# Patient Record
Sex: MALE | Race: WHITE | NOT HISPANIC OR LATINO | Employment: FULL TIME | URBAN - METROPOLITAN AREA
[De-identification: names, ages, dates, MRNs, and addresses within clinical notes are randomized per-mention and may not be internally consistent; named-entity substitution may affect disease eponyms.]

---

## 2017-04-04 RX ORDER — WARFARIN SODIUM 2 MG/1
TABLET ORAL
Qty: 120 TABLET | Refills: 0 | Status: SHIPPED | OUTPATIENT
Start: 2017-04-04 | End: 2017-04-30 | Stop reason: SDUPTHER

## 2017-05-01 RX ORDER — WARFARIN SODIUM 2 MG/1
TABLET ORAL
Qty: 120 TABLET | Refills: 0 | Status: SHIPPED | OUTPATIENT
Start: 2017-05-01 | End: 2017-06-05 | Stop reason: SDUPTHER

## 2017-06-06 RX ORDER — WARFARIN SODIUM 2 MG/1
TABLET ORAL
Qty: 120 TABLET | Refills: 0 | Status: SHIPPED | OUTPATIENT
Start: 2017-06-06 | End: 2017-07-04 | Stop reason: SDUPTHER

## 2017-07-05 RX ORDER — WARFARIN SODIUM 2 MG/1
TABLET ORAL
Qty: 120 TABLET | Refills: 0 | Status: SHIPPED | OUTPATIENT
Start: 2017-07-05 | End: 2017-08-02 | Stop reason: SDUPTHER

## 2017-07-17 RX ORDER — LISINOPRIL 20 MG/1
TABLET ORAL
Qty: 90 TABLET | Refills: 0 | Status: SHIPPED | OUTPATIENT
Start: 2017-07-17 | End: 2017-10-12 | Stop reason: SDUPTHER

## 2017-07-27 ENCOUNTER — OFFICE VISIT (OUTPATIENT)
Dept: CARDIOLOGY | Facility: CLINIC | Age: 37
End: 2017-07-27

## 2017-07-27 VITALS
WEIGHT: 275.2 LBS | BODY MASS INDEX: 38.53 KG/M2 | HEIGHT: 71 IN | HEART RATE: 58 BPM | SYSTOLIC BLOOD PRESSURE: 104 MMHG | DIASTOLIC BLOOD PRESSURE: 60 MMHG

## 2017-07-27 DIAGNOSIS — I42.8 NONISCHEMIC CARDIOMYOPATHY (HCC): ICD-10-CM

## 2017-07-27 DIAGNOSIS — Q23.1 AORTIC INSUFFICIENCY DUE TO BICUSPID AORTIC VALVE: Primary | ICD-10-CM

## 2017-07-27 DIAGNOSIS — I10 ESSENTIAL HYPERTENSION: ICD-10-CM

## 2017-07-27 DIAGNOSIS — Z95.2 HISTORY OF MECHANICAL AORTIC VALVE REPLACEMENT: ICD-10-CM

## 2017-07-27 PROBLEM — Q23.81 AORTIC INSUFFICIENCY DUE TO BICUSPID AORTIC VALVE: Status: ACTIVE | Noted: 2017-07-27

## 2017-07-27 PROCEDURE — 99213 OFFICE O/P EST LOW 20 MIN: CPT | Performed by: INTERNAL MEDICINE

## 2017-07-27 PROCEDURE — 93000 ELECTROCARDIOGRAM COMPLETE: CPT | Performed by: INTERNAL MEDICINE

## 2017-07-27 NOTE — PROGRESS NOTES
Date of Office Visit: 2017  Encounter Provider: Lorne Kam MD  Place of Service: [facility]  Patient Name: Man Ramsey  :1980    Chief Complaint   Patient presents with   • Cardiomyopathy     1 YR FOLLOW UP    :     HPI: Man Ramsey is a 37 y.o. male who presents today to followup. He has a history of a bicuspid aortic valve with severe aortic insufficiency which caused  nonischemic dilated cardiomyopathy. He underwent mechanical aortic valve replacement and had complete recovery of left ventricular systolic function. He has been maintained on  wararin and has had no cardiac issues. He denies dyspnea, chest pain, palpitations, syncope, or edema.        Past Medical History:   Diagnosis Date   • Aortic insufficiency due to bicuspid aortic valve     with severe AI, s/p 29mm ATS mechanical AVR 2012.  Echo has shown normal function but mild AI.   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  He recovered with oral steroids.  GIANNA was negative for endocarditis.   • Hypertension    • Nonischemic cardiomyopathy     due to AI, LVEF 40% with LVE 2012; improved to EF 57% in 2012 with mild LV dilation.  Echo 3/2014 with normal LV size and systolic function   • Obesity    • Ocular migraine        Past Surgical History:   Procedure Laterality Date   • AORTIC VALVE REPAIR/REPLACEMENT      replacement       Social History     Social History   • Marital status: Single     Spouse name: N/A   • Number of children: N/A   • Years of education: N/A     Occupational History   • Not on file.     Social History Main Topics   • Smoking status: Never Smoker   • Smokeless tobacco: Never Used      Comment: caffeine use/ 2-3 CUPS OF COFFEE DAILY    • Alcohol use 1.2 oz/week     1 Cans of beer, 1 Shots of liquor per week      Comment: EITHER OR DAILY, NOT BOTH.    • Drug use: No   • Sexual  "activity: Not on file     Other Topics Concern   • Not on file     Social History Narrative       History reviewed. No pertinent family history.    Review of Systems   HENT: Positive for nosebleeds.    All other systems reviewed and are negative.      No Known Allergies      Current Outpatient Prescriptions:   •  Ascorbic Acid (VITAMIN C PO), Take  by mouth Daily., Disp: , Rfl:   •  carvedilol (COREG) 12.5 MG tablet, TAKE ONE TABLET BY MOUTH TWICE DAILY, Disp: 180 tablet, Rfl: 3  •  Cholecalciferol (VITAMIN D) 1000 UNITS tablet, Take 2 tablets by mouth., Disp: , Rfl:   •  Cyanocobalamin (VITAMIN B 12 PO), Take  by mouth Daily., Disp: , Rfl:   •  lisinopril (PRINIVIL,ZESTRIL) 20 MG tablet, TAKE ONE TABLET BY MOUTH ONCE DAILY, Disp: 90 tablet, Rfl: 0  •  Melatonin 3 MG capsule, Take  by mouth., Disp: , Rfl:   •  Multiple Vitamin tablet, Take  by mouth daily., Disp: , Rfl:   •  warfarin (COUMADIN) 2 MG tablet, TAKE FOUR TABLETS BY MOUTH ONCE DAILY, Disp: 120 tablet, Rfl: 0     Objective:     Vitals:    07/27/17 1148   BP: 104/60   Pulse: 58   Weight: 275 lb 3.2 oz (125 kg)   Height: 71\" (180.3 cm)     Body mass index is 38.38 kg/(m^2).    Physical Exam   Constitutional: He is oriented to person, place, and time. He appears well-developed and well-nourished.   HENT:   Head: Normocephalic.   Nose: Nose normal.   Mouth/Throat: Oropharynx is clear and moist.   Eyes: Conjunctivae and EOM are normal. Pupils are equal, round, and reactive to light.   Neck: Normal range of motion. No JVD present.   Cardiovascular: Normal rate, regular rhythm, normal heart sounds and intact distal pulses.    No murmur heard.  Mechanical S2   Pulmonary/Chest: Effort normal and breath sounds normal.   Abdominal: Soft. He exhibits no mass. There is no tenderness.   Musculoskeletal: Normal range of motion. He exhibits no edema.   Lymphadenopathy:     He has no cervical adenopathy.   Neurological: He is alert and oriented to person, place, and time. " No cranial nerve deficit.   Skin: Skin is warm and dry. No rash noted.   Psychiatric: He has a normal mood and affect. His behavior is normal. Judgment and thought content normal.   Vitals reviewed.        ECG 12 Lead  Date/Time: 7/27/2017 4:45 PM  Performed by: LORNE KAM  Authorized by: LORNE KAM   Comparison: compared with previous ECG   Similar to previous ECG  Rhythm: sinus rhythm  Conduction: conduction normal  ST Segments: ST segments normal  T Waves: T waves normal  QRS axis: normal  Other: no other findings  Clinical impression: normal ECG              Assessment:       Diagnosis Plan   1. Aortic insufficiency due to bicuspid aortic valve     2. History of mechanical aortic valve replacement     3. Essential hypertension     4. Nonischemic cardiomyopathy            Plan:       He is status post mechanical aortic valve replacement for severe bicuspid aortic insufficiency.  He had nonischemic dilated cardiopathy which resolved.  I will repeat an echocardiogram in 2019, which will be 5 years since his last study.  His blood pressure is well-controlled.  We follow his INR in clinic.    As always, it has been a pleasure to participate in your patient's care.      Sincerely,       Lorne Kam MD

## 2017-08-02 RX ORDER — WARFARIN SODIUM 2 MG/1
TABLET ORAL
Qty: 360 TABLET | Refills: 0 | Status: SHIPPED | OUTPATIENT
Start: 2017-08-02 | End: 2017-11-01 | Stop reason: SDUPTHER

## 2017-08-14 RX ORDER — CARVEDILOL 12.5 MG/1
TABLET ORAL
Qty: 180 TABLET | Refills: 3 | Status: SHIPPED | OUTPATIENT
Start: 2017-08-14 | End: 2018-07-27 | Stop reason: SDUPTHER

## 2017-09-15 ENCOUNTER — TELEPHONE (OUTPATIENT)
Dept: CARDIOLOGY | Facility: HOSPITAL | Age: 37
End: 2017-09-15

## 2017-10-12 RX ORDER — LISINOPRIL 20 MG/1
TABLET ORAL
Qty: 90 TABLET | Refills: 1 | Status: SHIPPED | OUTPATIENT
Start: 2017-10-12 | End: 2018-04-21 | Stop reason: SDUPTHER

## 2017-11-01 RX ORDER — WARFARIN SODIUM 2 MG/1
TABLET ORAL
Qty: 360 TABLET | Refills: 0 | Status: SHIPPED | OUTPATIENT
Start: 2017-11-01 | End: 2018-02-06 | Stop reason: SDUPTHER

## 2017-11-09 ENCOUNTER — TELEPHONE (OUTPATIENT)
Dept: CARDIOLOGY | Facility: HOSPITAL | Age: 37
End: 2017-11-09

## 2018-02-06 RX ORDER — WARFARIN SODIUM 2 MG/1
TABLET ORAL
Qty: 360 TABLET | Refills: 0 | Status: SHIPPED | OUTPATIENT
Start: 2018-02-06 | End: 2018-05-02 | Stop reason: SDUPTHER

## 2018-02-08 ENCOUNTER — TELEPHONE (OUTPATIENT)
Dept: CARDIOLOGY | Facility: HOSPITAL | Age: 38
End: 2018-02-08

## 2018-04-23 RX ORDER — LISINOPRIL 20 MG/1
TABLET ORAL
Qty: 90 TABLET | Refills: 1 | Status: SHIPPED | OUTPATIENT
Start: 2018-04-23 | End: 2018-10-25 | Stop reason: SDUPTHER

## 2018-05-03 ENCOUNTER — TELEPHONE (OUTPATIENT)
Dept: CARDIOLOGY | Facility: CLINIC | Age: 38
End: 2018-05-03

## 2018-05-03 RX ORDER — WARFARIN SODIUM 2 MG/1
TABLET ORAL
Qty: 360 TABLET | Refills: 0 | Status: SHIPPED | OUTPATIENT
Start: 2018-05-03 | End: 2018-07-30 | Stop reason: SDUPTHER

## 2018-05-03 NOTE — TELEPHONE ENCOUNTER
Received a refill request for pt's Warfarin.  Pt's last check was 04/05/18 to recheck in 2 wks.      I called pt as a reminder to have his INR checked.  Pt reports that he checked it last night but for forgot to call it in.  Pt states he will call it in today when he gets home.

## 2018-07-23 NOTE — PROGRESS NOTES
Date of Office Visit: 2018  Encounter Provider: Lorne Kam MD  Place of Service: [facility]  Patient Name: Man Ramsey  :1980    Chief Complaint   Patient presents with   • Cardiac Valve Problem     1 yr follow up    :     HPI: Man Ramsey is a 38 y.o. male who presents today to followup. He has a history of a bicuspid aortic valve with severe aortic insufficiency which caused  nonischemic dilated cardiomyopathy. He underwent mechanical aortic valve replacement and had complete recovery of left ventricular systolic function. He has been maintained on warfarin and has had no cardiac issues. He denies dyspnea, chest pain, syncope, or edema.  He has isolated palpitations which are mild and infrequent.    Past Medical History:   Diagnosis Date   • Aortic insufficiency due to bicuspid aortic valve     with severe AI, s/p 29mm ATS mechanical AVR 2012.  Echo has shown normal function but mild AI.   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  He recovered with oral steroids.  GIANNA was negative for endocarditis.   • Hypertension    • Nonischemic cardiomyopathy (CMS/HCC)     due to AI, LVEF 40% with LVE 2012; improved to EF 57% in 2012 with mild LV dilation.  Echo 3/2014 with normal LV size and systolic function   • Obesity    • Ocular migraine        Past Surgical History:   Procedure Laterality Date   • AORTIC VALVE REPAIR/REPLACEMENT      replacement       Social History     Social History   • Marital status: Single     Spouse name: N/A   • Number of children: N/A   • Years of education: N/A     Occupational History   • Not on file.     Social History Main Topics   • Smoking status: Never Smoker   • Smokeless tobacco: Never Used      Comment: caffeine use/ 2-3 CUPS OF TEADAILY    • Alcohol use 1.2 oz/week     1 Cans of beer, 1 Shots of liquor per week      Comment: EITHER  "OR DAILY, NOT BOTH.    • Drug use: No   • Sexual activity: Not on file     Other Topics Concern   • Not on file     Social History Narrative   • No narrative on file       History reviewed. No pertinent family history.    Review of Systems   Cardiovascular: Positive for palpitations. Negative for leg swelling.   Respiratory: Negative for shortness of breath.    Neurological: Positive for headaches. Negative for dizziness and light-headedness.   All other systems reviewed and are negative.      No Known Allergies      Current Outpatient Prescriptions:   •  Ascorbic Acid (VITAMIN C PO), Take  by mouth Daily., Disp: , Rfl:   •  carvedilol (COREG) 12.5 MG tablet, TAKE ONE TABLET BY MOUTH TWICE DAILY, Disp: 180 tablet, Rfl: 3  •  Cholecalciferol (VITAMIN D) 1000 UNITS tablet, Take 2 tablets by mouth., Disp: , Rfl:   •  Cyanocobalamin (VITAMIN B 12 PO), Take  by mouth Daily., Disp: , Rfl:   •  lisinopril (PRINIVIL,ZESTRIL) 20 MG tablet, TAKE ONE TABLET BY MOUTH ONCE DAILY, Disp: 90 tablet, Rfl: 1  •  Melatonin 3 MG capsule, Take  by mouth., Disp: , Rfl:   •  Multiple Vitamin tablet, Take  by mouth daily., Disp: , Rfl:   •  warfarin (COUMADIN) 2 MG tablet, TAKE 4 TABLETS BY MOUTH ONCE DAILY AS DIRECTED, Disp: 360 tablet, Rfl: 0     Objective:     Vitals:    07/30/18 0930   BP: 112/66   BP Location: Left arm   Pulse: 67   Weight: 128 kg (281 lb 3.2 oz)   Height: 180.3 cm (71\")     Body mass index is 39.22 kg/m².    Physical Exam   Constitutional: He is oriented to person, place, and time. He appears well-developed and well-nourished.   HENT:   Head: Normocephalic.   Nose: Nose normal.   Mouth/Throat: Oropharynx is clear and moist.   Eyes: Pupils are equal, round, and reactive to light. Conjunctivae and EOM are normal.   Neck: Normal range of motion. No JVD present.   Cardiovascular: Normal rate, regular rhythm, normal heart sounds and intact distal pulses.    No murmur heard.  Mechanical S2   Pulmonary/Chest: Effort normal " and breath sounds normal.   Abdominal: Soft. He exhibits no mass. There is no tenderness.   Musculoskeletal: Normal range of motion. He exhibits no edema.   Lymphadenopathy:     He has no cervical adenopathy.   Neurological: He is alert and oriented to person, place, and time. No cranial nerve deficit.   Skin: Skin is warm and dry. No rash noted.   Psychiatric: He has a normal mood and affect. His behavior is normal. Judgment and thought content normal.   Vitals reviewed.        ECG 12 Lead  Date/Time: 7/30/2018 10:51 AM  Performed by: LORNE KAM  Authorized by: LORNE KAM   Comparison: compared with previous ECG   Similar to previous ECG  Rhythm: sinus rhythm  Conduction: conduction normal  ST Segments: ST segments normal  T Waves: T waves normal  QRS axis: normal  Other: no other findings  Clinical impression: normal ECG              Assessment:       Diagnosis Plan   1. Aortic insufficiency due to bicuspid aortic valve  Adult Transthoracic Echo Complete W/ Cont if Necessary Per Protocol   2. History of mechanical aortic valve replacement  Adult Transthoracic Echo Complete W/ Cont if Necessary Per Protocol   3. Nonischemic cardiomyopathy (CMS/HCC)  Adult Transthoracic Echo Complete W/ Cont if Necessary Per Protocol   4. Essential hypertension            Plan:       He is status post mechanical aortic valve replacement for severe bicuspid aortic insufficiency.  He had nonischemic dilated cardiopathy which resolved.  I will repeat an echocardiogram in 2019, which will be 5 years since his last study.  His blood pressure is well-controlled. His INR in well managed.     As always, it has been a pleasure to participate in your patient's care.      Sincerely,       Lorne Kam MD

## 2018-07-27 RX ORDER — CARVEDILOL 12.5 MG/1
TABLET ORAL
Qty: 180 TABLET | Refills: 3 | Status: SHIPPED | OUTPATIENT
Start: 2018-07-27 | End: 2019-08-19 | Stop reason: SDUPTHER

## 2018-07-30 ENCOUNTER — OFFICE VISIT (OUTPATIENT)
Dept: CARDIOLOGY | Facility: CLINIC | Age: 38
End: 2018-07-30

## 2018-07-30 VITALS
DIASTOLIC BLOOD PRESSURE: 66 MMHG | WEIGHT: 281.2 LBS | SYSTOLIC BLOOD PRESSURE: 112 MMHG | HEIGHT: 71 IN | HEART RATE: 67 BPM | BODY MASS INDEX: 39.37 KG/M2

## 2018-07-30 DIAGNOSIS — Z95.2 HISTORY OF MECHANICAL AORTIC VALVE REPLACEMENT: ICD-10-CM

## 2018-07-30 DIAGNOSIS — Q23.1 AORTIC INSUFFICIENCY DUE TO BICUSPID AORTIC VALVE: Primary | ICD-10-CM

## 2018-07-30 DIAGNOSIS — I42.8 NONISCHEMIC CARDIOMYOPATHY (HCC): ICD-10-CM

## 2018-07-30 DIAGNOSIS — I10 ESSENTIAL HYPERTENSION: ICD-10-CM

## 2018-07-30 PROCEDURE — 93000 ELECTROCARDIOGRAM COMPLETE: CPT | Performed by: INTERNAL MEDICINE

## 2018-07-30 PROCEDURE — 99213 OFFICE O/P EST LOW 20 MIN: CPT | Performed by: INTERNAL MEDICINE

## 2018-07-30 RX ORDER — WARFARIN SODIUM 2 MG/1
TABLET ORAL
Qty: 360 TABLET | Refills: 0 | Status: SHIPPED | OUTPATIENT
Start: 2018-07-30 | End: 2018-10-31 | Stop reason: SDUPTHER

## 2018-10-25 RX ORDER — LISINOPRIL 20 MG/1
20 TABLET ORAL DAILY
Qty: 90 TABLET | Refills: 2 | Status: SHIPPED | OUTPATIENT
Start: 2018-10-25 | End: 2019-07-14 | Stop reason: SDUPTHER

## 2018-10-31 RX ORDER — WARFARIN SODIUM 2 MG/1
TABLET ORAL
Qty: 360 TABLET | Refills: 0 | Status: SHIPPED | OUTPATIENT
Start: 2018-10-31 | End: 2019-01-29 | Stop reason: SDUPTHER

## 2018-11-27 ENCOUNTER — TELEPHONE (OUTPATIENT)
Dept: PHARMACY | Facility: HOSPITAL | Age: 38
End: 2018-11-27

## 2018-12-05 LAB — INR PPP: 3.7

## 2018-12-06 ENCOUNTER — ANTICOAGULATION VISIT (OUTPATIENT)
Dept: PHARMACY | Facility: HOSPITAL | Age: 38
End: 2018-12-06

## 2018-12-06 ENCOUNTER — TELEPHONE (OUTPATIENT)
Dept: PHARMACY | Facility: HOSPITAL | Age: 38
End: 2018-12-06

## 2018-12-06 DIAGNOSIS — Z79.01 LONG TERM (CURRENT) USE OF ANTICOAGULANTS: ICD-10-CM

## 2018-12-06 DIAGNOSIS — Z95.2 HISTORY OF MECHANICAL AORTIC VALVE REPLACEMENT: ICD-10-CM

## 2018-12-06 NOTE — PROGRESS NOTES
Anticoagulation Clinic Progress Note    Anticoagulation Summary  As of 12/6/2018    INR goal:   2.5-3.5   TTR:   --   INR used for dosing:   3.70! (12/5/2018)   Warfarin maintenance plan:   8 mg every day   Weekly warfarin total:   56 mg   Plan last modified:   Forest Fisher RPH (12/6/2018)   Next INR check:   12/12/2018   Priority:   Maintenance   Target end date:   Indefinite    Indications    History of mechanical aortic valve replacement [Z95.2]  Long term (current) use of anticoagulants [Z79.01]             Anticoagulation Episode Summary     INR check location:   Home Draw    Preferred lab:       Send INR reminders to:   LISA LECHUGA  POOL    Comments:   **[facility]**      Anticoagulation Care Providers     Provider Role Specialty Phone number    Lorne Kam MD Referring Cardiology [phone]            Education:  Man Ramsey is a new start in the [facility].     Man Ramsey was mailed information regarding the transition. he expressed verbal consent and agreement to receive care through the [facility] under the current collaborative care agreement with [facility].     Pt has agreed to only be called if INR out of range. They have been instructed to call if any changes in medications, doses, concerns, etc. Patient expresses understanding and has no further questions at this time.    INR History:  Previous INR data from [facility] is recorded in [facility] and scanned into the patient's chart in [facility]. These results have been analyzed and reviewed.    Plan:  1. INR is Supratherapeutic today- see above in Anticoagulation Summary.   Will instruct Man Ramsey to Continue their warfarin regimen- see above in Anticoagulation Summary.  2. Follow-up in 1 week  3. Patient declines warfarin refills.  4. Verbal and any requested printed information provided. Patient expresses understanding and has no further  questions at this time.    Forest Fisher RPH

## 2018-12-12 LAB — INR PPP: 3

## 2018-12-13 ENCOUNTER — ANTICOAGULATION VISIT (OUTPATIENT)
Dept: PHARMACY | Facility: HOSPITAL | Age: 38
End: 2018-12-13

## 2018-12-13 DIAGNOSIS — Z79.01 LONG TERM (CURRENT) USE OF ANTICOAGULANTS: ICD-10-CM

## 2018-12-13 DIAGNOSIS — Z95.2 HISTORY OF MECHANICAL AORTIC VALVE REPLACEMENT: ICD-10-CM

## 2018-12-13 NOTE — PROGRESS NOTES
Anticoagulation Clinic Progress Note    Anticoagulation Summary  As of 12/13/2018    INR goal:   2.5-3.5   TTR:   --   INR used for dosing:   3.00 (12/12/2018)   Warfarin maintenance plan:   8 mg every day   Weekly warfarin total:   56 mg   No change documented:   Forest Fisher RPH   Plan last modified:   Forest Fisher RPH (12/6/2018)   Next INR check:   12/26/2018   Priority:   Maintenance   Target end date:   Indefinite    Indications    History of mechanical aortic valve replacement [Z95.2]  Long term (current) use of anticoagulants [Z79.01]             Anticoagulation Episode Summary     INR check location:   Home Draw    Preferred lab:       Send INR reminders to:    JOSE LECHUGA  POOL    Comments:   **[facility]**      Anticoagulation Care Providers     Provider Role Specialty Phone number    Lorne Kam MD Referring Cardiology [phone]          Drug interactions: has remained unchanged.  Diet: has remained unchanged.    Clinic Interview:  No pertinent clinical findings have been reported.    INR History:  Anticoagulation Monitoring 12/6/2018 12/13/2018   INR 3.70 3.00   INR Date 12/5/2018 12/12/2018   INR Goal 2.5-3.5 2.5-3.5   Trend - Same   Last Week Total 32 mg 56 mg   Next Week Total 56 mg 56 mg   Sun 8 mg 8 mg   Mon 8 mg 8 mg   Tue 8 mg 8 mg   Wed - 8 mg   Thu 8 mg 8 mg   Fri 8 mg 8 mg   Sat 8 mg 8 mg   Visit Report - -       Plan:  1. INR is Therapeutic today- see above in Anticoagulation Summary.   Will instruct Man Ramsey to Continue their warfarin regimen- see above in Anticoagulation Summary.  2. Follow up in 2 weeks  3. Pt has agreed to only be called if INR out of range. They have been instructed to call if any changes in medications, doses, concerns, etc.     Forest Fisher RPH

## 2018-12-27 ENCOUNTER — ANTICOAGULATION VISIT (OUTPATIENT)
Dept: PHARMACY | Facility: HOSPITAL | Age: 38
End: 2018-12-27

## 2018-12-27 DIAGNOSIS — Z79.01 LONG TERM (CURRENT) USE OF ANTICOAGULANTS: ICD-10-CM

## 2018-12-27 DIAGNOSIS — Z95.2 HISTORY OF MECHANICAL AORTIC VALVE REPLACEMENT: ICD-10-CM

## 2018-12-27 LAB — INR PPP: 3.3

## 2018-12-27 NOTE — PROGRESS NOTES
Anticoagulation Clinic Progress Note    Anticoagulation Summary  As of 12/27/2018    INR goal:   2.5-3.5   TTR:   100.0 % (1.6 wk)   INR used for dosing:   3.30 (12/27/2018)   Warfarin maintenance plan:   8 mg every day   Weekly warfarin total:   56 mg   No change documented:   Joan Spears [facility]   Plan last modified:   Forest Fisher [facility] (12/6/2018)   Next INR check:   1/10/2019   Priority:   Maintenance   Target end date:   Indefinite    Indications    History of mechanical aortic valve replacement [Z95.2]  Long term (current) use of anticoagulants [Z79.01]             Anticoagulation Episode Summary     INR check location:   Home Draw    Preferred lab:       Send INR reminders to:    JOSE LECHUGA  POOL    Comments:   **[facility]**      Anticoagulation Care Providers     Provider Role Specialty Phone number    Lorne Kam MD Referring Cardiology [phone]          Clinic Interview:  No pertinent clinical findings have been reported.    INR History:  Anticoagulation Monitoring 12/6/2018 12/13/2018 12/27/2018   INR 3.70 3.00 3.30   INR Date 12/5/2018 12/12/2018 12/27/2018   INR Goal 2.5-3.5 2.5-3.5 2.5-3.5   Trend - Same Same   Last Week Total 32 mg 56 mg 56 mg   Next Week Total 56 mg 56 mg 56 mg   Sun 8 mg 8 mg 8 mg   Mon 8 mg 8 mg 8 mg   Tue 8 mg 8 mg 8 mg   Wed - 8 mg 8 mg   Thu 8 mg 8 mg 8 mg   Fri 8 mg 8 mg 8 mg   Sat 8 mg 8 mg 8 mg   Visit Report - - -   Some recent data might be hidden       Plan:  1. INR is therapeutic today- see above in Anticoagulation Summary.    Man Ramsey to continue their warfarin regimen- see above in Anticoagulation Summary.  2. Follow up in 2 weeks  3. Pt has agreed to only be called if INR out of range. They have been instructed to call if any changes in medications, doses, concerns, etc. .    Joan Spears [facility]

## 2019-01-09 LAB — INR PPP: 3.5

## 2019-01-10 ENCOUNTER — ANTICOAGULATION VISIT (OUTPATIENT)
Dept: PHARMACY | Facility: HOSPITAL | Age: 39
End: 2019-01-10

## 2019-01-10 DIAGNOSIS — Z79.01 LONG TERM (CURRENT) USE OF ANTICOAGULANTS: ICD-10-CM

## 2019-01-10 DIAGNOSIS — Z95.2 HISTORY OF MECHANICAL AORTIC VALVE REPLACEMENT: ICD-10-CM

## 2019-01-23 LAB — INR PPP: 3

## 2019-01-24 ENCOUNTER — ANTICOAGULATION VISIT (OUTPATIENT)
Dept: PHARMACY | Facility: HOSPITAL | Age: 39
End: 2019-01-24

## 2019-01-24 DIAGNOSIS — Z95.2 HISTORY OF MECHANICAL AORTIC VALVE REPLACEMENT: ICD-10-CM

## 2019-01-24 DIAGNOSIS — Z79.01 LONG TERM (CURRENT) USE OF ANTICOAGULANTS: ICD-10-CM

## 2019-01-24 NOTE — PROGRESS NOTES
Anticoagulation Clinic Progress Note    Anticoagulation Summary  As of 1/24/2019    INR goal:   2.5-3.5   TTR:   100.0 % (1.3 mo)   INR used for dosing:   3.00 (1/23/2019)   Warfarin maintenance plan:   8 mg every day   Weekly warfarin total:   56 mg   Plan last modified:   Forest Fisher RPH (12/6/2018)   Next INR check:   2/6/2019   Priority:   Maintenance   Target end date:   Indefinite    Indications    History of mechanical aortic valve replacement [Z95.2]  Long term (current) use of anticoagulants [Z79.01]             Anticoagulation Episode Summary     INR check location:   Home Draw    Preferred lab:       [facility]    Comments:   **[facility]**      Anticoagulation Care Providers     Provider Role Specialty Phone number    Lorne Kam MD Referring Cardiology [phone]          Clinic Interview:  No pertinent clinical findings have been reported.    INR History:  Anticoagulation Monitoring 12/27/2018 1/10/2019 1/24/2019   INR 3.30 3.50 3.00   INR Date 12/27/2018 1/9/2019 1/23/2019   INR Goal 2.5-3.5 2.5-3.5 2.5-3.5   Trend Same Same Same   Last Week Total 56 mg 56 mg 56 mg   Next Week Total 56 mg 56 mg 56 mg   Sun 8 mg 8 mg 8 mg   Mon 8 mg 8 mg 8 mg   Tue 8 mg 8 mg 8 mg   Wed 8 mg 8 mg 8 mg   Thu 8 mg 8 mg 8 mg   Fri 8 mg 8 mg 8 mg   Sat 8 mg 8 mg 8 mg   Visit Report - - -   Some recent data might be hidden       Plan:  1. INR is therapeutic today- see above in Anticoagulation Summary.    Man Ramsey to continue their warfarin regimen- see above in Anticoagulation Summary.  2. Follow up in 2 weeks  3. Pt has agreed to only be called if INR out of range. They have been instructed to call if any changes in medications, doses, concerns, etc. Patient expresses understanding and has no further questions at this time.    Maria Del Rosario Cruz [facility]

## 2019-01-29 RX ORDER — WARFARIN SODIUM 2 MG/1
TABLET ORAL
Qty: 360 TABLET | Refills: 0 | Status: SHIPPED | OUTPATIENT
Start: 2019-01-29 | End: 2019-04-29 | Stop reason: SDUPTHER

## 2019-02-06 LAB — INR PPP: 2.5

## 2019-02-07 ENCOUNTER — ANTICOAGULATION VISIT (OUTPATIENT)
Dept: PHARMACY | Facility: HOSPITAL | Age: 39
End: 2019-02-07

## 2019-02-07 DIAGNOSIS — Z79.01 LONG TERM (CURRENT) USE OF ANTICOAGULANTS: ICD-10-CM

## 2019-02-07 DIAGNOSIS — Z95.2 HISTORY OF MECHANICAL AORTIC VALVE REPLACEMENT: ICD-10-CM

## 2019-02-07 NOTE — PROGRESS NOTES
Anticoagulation Clinic Progress Note    Anticoagulation Summary  As of 2019    INR goal:   2.5-3.5   TTR:   100.0 % (1.7 mo)   INR used for dosin.50 (2019)   Warfarin maintenance plan:   8 mg every day   Weekly warfarin total:   56 mg   No change documented:   Chiquita Escobar RPH   Plan last modified:   Forest Fisher RPH (2018)   Next INR check:   2019   Priority:   Maintenance   Target end date:   Indefinite    Indications    History of mechanical aortic valve replacement [Z95.2]  Long term (current) use of anticoagulants [Z79.01]             Anticoagulation Episode Summary     INR check location:   Home Draw    Preferred lab:       Send INR reminders to:    JOSE LECHUGA  POOL    Comments:   **[facility]**      Anticoagulation Care Providers     Provider Role Specialty Phone number    Lorne Kam MD Referring Cardiology [phone]          Clinic Interview:      INR History:  Anticoagulation Monitoring 1/10/2019 2019 2019   INR 3.50 3.00 2.50   INR Date 2019   INR Goal 2.5-3.5 2.5-3.5 2.5-3.5   Trend Same Same Same   Last Week Total 56 mg 56 mg 56 mg   Next Week Total 56 mg 56 mg 56 mg   Sun 8 mg 8 mg 8 mg   Mon 8 mg 8 mg 8 mg   Tue 8 mg 8 mg 8 mg   Wed 8 mg 8 mg 8 mg   Thu 8 mg 8 mg 8 mg   Fri 8 mg 8 mg 8 mg   Sat 8 mg 8 mg 8 mg   Visit Report - - -   Some recent data might be hidden       Plan:  1. INR is Therapeutic today- see above in Anticoagulation Summary.   Will instruct Man Ramsey to Continue their warfarin regimen- see above in Anticoagulation Summary.  2. Follow up in 2 weeks  3.  Pt has agreed to only be called if INR out of range. They have been instructed to call if any changes in medications, doses, concerns, etc. Patient expresses understanding and has no further questions at this time.    Chiquita Escobar RPH

## 2019-02-21 ENCOUNTER — ANTICOAGULATION VISIT (OUTPATIENT)
Dept: PHARMACY | Facility: HOSPITAL | Age: 39
End: 2019-02-21

## 2019-02-21 DIAGNOSIS — Z79.01 LONG TERM (CURRENT) USE OF ANTICOAGULANTS: ICD-10-CM

## 2019-02-21 DIAGNOSIS — Z95.2 HISTORY OF MECHANICAL AORTIC VALVE REPLACEMENT: ICD-10-CM

## 2019-02-21 LAB — INR PPP: 3.7

## 2019-02-21 NOTE — PROGRESS NOTES
Anticoagulation Clinic Progress Note    Anticoagulation Summary  As of 2/21/2019    INR goal:   2.5-3.5   TTR:   96.3 % (2.2 mo)   INR used for dosing:   3.70! (2/21/2019)   Warfarin maintenance plan:   8 mg every day   Weekly warfarin total:   56 mg   Plan last modified:   Forest Fisher RP (12/6/2018)   Next INR check:   3/7/2019   Priority:   Maintenance   Target end date:   Indefinite    Indications    History of mechanical aortic valve replacement [Z95.2]  Long term (current) use of anticoagulants [Z79.01]             Anticoagulation Episode Summary     INR check location:   Home Draw    Preferred lab:       Send INR reminders to:    [facility]  POOL    Comments:   **[facility]**      Anticoagulation Care Providers     Provider Role Specialty Phone number    Lorne Kam MD Referring Cardiology [phone]          Clinic Interview:  drinking alcohol more, 2x in the evening and instead of 1x week.     INR History:  Anticoagulation Monitoring 1/24/2019 2/7/2019 2/21/2019   INR 3.00 2.50 3.70   INR Date 1/23/2019 2/6/2019 2/21/2019   INR Goal 2.5-3.5 2.5-3.5 2.5-3.5   Trend Same Same Same   Last Week Total 56 mg 56 mg 56 mg   Next Week Total 56 mg 56 mg 56 mg   Sun 8 mg 8 mg 8 mg   Mon 8 mg 8 mg 8 mg   Tue 8 mg 8 mg 8 mg   Wed 8 mg 8 mg 8 mg   Thu 8 mg 8 mg 8 mg   Fri 8 mg 8 mg 8 mg   Sat 8 mg 8 mg 8 mg   Visit Report - - -   Some recent data might be hidden       Plan:  1. INR is Supratherapeutic today- see above in Anticoagulation Summary.   Will instruct Man Ramsey to Continue their warfarin regimen- see above in Anticoagulation Summary.  2. Follow up in 2 weeks  3.  Pt has agreed to only be called if INR out of range. They have been instructed to call if any changes in medications, doses, concerns, etc. Patient expresses understanding and has no further questions at this time.    Chiquita Escobar [facility]

## 2019-03-06 LAB — INR PPP: 3.5

## 2019-03-07 ENCOUNTER — ANTICOAGULATION VISIT (OUTPATIENT)
Dept: PHARMACY | Facility: HOSPITAL | Age: 39
End: 2019-03-07

## 2019-03-07 DIAGNOSIS — Z95.2 HISTORY OF MECHANICAL AORTIC VALVE REPLACEMENT: ICD-10-CM

## 2019-03-07 DIAGNOSIS — Z79.01 LONG TERM (CURRENT) USE OF ANTICOAGULANTS: ICD-10-CM

## 2019-03-20 LAB — INR PPP: 3.3

## 2019-03-21 ENCOUNTER — ANTICOAGULATION VISIT (OUTPATIENT)
Dept: PHARMACY | Facility: HOSPITAL | Age: 39
End: 2019-03-21

## 2019-03-21 DIAGNOSIS — Z79.01 LONG TERM (CURRENT) USE OF ANTICOAGULANTS: ICD-10-CM

## 2019-03-21 DIAGNOSIS — Z95.2 HISTORY OF MECHANICAL AORTIC VALVE REPLACEMENT: ICD-10-CM

## 2019-03-21 NOTE — PROGRESS NOTES
Anticoagulation Clinic Progress Note    Anticoagulation Summary  As of 3/21/2019    INR goal:   2.5-3.5   TTR:   83.6 % (3.1 mo)   INR used for dosing:   3.30 (3/20/2019)   Warfarin maintenance plan:   8 mg every day   Weekly warfarin total:   56 mg   No change documented:   Yecenia Burns   Plan last modified:   Forest Fisher [facility] (12/6/2018)   Next INR check:   4/4/2019   Priority:   Maintenance   Target end date:   Indefinite    Indications    History of mechanical aortic valve replacement [Z95.2]  Long term (current) use of anticoagulants [Z79.01]             Anticoagulation Episode Summary     INR check location:   Home Draw    Preferred lab:       Send INR reminders to:    JOSE [facility]  POOL    Comments:   **[facility]**      Anticoagulation Care Providers     Provider Role Specialty Phone number    Lorne Kam MD Referring Cardiology [phone]          Clinic Interview:  No pertinent clinical findings have been reported.    INR History:  Anticoagulation Monitoring 2/21/2019 3/7/2019 3/21/2019   INR 3.70 3.50 3.30   INR Date 2/21/2019 3/6/2019 3/20/2019   INR Goal 2.5-3.5 2.5-3.5 2.5-3.5   Trend Same Same Same   Last Week Total 56 mg 56 mg 56 mg   Next Week Total 56 mg 56 mg 56 mg   Sun 8 mg 8 mg 8 mg   Mon 8 mg 8 mg 8 mg   Tue 8 mg 8 mg 8 mg   Wed 8 mg 8 mg 8 mg   Thu 8 mg 8 mg 8 mg   Fri 8 mg 8 mg 8 mg   Sat 8 mg 8 mg 8 mg   Visit Report - - -   Some recent data might be hidden       Plan:  1. INR is therapeutic today- see above in Anticoagulation Summary.    Man Ramsey to continue their warfarin regimen- see above in Anticoagulation Summary.  2. Follow up in 2 weeks  3. Pt has agreed to only be called if INR out of range. They have been instructed to call if any changes in medications, doses, concerns, etc. Patient expresses understanding and has no further questions at this time.    Yecenia Burns

## 2019-04-03 LAB — INR PPP: 3.7

## 2019-04-04 ENCOUNTER — ANTICOAGULATION VISIT (OUTPATIENT)
Dept: PHARMACY | Facility: HOSPITAL | Age: 39
End: 2019-04-04

## 2019-04-04 DIAGNOSIS — Z79.01 LONG TERM (CURRENT) USE OF ANTICOAGULANTS: ICD-10-CM

## 2019-04-04 DIAGNOSIS — Z95.2 HISTORY OF MECHANICAL AORTIC VALVE REPLACEMENT: ICD-10-CM

## 2019-04-04 NOTE — PROGRESS NOTES
Anticoagulation Clinic Progress Note    Anticoagulation Summary  As of 4/4/2019    INR goal:   2.5-3.5   TTR:   79.5 % (3.6 mo)   INR used for dosing:   3.70! (4/3/2019)   Warfarin maintenance plan:   8 mg every day   Weekly warfarin total:   56 mg   No change documented:   Shani Vincent [facility]   Plan last modified:   Forest Fisher [facility] (12/6/2018)   Next INR check:   4/17/2019   Priority:   Maintenance   Target end date:   Indefinite    Indications    History of mechanical aortic valve replacement [Z95.2]  Long term (current) use of anticoagulants [Z79.01]             Anticoagulation Episode Summary     INR check location:   Home Draw    Preferred lab:       Send INR reminders to:    [facility]SE [facility]  POOL    Comments:   **[facility]**      Anticoagulation Care Providers     Provider Role Specialty Phone number    Lorne Kam MD Referring Cardiology [phone]          Drug interactions: has remained unchanged.  Diet: has remained unchanged.    Clinic Interview:      INR History:  Anticoagulation Monitoring 3/7/2019 3/21/2019 4/4/2019   INR 3.50 3.30 3.70   INR Date 3/6/2019 3/20/2019 4/3/2019   INR Goal 2.5-3.5 2.5-3.5 2.5-3.5   Trend Same Same Same   Last Week Total 56 mg 56 mg 56 mg   Next Week Total 56 mg 56 mg 56 mg   Sun 8 mg 8 mg 8 mg   Mon 8 mg 8 mg 8 mg   Tue 8 mg 8 mg 8 mg   Wed 8 mg 8 mg 8 mg   Thu 8 mg 8 mg 8 mg   Fri 8 mg 8 mg 8 mg   Sat 8 mg 8 mg 8 mg   Visit Report - - -   Some recent data might be hidden       Plan:  1. INR is Supratherapeutic today- see above in Anticoagulation Summary.   Will instruct Man Ramsey to Continue their warfarin regimen- see above in Anticoagulation Summary.  2. Follow up in 2 weeks  3. Pt has agreed to only be called if INR out of range. They have been instructed to call if any changes in medications, doses, concerns, etc. Patient expresses understanding and has no further questions at this time.    Shani Vincent [facility]

## 2019-04-17 LAB — INR PPP: 3.8

## 2019-04-18 ENCOUNTER — ANTICOAGULATION VISIT (OUTPATIENT)
Dept: PHARMACY | Facility: HOSPITAL | Age: 39
End: 2019-04-18

## 2019-04-18 DIAGNOSIS — Z95.2 HISTORY OF MECHANICAL AORTIC VALVE REPLACEMENT: ICD-10-CM

## 2019-04-18 DIAGNOSIS — Z79.01 LONG TERM (CURRENT) USE OF ANTICOAGULANTS: ICD-10-CM

## 2019-04-18 NOTE — PROGRESS NOTES
Anticoagulation Clinic Progress Note    Anticoagulation Summary  As of 4/18/2019    INR goal:   2.5-3.5   TTR:   70.4 % (4.1 mo)   INR used for dosing:   3.80! (4/17/2019)   Warfarin maintenance plan:   6 mg every Thu; 8 mg all other days   Weekly warfarin total:   54 mg   Plan last modified:   Shani Vincent [facility] (4/18/2019)   Next INR check:   5/1/2019   Priority:   Maintenance   Target end date:   Indefinite    Indications    History of mechanical aortic valve replacement [Z95.2]  Long term (current) use of anticoagulants [Z79.01]             Anticoagulation Episode Summary     INR check location:   Home Draw    Preferred lab:       Send INR reminders to:    [facility]  POOL    Comments:   **[facility]**      Anticoagulation Care Providers     Provider Role Specialty Phone number    Lorne Kam MD Referring Cardiology [phone]          Drug interactions: has remained unchanged.  Diet: has remained unchanged.    Clinic Interview:      INR History:  Anticoagulation Monitoring 3/21/2019 4/4/2019 4/18/2019   INR 3.30 3.70 3.80   INR Date 3/20/2019 4/3/2019 4/17/2019   INR Goal 2.5-3.5 2.5-3.5 2.5-3.5   Trend Same Same Down   Last Week Total 56 mg 56 mg 56 mg   Next Week Total 56 mg 56 mg 54 mg   Sun 8 mg 8 mg 8 mg   Mon 8 mg 8 mg 8 mg   Tue 8 mg 8 mg 8 mg   Wed 8 mg 8 mg 8 mg   Thu 8 mg 8 mg 6 mg   Fri 8 mg 8 mg 8 mg   Sat 8 mg 8 mg 8 mg   Visit Report - - -   Some recent data might be hidden       Plan:  1. INR is Supratherapeutic today- see above in Anticoagulation Summary.   Will instruct Man Ramsey to Change their warfarin regimen- see above in Anticoagulation Summary.  2. Follow up in 2 weeks  3. Pt has agreed to only be called if INR out of range. They have been instructed to call if any changes in medications, doses, concerns, etc. Patient expresses understanding and has no further questions at this time.    Shani Vincent [facility]

## 2019-04-29 RX ORDER — WARFARIN SODIUM 2 MG/1
TABLET ORAL
Qty: 360 TABLET | Refills: 0 | Status: SHIPPED | OUTPATIENT
Start: 2019-04-29 | End: 2019-07-28 | Stop reason: SDUPTHER

## 2019-05-01 LAB — INR PPP: 3.5

## 2019-05-02 ENCOUNTER — ANTICOAGULATION VISIT (OUTPATIENT)
Dept: PHARMACY | Facility: HOSPITAL | Age: 39
End: 2019-05-02

## 2019-05-02 DIAGNOSIS — Z79.01 LONG TERM (CURRENT) USE OF ANTICOAGULANTS: ICD-10-CM

## 2019-05-02 DIAGNOSIS — Z95.2 HISTORY OF MECHANICAL AORTIC VALVE REPLACEMENT: ICD-10-CM

## 2019-05-02 NOTE — PROGRESS NOTES
Anticoagulation Clinic Progress Note    Anticoagulation Summary  As of 5/2/2019    INR goal:   2.5-3.5   TTR:   63.1 % (4.5 mo)   INR used for dosing:   3.50 (5/1/2019)   Warfarin maintenance plan:   6 mg every Thu; 8 mg all other days   Weekly warfarin total:   54 mg   Plan last modified:   Shani Vincent [facility] (4/18/2019)   Next INR check:   5/15/2019   Priority:   Maintenance   Target end date:   Indefinite    Indications    History of mechanical aortic valve replacement [Z95.2]  Long term (current) use of anticoagulants [Z79.01]             Anticoagulation Episode Summary     INR check location:   Home Draw    Preferred lab:       Send INR reminders to:   [facility]  POOL    Comments:   **[facility]**      Anticoagulation Care Providers     Provider Role Specialty Phone number    Lorne Kam MD Referring Cardiology [phone]          Clinic Interview:  No pertinent clinical findings have been reported.    INR History:  Anticoagulation Monitoring 4/4/2019 4/18/2019 5/2/2019   INR 3.70 3.80 3.50   INR Date 4/3/2019 4/17/2019 5/1/2019   INR Goal 2.5-3.5 2.5-3.5 2.5-3.5   Trend Same Down Same   Last Week Total 56 mg 56 mg 54 mg   Next Week Total 56 mg 54 mg 54 mg   Sun 8 mg 8 mg 8 mg   Mon 8 mg 8 mg 8 mg   Tue 8 mg 8 mg 8 mg   Wed 8 mg 8 mg 8 mg   Thu 8 mg 6 mg 6 mg   Fri 8 mg 8 mg 8 mg   Sat 8 mg 8 mg 8 mg   Visit Report - - -   Some recent data might be hidden       Plan:  1. INR is therapeutic today- see above in Anticoagulation Summary.    Man Ramsey to continue their warfarin regimen- see above in Anticoagulation Summary.  2. Follow up in 2 weeks  3. Pt has agreed to only be called if INR out of range. They have been instructed to call if any changes in medications, doses, concerns, etc. Patient expresses understanding and has no further questions at this time.    Maria Del Rosario Cruz [facility]

## 2019-05-15 LAB — INR PPP: 3.7

## 2019-05-16 ENCOUNTER — ANTICOAGULATION VISIT (OUTPATIENT)
Dept: PHARMACY | Facility: HOSPITAL | Age: 39
End: 2019-05-16

## 2019-05-16 DIAGNOSIS — Z79.01 LONG TERM (CURRENT) USE OF ANTICOAGULANTS: ICD-10-CM

## 2019-05-16 DIAGNOSIS — Z95.2 HISTORY OF MECHANICAL AORTIC VALVE REPLACEMENT: ICD-10-CM

## 2019-05-16 NOTE — PROGRESS NOTES
Anticoagulation Clinic Progress Note    Anticoagulation Summary  As of 5/16/2019    INR goal:   2.5-3.5   TTR:   57.2 % (5 mo)   INR used for dosing:   3.70! (5/15/2019)   Warfarin maintenance plan:   6 mg every Thu; 8 mg all other days   Weekly warfarin total:   54 mg   No change documented:   Neo Cornejo [facility]   Plan last modified:   Shani Vincent [facility] (4/18/2019)   Next INR check:   5/29/2019   Priority:   Maintenance   Target end date:   Indefinite    Indications    History of mechanical aortic valve replacement [Z95.2]  Long term (current) use of anticoagulants [Z79.01]             Anticoagulation Episode Summary     INR check location:   Home Draw    Preferred lab:       Send INR reminders to:    JOSE [facility]  POOL    Comments:   **[facility]**      Anticoagulation Care Providers     Provider Role Specialty Phone number    Lorne Kam MD Referring Cardiology [phone]            Clinic Interview:  Patient Findings     Positives:   Change in diet/appetite    Negatives:   Signs/symptoms of thrombosis, Signs/symptoms of bleeding,   Laboratory test error suspected, Change in health, Change in alcohol use,   Change in activity, Upcoming invasive procedure, Emergency department   visit, Upcoming dental procedure, Missed doses, Extra doses, Change in   medications, Hospital admission, Bruising, Other complaints    Comments:   Week off for (stay-)vacation/out of routine. No more alcohol   than usual. Diet a little different than usual.       Clinical Outcomes     Negatives:   Major bleeding event, Thromboembolic event,   Anticoagulation-related hospital admission, Anticoagulation-related ED   visit, Anticoagulation-related fatality    Comments:   Week off for (stay-)vacation/out of routine. No more alcohol   than usual. Diet a little different than usual.         INR History:  Anticoagulation Monitoring 4/18/2019 5/2/2019 5/16/2019   INR 3.80 3.50 3.70   INR Date 4/17/2019 5/1/2019  5/15/2019   INR Goal 2.5-3.5 2.5-3.5 2.5-3.5   Trend Down Same Same   Last Week Total 56 mg 54 mg 54 mg   Next Week Total 54 mg 54 mg 54 mg   Sun 8 mg 8 mg 8 mg   Mon 8 mg 8 mg 8 mg   Tue 8 mg 8 mg 8 mg   Wed 8 mg 8 mg 8 mg   Thu 6 mg 6 mg 6 mg   Fri 8 mg 8 mg 8 mg   Sat 8 mg 8 mg 8 mg   Visit Report - - -   Some recent data might be hidden       Plan:  1. INR is Supratherapeutic today- see above in Anticoagulation Summary.   Will instruct Man Ramsey to Continue their warfarin regimen (due for lower 6 mg dose today)- see above in Anticoagulation Summary.  2. Follow up in 2 weeks  3. Pt has agreed to only be called if INR out of range. They have been instructed to call if any changes in medications, doses, concerns, etc. Patient expresses understanding and has no further questions at this time.    Neo Cornejo [facility]

## 2019-05-29 LAB — INR PPP: 3.3

## 2019-05-30 ENCOUNTER — ANTICOAGULATION VISIT (OUTPATIENT)
Dept: PHARMACY | Facility: HOSPITAL | Age: 39
End: 2019-05-30

## 2019-05-30 DIAGNOSIS — Z95.2 HISTORY OF MECHANICAL AORTIC VALVE REPLACEMENT: ICD-10-CM

## 2019-05-30 DIAGNOSIS — Z79.01 LONG TERM (CURRENT) USE OF ANTICOAGULANTS: ICD-10-CM

## 2019-05-30 NOTE — PROGRESS NOTES
Anticoagulation Clinic Progress Note    Anticoagulation Summary  As of 5/30/2019    INR goal:   2.5-3.5   TTR:   56.6 % (5.5 mo)   INR used for dosing:   3.30 (5/29/2019)   Warfarin maintenance plan:   6 mg every Thu; 8 mg all other days   Weekly warfarin total:   54 mg   No change documented:   Neo Cornejo RPH   Plan last modified:   Shani Vincent [facility] (4/18/2019)   Next INR check:   6/12/2019   Priority:   Maintenance   Target end date:   Indefinite    Indications    History of mechanical aortic valve replacement [Z95.2]  Long term (current) use of anticoagulants [Z79.01]             Anticoagulation Episode Summary     INR check location:   Home Draw    Preferred lab:       Send INR reminders to:    JOSE [facility]CHUGA  POOL    Comments:   **[facility]**      Anticoagulation Care Providers     Provider Role Specialty Phone number    Lorne Kam MD Referring Cardiology [phone]          Clinic Interview:  No pertinent clinical findings have been reported.    INR History:  Anticoagulation Monitoring 5/2/2019 5/16/2019 5/30/2019   INR 3.50 3.70 3.30   INR Date 5/1/2019 5/15/2019 5/29/2019   INR Goal 2.5-3.5 2.5-3.5 2.5-3.5   Trend Same Same Same   Last Week Total 54 mg 54 mg 54 mg   Next Week Total 54 mg 54 mg 54 mg   Sun 8 mg 8 mg 8 mg   Mon 8 mg 8 mg 8 mg   Tue 8 mg 8 mg 8 mg   Wed 8 mg 8 mg 8 mg   Thu 6 mg 6 mg 6 mg   Fri 8 mg 8 mg 8 mg   Sat 8 mg 8 mg 8 mg   Visit Report - - -   Some recent data might be hidden       Plan:  1. INR is therapeutic today- see above in Anticoagulation Summary.    Man Ramsey to continue their warfarin regimen- see above in Anticoagulation Summary.  2. Follow up in 2 weeks  3. Pt has agreed to only be called if INR out of range. They have been instructed to call if any changes in medications, doses, concerns, etc. Patient expresses understanding and has no further questions at this time.    Neo Cornejo RPH

## 2019-06-12 LAB — INR PPP: 3.3

## 2019-06-13 ENCOUNTER — ANTICOAGULATION VISIT (OUTPATIENT)
Dept: PHARMACY | Facility: HOSPITAL | Age: 39
End: 2019-06-13

## 2019-06-13 DIAGNOSIS — Z95.2 HISTORY OF MECHANICAL AORTIC VALVE REPLACEMENT: ICD-10-CM

## 2019-06-13 DIAGNOSIS — Z79.01 LONG TERM (CURRENT) USE OF ANTICOAGULANTS: ICD-10-CM

## 2019-06-13 NOTE — PROGRESS NOTES
Anticoagulation Clinic Progress Note    Anticoagulation Summary  As of 6/13/2019    INR goal:   2.5-3.5   TTR:   60.0 % (5.9 mo)   INR used for dosing:   3.30 (6/12/2019)   Warfarin maintenance plan:   6 mg every Thu; 8 mg all other days   Weekly warfarin total:   54 mg   No change documented:   Sun Curtis   Plan last modified:   Shani Vincent RP (4/18/2019)   Next INR check:   6/26/2019   Priority:   Maintenance   Target end date:   Indefinite    Indications    History of mechanical aortic valve replacement [Z95.2]  Long term (current) use of anticoagulants [Z79.01]             Anticoagulation Episode Summary     INR check location:   Home Draw    Preferred lab:       Send INR reminders to:    [facility]SE [facility]  POOL    Comments:   **[facility]**      Anticoagulation Care Providers     Provider Role Specialty Phone number    Lorne Kam MD Referring Cardiology [phone]          Clinic Interview:  No pertinent clinical findings have been reported.    INR History:  Anticoagulation Monitoring 5/16/2019 5/30/2019 6/13/2019   INR 3.70 3.30 3.30   INR Date 5/15/2019 5/29/2019 6/12/2019   INR Goal 2.5-3.5 2.5-3.5 2.5-3.5   Trend Same Same Same   Last Week Total 54 mg 54 mg 54 mg   Next Week Total 54 mg 54 mg 54 mg   Sun 8 mg 8 mg 8 mg   Mon 8 mg 8 mg 8 mg   Tue 8 mg 8 mg 8 mg   Wed 8 mg 8 mg 8 mg   Thu 6 mg 6 mg 6 mg   Fri 8 mg 8 mg 8 mg   Sat 8 mg 8 mg 8 mg   Visit Report - - -   Some recent data might be hidden       Plan:  1. INR is therapeutic today- see above in Anticoagulation Summary.    Man Ramsey to continue their warfarin regimen- see above in Anticoagulation Summary.  2. Follow up in 2 weeks  3. Pt has agreed to only be called if INR out of range. They have been instructed to call if any changes in medications, doses, concerns, etc. Patient expresses understanding and has no further questions at this time.    Sun Curtis

## 2019-06-26 LAB — INR PPP: 3.3

## 2019-06-27 ENCOUNTER — ANTICOAGULATION VISIT (OUTPATIENT)
Dept: PHARMACY | Facility: HOSPITAL | Age: 39
End: 2019-06-27

## 2019-06-27 DIAGNOSIS — Z95.2 HISTORY OF MECHANICAL AORTIC VALVE REPLACEMENT: ICD-10-CM

## 2019-06-27 DIAGNOSIS — Z79.01 LONG TERM (CURRENT) USE OF ANTICOAGULANTS: ICD-10-CM

## 2019-06-27 NOTE — PROGRESS NOTES
Anticoagulation Clinic Progress Note    Anticoagulation Summary  As of 6/27/2019    INR goal:   2.5-3.5   TTR:   63.0 % (6.4 mo)   INR used for dosing:   3.30 (6/26/2019)   Warfarin maintenance plan:   6 mg every Thu; 8 mg all other days   Weekly warfarin total:   54 mg   No change documented:   Almaz Faria   Plan last modified:   Shani Vincent [facility] (4/18/2019)   Next INR check:   7/11/2019   Priority:   Maintenance   Target end date:   Indefinite    Indications    History of mechanical aortic valve replacement [Z95.2]  Long term (current) use of anticoagulants [Z79.01]             Anticoagulation Episode Summary     INR check location:   Home Draw    Preferred lab:       Send INR reminders to:    [facility]SE [facility]  POOL    Comments:   **[facility]**      Anticoagulation Care Providers     Provider Role Specialty Phone number    Lorne Kam MD Referring Cardiology [phone]          Clinic Interview:  No pertinent clinical findings have been reported.    INR History:  Anticoagulation Monitoring 5/30/2019 6/13/2019 6/27/2019   INR 3.30 3.30 3.30   INR Date 5/29/2019 6/12/2019 6/26/2019   INR Goal 2.5-3.5 2.5-3.5 2.5-3.5   Trend Same Same Same   Last Week Total 54 mg 54 mg 54 mg   Next Week Total 54 mg 54 mg 54 mg   Sun 8 mg 8 mg 8 mg   Mon 8 mg 8 mg 8 mg   Tue 8 mg 8 mg 8 mg   Wed 8 mg 8 mg 8 mg   Thu 6 mg 6 mg 6 mg   Fri 8 mg 8 mg 8 mg   Sat 8 mg 8 mg 8 mg   Visit Report - - -   Some recent data might be hidden       Plan:  1. INR is therapeutic today- see above in Anticoagulation Summary.    Man Ramsey to continue their warfarin regimen- see above in Anticoagulation Summary.  2. Follow up in 2 weeks  3. Pt has agreed to only be called if INR out of range. They have been instructed to call if any changes in medications, doses, concerns, etc. Patient expresses understanding and has no further questions at this time.    Almaz Faria

## 2019-07-10 LAB — INR PPP: 3

## 2019-07-11 ENCOUNTER — ANTICOAGULATION VISIT (OUTPATIENT)
Dept: PHARMACY | Facility: HOSPITAL | Age: 39
End: 2019-07-11

## 2019-07-11 DIAGNOSIS — Z95.2 HISTORY OF MECHANICAL AORTIC VALVE REPLACEMENT: ICD-10-CM

## 2019-07-11 DIAGNOSIS — Z79.01 LONG TERM (CURRENT) USE OF ANTICOAGULANTS: ICD-10-CM

## 2019-07-11 NOTE — PROGRESS NOTES
Anticoagulation Clinic Progress Note    Anticoagulation Summary  As of 7/11/2019    INR goal:   2.5-3.5   TTR:   65.5 % (6.9 mo)   INR used for dosing:   3.00 (7/10/2019)   Warfarin maintenance plan:   6 mg every Thu; 8 mg all other days   Weekly warfarin total:   54 mg   No change documented:   Yecenia Burns   Plan last modified:   Shani Vincent [facility] (4/18/2019)   Next INR check:   7/24/2019   Priority:   Maintenance   Target end date:   Indefinite    Indications    History of mechanical aortic valve replacement [Z95.2]  Long term (current) use of anticoagulants [Z79.01]             Anticoagulation Episode Summary     INR check location:   Home Draw    Preferred lab:       Send INR reminders to:    [facility]SE [facility]  POOL    Comments:   **[facility]**      Anticoagulation Care Providers     Provider Role Specialty Phone number    Lorne Kam MD Referring Cardiology [phone]          Clinic Interview:  No pertinent clinical findings have been reported.    INR History:  Anticoagulation Monitoring 6/13/2019 6/27/2019 7/11/2019   INR 3.30 3.30 3.00   INR Date 6/12/2019 6/26/2019 7/10/2019   INR Goal 2.5-3.5 2.5-3.5 2.5-3.5   Trend Same Same Same   Last Week Total 54 mg 54 mg 54 mg   Next Week Total 54 mg 54 mg 54 mg   Sun 8 mg 8 mg 8 mg   Mon 8 mg 8 mg 8 mg   Tue 8 mg 8 mg 8 mg   Wed 8 mg 8 mg 8 mg   Thu 6 mg 6 mg 6 mg   Fri 8 mg 8 mg 8 mg   Sat 8 mg 8 mg 8 mg   Visit Report - - -   Some recent data might be hidden       Plan:  1. INR is therapeutic today- see above in Anticoagulation Summary.    Man Ramsey to continue their warfarin regimen- see above in Anticoagulation Summary.  2. Follow up in 2 weeks  3. Pt has agreed to only be called if INR out of range. They have been instructed to call if any changes in medications, doses, concerns, etc. Patient expresses understanding and has no further questions at this time.    Yecenia Burns

## 2019-07-15 RX ORDER — LISINOPRIL 20 MG/1
TABLET ORAL
Qty: 90 TABLET | Refills: 2 | Status: SHIPPED | OUTPATIENT
Start: 2019-07-15 | End: 2020-04-13

## 2019-07-24 LAB — INR PPP: 3.2

## 2019-07-25 ENCOUNTER — ANTICOAGULATION VISIT (OUTPATIENT)
Dept: PHARMACY | Facility: HOSPITAL | Age: 39
End: 2019-07-25

## 2019-07-25 DIAGNOSIS — Z79.01 LONG TERM (CURRENT) USE OF ANTICOAGULANTS: ICD-10-CM

## 2019-07-25 DIAGNOSIS — Z95.2 HISTORY OF MECHANICAL AORTIC VALVE REPLACEMENT: ICD-10-CM

## 2019-07-25 NOTE — PROGRESS NOTES
Anticoagulation Clinic Progress Note    Anticoagulation Summary  As of 7/25/2019    INR goal:   2.5-3.5   TTR:   67.7 % (7.3 mo)   INR used for dosing:   3.20 (7/24/2019)   Warfarin maintenance plan:   6 mg every Thu; 8 mg all other days   Weekly warfarin total:   54 mg   No change documented:   Sun Curtis   Plan last modified:   Shani Vincent [facility] (4/18/2019)   Next INR check:   8/7/2019   Priority:   Maintenance   Target end date:   Indefinite    Indications    History of mechanical aortic valve replacement [Z95.2]  Long term (current) use of anticoagulants [Z79.01]             Anticoagulation Episode Summary     INR check location:   Home Draw    Preferred lab:       Send INR reminders to:    JOSE [facility]  POOL    Comments:   **[facility]**      Anticoagulation Care Providers     Provider Role Specialty Phone number    Lorne Kam MD Referring Cardiology [phone]          Clinic Interview:  No pertinent clinical findings have been reported.    INR History:  Anticoagulation Monitoring 6/27/2019 7/11/2019 7/25/2019   INR 3.30 3.00 3.20   INR Date 6/26/2019 7/10/2019 7/24/2019   INR Goal 2.5-3.5 2.5-3.5 2.5-3.5   Trend Same Same Same   Last Week Total 54 mg 54 mg 54 mg   Next Week Total 54 mg 54 mg 54 mg   Sun 8 mg 8 mg 8 mg   Mon 8 mg 8 mg 8 mg   Tue 8 mg 8 mg 8 mg   Wed 8 mg 8 mg 8 mg   Thu 6 mg 6 mg 6 mg   Fri 8 mg 8 mg 8 mg   Sat 8 mg 8 mg 8 mg   Visit Report - - -   Some recent data might be hidden       Plan:  1. INR is therapeutic today- see above in Anticoagulation Summary.    Man Ramsey to continue their warfarin regimen- see above in Anticoagulation Summary.  2. Follow up in 2 weeks  3. Pt has agreed to only be called if INR out of range. They have been instructed to call if any changes in medications, doses, concerns, etc. Patient expresses understanding and has no further questions at this time.    Sun Curtis

## 2019-07-29 RX ORDER — WARFARIN SODIUM 2 MG/1
TABLET ORAL
Qty: 324 TABLET | Refills: 0 | Status: SHIPPED | OUTPATIENT
Start: 2019-07-29 | End: 2019-10-19 | Stop reason: SDUPTHER

## 2019-07-31 ENCOUNTER — HOSPITAL ENCOUNTER (OUTPATIENT)
Dept: CARDIOLOGY | Facility: HOSPITAL | Age: 39
Discharge: HOME | End: 2019-07-31
Attending: INTERNAL MEDICINE | Admitting: INTERNAL MEDICINE

## 2019-07-31 ENCOUNTER — OFFICE VISIT (OUTPATIENT)
Dept: CARDIOLOGY | Facility: CLINIC | Age: 39
End: 2019-07-31

## 2019-07-31 VITALS
BODY MASS INDEX: 39.34 KG/M2 | SYSTOLIC BLOOD PRESSURE: 128 MMHG | HEIGHT: 71 IN | DIASTOLIC BLOOD PRESSURE: 82 MMHG | HEART RATE: 70 BPM | WEIGHT: 281 LBS

## 2019-07-31 VITALS
DIASTOLIC BLOOD PRESSURE: 82 MMHG | WEIGHT: 286.6 LBS | HEART RATE: 65 BPM | HEIGHT: 71 IN | SYSTOLIC BLOOD PRESSURE: 116 MMHG | BODY MASS INDEX: 40.12 KG/M2

## 2019-07-31 DIAGNOSIS — Z95.2 HISTORY OF MECHANICAL AORTIC VALVE REPLACEMENT: ICD-10-CM

## 2019-07-31 DIAGNOSIS — Q23.1 AORTIC INSUFFICIENCY DUE TO BICUSPID AORTIC VALVE: ICD-10-CM

## 2019-07-31 DIAGNOSIS — Z79.01 LONG TERM (CURRENT) USE OF ANTICOAGULANTS: ICD-10-CM

## 2019-07-31 DIAGNOSIS — Q23.1 AORTIC INSUFFICIENCY DUE TO BICUSPID AORTIC VALVE: Primary | ICD-10-CM

## 2019-07-31 DIAGNOSIS — I42.8 NONISCHEMIC CARDIOMYOPATHY (HCC): ICD-10-CM

## 2019-07-31 DIAGNOSIS — Z86.79 HISTORY OF CARDIOMYOPATHY: ICD-10-CM

## 2019-07-31 LAB
AORTIC DIMENSIONLESS INDEX: 0.5 (DI)
AORTIC ROOT ANNULUS: 2.4 CM
BH CV ECHO MEAS - ACS: 1.9 CM
BH CV ECHO MEAS - AO MAX PG (FULL): 9.1 MMHG
BH CV ECHO MEAS - AO MAX PG: 12 MMHG
BH CV ECHO MEAS - AO MEAN PG (FULL): 4.9 MMHG
BH CV ECHO MEAS - AO MEAN PG: 6.2 MMHG
BH CV ECHO MEAS - AO V2 MAX: 173.6 CM/SEC
BH CV ECHO MEAS - AO V2 MEAN: 112.5 CM/SEC
BH CV ECHO MEAS - AO V2 VTI: 39.2 CM
BH CV ECHO MEAS - AVA(I,A): 1.8 CM^2
BH CV ECHO MEAS - AVA(I,D): 1.8 CM^2
BH CV ECHO MEAS - AVA(V,A): 2 CM^2
BH CV ECHO MEAS - AVA(V,D): 2 CM^2
BH CV ECHO MEAS - BSA(HAYCOCK): 2.5 M^2
BH CV ECHO MEAS - BSA: 2.4 M^2
BH CV ECHO MEAS - BZI_BMI: 42.7 KILOGRAMS/M^2
BH CV ECHO MEAS - BZI_METRIC_HEIGHT: 172.7 CM
BH CV ECHO MEAS - BZI_METRIC_WEIGHT: 127.5 KG
BH CV ECHO MEAS - EDV(MOD-SP2): 109 ML
BH CV ECHO MEAS - EDV(MOD-SP4): 126 ML
BH CV ECHO MEAS - EDV(TEICH): 165.3 ML
BH CV ECHO MEAS - EF(CUBED): 77.9 %
BH CV ECHO MEAS - EF(MOD-BP): 53 %
BH CV ECHO MEAS - EF(MOD-SP2): 51.4 %
BH CV ECHO MEAS - EF(MOD-SP4): 54 %
BH CV ECHO MEAS - EF(TEICH): 69.3 %
BH CV ECHO MEAS - ESV(MOD-SP2): 53 ML
BH CV ECHO MEAS - ESV(MOD-SP4): 58 ML
BH CV ECHO MEAS - ESV(TEICH): 50.8 ML
BH CV ECHO MEAS - FS: 39.5 %
BH CV ECHO MEAS - IVS/LVPW: 1
BH CV ECHO MEAS - IVSD: 1.2 CM
BH CV ECHO MEAS - LAT PEAK E' VEL: 8 CM/SEC
BH CV ECHO MEAS - LV DIASTOLIC VOL/BSA (35-75): 53.4 ML/M^2
BH CV ECHO MEAS - LV MASS(C)D: 292.2 GRAMS
BH CV ECHO MEAS - LV MASS(C)DI: 123.7 GRAMS/M^2
BH CV ECHO MEAS - LV MAX PG: 3 MMHG
BH CV ECHO MEAS - LV MEAN PG: 1.3 MMHG
BH CV ECHO MEAS - LV SYSTOLIC VOL/BSA (12-30): 24.6 ML/M^2
BH CV ECHO MEAS - LV V1 MAX: 86.3 CM/SEC
BH CV ECHO MEAS - LV V1 MEAN: 49.9 CM/SEC
BH CV ECHO MEAS - LV V1 VTI: 18.3 CM
BH CV ECHO MEAS - LVIDD: 5.8 CM
BH CV ECHO MEAS - LVIDS: 3.5 CM
BH CV ECHO MEAS - LVLD AP2: 9.7 CM
BH CV ECHO MEAS - LVLD AP4: 9.5 CM
BH CV ECHO MEAS - LVLS AP2: 7.8 CM
BH CV ECHO MEAS - LVLS AP4: 7.7 CM
BH CV ECHO MEAS - LVOT AREA (M): 3.8 CM^2
BH CV ECHO MEAS - LVOT AREA: 3.9 CM^2
BH CV ECHO MEAS - LVOT DIAM: 2.2 CM
BH CV ECHO MEAS - LVPWD: 1.2 CM
BH CV ECHO MEAS - MED PEAK E' VEL: 9 CM/SEC
BH CV ECHO MEAS - MV A DUR: 0.12 SEC
BH CV ECHO MEAS - MV A MAX VEL: 48.7 CM/SEC
BH CV ECHO MEAS - MV DEC SLOPE: 253 CM/SEC^2
BH CV ECHO MEAS - MV DEC TIME: 0.26 SEC
BH CV ECHO MEAS - MV E MAX VEL: 60.3 CM/SEC
BH CV ECHO MEAS - MV E/A: 1.2
BH CV ECHO MEAS - MV MAX PG: 2.8 MMHG
BH CV ECHO MEAS - MV MEAN PG: 0.98 MMHG
BH CV ECHO MEAS - MV P1/2T MAX VEL: 62.1 CM/SEC
BH CV ECHO MEAS - MV P1/2T: 71.9 MSEC
BH CV ECHO MEAS - MV V2 MAX: 83.4 CM/SEC
BH CV ECHO MEAS - MV V2 MEAN: 44.3 CM/SEC
BH CV ECHO MEAS - MV V2 VTI: 21.8 CM
BH CV ECHO MEAS - MVA P1/2T LCG: 3.5 CM^2
BH CV ECHO MEAS - MVA(P1/2T): 3.1 CM^2
BH CV ECHO MEAS - MVA(VTI): 3.3 CM^2
BH CV ECHO MEAS - PA ACC TIME: 0.13 SEC
BH CV ECHO MEAS - PA MAX PG (FULL): 2 MMHG
BH CV ECHO MEAS - PA MAX PG: 3.6 MMHG
BH CV ECHO MEAS - PA PR(ACCEL): 18.8 MMHG
BH CV ECHO MEAS - PA V2 MAX: 95 CM/SEC
BH CV ECHO MEAS - PULM A REVS DUR: 0.11 SEC
BH CV ECHO MEAS - PULM A REVS VEL: 26.5 CM/SEC
BH CV ECHO MEAS - PULM DIAS VEL: 31.4 CM/SEC
BH CV ECHO MEAS - PULM S/D: 1.4
BH CV ECHO MEAS - PULM SYS VEL: 44.5 CM/SEC
BH CV ECHO MEAS - PVA(V,A): 3.1 CM^2
BH CV ECHO MEAS - PVA(V,D): 3.1 CM^2
BH CV ECHO MEAS - QP/QS: 0.94
BH CV ECHO MEAS - RV MAX PG: 1.6 MMHG
BH CV ECHO MEAS - RV MEAN PG: 0.9 MMHG
BH CV ECHO MEAS - RV V1 MAX: 62.6 CM/SEC
BH CV ECHO MEAS - RV V1 MEAN: 44.1 CM/SEC
BH CV ECHO MEAS - RV V1 VTI: 14.5 CM
BH CV ECHO MEAS - RVOT AREA: 4.7 CM^2
BH CV ECHO MEAS - RVOT DIAM: 2.4 CM
BH CV ECHO MEAS - SI(CUBED): 63.7 ML/M^2
BH CV ECHO MEAS - SI(LVOT): 30.5 ML/M^2
BH CV ECHO MEAS - SI(MOD-SP2): 23.7 ML/M^2
BH CV ECHO MEAS - SI(MOD-SP4): 28.8 ML/M^2
BH CV ECHO MEAS - SI(TEICH): 48.5 ML/M^2
BH CV ECHO MEAS - SV(CUBED): 150.4 ML
BH CV ECHO MEAS - SV(LVOT): 72 ML
BH CV ECHO MEAS - SV(MOD-SP2): 56 ML
BH CV ECHO MEAS - SV(MOD-SP4): 68 ML
BH CV ECHO MEAS - SV(RVOT): 68 ML
BH CV ECHO MEAS - SV(TEICH): 114.5 ML
BH CV ECHO MEAS - TAPSE (>1.6): 2.8 CM2
BH CV ECHO MEASUREMENTS AVERAGE E/E' RATIO: 7.09
BH CV XLRA - RV BASE: 3.3 CM
BH CV XLRA - TDI S': 10 CM/SEC
LEFT ATRIUM VOLUME INDEX: 22 ML/M2
SINUS: 4 CM
STJ: 3.5 CM

## 2019-07-31 PROCEDURE — 93306 TTE W/DOPPLER COMPLETE: CPT | Performed by: INTERNAL MEDICINE

## 2019-07-31 PROCEDURE — 99213 OFFICE O/P EST LOW 20 MIN: CPT | Performed by: INTERNAL MEDICINE

## 2019-07-31 PROCEDURE — 93000 ELECTROCARDIOGRAM COMPLETE: CPT | Performed by: INTERNAL MEDICINE

## 2019-07-31 PROCEDURE — 93306 TTE W/DOPPLER COMPLETE: CPT

## 2019-07-31 NOTE — PROGRESS NOTES
Date of Office Visit: 2019  Encounter Provider: Lorne Kam MD  Place of Service: [facility] CARDIOLOGY  Patient Name: Man Ramsey  :1980    Chief Complaint   Patient presents with   • Cardiac Valve Problem   :     HPI: Man Ramsey is a 39 y.o. male who presents today to followup.     He has a history of a bicuspid aortic valve with severe aortic insufficiency which caused nonischemic dilated cardiomyopathy. In , he underwent mechanical aortic valve replacement and had complete recovery of left ventricular systolic function. He has been maintained on warfarin and has had no cardiac issues. He denies dyspnea, chest pain, syncope, or edema.  He denies bleeding.    Past Medical History:   Diagnosis Date   • Aortic insufficiency due to bicuspid aortic valve     with severe AI, s/p 29mm ATS mechanical AVR 2012.  Echo has shown normal function but mild AI.   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  He recovered with oral steroids.  GIANNA was negative for endocarditis.   • Hypertension    • Nonischemic cardiomyopathy (CMS/HCC)     due to AI, LVEF 40% with LVE 2012; improved to EF 57% in 2012 with mild LV dilation.  Echo 3/2014 with normal LV size and systolic function   • Obesity    • Ocular migraine        Past Surgical History:   Procedure Laterality Date   • AORTIC VALVE REPAIR/REPLACEMENT      replacement       Social History     Socioeconomic History   • Marital status: Single     Spouse name: Not on file   • Number of children: Not on file   • Years of education: Not on file   • Highest education level: Not on file   Tobacco Use   • Smoking status: Never Smoker   • Smokeless tobacco: Never Used   • Tobacco comment: caffeine use/ 2-3 CUPS OF TEADAILY    Substance and Sexual Activity   • Alcohol use: Yes     Alcohol/week: 1.2 oz     Types: 1 Cans of beer, 1 Shots of liquor  "per week     Comment: EITHER OR DAILY, NOT BOTH.    • Drug use: No       No family history on file.    Review of Systems   Constitution: Positive for malaise/fatigue.   Cardiovascular: Negative for leg swelling.   Respiratory: Negative for shortness of breath.    Neurological: Positive for headaches. Negative for dizziness and light-headedness.   All other systems reviewed and are negative.      No Known Allergies      Current Outpatient Medications:   •  Ascorbic Acid (VITAMIN C PO), Take  by mouth Daily., Disp: , Rfl:   •  carvedilol (COREG) 12.5 MG tablet, TAKE ONE TABLET BY MOUTH TWICE DAILY, Disp: 180 tablet, Rfl: 3  •  Cholecalciferol (VITAMIN D) 1000 UNITS tablet, Take 2 tablets by mouth., Disp: , Rfl:   •  Cyanocobalamin (VITAMIN B 12 PO), Take  by mouth Daily., Disp: , Rfl:   •  lisinopril (PRINIVIL,ZESTRIL) 20 MG tablet, TAKE 1 TABLET BY MOUTH ONCE DAILY, Disp: 90 tablet, Rfl: 2  •  Melatonin 3 MG capsule, Take  by mouth., Disp: , Rfl:   •  Multiple Vitamin tablet, Take  by mouth daily., Disp: , Rfl:   •  warfarin (COUMADIN) 2 MG tablet, TAKE 3 tabs (6mg) on Thursday, TAKE 4 tabs (8mg) all other days OR as directed by CLINIC, Disp: 324 tablet, Rfl: 0     Objective:     Vitals:    07/31/19 1046   BP: 116/82   BP Location: Left arm   Pulse: 65   Weight: 130 kg (286 lb 9.6 oz)   Height: 180.3 cm (71\")     Body mass index is 39.97 kg/m².    Physical Exam   Constitutional: He is oriented to person, place, and time. He appears well-developed and well-nourished.   HENT:   Head: Normocephalic.   Nose: Nose normal.   Mouth/Throat: Oropharynx is clear and moist.   Eyes: Conjunctivae and EOM are normal. Pupils are equal, round, and reactive to light.   Neck: Normal range of motion. No JVD present.   Cardiovascular: Normal rate, regular rhythm, normal heart sounds and intact distal pulses.   No murmur heard.  Mechanical S2   Pulmonary/Chest: Effort normal and breath sounds normal.   Abdominal: Soft. There is no " tenderness.   Musculoskeletal: Normal range of motion. He exhibits no edema.   Neurological: He is alert and oriented to person, place, and time. No cranial nerve deficit.   Skin: Skin is warm and dry. No rash noted.   Psychiatric: He has a normal mood and affect. His behavior is normal. Judgment and thought content normal.   Vitals reviewed.        ECG 12 Lead  Date/Time: 7/31/2019 11:08 AM  Performed by: Lorne Kam MD  Authorized by: Lorne Kam MD   Comparison: compared with previous ECG   Similar to previous ECG  Rhythm: sinus rhythm  Conduction: conduction normal  ST Segments: ST segments normal  T Waves: T waves normal  QRS axis: normal  Other: no other findings    Clinical impression: normal ECG              Assessment:       Diagnosis Plan   1. Aortic insufficiency due to bicuspid aortic valve     2. History of mechanical aortic valve replacement     3. Long term (current) use of anticoagulants     4. History of cardiomyopathy            Plan:       He is status post mechanical aortic valve replacement for severe bicuspid aortic insufficiency.  He had nonischemic dilated cardiopathy which resolved.  An echo today shows normal LV size/systolic function and normal valvular function.  I will repeat this in 2024 assuming he has no problems until then. His INR in well managed.     Sincerely,       Lorne Kam MD

## 2019-08-07 LAB — INR PPP: 2.9

## 2019-08-08 ENCOUNTER — ANTICOAGULATION VISIT (OUTPATIENT)
Dept: PHARMACY | Facility: HOSPITAL | Age: 39
End: 2019-08-08

## 2019-08-08 DIAGNOSIS — Z79.01 LONG TERM (CURRENT) USE OF ANTICOAGULANTS: ICD-10-CM

## 2019-08-08 DIAGNOSIS — Z95.2 HISTORY OF MECHANICAL AORTIC VALVE REPLACEMENT: ICD-10-CM

## 2019-08-08 NOTE — PROGRESS NOTES
Anticoagulation Clinic Progress Note    Anticoagulation Summary  As of 2019    INR goal:   2.5-3.5   TTR:   69.6 % (7.8 mo)   INR used for dosin.90 (2019)   Warfarin maintenance plan:   6 mg every Thu; 8 mg all other days   Weekly warfarin total:   54 mg   No change documented:   Sun Curtis   Plan last modified:   Shani Vincent RP (2019)   Next INR check:   2019   Priority:   Maintenance   Target end date:   Indefinite    Indications    History of mechanical aortic valve replacement [Z95.2]  Long term (current) use of anticoagulants [Z79.01]             Anticoagulation Episode Summary     INR check location:   Home Draw    Preferred lab:       Send INR reminders to:    JOSE LECHUGA  POOL    Comments:   **[facility]**      Anticoagulation Care Providers     Provider Role Specialty Phone number    Lorne Kam MD Referring Cardiology [phone]          Clinic Interview:  No pertinent clinical findings have been reported.    INR History:  Anticoagulation Monitoring 2019   INR 3.00 3.20 2.90   INR Date 7/10/2019 2019 2019   INR Goal 2.5-3.5 2.5-3.5 2.5-3.5   Trend Same Same Same   Last Week Total 54 mg 54 mg 54 mg   Next Week Total 54 mg 54 mg 54 mg   Sun 8 mg 8 mg 8 mg   Mon 8 mg 8 mg 8 mg   Tue 8 mg 8 mg 8 mg   Wed 8 mg 8 mg 8 mg   Thu 6 mg 6 mg 6 mg   Fri 8 mg 8 mg 8 mg   Sat 8 mg 8 mg 8 mg   Visit Report - - -   Some recent data might be hidden       Plan:  1. INR is therapeutic today- see above in Anticoagulation Summary.    Man Ramsey to continue their warfarin regimen- see above in Anticoagulation Summary.  2. Follow up in 2 weeks  3. Pt has agreed to only be called if INR out of range. They have been instructed to call if any changes in medications, doses, concerns, etc. Patient expresses understanding and has no further questions at this time.    Sun Curtis

## 2019-08-19 RX ORDER — CARVEDILOL 12.5 MG/1
TABLET ORAL
Qty: 180 TABLET | Refills: 2 | Status: SHIPPED | OUTPATIENT
Start: 2019-08-19 | End: 2020-05-18

## 2019-08-22 ENCOUNTER — ANTICOAGULATION VISIT (OUTPATIENT)
Dept: PHARMACY | Facility: HOSPITAL | Age: 39
End: 2019-08-22

## 2019-08-22 DIAGNOSIS — Z95.2 HISTORY OF MECHANICAL AORTIC VALVE REPLACEMENT: ICD-10-CM

## 2019-08-22 DIAGNOSIS — Z79.01 LONG TERM (CURRENT) USE OF ANTICOAGULANTS: ICD-10-CM

## 2019-08-22 LAB — INR PPP: 2.9

## 2019-08-22 NOTE — PROGRESS NOTES
Anticoagulation Clinic Progress Note    Anticoagulation Summary  As of 2019    INR goal:   2.5-3.5   TTR:   71.4 % (8.3 mo)   INR used for dosin.90 (2019)   Warfarin maintenance plan:   6 mg every Thu; 8 mg all other days   Weekly warfarin total:   54 mg   No change documented:   Yecenia Burns   Plan last modified:   Shani Vincent [facility] (2019)   Next INR check:   2019   Priority:   Maintenance   Target end date:   Indefinite    Indications    History of mechanical aortic valve replacement [Z95.2]  Long term (current) use of anticoagulants [Z79.01]             Anticoagulation Episode Summary     INR check location:   Home Draw    Preferred lab:       Send INR reminders to:    [facility]SE [facility]  POOL    Comments:   **[facility]**      Anticoagulation Care Providers     Provider Role Specialty Phone number    Lorne Kam MD Referring Cardiology [phone]          Clinic Interview:  No pertinent clinical findings have been reported.    INR History:  Anticoagulation Monitoring 2019   INR 3.20 2.90 2.90   INR Date 2019   INR Goal 2.5-3.5 2.5-3.5 2.5-3.5   Trend Same Same Same   Last Week Total 54 mg 54 mg 54 mg   Next Week Total 54 mg 54 mg 54 mg   Sun 8 mg 8 mg 8 mg   Mon 8 mg 8 mg 8 mg   Tue 8 mg 8 mg 8 mg   Wed 8 mg 8 mg 8 mg   Thu 6 mg 6 mg 6 mg   Fri 8 mg 8 mg 8 mg   Sat 8 mg 8 mg 8 mg   Visit Report - - -   Some recent data might be hidden       Plan:  1. INR is therapeutic today- see above in Anticoagulation Summary.    Man Ramsey to continue their warfarin regimen- see above in Anticoagulation Summary.  2. Follow up in 2 weeks  3. Pt has agreed to only be called if INR out of range. They have been instructed to call if any changes in medications, doses, concerns, etc. Patient expresses understanding and has no further questions at this time.    Yecenia Burns

## 2019-09-05 ENCOUNTER — ANTICOAGULATION VISIT (OUTPATIENT)
Dept: PHARMACY | Facility: HOSPITAL | Age: 39
End: 2019-09-05

## 2019-09-05 DIAGNOSIS — Z79.01 LONG TERM (CURRENT) USE OF ANTICOAGULANTS: ICD-10-CM

## 2019-09-05 DIAGNOSIS — Z95.2 HISTORY OF MECHANICAL AORTIC VALVE REPLACEMENT: ICD-10-CM

## 2019-09-05 LAB — INR PPP: 3.3

## 2019-09-05 NOTE — PROGRESS NOTES
Anticoagulation Clinic Progress Note    Anticoagulation Summary  As of 9/5/2019    INR goal:   2.5-3.5   TTR:   72.9 % (8.7 mo)   INR used for dosing:   3.30 (9/4/2019)   Warfarin maintenance plan:   6 mg every Thu; 8 mg all other days   Weekly warfarin total:   54 mg   No change documented:   Almaz aFria   Plan last modified:   Shani Vincent [facility] (4/18/2019)   Next INR check:   9/19/2019   Priority:   Maintenance   Target end date:   Indefinite    Indications    History of mechanical aortic valve replacement [Z95.2]  Long term (current) use of anticoagulants [Z79.01]             Anticoagulation Episode Summary     INR check location:   Home Draw    Preferred lab:       Send INR reminders to:    [facility]SE [facility]  POOL    Comments:   **[facility]**      Anticoagulation Care Providers     Provider Role Specialty Phone number    Lorne Kam MD Referring Cardiology [phone]          Clinic Interview:  No pertinent clinical findings have been reported.    INR History:  Anticoagulation Monitoring 8/8/2019 8/22/2019 9/5/2019   INR 2.90 2.90 3.30   INR Date 8/7/2019 8/22/2019 9/4/2019   INR Goal 2.5-3.5 2.5-3.5 2.5-3.5   Trend Same Same Same   Last Week Total 54 mg 54 mg 54 mg   Next Week Total 54 mg 54 mg 54 mg   Sun 8 mg 8 mg 8 mg   Mon 8 mg 8 mg 8 mg   Tue 8 mg 8 mg 8 mg   Wed 8 mg 8 mg 8 mg   Thu 6 mg 6 mg 6 mg   Fri 8 mg 8 mg 8 mg   Sat 8 mg 8 mg 8 mg   Visit Report - - -   Some recent data might be hidden       Plan:  1. INR is therapeutic today- see above in Anticoagulation Summary.    Man Ramsey to continue their warfarin regimen- see above in Anticoagulation Summary.  2. Follow up in 2 weeks  3. Pt has agreed to only be called if INR out of range. They have been instructed to call if any changes in medications, doses, concerns, etc. Patient expresses understanding and has no further questions at this time.    Almaz Faria

## 2019-09-18 LAB — INR PPP: 3.1

## 2019-09-19 ENCOUNTER — ANTICOAGULATION VISIT (OUTPATIENT)
Dept: PHARMACY | Facility: HOSPITAL | Age: 39
End: 2019-09-19

## 2019-09-19 DIAGNOSIS — Z79.01 LONG TERM (CURRENT) USE OF ANTICOAGULANTS: ICD-10-CM

## 2019-09-19 DIAGNOSIS — Z95.2 HISTORY OF MECHANICAL AORTIC VALVE REPLACEMENT: ICD-10-CM

## 2019-09-19 NOTE — PROGRESS NOTES
Anticoagulation Clinic Progress Note    Anticoagulation Summary  As of 9/19/2019    INR goal:   2.5-3.5   TTR:   74.2 % (9.2 mo)   INR used for dosing:   3.10 (9/18/2019)   Warfarin maintenance plan:   6 mg every Thu; 8 mg all other days   Weekly warfarin total:   54 mg   No change documented:   Yecenia Burns   Plan last modified:   Shani Vincent [facility] (4/18/2019)   Next INR check:   10/2/2019   Priority:   Maintenance   Target end date:   Indefinite    Indications    History of mechanical aortic valve replacement [Z95.2]  Long term (current) use of anticoagulants [Z79.01]             Anticoagulation Episode Summary     INR check location:   Home Draw    Preferred lab:       Send INR reminders to:    JOSE LECHUGA  POOL    Comments:   **[facility]**      Anticoagulation Care Providers     Provider Role Specialty Phone number    Lorne Kam MD Referring Cardiology [phone]          Clinic Interview:  No pertinent clinical findings have been reported.    INR History:  Anticoagulation Monitoring 8/22/2019 9/5/2019 9/19/2019   INR 2.90 3.30 3.10   INR Date 8/22/2019 9/4/2019 9/18/2019   INR Goal 2.5-3.5 2.5-3.5 2.5-3.5   Trend Same Same Same   Last Week Total 54 mg 54 mg 54 mg   Next Week Total 54 mg 54 mg 54 mg   Sun 8 mg 8 mg 8 mg   Mon 8 mg 8 mg 8 mg   Tue 8 mg 8 mg 8 mg   Wed 8 mg 8 mg 8 mg   Thu 6 mg 6 mg 6 mg   Fri 8 mg 8 mg 8 mg   Sat 8 mg 8 mg 8 mg   Visit Report - - -   Some recent data might be hidden       Plan:  1. INR is therapeutic today- see above in Anticoagulation Summary.    Man Ramsey to continue their warfarin regimen- see above in Anticoagulation Summary.  2. Follow up in 2 weeks  3. Pt has agreed to only be called if INR out of range. They have been instructed to call if any changes in medications, doses, concerns, etc. Patient expresses understanding and has no further questions at this time.    Yecenia Burns

## 2019-10-02 LAB — INR PPP: 2.4

## 2019-10-03 LAB — INR PPP: 2.4

## 2019-10-04 ENCOUNTER — ANTICOAGULATION VISIT (OUTPATIENT)
Dept: PHARMACY | Facility: HOSPITAL | Age: 39
End: 2019-10-04

## 2019-10-04 DIAGNOSIS — Z95.2 HISTORY OF MECHANICAL AORTIC VALVE REPLACEMENT: ICD-10-CM

## 2019-10-04 DIAGNOSIS — Z79.01 LONG TERM (CURRENT) USE OF ANTICOAGULANTS: ICD-10-CM

## 2019-10-04 NOTE — PROGRESS NOTES
Anticoagulation Clinic Progress Note    Anticoagulation Summary  As of 10/4/2019    INR goal:   2.5-3.5   TTR:   74.5 % (9.7 mo)   INR used for dosin.40! (10/3/2019)   Warfarin maintenance plan:   6 mg every Thu; 8 mg all other days   Weekly warfarin total:   54 mg   No change documented:   Neo Cornejo RP   Plan last modified:   Shani Vincent RP (2019)   Next INR check:   10/16/2019   Priority:   Maintenance   Target end date:   Indefinite    Indications    History of mechanical aortic valve replacement [Z95.2]  Long term (current) use of anticoagulants [Z79.01]             Anticoagulation Episode Summary     INR check location:   Home Draw    Preferred lab:       Send INR reminders to:    JOSE LECHUGA  POOL    Comments:   **[facility]**      Anticoagulation Care Providers     Provider Role Specialty Phone number    Lorne Kam MD Referring Cardiology [phone]            Clinic Interview:  Patient Findings     Positives:   Change in alcohol use, Change in medications    Negatives:   Signs/symptoms of thrombosis, Signs/symptoms of bleeding,   Laboratory test error suspected, Change in health, Change in activity,   Upcoming invasive procedure, Emergency department visit, Upcoming dental   procedure, Missed doses, Extra doses, Change in diet/appetite, Hospital   admission, Bruising, Other complaints    Comments:   Less EtOH last week while taking muscle relaxers briefly;   returned to normal.      Clinical Outcomes     Negatives:   Major bleeding event, Thromboembolic event,   Anticoagulation-related hospital admission, Anticoagulation-related ED   visit, Anticoagulation-related fatality    Comments:   Less EtOH last week while taking muscle relaxers briefly;   returned to normal.        INR History:  Anticoagulation Monitoring 2019 2019 10/4/2019   INR 3.30 3.10 2.40   INR Date 2019 2019 10/3/2019   INR Goal 2.5-3.5 2.5-3.5 2.5-3.5   Trend Same Same Same    Last Week Total 54 mg 54 mg 54 mg   Next Week Total 54 mg 54 mg 54 mg   Sun 8 mg 8 mg 8 mg   Mon 8 mg 8 mg 8 mg   Tue 8 mg 8 mg 8 mg   Wed 8 mg 8 mg 8 mg   Thu 6 mg 6 mg 6 mg   Fri 8 mg 8 mg 8 mg   Sat 8 mg 8 mg 8 mg   Visit Report - - -   Some recent data might be hidden       Plan:  1. INR is Subtherapeutic today- see above in Anticoagulation Summary.   Will instruct Man Ramsey to Continue their warfarin regimen- see above in Anticoagulation Summary.  2. Follow up in 2 weeks  3. Pt has agreed to only be called if INR out of range. They have been instructed to call if any changes in medications, doses, concerns, etc. Patient expresses understanding and has no further questions at this time.    Neo Cornejo [facility]

## 2019-10-16 LAB — INR PPP: 3.2

## 2019-10-17 ENCOUNTER — ANTICOAGULATION VISIT (OUTPATIENT)
Dept: PHARMACY | Facility: HOSPITAL | Age: 39
End: 2019-10-17

## 2019-10-17 DIAGNOSIS — Z95.2 HISTORY OF MECHANICAL AORTIC VALVE REPLACEMENT: ICD-10-CM

## 2019-10-17 DIAGNOSIS — Z79.01 LONG TERM (CURRENT) USE OF ANTICOAGULANTS: ICD-10-CM

## 2019-10-17 NOTE — PROGRESS NOTES
Anticoagulation Clinic Progress Note    Anticoagulation Summary  As of 10/17/2019    INR goal:   2.5-3.5   TTR:   75.1 % (10.1 mo)   INR used for dosing:   3.20 (10/16/2019)   Warfarin maintenance plan:   6 mg every Thu; 8 mg all other days   Weekly warfarin total:   54 mg   No change documented:   Ryan Vann RPH   Plan last modified:   Shani Vincent RPH (4/18/2019)   Next INR check:   10/30/2019   Priority:   Maintenance   Target end date:   Indefinite    Indications    History of mechanical aortic valve replacement [Z95.2]  Long term (current) use of anticoagulants [Z79.01]             Anticoagulation Episode Summary     INR check location:   Home Draw    Preferred lab:       Send INR reminders to:    JOSE [facility]CHUGA  POOL    Comments:   **[facility]**      Anticoagulation Care Providers     Provider Role Specialty Phone number    Lorne Kam MD Referring Cardiology [phone]          Clinic Interview:  No pertinent clinical findings have been reported.    INR History:  Anticoagulation Monitoring 9/19/2019 10/4/2019 10/17/2019   INR 3.10 2.40 3.20   INR Date 9/18/2019 10/3/2019 10/16/2019   INR Goal 2.5-3.5 2.5-3.5 2.5-3.5   Trend Same Same Same   Last Week Total 54 mg 54 mg 54 mg   Next Week Total 54 mg 54 mg 54 mg   Sun 8 mg 8 mg 8 mg   Mon 8 mg 8 mg 8 mg   Tue 8 mg 8 mg 8 mg   Wed 8 mg 8 mg 8 mg   Thu 6 mg 6 mg 6 mg   Fri 8 mg 8 mg 8 mg   Sat 8 mg 8 mg 8 mg   Visit Report - - -   Some recent data might be hidden       Plan:  1. INR is therapeutic today- see above in Anticoagulation Summary.    Man Ramsey to continue their warfarin regimen- see above in Anticoagulation Summary.  2. Follow up in 2 weeks  3. Pt has agreed to only be called if INR out of range. They have been instructed to call if any changes in medications, doses, concerns, etc. Patient expresses understanding and has no further questions at this time.    Ryan Vann RPH

## 2019-10-21 RX ORDER — WARFARIN SODIUM 2 MG/1
TABLET ORAL
Qty: 350 TABLET | Refills: 0 | Status: SHIPPED | OUTPATIENT
Start: 2019-10-21 | End: 2020-01-16

## 2019-10-30 LAB — INR PPP: 2.5

## 2019-10-31 ENCOUNTER — ANTICOAGULATION VISIT (OUTPATIENT)
Dept: PHARMACY | Facility: HOSPITAL | Age: 39
End: 2019-10-31

## 2019-10-31 DIAGNOSIS — Z95.2 HISTORY OF MECHANICAL AORTIC VALVE REPLACEMENT: ICD-10-CM

## 2019-10-31 DIAGNOSIS — Z79.01 LONG TERM (CURRENT) USE OF ANTICOAGULANTS: ICD-10-CM

## 2019-10-31 NOTE — PROGRESS NOTES
Anticoagulation Clinic Progress Note    Anticoagulation Summary  As of 10/31/2019    INR goal:   2.5-3.5   TTR:   76.2 % (10.6 mo)   INR used for dosin.50 (10/30/2019)   Warfarin maintenance plan:   6 mg every Thu; 8 mg all other days   Weekly warfarin total:   54 mg   No change documented:   Sun Curtis   Plan last modified:   Shani Vincent RPH (2019)   Next INR check:   2019   Priority:   Maintenance   Target end date:   Indefinite    Indications    History of mechanical aortic valve replacement [Z95.2]  Long term (current) use of anticoagulants [Z79.01]             Anticoagulation Episode Summary     INR check location:   Home Draw    Preferred lab:       Send INR reminders to:    JOSE LECHUGA  POOL    Comments:   **[facility]**      Anticoagulation Care Providers     Provider Role Specialty Phone number    Lorne Kam MD Referring Cardiology [phone]          Clinic Interview:  No pertinent clinical findings have been reported.    INR History:  Anticoagulation Monitoring 10/4/2019 10/17/2019 10/31/2019   INR 2.40 3.20 2.50   INR Date 10/3/2019 10/16/2019 10/30/2019   INR Goal 2.5-3.5 2.5-3.5 2.5-3.5   Trend Same Same Same   Last Week Total 54 mg 54 mg 54 mg   Next Week Total 54 mg 54 mg 54 mg   Sun 8 mg 8 mg 8 mg   Mon 8 mg 8 mg 8 mg   Tue 8 mg 8 mg 8 mg   Wed 8 mg 8 mg 8 mg   Thu 6 mg 6 mg 6 mg   Fri 8 mg 8 mg 8 mg   Sat 8 mg 8 mg 8 mg   Visit Report - - -   Some recent data might be hidden       Plan:  1. INR is therapeutic today- see above in Anticoagulation Summary.    Man Ramsey to continue their warfarin regimen- see above in Anticoagulation Summary.  2. Follow up in 2 weeks  3. Pt has agreed to only be called if INR out of range. They have been instructed to call if any changes in medications, doses, concerns, etc. Patient expresses understanding and has no further questions at this time.    Sun Curtis

## 2019-11-13 ENCOUNTER — ANTICOAGULATION VISIT (OUTPATIENT)
Dept: PHARMACY | Facility: HOSPITAL | Age: 39
End: 2019-11-13

## 2019-11-13 DIAGNOSIS — Z79.01 LONG TERM (CURRENT) USE OF ANTICOAGULANTS: ICD-10-CM

## 2019-11-13 DIAGNOSIS — Z95.2 HISTORY OF MECHANICAL AORTIC VALVE REPLACEMENT: ICD-10-CM

## 2019-11-13 LAB — INR PPP: 3.2

## 2019-11-13 NOTE — PROGRESS NOTES
Anticoagulation Clinic Progress Note    Anticoagulation Summary  As of 11/13/2019    INR goal:   2.5-3.5   TTR:   77.1 % (11 mo)   INR used for dosing:   3.20 (11/12/2019)   Warfarin maintenance plan:   6 mg every Thu; 8 mg all other days   Weekly warfarin total:   54 mg   No change documented:   Almaz Faria   Plan last modified:   Shani Vincent [facility] (4/18/2019)   Next INR check:   11/27/2019   Priority:   Maintenance   Target end date:   Indefinite    Indications    History of mechanical aortic valve replacement [Z95.2]  Long term (current) use of anticoagulants [Z79.01]             Anticoagulation Episode Summary     INR check location:   Home Draw    Preferred lab:       Send INR reminders to:    JOSE LECHUGA  POOL    Comments:   **[facility]**      Anticoagulation Care Providers     Provider Role Specialty Phone number    Lorne Kam MD Referring Cardiology [phone]          Clinic Interview:  No pertinent clinical findings have been reported.    INR History:  Anticoagulation Monitoring 10/17/2019 10/31/2019 11/13/2019   INR 3.20 2.50 3.20   INR Date 10/16/2019 10/30/2019 11/12/2019   INR Goal 2.5-3.5 2.5-3.5 2.5-3.5   Trend Same Same Same   Last Week Total 54 mg 54 mg 54 mg   Next Week Total 54 mg 54 mg 54 mg   Sun 8 mg 8 mg 8 mg   Mon 8 mg 8 mg 8 mg   Tue 8 mg 8 mg 8 mg   Wed 8 mg 8 mg 8 mg   Thu 6 mg 6 mg 6 mg   Fri 8 mg 8 mg 8 mg   Sat 8 mg 8 mg 8 mg   Visit Report - - -   Some recent data might be hidden       Plan:  1. INR is therapeutic today- see above in Anticoagulation Summary.    Man Ramsey to continue their warfarin regimen- see above in Anticoagulation Summary.  2. Follow up in 2 weeks  3. Pt has agreed to only be called if INR out of range. They have been instructed to call if any changes in medications, doses, concerns, etc. Patient expresses understanding and has no further questions at this time.    Almaz Faria

## 2019-11-14 ENCOUNTER — HOSPITAL ENCOUNTER (EMERGENCY)
Facility: HOSPITAL | Age: 39
Discharge: HOME | End: 2019-11-14
Attending: EMERGENCY MEDICINE | Admitting: EMERGENCY MEDICINE

## 2019-11-14 VITALS
HEIGHT: 71 IN | TEMPERATURE: 97.1 F | OXYGEN SATURATION: 98 % | WEIGHT: 286.6 LBS | BODY MASS INDEX: 40.12 KG/M2 | HEART RATE: 84 BPM | DIASTOLIC BLOOD PRESSURE: 112 MMHG | SYSTOLIC BLOOD PRESSURE: 155 MMHG | RESPIRATION RATE: 16 BRPM

## 2019-11-14 DIAGNOSIS — K92.1 HEMATOCHEZIA: Primary | ICD-10-CM

## 2019-11-14 LAB
ALBUMIN SERPL-MCNC: 4.2 G/DL (ref 3.5–5.2)
ALBUMIN/GLOB SERPL: 1.4 G/DL
ALP SERPL-CCNC: 92 U/L (ref 39–117)
ALT SERPL W P-5'-P-CCNC: 41 U/L (ref 1–41)
ANION GAP SERPL CALCULATED.3IONS-SCNC: 7.9 MMOL/L (ref 5–15)
AST SERPL-CCNC: 27 U/L (ref 1–40)
BASOPHILS # BLD AUTO: 0.04 10*3/MM3 (ref 0–0.2)
BASOPHILS NFR BLD AUTO: 0.4 % (ref 0–1.5)
BILIRUB SERPL-MCNC: 0.4 MG/DL (ref 0.2–1.2)
BUN BLD-MCNC: 16 MG/DL (ref 6–20)
BUN/CREAT SERPL: 14.8 (ref 7–25)
CALCIUM SPEC-SCNC: 9.4 MG/DL (ref 8.6–10.5)
CHLORIDE SERPL-SCNC: 101 MMOL/L (ref 98–107)
CO2 SERPL-SCNC: 31.1 MMOL/L (ref 22–29)
CREAT BLD-MCNC: 1.08 MG/DL (ref 0.76–1.27)
DEPRECATED RDW RBC AUTO: 42.7 FL (ref 37–54)
EOSINOPHIL # BLD AUTO: 0.12 10*3/MM3 (ref 0–0.4)
EOSINOPHIL NFR BLD AUTO: 1.3 % (ref 0.3–6.2)
ERYTHROCYTE [DISTWIDTH] IN BLOOD BY AUTOMATED COUNT: 13.3 % (ref 12.3–15.4)
EXPIRATION DATE: ABNORMAL
FECAL OCCULT BLOOD SCREEN, POC: POSITIVE
GFR SERPL CREATININE-BSD FRML MDRD: 76 ML/MIN/1.73
GLOBULIN UR ELPH-MCNC: 3.1 GM/DL
GLUCOSE BLD-MCNC: 145 MG/DL (ref 65–99)
HCT VFR BLD AUTO: 43.3 % (ref 37.5–51)
HGB BLD-MCNC: 14.4 G/DL (ref 13–17.7)
IMM GRANULOCYTES # BLD AUTO: 0.03 10*3/MM3 (ref 0–0.05)
IMM GRANULOCYTES NFR BLD AUTO: 0.3 % (ref 0–0.5)
INR PPP: 2.58 (ref 0.9–1.1)
LIPASE SERPL-CCNC: 46 U/L (ref 13–60)
LYMPHOCYTES # BLD AUTO: 2.31 10*3/MM3 (ref 0.7–3.1)
LYMPHOCYTES NFR BLD AUTO: 24.8 % (ref 19.6–45.3)
Lab: 128
MCH RBC QN AUTO: 29 PG (ref 26.6–33)
MCHC RBC AUTO-ENTMCNC: 33.3 G/DL (ref 31.5–35.7)
MCV RBC AUTO: 87.1 FL (ref 79–97)
MONOCYTES # BLD AUTO: 0.98 10*3/MM3 (ref 0.1–0.9)
MONOCYTES NFR BLD AUTO: 10.5 % (ref 5–12)
NEGATIVE CONTROL: NEGATIVE
NEUTROPHILS # BLD AUTO: 5.82 10*3/MM3 (ref 1.7–7)
NEUTROPHILS NFR BLD AUTO: 62.7 % (ref 42.7–76)
NRBC BLD AUTO-RTO: 0 /100 WBC (ref 0–0.2)
PLATELET # BLD AUTO: 275 10*3/MM3 (ref 140–450)
PMV BLD AUTO: 9.1 FL (ref 6–12)
POSITIVE CONTROL: POSITIVE
POTASSIUM BLD-SCNC: 3.7 MMOL/L (ref 3.5–5.2)
PROT SERPL-MCNC: 7.3 G/DL (ref 6–8.5)
PROTHROMBIN TIME: 27.4 SECONDS (ref 11.7–14.2)
RBC # BLD AUTO: 4.97 10*6/MM3 (ref 4.14–5.8)
SODIUM BLD-SCNC: 140 MMOL/L (ref 136–145)
WBC NRBC COR # BLD: 9.3 10*3/MM3 (ref 3.4–10.8)

## 2019-11-14 PROCEDURE — 85610 PROTHROMBIN TIME: CPT | Performed by: PHYSICIAN ASSISTANT

## 2019-11-14 PROCEDURE — 82270 OCCULT BLOOD FECES: CPT | Performed by: PHYSICIAN ASSISTANT

## 2019-11-14 PROCEDURE — 85025 COMPLETE CBC W/AUTO DIFF WBC: CPT | Performed by: PHYSICIAN ASSISTANT

## 2019-11-14 PROCEDURE — 99283 EMERGENCY DEPT VISIT LOW MDM: CPT

## 2019-11-14 PROCEDURE — 80053 COMPREHEN METABOLIC PANEL: CPT | Performed by: PHYSICIAN ASSISTANT

## 2019-11-14 PROCEDURE — 83690 ASSAY OF LIPASE: CPT | Performed by: PHYSICIAN ASSISTANT

## 2019-11-14 RX ORDER — SODIUM CHLORIDE 0.9 % (FLUSH) 0.9 %
10 SYRINGE (ML) INJECTION AS NEEDED
Status: DISCONTINUED | OUTPATIENT
Start: 2019-11-14 | End: 2019-11-15 | Stop reason: HOSPADM

## 2019-11-15 NOTE — ED PROVIDER NOTES
EMERGENCY DEPARTMENT ENCOUNTER    Room Number:  20/20  Date of encounter:  11/14/2019  PCP: Provider, No Known  Historian: patient  Dr Kam, cardiology      HPI:  Chief Complaint: bloody stool  A complete HPI/ROS/PMH/PSH/SH/FH are unobtainable due to: nothing    Context: Man Ramsey is a 39 y.o. male who presents to the ED c/o bloody stool that have occurred intermittently throughout the last 3 days. Patient states he noticed 'bright red' blood in the commode 2 days ago after several BMs. He had one BM yesterday without any blood. Today, he noticed blood when he wiped and again in the commode after a BM. Pt denies constant rectal bleeding, CP, SOA, abdominal pain, fever, chills and all other complaints at this time. He is anticoagulated on Coumadin. He has never had a colonoscopy.     Medical Record Review:  Mechanical aortic valve replacement. Coumadin anticoagulation.       PAST MEDICAL HISTORY  Active Ambulatory Problems     Diagnosis Date Noted   • Hypertension    • History of mechanical aortic valve replacement 07/27/2017   • Aortic insufficiency due to bicuspid aortic valve 07/27/2017   • Long term (current) use of anticoagulants 12/06/2018   • History of cardiomyopathy 07/31/2019     Resolved Ambulatory Problems     Diagnosis Date Noted   • Nonischemic cardiomyopathy (CMS/HCC)      Past Medical History:   Diagnosis Date   • Aortic insufficiency due to bicuspid aortic valve    • Fever of unknown origin    • Hypertension    • Nonischemic cardiomyopathy (CMS/HCC)    • Obesity    • Ocular migraine          PAST SURGICAL HISTORY  Past Surgical History:   Procedure Laterality Date   • AORTIC VALVE REPAIR/REPLACEMENT      replacement         FAMILY HISTORY  History reviewed. No pertinent family history.      SOCIAL HISTORY  Social History     Socioeconomic History   • Marital status: Single     Spouse name: Not on file   • Number of children: Not on file   • Years of education: Not on file   • Highest education  level: Not on file   Tobacco Use   • Smoking status: Never Smoker   • Smokeless tobacco: Never Used   • Tobacco comment: caffeine use/ 2-3 CUPS OF TEADAILY    Substance and Sexual Activity   • Alcohol use: Yes     Alcohol/week: 1.2 oz     Types: 1 Cans of beer, 1 Shots of liquor per week     Comment: EITHER OR DAILY, NOT BOTH.    • Drug use: No         ALLERGIES  Patient has no known allergies.        REVIEW OF SYSTEMS  Review of Systems   Constitutional: Negative for activity change, appetite change and fever.   HENT: Negative for congestion and sore throat.    Eyes: Negative.    Respiratory: Negative for cough and shortness of breath.    Cardiovascular: Negative for chest pain and leg swelling.   Gastrointestinal: Positive for blood in stool. Negative for abdominal pain, constipation, diarrhea and vomiting.   Genitourinary: Negative for decreased urine volume and dysuria.   Musculoskeletal: Negative for neck pain.   Skin: Negative for rash and wound.   Neurological: Negative for weakness, numbness and headaches.   Psychiatric/Behavioral: Negative.    All other systems reviewed and are negative.       All other ROS negative except as documented in HPI      PHYSICAL EXAM    I have reviewed the triage vital signs and nursing notes.    ED Triage Vitals [11/14/19 2055]   Temp Heart Rate Resp BP SpO2   97.1 °F (36.2 °C) 80 16 -- 98 %     GENERAL: awake, alert, not distressed  HENT: nares patent  EYES: no scleral icterus  CV: regular rhythm, regular rate, mech valve sound  RESPIRATORY: normal effort  ABDOMEN: soft, non-tender  MUSCULOSKELETAL: no deformity  NEURO: alert, moves all extremities, follows commands  SKIN: warm, dry  : Hemoccult positive, no hemorrhoids, no fissures, very minimal bright red blood        LAB RESULTS  Recent Results (from the past 24 hour(s))   Comprehensive Metabolic Panel    Collection Time: 11/14/19  9:33 PM   Result Value Ref Range    Glucose 145 (H) 65 - 99 mg/dL    BUN 16 6 - 20 mg/dL     Creatinine 1.08 0.76 - 1.27 mg/dL    Sodium 140 136 - 145 mmol/L    Potassium 3.7 3.5 - 5.2 mmol/L    Chloride 101 98 - 107 mmol/L    CO2 31.1 (H) 22.0 - 29.0 mmol/L    Calcium 9.4 8.6 - 10.5 mg/dL    Total Protein 7.3 6.0 - 8.5 g/dL    Albumin 4.20 3.50 - 5.20 g/dL    ALT (SGPT) 41 1 - 41 U/L    AST (SGOT) 27 1 - 40 U/L    Alkaline Phosphatase 92 39 - 117 U/L    Total Bilirubin 0.4 0.2 - 1.2 mg/dL    eGFR Non African Amer 76 >60 mL/min/1.73    Globulin 3.1 gm/dL    A/G Ratio 1.4 g/dL    BUN/Creatinine Ratio 14.8 7.0 - 25.0    Anion Gap 7.9 5.0 - 15.0 mmol/L   Lipase    Collection Time: 11/14/19  9:33 PM   Result Value Ref Range    Lipase 46 13 - 60 U/L   Protime-INR    Collection Time: 11/14/19  9:33 PM   Result Value Ref Range    Protime 27.4 (H) 11.7 - 14.2 Seconds    INR 2.58 (H) 0.90 - 1.10   CBC Auto Differential    Collection Time: 11/14/19  9:33 PM   Result Value Ref Range    WBC 9.30 3.40 - 10.80 10*3/mm3    RBC 4.97 4.14 - 5.80 10*6/mm3    Hemoglobin 14.4 13.0 - 17.7 g/dL    Hematocrit 43.3 37.5 - 51.0 %    MCV 87.1 79.0 - 97.0 fL    MCH 29.0 26.6 - 33.0 pg    MCHC 33.3 31.5 - 35.7 g/dL    RDW 13.3 12.3 - 15.4 %    RDW-SD 42.7 37.0 - 54.0 fl    MPV 9.1 6.0 - 12.0 fL    Platelets 275 140 - 450 10*3/mm3    Neutrophil % 62.7 42.7 - 76.0 %    Lymphocyte % 24.8 19.6 - 45.3 %    Monocyte % 10.5 5.0 - 12.0 %    Eosinophil % 1.3 0.3 - 6.2 %    Basophil % 0.4 0.0 - 1.5 %    Immature Grans % 0.3 0.0 - 0.5 %    Neutrophils, Absolute 5.82 1.70 - 7.00 10*3/mm3    Lymphocytes, Absolute 2.31 0.70 - 3.10 10*3/mm3    Monocytes, Absolute 0.98 (H) 0.10 - 0.90 10*3/mm3    Eosinophils, Absolute 0.12 0.00 - 0.40 10*3/mm3    Basophils, Absolute 0.04 0.00 - 0.20 10*3/mm3    Immature Grans, Absolute 0.03 0.00 - 0.05 10*3/mm3    nRBC 0.0 0.0 - 0.2 /100 WBC   POCT Occult Blood Stool    Collection Time: 11/14/19 10:30 PM   Result Value Ref Range    Fecal Occult Blood Positive (A) Negative    Lot Number [id number]     Expiration Date  05/31/2020     Positive Control Positive Positive    Negative Control Negative Negative       Ordered the above labs and independently reviewed the results.        MEDICATIONS GIVEN IN ER    Medications   sodium chloride 0.9 % flush 10 mL (not administered)         PROGRESS, DATA ANALYSIS, CONSULTS, AND MEDICAL DECISION MAKING    All labs have been independently reviewed by me.  All radiology studies have been reviewed by me and discussed with radiologist dictating report.   EKG's independently reviewed by me.  Discussion below represents my analysis of pertinent findings related to patient's condition, differential diagnosis, treatment plan and final disposition.         --  2130. Ordered labs.     2223. Performed rectal examination. Patient is Hemoccult positive. Very minimal bright red blood. No hemorrhoids. No fissures. Ambulated patient. HR 88, /112.  Patient is not anemic.  Shared decision-making with patient. Discussed admission versus discharge. He would like to be discharged. Pt understands and agrees with the plan, all questions answered.    2230. Spoke with Dr Romero about the patient's case. After a bedside evaluation, he agrees with the plan of care.    --  AS OF 11:15 PM VITALS:    BP - (!) 155/112  HR - 84  TEMP - 97.1 °F (36.2 °C) (Tympanic)  02 SATS - 98%        DIAGNOSIS  Final diagnoses:   Hematochezia         DISPOSITION  DISCHARGE    Patient discharged in stable condition.    Reviewed implications of results, diagnosis, meds, responsibility to follow up, warning signs and symptoms of possible worsening, potential complications and reasons to return to ER.    Patient/Family voiced understanding of above instructions.    Discussed plan for discharge, as there is no emergent indication for admission. Patient referred to primary care provider for BP management due to today's BP. Pt/family is agreeable and understands need for follow up and repeat testing.  Pt is aware that discharge does not  mean that nothing is wrong but it indicates no emergency is present that requires admission and they must continue care with follow-up as given below or physician of their choice.     FOLLOW-UP  Cosme Jarvis MD  [address] [id number] [phone]    Schedule an appointment as soon as possible for a visit            Medication List      No changes were made to your prescriptions during this visit.       --  Documentation assistance provided by henrique Patiño for GEORGES Bullard.  Information recorded by the scribe was done at my direction and has been verified and validated by me.       Mckenzie Patiño  11/14/19 2323       Eric Sheth PA  11/14/19 8550

## 2019-11-15 NOTE — ED NOTES
Discussed discharge instructions with patient, patient verbalized understanding, pt left with NAD.     Alie Andres RN  11/14/19 9510

## 2019-11-15 NOTE — ED NOTES
Pt reports that he is passing bright red blood in his stool that happened Tuesday all day, went away and started back up again today when he wipes. Denies abd pain. Hx of this although denies abd problems. Reports taking warfarin.          Christie Quinones, RN  11/14/19 2058

## 2019-11-15 NOTE — ED PROVIDER NOTES
"Pt is a 39 y.o. male who presents to the ED complaining of \"blood in stool\" that started 3 days ago. Pt denies abd pain, generalized weakness, lightheadedness. Pt reports he is currently taking Coumadin for artifical heart valve.       On exam,  Constitutional: Resting comfortably, no distress  HEENT: Moist mucus membrane, PERRLA  Cardiovascular: Artifical valve sound, regular heart  Pulmonary: CTAB, no respiratory distress  Abdomen: Soft, non-distended, non-tender, normal active bowel sounds  Musculoskeletal: No bilateral pedal edema, no bilateral calves tenderness   Neurological: Non-focal neurological exam    Plan: Reviewed labs which shows hemoglobin is normal. Plan to discharge.         MD ATTESTATION NOTE  The ROSELINE and I have discussed this patient's history, physical exam, and treatment plan.  I have reviewed the documentation and personally had a face to face interaction with the patient. I affirm the documentation and agree with the treatment and plan.  The attached note describes my personal findings.    Documentation assistance provided by henrique Silvestre for Dr. Frank Romero MD. Information recorded by the scribe was done at my direction and has been verified and validated by me.       Luciana Silvestre  11/15/19 0139       Boris Romero MD  11/15/19 0234    "

## 2019-11-15 NOTE — DISCHARGE INSTRUCTIONS
Return for any worsening symptoms including dizziness, lightheadedness, weakness, or worsening bleeding.

## 2019-11-27 LAB — INR PPP: 3

## 2019-12-02 ENCOUNTER — ANTICOAGULATION VISIT (OUTPATIENT)
Dept: PHARMACY | Facility: HOSPITAL | Age: 39
End: 2019-12-02

## 2019-12-02 ENCOUNTER — OFFICE VISIT (OUTPATIENT)
Dept: FAMILY MEDICINE CLINIC | Facility: CLINIC | Age: 39
End: 2019-12-02

## 2019-12-02 VITALS
SYSTOLIC BLOOD PRESSURE: 122 MMHG | HEIGHT: 71 IN | BODY MASS INDEX: 39.62 KG/M2 | DIASTOLIC BLOOD PRESSURE: 84 MMHG | OXYGEN SATURATION: 98 % | WEIGHT: 283 LBS | HEART RATE: 67 BPM | TEMPERATURE: 97.8 F

## 2019-12-02 DIAGNOSIS — R73.9 HYPERGLYCEMIA: ICD-10-CM

## 2019-12-02 DIAGNOSIS — Z95.2 HISTORY OF MECHANICAL AORTIC VALVE REPLACEMENT: ICD-10-CM

## 2019-12-02 DIAGNOSIS — Z79.01 LONG TERM (CURRENT) USE OF ANTICOAGULANTS: ICD-10-CM

## 2019-12-02 DIAGNOSIS — I10 ESSENTIAL HYPERTENSION: Primary | ICD-10-CM

## 2019-12-02 DIAGNOSIS — K62.5 RECTAL BLEEDING: ICD-10-CM

## 2019-12-02 DIAGNOSIS — E78.5 HYPERLIPIDEMIA, UNSPECIFIED HYPERLIPIDEMIA TYPE: ICD-10-CM

## 2019-12-02 PROCEDURE — 90715 TDAP VACCINE 7 YRS/> IM: CPT | Performed by: INTERNAL MEDICINE

## 2019-12-02 PROCEDURE — 99214 OFFICE O/P EST MOD 30 MIN: CPT | Performed by: INTERNAL MEDICINE

## 2019-12-02 PROCEDURE — 90471 IMMUNIZATION ADMIN: CPT | Performed by: INTERNAL MEDICINE

## 2019-12-02 RX ORDER — CHLORAL HYDRATE 500 MG
CAPSULE ORAL
COMMUNITY

## 2019-12-02 NOTE — PROGRESS NOTES
Subjective Chief complaint is to establish care  Man Ramsey is a 39 y.o. male.     History of Present Illness   Man is here today to establish care.  He is a former patient of Dr. Garcia.  He has not seen him since 2014.  He is under the care of Dr. Kam as his cardiologist for a mechanical heart valve.  He is on warfarin as his anticoagulant.  He did have a recent episode of rectal bleeding at the emergency room his hemoglobin was normal.  He did have an elevation in his blood sugar but he was not fasting for this.  He does report that he inks that his cholesterol will be high there is a history of this in the family.  Social history he works in computer software.  He is a lifelong non-smoker.  He occasionally drinks alcohol.  Family history is remarkable for hypertension and hyperlipidemia.  He did have a grandparent who had congestive heart failure.  The following portions of the patient's history were reviewed and updated as appropriate: allergies, current medications, past family history, past medical history, past social history, past surgical history and problem list.    Review of Systems   HENT: Positive for nosebleeds. Negative for sore throat and trouble swallowing.    Respiratory: Negative for chest tightness and shortness of breath.    Cardiovascular: Negative for chest pain and leg swelling.   Gastrointestinal: Positive for anal bleeding and blood in stool. Negative for abdominal pain.   Genitourinary: Negative for dysuria and hematuria.   Neurological: Negative for dizziness, light-headedness and headache.       Objective   Physical Exam   Constitutional: He is oriented to person, place, and time. He appears well-developed and well-nourished.   Neck: Carotid bruit is not present. No thyromegaly present.   Cardiovascular: Normal rate, regular rhythm and intact distal pulses. Exam reveals no gallop and no friction rub.   No murmur heard.  There is a mechanical click but no murmur   Pulmonary/Chest:  Effort normal and breath sounds normal. No respiratory distress. He has no wheezes. He has no rales.   Abdominal: Soft. Bowel sounds are normal. He exhibits no distension and no mass. There is no tenderness. There is no guarding.   Musculoskeletal: He exhibits no edema.   Neurological: He is alert and oriented to person, place, and time.   Skin: Skin is warm and dry.   Psychiatric: He has a normal mood and affect.   Nursing note and vitals reviewed.        Assessment/Plan   Man was seen today for establish care.    Diagnoses and all orders for this visit:    Essential hypertension    History of mechanical aortic valve replacement    Rectal bleeding    Hyperglycemia  -     Hemoglobin A1c; Future  -     Basic Metabolic Panel; Future    Hyperlipidemia, unspecified hyperlipidemia type  -     Lipid Panel; Future    Other orders  -     Tdap Vaccine Greater Than or Equal To 6yo IM      Man is here today to establish care.  His medical problems seem to be stable.  He does have an appointment with a gastroenterologist regarding his rectal bleeding.  I am going to set him up for some fasting laboratories and we will call him with these results.  We will see him back in approximately 6 months for physical exam.

## 2019-12-02 NOTE — PROGRESS NOTES
Anticoagulation Clinic Progress Note    Anticoagulation Summary  As of 12/2/2019    INR goal:   2.5-3.5   TTR:   78.1 % (11.5 mo)   INR used for dosing:   3.00 (11/27/2019)   Warfarin maintenance plan:   6 mg every Thu; 8 mg all other days   Weekly warfarin total:   54 mg   No change documented:   Yecenia Burns   Plan last modified:   Shani Vincent [facility] (4/18/2019)   Next INR check:   12/11/2019   Priority:   Maintenance   Target end date:   Indefinite    Indications    History of mechanical aortic valve replacement [Z95.2]  Long term (current) use of anticoagulants [Z79.01]             Anticoagulation Episode Summary     INR check location:   Home Draw    Preferred lab:       Send INR reminders to:    JOSE [facility]CHU[facility]  POOL    Comments:   **[facility]**      Anticoagulation Care Providers     Provider Role Specialty Phone number    Lorne Kam MD Referring Cardiology [phone]          Clinic Interview:  No pertinent clinical findings have been reported.    INR History:  Anticoagulation Monitoring 10/31/2019 11/13/2019 12/2/2019   INR 2.50 3.20 3.00   INR Date 10/30/2019 11/12/2019 11/27/2019   INR Goal 2.5-3.5 2.5-3.5 2.5-3.5   Trend Same Same Same   Last Week Total 54 mg 54 mg 54 mg   Next Week Total 54 mg 54 mg 54 mg   Sun 8 mg 8 mg 8 mg   Mon 8 mg 8 mg 8 mg   Tue 8 mg 8 mg 8 mg   Wed 8 mg 8 mg 8 mg   Thu 6 mg 6 mg 6 mg   Fri 8 mg 8 mg 8 mg   Sat 8 mg 8 mg 8 mg   Visit Report - - -   Some recent data might be hidden       Plan:  1. INR is therapeutic today- see above in Anticoagulation Summary.    Man Ramsey to continue their warfarin regimen- see above in Anticoagulation Summary.  2. Follow up in 2 weeks  3. Pt has agreed to only be called if INR out of range. They have been instructed to call if any changes in medications, doses, concerns, etc. Patient expresses understanding and has no further questions at this time.    Yecenia Burns

## 2019-12-03 ENCOUNTER — OFFICE VISIT (OUTPATIENT)
Dept: GASTROENTEROLOGY | Facility: CLINIC | Age: 39
End: 2019-12-03

## 2019-12-03 VITALS
TEMPERATURE: 97.5 F | DIASTOLIC BLOOD PRESSURE: 82 MMHG | WEIGHT: 287.2 LBS | SYSTOLIC BLOOD PRESSURE: 124 MMHG | BODY MASS INDEX: 40.21 KG/M2 | HEIGHT: 71 IN

## 2019-12-03 DIAGNOSIS — K62.5 RECTAL BLEED: Primary | ICD-10-CM

## 2019-12-03 PROCEDURE — 99204 OFFICE O/P NEW MOD 45 MIN: CPT | Performed by: INTERNAL MEDICINE

## 2019-12-03 RX ORDER — FEXOFENADINE HCL 180 MG/1
180 TABLET ORAL DAILY
COMMUNITY

## 2019-12-03 NOTE — H&P (VIEW-ONLY)
Chief Complaint   Patient presents with   • Rectal Bleeding       Man Ramsey is a 39 y.o. male who presents with rectal bleeding    39-year-old gentleman with a history of aortic valve replacement, cardiomyopathy, on Coumadin with an INR running between 2.5 and 3.3, presents with rectal bleeding off and on now for 1 month.  Hemoglobin was normal.  No previous colonoscopy in his lifetime.  No abdominal cramping.  No diarrhea.  No family history of IBD or colon cancer        Past Medical History:   Diagnosis Date   • Aortic insufficiency due to bicuspid aortic valve     with severe AI, s/p 29mm ATS mechanical AVR 5/2012.  Echo has shown normal function but mild AI.   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  He recovered with oral steroids.  GIANNA was negative for endocarditis.   • GI (gastrointestinal bleed) 11/12/2019   • Hypertension    • Nonischemic cardiomyopathy (CMS/HCC)     due to AI, LVEF 40% with LVE 5/2012; improved to EF 57% in July 2012 with mild LV dilation.  Echo 3/2014 with normal LV size and systolic function   • Obesity    • Ocular migraine        Past Surgical History:   Procedure Laterality Date   • ABDOMINAL SURGERY  May 2012    sternotomy, heart valve replacement   • AORTIC VALVE REPAIR/REPLACEMENT      replacement         Current Outpatient Medications:   •  Ascorbic Acid (VITAMIN C PO), Take  by mouth Daily., Disp: , Rfl:   •  Calcium Carb-Cholecalciferol (CALCIUM 1000 + D PO), Take  by mouth., Disp: , Rfl:   •  carvedilol (COREG) 12.5 MG tablet, TAKE 1 TABLET BY MOUTH TWICE DAILY, Disp: 180 tablet, Rfl: 2  •  Cholecalciferol (VITAMIN D) 1000 UNITS tablet, Take 2 tablets by mouth., Disp: , Rfl:   •  Cyanocobalamin (VITAMIN B 12 PO), Take  by mouth Daily., Disp: , Rfl:   •  fexofenadine (ALLEGRA) 180 MG tablet, Take 180 mg by mouth Daily., Disp: ,  Rfl:   •  lisinopril (PRINIVIL,ZESTRIL) 20 MG tablet, TAKE 1 TABLET BY MOUTH ONCE DAILY, Disp: 90 tablet, Rfl: 2  •  Melatonin 3 MG capsule, Take  by mouth., Disp: , Rfl:   •  Multiple Vitamin tablet, Take  by mouth daily., Disp: , Rfl:   •  Omega-3 Fatty Acids (FISH OIL) 1000 MG capsule capsule, Take  by mouth Daily With Breakfast., Disp: , Rfl:   •  warfarin (COUMADIN) 2 MG tablet, TAKE 3 TABLETS (6MG) BY MOUTH ON THURSDAYS AND 4 TABS (8MG) ON ALL OTHER DAYS OR AS DIRECTED BY CLINIC, Disp: 350 tablet, Rfl: 0    No Known Allergies    Social History     Socioeconomic History   • Marital status: Single     Spouse name: Not on file   • Number of children: Not on file   • Years of education: Not on file   • Highest education level: Not on file   Tobacco Use   • Smoking status: Never Smoker   • Smokeless tobacco: Never Used   • Tobacco comment: caffeine use/ 2-3 CUPS OF TEADAILY    Substance and Sexual Activity   • Alcohol use: Yes     Alcohol/week: 1.2 oz     Types: 1 Cans of beer, 1 Shots of liquor per week     Comment: 1-2 servings of alcohol daily   • Drug use: No   • Sexual activity: No       History reviewed. No pertinent family history.    Review of Systems   Gastrointestinal: Positive for blood in stool.   All other systems reviewed and are negative.      There were no vitals filed for this visit.    Physical Exam   Constitutional: He is oriented to person, place, and time. He appears well-developed and well-nourished.   HENT:   Head: Normocephalic and atraumatic.   Eyes: Conjunctivae and EOM are normal.   Neck: Normal range of motion. No tracheal deviation present.   Cardiovascular: Normal rate and regular rhythm.   Pulmonary/Chest: Effort normal and breath sounds normal. No respiratory distress.   Abdominal: Soft. Bowel sounds are normal. He exhibits no distension and no mass. There is no tenderness. There is no rebound and no guarding.   Musculoskeletal: Normal range of motion.   Neurological: He is alert and  oriented to person, place, and time.   Skin: Skin is warm and dry.   Psychiatric: He has a normal mood and affect. Judgment normal.   Nursing note and vitals reviewed.      Problem list    Rectal bleeding  Heme positive stools      Assessment/Plan    His diagnosis is likely internal hemorrhoid cannot rule out rectal polyp or cancer without full colonoscopy  I will plan for colonoscopy in 2 weeks and we will have him continue his Coumadin up until the time of procedure as I firmly believe that hemorrhoids will be his diagnosis, he will likely need suppository treatment at home for hemorrhoids, I doubt we will begin with banding or IRC

## 2019-12-12 ENCOUNTER — ANTICOAGULATION VISIT (OUTPATIENT)
Dept: PHARMACY | Facility: HOSPITAL | Age: 39
End: 2019-12-12

## 2019-12-12 DIAGNOSIS — Z79.01 LONG TERM (CURRENT) USE OF ANTICOAGULANTS: ICD-10-CM

## 2019-12-12 DIAGNOSIS — Z95.2 HISTORY OF MECHANICAL AORTIC VALVE REPLACEMENT: ICD-10-CM

## 2019-12-12 LAB — INR PPP: 3.4

## 2019-12-12 NOTE — PROGRESS NOTES
Anticoagulation Clinic Progress Note    Anticoagulation Summary  As of 12/12/2019    INR goal:   2.5-3.5   TTR:   79.0 % (1 y)   INR used for dosing:   3.40 (12/11/2019)   Warfarin maintenance plan:   6 mg every Thu; 8 mg all other days   Weekly warfarin total:   54 mg   No change documented:   Almaz Faria   Plan last modified:   Shani Vincent [facility] (4/18/2019)   Next INR check:   12/26/2019   Priority:   Maintenance   Target end date:   Indefinite    Indications    History of mechanical aortic valve replacement [Z95.2]  Long term (current) use of anticoagulants [Z79.01]             Anticoagulation Episode Summary     INR check location:   Home Draw    Preferred lab:       Send INR reminders to:    JOSE LECHUGA  POOL    Comments:   **[facility]**      Anticoagulation Care Providers     Provider Role Specialty Phone number    Lorne Kam MD Referring Cardiology [phone]          Clinic Interview:  No pertinent clinical findings have been reported.    INR History:  Anticoagulation Monitoring 11/13/2019 12/2/2019 12/12/2019   INR 3.20 3.00 3.40   INR Date 11/12/2019 11/27/2019 12/11/2019   INR Goal 2.5-3.5 2.5-3.5 2.5-3.5   Trend Same Same Same   Last Week Total 54 mg 54 mg 54 mg   Next Week Total 54 mg 54 mg 54 mg   Sun 8 mg 8 mg 8 mg   Mon 8 mg 8 mg 8 mg   Tue 8 mg 8 mg 8 mg   Wed 8 mg 8 mg 8 mg   Thu 6 mg 6 mg 6 mg   Fri 8 mg 8 mg 8 mg   Sat 8 mg 8 mg 8 mg   Visit Report - - -   Some recent data might be hidden       Plan:  1. INR is therapeutic today- see above in Anticoagulation Summary.    Man Ramsey to continue their warfarin regimen- see above in Anticoagulation Summary.  2. Follow up in 2 weeks  3. Pt has agreed to only be called if INR out of range. They have been instructed to call if any changes in medications, doses, concerns, etc. Patient expresses understanding and has no further questions at this time.    Almaz Faria

## 2019-12-17 ENCOUNTER — HOSPITAL ENCOUNTER (OUTPATIENT)
Facility: HOSPITAL | Age: 39
Setting detail: HOSPITAL OUTPATIENT SURGERY
Discharge: HOME | End: 2019-12-17
Attending: INTERNAL MEDICINE | Admitting: INTERNAL MEDICINE

## 2019-12-17 ENCOUNTER — ANESTHESIA (OUTPATIENT)
Dept: GASTROENTEROLOGY | Facility: HOSPITAL | Age: 39
End: 2019-12-17

## 2019-12-17 ENCOUNTER — TELEPHONE (OUTPATIENT)
Dept: GASTROENTEROLOGY | Facility: CLINIC | Age: 39
End: 2019-12-17

## 2019-12-17 ENCOUNTER — ANESTHESIA EVENT (OUTPATIENT)
Dept: GASTROENTEROLOGY | Facility: HOSPITAL | Age: 39
End: 2019-12-17

## 2019-12-17 VITALS
HEART RATE: 61 BPM | DIASTOLIC BLOOD PRESSURE: 102 MMHG | SYSTOLIC BLOOD PRESSURE: 144 MMHG | RESPIRATION RATE: 17 BRPM | OXYGEN SATURATION: 100 % | BODY MASS INDEX: 39.19 KG/M2 | TEMPERATURE: 98.1 F | HEIGHT: 71 IN | WEIGHT: 279.9 LBS

## 2019-12-17 DIAGNOSIS — K62.5 RECTAL BLEED: ICD-10-CM

## 2019-12-17 PROCEDURE — 25010000002 PROPOFOL 10 MG/ML EMULSION: Performed by: ANESTHESIOLOGY

## 2019-12-17 PROCEDURE — 45380 COLONOSCOPY AND BIOPSY: CPT | Performed by: INTERNAL MEDICINE

## 2019-12-17 PROCEDURE — 88305 TISSUE EXAM BY PATHOLOGIST: CPT | Performed by: INTERNAL MEDICINE

## 2019-12-17 RX ORDER — SODIUM CHLORIDE 0.9 % (FLUSH) 0.9 %
10 SYRINGE (ML) INJECTION AS NEEDED
Status: DISCONTINUED | OUTPATIENT
Start: 2019-12-17 | End: 2019-12-17 | Stop reason: HOSPADM

## 2019-12-17 RX ORDER — LIDOCAINE HYDROCHLORIDE 10 MG/ML
0.5 INJECTION, SOLUTION INFILTRATION; PERINEURAL ONCE AS NEEDED
Status: DISCONTINUED | OUTPATIENT
Start: 2019-12-17 | End: 2019-12-17 | Stop reason: HOSPADM

## 2019-12-17 RX ORDER — PROPOFOL 10 MG/ML
VIAL (ML) INTRAVENOUS AS NEEDED
Status: DISCONTINUED | OUTPATIENT
Start: 2019-12-17 | End: 2019-12-17 | Stop reason: SURG

## 2019-12-17 RX ORDER — LIDOCAINE HYDROCHLORIDE 20 MG/ML
INJECTION, SOLUTION INFILTRATION; PERINEURAL AS NEEDED
Status: DISCONTINUED | OUTPATIENT
Start: 2019-12-17 | End: 2019-12-17 | Stop reason: SURG

## 2019-12-17 RX ORDER — SODIUM CHLORIDE, SODIUM LACTATE, POTASSIUM CHLORIDE, CALCIUM CHLORIDE 600; 310; 30; 20 MG/100ML; MG/100ML; MG/100ML; MG/100ML
1000 INJECTION, SOLUTION INTRAVENOUS CONTINUOUS
Status: DISCONTINUED | OUTPATIENT
Start: 2019-12-17 | End: 2019-12-17 | Stop reason: HOSPADM

## 2019-12-17 RX ADMIN — PROPOFOL 200 MG: 10 INJECTION, EMULSION INTRAVENOUS at 13:40

## 2019-12-17 RX ADMIN — LIDOCAINE HYDROCHLORIDE 100 MG: 20 INJECTION, SOLUTION INFILTRATION; PERINEURAL at 13:40

## 2019-12-17 RX ADMIN — SODIUM CHLORIDE, POTASSIUM CHLORIDE, SODIUM LACTATE AND CALCIUM CHLORIDE 1000 ML: 600; 310; 30; 20 INJECTION, SOLUTION INTRAVENOUS at 12:28

## 2019-12-17 NOTE — DISCHARGE INSTRUCTIONS
For the next 24 hours patient needs to be with a responsible adult.    For 24 hours DO NOT drive, operate machinery, appliances, drink alcohol, make important decisions or sign legal documents.    Start with a light or bland diet if you are feeling sick to your stomach otherwise advance to regular diet as tolerated.    Follow recommendations on procedure report if provided by your doctor.    Call Dr Hill for problems [phone]    Problems may include but not limited to: large amounts of bleeding, trouble breathing, repeated vomiting, severe unrelieved pain, fever or chills.

## 2019-12-17 NOTE — ANESTHESIA POSTPROCEDURE EVALUATION
"Patient: Man Ramsey    Procedure Summary     Date:  12/17/19 Room / Location:  [facility] ENDOSCOPY 10 /  [facility]    Anesthesia Start:  1337 Anesthesia Stop:  1357    Procedure:  COLONOSCOPY into cecum with polypectomy (N/A ) Diagnosis:       Rectal bleed      (Rectal bleed [K62.5])    Surgeon:  Cosme Hill MD Provider:  Mckenzie Mahmood MD    Anesthesia Type:  MAC ASA Status:  3          Anesthesia Type: No value filed.    Vitals  Vitals Value Taken Time   /67 12/17/2019  1:58 PM   Temp     Pulse 67 12/17/2019  1:58 PM   Resp 15 12/17/2019  1:58 PM   SpO2 98 % 12/17/2019  1:58 PM           Post Anesthesia Care and Evaluation    Patient location during evaluation: PACU  Patient participation: complete - patient participated  Level of consciousness: awake  Pain score: 0  Pain management: adequate  Airway patency: patent  Anesthetic complications: No anesthetic complications    Cardiovascular status: acceptable  Respiratory status: acceptable  Hydration status: acceptable    Comments: Blood pressure 101/67, pulse 67, temperature 36.7 °C (98.1 °F), temperature source Oral, resp. rate 15, height 180.3 cm (71\"), weight 127 kg (279 lb 14.4 oz), SpO2 98 %.    No anesthesia care post op    "

## 2019-12-17 NOTE — TELEPHONE ENCOUNTER
Advised patient the PA for his Analpram will be started once we have received the paperwork from his pharmacy. He verb understanding.

## 2019-12-17 NOTE — ADDENDUM NOTE
Addendum  created 12/17/19 1626 by Man Rainey MD    Review and Sign - Ready for Procedure, Review and Sign - Signed

## 2019-12-17 NOTE — TELEPHONE ENCOUNTER
----- Message from Maite Copeland sent at 12/17/2019  3:34 PM EST -----  Regarding: medication   Contact: [phone]  Pt is calling regarding medications needing PA. Pharmacy told the pt they sent the PA (I don't see it).

## 2019-12-17 NOTE — ANESTHESIA PREPROCEDURE EVALUATION
Anesthesia Evaluation     Patient summary reviewed and Nursing notes reviewed   NPO Solid Status: > 8 hours  NPO Liquid Status: > 6 hours           Airway   Mallampati: I  TM distance: >3 FB  Neck ROM: full  No difficulty expected  Dental - normal exam     Pulmonary - normal exam   Cardiovascular     ECG reviewed  PT is on anticoagulation therapy  Patient on routine beta blocker  Rhythm: regular  Rate: normal    (+) hypertension 2 medications or greater, valvular problems/murmurs (s/p avr), CAD, murmur,     ROS comment: Echo ef 55%    Neuro/Psych  GI/Hepatic/Renal/Endo    (+) obesity,  GI bleeding ,     Musculoskeletal     Abdominal  - normal exam    Bowel sounds: normal.   Substance History      OB/GYN          Other                      Anesthesia Plan    ASA 3     MAC       Anesthetic plan, all risks, benefits, and alternatives have been provided, discussed and informed consent has been obtained with: patient.

## 2019-12-18 ENCOUNTER — TELEPHONE (OUTPATIENT)
Dept: GASTROENTEROLOGY | Facility: CLINIC | Age: 39
End: 2019-12-18

## 2019-12-18 LAB
CYTO UR: NORMAL
LAB AP CASE REPORT: NORMAL
LAB AP CLINICAL INFORMATION: NORMAL
PATH REPORT.FINAL DX SPEC: NORMAL
PATH REPORT.GROSS SPEC: NORMAL

## 2019-12-18 NOTE — TELEPHONE ENCOUNTER
----- Message from Cosme Hill MD sent at 12/18/2019  3:45 PM EST -----  Biopsies benign  Colonoscopy recall 10 years

## 2019-12-18 NOTE — TELEPHONE ENCOUNTER
Patient called, advised as per Dr. Hill's note. He verb understanding and is agreeable to the plan. Repeat c/s in 10 yrs (12/17/290) added to HM.

## 2019-12-25 LAB — INR PPP: 3.1

## 2019-12-26 ENCOUNTER — RESULTS ENCOUNTER (OUTPATIENT)
Dept: FAMILY MEDICINE CLINIC | Facility: CLINIC | Age: 39
End: 2019-12-26

## 2019-12-26 ENCOUNTER — ANTICOAGULATION VISIT (OUTPATIENT)
Dept: PHARMACY | Facility: HOSPITAL | Age: 39
End: 2019-12-26

## 2019-12-26 DIAGNOSIS — Z79.01 LONG TERM (CURRENT) USE OF ANTICOAGULANTS: ICD-10-CM

## 2019-12-26 DIAGNOSIS — Z95.2 HISTORY OF MECHANICAL AORTIC VALVE REPLACEMENT: ICD-10-CM

## 2019-12-26 DIAGNOSIS — R73.9 HYPERGLYCEMIA: ICD-10-CM

## 2019-12-26 DIAGNOSIS — E78.5 HYPERLIPIDEMIA, UNSPECIFIED HYPERLIPIDEMIA TYPE: ICD-10-CM

## 2019-12-26 LAB
BUN SERPL-MCNC: 13 MG/DL (ref 6–20)
BUN/CREAT SERPL: 12.7 (ref 7–25)
CALCIUM SERPL-MCNC: 9.5 MG/DL (ref 8.6–10.5)
CHLORIDE SERPL-SCNC: 102 MMOL/L (ref 98–107)
CHOLEST SERPL-MCNC: 236 MG/DL (ref 0–200)
CO2 SERPL-SCNC: 25.8 MMOL/L (ref 22–29)
CREAT SERPL-MCNC: 1.02 MG/DL (ref 0.76–1.27)
GLUCOSE SERPL-MCNC: 98 MG/DL (ref 65–99)
HBA1C MFR BLD: 5.9 % (ref 4.8–5.6)
HDLC SERPL-MCNC: 35 MG/DL (ref 40–60)
LDLC SERPL CALC-MCNC: 159 MG/DL (ref 0–100)
POTASSIUM SERPL-SCNC: 4.4 MMOL/L (ref 3.5–5.2)
SODIUM SERPL-SCNC: 141 MMOL/L (ref 136–145)
TRIGL SERPL-MCNC: 211 MG/DL (ref 0–150)
VLDLC SERPL CALC-MCNC: 42.2 MG/DL

## 2019-12-26 NOTE — PROGRESS NOTES
Anticoagulation Clinic Progress Note    Anticoagulation Summary  As of 12/26/2019    INR goal:   2.5-3.5   TTR:   79.7 % (1 y)   INR used for dosing:   3.10 (12/25/2019)   Warfarin maintenance plan:   6 mg every Thu; 8 mg all other days   Weekly warfarin total:   54 mg   No change documented:   Yecenia Burns   Plan last modified:   Shani Vincent [facility] (4/18/2019)   Next INR check:   1/8/2020   Priority:   Maintenance   Target end date:   Indefinite    Indications    History of mechanical aortic valve replacement [Z95.2]  Long term (current) use of anticoagulants [Z79.01]             Anticoagulation Episode Summary     INR check location:   Home Draw    Preferred lab:       Send INR reminders to:    [facility]  POOL    Comments:   **[employer] [facility]**      Anticoagulation Care Providers     Provider Role Specialty Phone number    Lorne Kam MD Referring Cardiology [phone]          Clinic Interview:  No pertinent clinical findings have been reported.    INR History:  Anticoagulation Monitoring 12/2/2019 12/12/2019 12/26/2019   INR 3.00 3.40 3.10   INR Date 11/27/2019 12/11/2019 12/25/2019   INR Goal 2.5-3.5 2.5-3.5 2.5-3.5   Trend Same Same Same   Last Week Total 54 mg 54 mg 54 mg   Next Week Total 54 mg 54 mg 54 mg   Sun 8 mg 8 mg 8 mg   Mon 8 mg 8 mg 8 mg   Tue 8 mg 8 mg 8 mg   Wed 8 mg 8 mg 8 mg   Thu 6 mg 6 mg 6 mg   Fri 8 mg 8 mg 8 mg   Sat 8 mg 8 mg 8 mg   Visit Report - - -   Some recent data might be hidden       Plan:  1. INR is therapeutic today- see above in Anticoagulation Summary.    Man Ramsey to continue their warfarin regimen- see above in Anticoagulation Summary.  2. Follow up in 2 weeks  3. Pt has agreed to only be called if INR out of range. They have been instructed to call if any changes in medications, doses, concerns, etc. Patient expresses understanding and has no further questions at this time.    Yecenia Burns

## 2019-12-27 ENCOUNTER — TELEPHONE (OUTPATIENT)
Dept: FAMILY MEDICINE CLINIC | Facility: CLINIC | Age: 39
End: 2019-12-27

## 2019-12-27 RX ORDER — AMOXICILLIN 500 MG/1
2000 TABLET, FILM COATED ORAL ONCE
Qty: 4 TABLET | Refills: 5 | Status: SHIPPED | OUTPATIENT
Start: 2019-12-27 | End: 2019-12-27

## 2020-01-08 ENCOUNTER — TELEPHONE (OUTPATIENT)
Dept: CARDIOLOGY | Facility: CLINIC | Age: 40
End: 2020-01-08

## 2020-01-08 LAB — INR PPP: 3.2

## 2020-01-08 NOTE — TELEPHONE ENCOUNTER
Mr. Ramsey was informed of instructions to hold warfarin x 3 days prior to dental procedure. He is agreeable to contacting [facility] upon procedure being scheduled to review instructions.

## 2020-01-08 NOTE — TELEPHONE ENCOUNTER
Received a form/ request for records and any padma-operative recommendations prior to two teeth extractions under IV sedation.  Dr. Kam has signed the form and I have faxed records (echo, last OV, and last PT/ INR), but pt will still need instructions regarding discontinuing Coumadin 3 days prior to oral surgery.

## 2020-01-09 ENCOUNTER — ANTICOAGULATION VISIT (OUTPATIENT)
Dept: PHARMACY | Facility: HOSPITAL | Age: 40
End: 2020-01-09

## 2020-01-09 DIAGNOSIS — Z95.2 HISTORY OF MECHANICAL AORTIC VALVE REPLACEMENT: ICD-10-CM

## 2020-01-09 DIAGNOSIS — Z79.01 LONG TERM (CURRENT) USE OF ANTICOAGULANTS: ICD-10-CM

## 2020-01-09 NOTE — PROGRESS NOTES
Anticoagulation Clinic Progress Note    Anticoagulation Summary  As of 1/9/2020    INR goal:   2.5-3.5   TTR:   80.5 % (1.1 y)   INR used for dosing:   3.20 (1/8/2020)   Warfarin maintenance plan:   6 mg every Thu; 8 mg all other days   Weekly warfarin total:   54 mg   No change documented:   Yecenia Burns   Plan last modified:   Shani Vincent [facility] (4/18/2019)   Next INR check:   1/22/2020   Priority:   Maintenance   Target end date:   Indefinite    Indications    History of mechanical aortic valve replacement [Z95.2]  Long term (current) use of anticoagulants [Z79.01]             Anticoagulation Episode Summary     INR check location:   Home Draw    Preferred lab:       Send INR reminders to:    [facility]  POOL    Comments:   **[facility]**      Anticoagulation Care Providers     Provider Role Specialty Phone number    Lorne Kam MD Referring Cardiology [phone]          Clinic Interview:  No pertinent clinical findings have been reported.    INR History:  Anticoagulation Monitoring 12/12/2019 12/26/2019 1/9/2020   INR 3.40 3.10 3.20   INR Date 12/11/2019 12/25/2019 1/8/2020   INR Goal 2.5-3.5 2.5-3.5 2.5-3.5   Trend Same Same Same   Last Week Total 54 mg 54 mg 54 mg   Next Week Total 54 mg 54 mg 54 mg   Sun 8 mg 8 mg 8 mg   Mon 8 mg 8 mg 8 mg   Tue 8 mg 8 mg 8 mg   Wed 8 mg 8 mg 8 mg   Thu 6 mg 6 mg 6 mg   Fri 8 mg 8 mg 8 mg   Sat 8 mg 8 mg 8 mg   Visit Report - - -   Some recent data might be hidden       Plan:  1. INR is therapeutic today- see above in Anticoagulation Summary.    Man Ramsey to continue their warfarin regimen- see above in Anticoagulation Summary.  2. Follow up in 2 weeks  3. Pt has agreed to only be called if INR out of range. They have been instructed to call if any changes in medications, doses, concerns, etc. Patient expresses understanding and has no further questions at this time.    Yecenia Burns

## 2020-01-16 RX ORDER — WARFARIN SODIUM 2 MG/1
TABLET ORAL
Qty: 350 TABLET | Refills: 0 | Status: SHIPPED | OUTPATIENT
Start: 2020-01-16 | End: 2020-03-16

## 2020-01-17 ENCOUNTER — ANTICOAGULATION VISIT (OUTPATIENT)
Dept: PHARMACY | Facility: HOSPITAL | Age: 40
End: 2020-01-17

## 2020-01-17 DIAGNOSIS — Z95.2 HISTORY OF MECHANICAL AORTIC VALVE REPLACEMENT: ICD-10-CM

## 2020-01-17 DIAGNOSIS — Z79.01 LONG TERM (CURRENT) USE OF ANTICOAGULANTS: ICD-10-CM

## 2020-01-17 LAB — INR PPP: 2.7

## 2020-01-17 NOTE — TELEPHONE ENCOUNTER
Procedure 1/23 per pt phone call today, instructed on holding 3 days and warfarin boost post procedure.  Will tigre INR at home on 1/29.

## 2020-01-17 NOTE — PROGRESS NOTES
Anticoagulation Clinic Progress Note    Anticoagulation Summary  As of 2020    INR goal:   2.5-3.5   TTR:   80.9 % (1.1 y)   INR used for dosin.70 (2020)   Warfarin maintenance plan:   6 mg every Thu; 8 mg all other days   Weekly warfarin total:   54 mg   Plan last modified:   Shani Vincent [facility] (2019)   Next INR check:   2020   Priority:   Maintenance   Target end date:   Indefinite    Indications    History of mechanical aortic valve replacement [Z95.2]  Long term (current) use of anticoagulants [Z79.01]             Anticoagulation Episode Summary     INR check location:   Home Draw    Preferred lab:       Send INR reminders to:   [facility] [employer]  POOL    Comments:   **[facility]**      Anticoagulation Care Providers     Provider Role Specialty Phone number    Lorne Kam MD Referring Cardiology [phone]            INR History:  Anticoagulation Monitoring 2019   INR 3.10 3.20 2.70   INR Date 2019   INR Goal 2.5-3.5 2.5-3.5 2.5-3.5   Trend Same Same Same   Last Week Total 54 mg 54 mg 54 mg   Next Week Total 54 mg 54 mg 34 mg   Sun 8 mg 8 mg 8 mg   Mon 8 mg 8 mg Hold (); Otherwise 8 mg   Tue 8 mg 8 mg Hold (); Otherwise 8 mg   Wed 8 mg 8 mg Hold ()   Thu 6 mg 6 mg 10 mg ()   Fri 8 mg 8 mg 10 mg (); Otherwise 8 mg   Sat 8 mg 8 mg 8 mg   Visit Report - - -   Some recent data might be hidden       Plan:  1. INR is Therapeutic today- see above in Anticoagulation Summary.   Will instruct Man Ramsey to HOLD warfarin for 3 days before dental procedure (, ) then boost to 10mg x 2 days, then resume their warfarin regimen- see above in Anticoagulation Summary.  2. Follow up in 6 days after procedure  3.Spoke with pt today.. They have been instructed to call if any changes in medications, doses, concerns, etc. Patient expresses understanding and has no further questions at this  time.    Maria Del Rosario Cruz [facility]

## 2020-01-29 LAB — INR PPP: 2.1

## 2020-01-30 ENCOUNTER — ANTICOAGULATION VISIT (OUTPATIENT)
Dept: PHARMACY | Facility: HOSPITAL | Age: 40
End: 2020-01-30

## 2020-01-30 DIAGNOSIS — Z95.2 HISTORY OF MECHANICAL AORTIC VALVE REPLACEMENT: ICD-10-CM

## 2020-01-30 DIAGNOSIS — Z79.01 LONG TERM (CURRENT) USE OF ANTICOAGULANTS: ICD-10-CM

## 2020-01-30 NOTE — PROGRESS NOTES
Anticoagulation Clinic Progress Note    Anticoagulation Summary  As of 2020    INR goal:   2.5-3.5   TTR:   79.5 % (1.1 y)   INR used for dosin.10! (2020)   Warfarin maintenance plan:   6 mg every Thu; 8 mg all other days   Weekly warfarin total:   54 mg   Plan last modified:   Shani Vincent [facility] (2019)   Next INR check:   2020   Priority:   Maintenance   Target end date:   Indefinite    Indications    History of mechanical aortic valve replacement [Z95.2]  Long term (current) use of anticoagulants [Z79.01]             Anticoagulation Episode Summary     INR check location:   Home Draw    Preferred lab:       Send INR reminders to:    [facility]  POOL    Comments:   **[facility]**      Anticoagulation Care Providers     Provider Role Specialty Phone number    Lorne Kam MD Referring Cardiology [phone]          Drug interactions: has remained unchanged.  Diet: has remained unchanged.    Clinic Interview:  No pertinent clinical findings have been reported.    INR History:  Anticoagulation Monitoring 2020   INR 3.20 2.70 2.10   INR Date 2020   INR Goal 2.5-3.5 2.5-3.5 2.5-3.5   Trend Same Same Same   Last Week Total 54 mg 54 mg 60 mg   Next Week Total 54 mg 34 mg 56 mg   Sun 8 mg 8 mg 8 mg   Mon 8 mg Hold (); Otherwise 8 mg 8 mg   Tue 8 mg Hold (); Otherwise 8 mg 8 mg   Wed 8 mg Hold () 8 mg   Thu 6 mg 10 mg () 8 mg ()   Fri 8 mg 10 mg (); Otherwise 8 mg 8 mg   Sat 8 mg 8 mg 8 mg   Visit Report - - -   Some recent data might be hidden       Plan:  1. INR is Subtherapeutic today- see above in Anticoagulation Summary.   Spoke with and instructed  Man Ramsey to take a booster of 8mg today, then continue current dosage regimen- see above in Anticoagulation Summary.  2. Follow up in 1 weeks  3. Pt has agreed to only be called if INR out of range. They have been instructed to call if any  changes in medications, doses, concerns, etc. Patient expresses understanding and has no further questions at this time.    Mckenzie Grayson, [facility]

## 2020-02-05 LAB — INR PPP: 2

## 2020-02-06 ENCOUNTER — ANTICOAGULATION VISIT (OUTPATIENT)
Dept: PHARMACY | Facility: HOSPITAL | Age: 40
End: 2020-02-06

## 2020-02-06 DIAGNOSIS — Z79.01 LONG TERM (CURRENT) USE OF ANTICOAGULANTS: ICD-10-CM

## 2020-02-06 DIAGNOSIS — Z95.2 HISTORY OF MECHANICAL AORTIC VALVE REPLACEMENT: ICD-10-CM

## 2020-02-06 NOTE — PROGRESS NOTES
Anticoagulation Clinic Progress Note    Anticoagulation Summary  As of 2020    INR goal:   2.5-3.5   TTR:   78.2 % (1.1 y)   INR used for dosin.00! (2020)   Warfarin maintenance plan:   10 mg every Thu; 8 mg all other days   Weekly warfarin total:   58 mg   Plan last modified:   Neo Cornejo RPH (2020)   Next INR check:   2020   Priority:   Maintenance   Target end date:   Indefinite    Indications    History of mechanical aortic valve replacement [Z95.2]  Long term (current) use of anticoagulants [Z79.01]             Anticoagulation Episode Summary     INR check location:   Home Draw    Preferred lab:       Send INR reminders to:    [facility]  POOL    Comments:   **[facility] [facility]**      Anticoagulation Care Providers     Provider Role Specialty Phone number    Lorne Kam MD Referring Cardiology [phone]            Clinic Interview:  Patient Findings     Positives:   Change in alcohol use, Change in diet/appetite    Negatives:   Signs/symptoms of thrombosis, Signs/symptoms of bleeding,   Laboratory test error suspected, Change in health, Change in activity,   Upcoming invasive procedure, Emergency department visit, Upcoming dental   procedure, Missed doses, Extra doses, Change in medications, Hospital   admission, Bruising, Other complaints    Comments:   Increased vit k (trying to eat more salads due to high BG)   since ; plans to continue. Temporarily off EtOH since dental   procedure; awaiting clearance from provider before resuming.      Clinical Outcomes     Negatives:   Major bleeding event, Thromboembolic event,   Anticoagulation-related hospital admission, Anticoagulation-related ED   visit, Anticoagulation-related fatality    Comments:   Increased vit k (trying to eat more salads due to high BG)   since ; plans to continue. Temporarily off EtOH since dental   procedure; awaiting clearance from provider before resuming.        INR  History:  Anticoagulation Monitoring 1/17/2020 1/30/2020 2/6/2020   INR 2.70 2.10 2.00   INR Date 1/17/2020 1/29/2020 2/5/2020   INR Goal 2.5-3.5 2.5-3.5 2.5-3.5   Trend Same Same Up   Last Week Total 54 mg 60 mg 56 mg   Next Week Total 34 mg 56 mg 58 mg   Sun 8 mg 8 mg 8 mg   Mon Hold (1/20); Otherwise 8 mg 8 mg 8 mg   Tue Hold (1/21); Otherwise 8 mg 8 mg 8 mg   Wed Hold (1/22) 8 mg -   Thu 10 mg (1/23) 8 mg (1/30) 10 mg   Fri 10 mg (1/24); Otherwise 8 mg 8 mg 8 mg   Sat 8 mg 8 mg 8 mg   Visit Report - - -   Some recent data might be hidden       Plan:  1. INR is Subtherapeutic today- see above in Anticoagulation Summary.   Will instruct Man Ramsey to Increase their warfarin regimen- see above in Anticoagulation Summary.  2. Follow up in 1 week  3. They have been instructed to call if any changes in medications, doses, concerns, etc. Patient expresses understanding and has no further questions at this time.    Neo Cornejo [facility]

## 2020-02-12 LAB — INR PPP: 2.4

## 2020-02-13 ENCOUNTER — ANTICOAGULATION VISIT (OUTPATIENT)
Dept: PHARMACY | Facility: HOSPITAL | Age: 40
End: 2020-02-13

## 2020-02-13 DIAGNOSIS — Z79.01 LONG TERM (CURRENT) USE OF ANTICOAGULANTS: ICD-10-CM

## 2020-02-13 DIAGNOSIS — Z95.2 HISTORY OF MECHANICAL AORTIC VALVE REPLACEMENT: ICD-10-CM

## 2020-02-13 NOTE — PROGRESS NOTES
Anticoagulation Clinic Progress Note    Anticoagulation Summary  As of 2020    INR goal:   2.5-3.5   TTR:   76.9 % (1.2 y)   INR used for dosin.40! (2020)   Warfarin maintenance plan:   8 mg every day   Weekly warfarin total:   56 mg   Plan last modified:   Neo Cornejo RPH (2020)   Next INR check:   2020   Priority:   Maintenance   Target end date:   Indefinite    Indications    History of mechanical aortic valve replacement [Z95.2]  Long term (current) use of anticoagulants [Z79.01]             Anticoagulation Episode Summary     INR check location:   Home Draw    Preferred lab:       Send INR reminders to:    [facility]  POOL    Comments:   **[facility]**      Anticoagulation Care Providers     Provider Role Specialty Phone number    Lorne Kam MD Referring Cardiology [phone]            Clinic Interview:  Patient Findings     Positives:   Change in alcohol use    Negatives:   Signs/symptoms of thrombosis, Signs/symptoms of bleeding,   Laboratory test error suspected, Change in health, Change in activity,   Upcoming invasive procedure, Emergency department visit, Upcoming dental   procedure, Missed doses, Extra doses, Change in medications, Change in   diet/appetite, Hospital admission, Bruising, Other complaints    Comments:   Pt reports he has been cleared to resume EtOH intake and   voices intention of restarting today.       Clinical Outcomes     Negatives:   Major bleeding event, Thromboembolic event,   Anticoagulation-related hospital admission, Anticoagulation-related ED   visit, Anticoagulation-related fatality    Comments:   Pt reports he has been cleared to resume EtOH intake and   voices intention of restarting today.         INR History:  Anticoagulation Monitoring 2020   INR 2.10 2.00 2.40   INR Date 2020   INR Goal 2.5-3.5 2.5-3.5 2.5-3.5   Trend Same Up Down   Last Week Total 60 mg 56 mg 58  mg   Next Week Total 56 mg 58 mg 56 mg   Sun 8 mg 8 mg 8 mg   Mon 8 mg 8 mg 8 mg   Tue 8 mg 8 mg 8 mg   Wed 8 mg - -   Thu 8 mg (1/30) 10 mg 8 mg   Fri 8 mg 8 mg 8 mg   Sat 8 mg 8 mg 8 mg   Visit Report - - -   Some recent data might be hidden       Plan:  1. INR is Subtherapeutic today- see above in Anticoagulation Summary.   Will instruct Man Ramsey to Change their warfarin regimen- see above in Anticoagulation Summary.  2. Follow up in 1 week  3. They have been instructed to call if any changes in medications, doses, concerns, etc. Patient expresses understanding and has no further questions at this time.    Neo Cornejo RP

## 2020-02-19 LAB — INR PPP: 2.4

## 2020-02-20 ENCOUNTER — ANTICOAGULATION VISIT (OUTPATIENT)
Dept: PHARMACY | Facility: HOSPITAL | Age: 40
End: 2020-02-20

## 2020-02-20 DIAGNOSIS — Z95.2 HISTORY OF MECHANICAL AORTIC VALVE REPLACEMENT: ICD-10-CM

## 2020-02-20 DIAGNOSIS — Z79.01 LONG TERM (CURRENT) USE OF ANTICOAGULANTS: ICD-10-CM

## 2020-02-20 NOTE — PROGRESS NOTES
Anticoagulation Clinic Progress Note    Anticoagulation Summary  As of 2020    INR goal:   2.5-3.5   TTR:   75.6 % (1.2 y)   INR used for dosin.40! (2020)   Warfarin maintenance plan:   10 mg every Thu; 8 mg all other days   Weekly warfarin total:   58 mg   Plan last modified:   Neo Cornejo RPH (2020)   Next INR check:   2020   Priority:   Maintenance   Target end date:   Indefinite    Indications    History of mechanical aortic valve replacement [Z95.2]  Long term (current) use of anticoagulants [Z79.01]             Anticoagulation Episode Summary     INR check location:   Home Draw    Preferred lab:       Send INR reminders to:    [facility]  POOL    Comments:   **[facility]**      Anticoagulation Care Providers     Provider Role Specialty Phone number    Lorne Kam MD Referring Cardiology [phone]          Clinic Interview:  Patient Findings     Negatives:   Signs/symptoms of thrombosis, Signs/symptoms of bleeding,   Laboratory test error suspected, Change in health, Change in alcohol use,   Change in activity, Upcoming invasive procedure, Emergency department   visit, Upcoming dental procedure, Missed doses, Extra doses, Change in   medications, Change in diet/appetite, Hospital admission, Bruising, Other   complaints      Clinical Outcomes     Negatives:   Major bleeding event, Thromboembolic event,   Anticoagulation-related hospital admission, Anticoagulation-related ED   visit, Anticoagulation-related fatality        INR History:  Anticoagulation Monitoring 2020   INR 2.00 2.40 2.40   INR Date 2020   INR Goal 2.5-3.5 2.5-3.5 2.5-3.5   Trend Up Down Up   Last Week Total 56 mg 58 mg 56 mg   Next Week Total 58 mg 56 mg 58 mg   Sun 8 mg 8 mg 8 mg   Mon 8 mg 8 mg 8 mg   Tue 8 mg 8 mg 8 mg   Wed - - -   Thu 10 mg 8 mg 10 mg   Fri 8 mg 8 mg 8 mg   Sat 8 mg 8 mg 8 mg   Visit Report - - -   Some recent data  might be hidden       Plan:  1. INR is Subtherapeutic today- see above in Anticoagulation Summary.   Will instruct Man Ramsey to Increase their warfarin regimen- see above in Anticoagulation Summary.  2. Follow up in 1 week  3. They have been instructed to call if any changes in medications, doses, concerns, etc. Patient expresses understanding and has no further questions at this time.    Neo Cornejo [facility]

## 2020-02-26 LAB — INR PPP: 2.1

## 2020-02-27 ENCOUNTER — ANTICOAGULATION VISIT (OUTPATIENT)
Dept: PHARMACY | Facility: HOSPITAL | Age: 40
End: 2020-02-27

## 2020-02-27 DIAGNOSIS — Z79.01 LONG TERM (CURRENT) USE OF ANTICOAGULANTS: ICD-10-CM

## 2020-02-27 DIAGNOSIS — Z95.2 HISTORY OF MECHANICAL AORTIC VALVE REPLACEMENT: ICD-10-CM

## 2020-02-27 NOTE — PROGRESS NOTES
Anticoagulation Clinic Progress Note    Anticoagulation Summary  As of 2020    INR goal:   2.5-3.5   TTR:   74.4 % (1.2 y)   INR used for dosin.10! (2020)   Warfarin maintenance plan:   10 mg every Sun, Thu; 8 mg all other days   Weekly warfarin total:   60 mg   Plan last modified:   Neo Cornejo RPH (2020)   Next INR check:   3/4/2020   Priority:   Maintenance   Target end date:   Indefinite    Indications    History of mechanical aortic valve replacement [Z95.2]  Long term (current) use of anticoagulants [Z79.01]             Anticoagulation Episode Summary     INR check location:   Home Draw    Preferred lab:       Send INR reminders to:    [facility]  POOL    Comments:   **[facility]**      Anticoagulation Care Providers     Provider Role Specialty Phone number    Lorne Kam MD Referring Cardiology [phone]            Clinic Interview:  Patient Findings     Positives:   Change in diet/appetite    Negatives:   Signs/symptoms of thrombosis, Signs/symptoms of bleeding,   Laboratory test error suspected, Change in health, Change in alcohol use,   Change in activity, Upcoming invasive procedure, Emergency department   visit, Upcoming dental procedure, Missed doses, Extra doses, Change in   medications, Hospital admission, Bruising, Other complaints    Comments:   Continues with increased vit k.       Clinical Outcomes     Negatives:   Major bleeding event, Thromboembolic event,   Anticoagulation-related hospital admission, Anticoagulation-related ED   visit, Anticoagulation-related fatality    Comments:   Continues with increased vit k.         INR History:  Anticoagulation Monitoring 2020   INR 2.40 2.40 2.10   INR Date 2020   INR Goal 2.5-3.5 2.5-3.5 2.5-3.5   Trend Down Up Up   Last Week Total 58 mg 56 mg 58 mg   Next Week Total 56 mg 58 mg 62 mg   Sun 8 mg 8 mg 10 mg   Mon 8 mg 8 mg 8 mg   Tue 8 mg 8 mg 8 mg    Wed - - -   Thu 8 mg 10 mg 12 mg (2/27)   Fri 8 mg 8 mg 8 mg   Sat 8 mg 8 mg 8 mg   Visit Report - - -   Some recent data might be hidden       Plan:  1. INR is Subtherapeutic today- see above in Anticoagulation Summary.   Will instruct Man PAT Roxy to Increase their warfarin regimen- see above in Anticoagulation Summary.  2. Follow up in 1 week  3. They have been instructed to call if any changes in medications, doses, concerns, etc. Patient expresses understanding and has no further questions at this time.    Neo Cornejo, [facility]

## 2020-03-04 LAB — INR PPP: 2.3

## 2020-03-05 ENCOUNTER — ANTICOAGULATION VISIT (OUTPATIENT)
Dept: PHARMACY | Facility: HOSPITAL | Age: 40
End: 2020-03-05

## 2020-03-05 DIAGNOSIS — Z95.2 HISTORY OF MECHANICAL AORTIC VALVE REPLACEMENT: ICD-10-CM

## 2020-03-05 DIAGNOSIS — Z79.01 LONG TERM (CURRENT) USE OF ANTICOAGULANTS: ICD-10-CM

## 2020-03-05 NOTE — PROGRESS NOTES
Anticoagulation Clinic Progress Note    Anticoagulation Summary  As of 3/5/2020    INR goal:   2.5-3.5   TTR:   73.3 % (1.2 y)   INR used for dosin.30! (3/4/2020)   Warfarin maintenance plan:   8 mg every Mon, Wed, Fri; 10 mg all other days   Weekly warfarin total:   64 mg   Plan last modified:   Maria Del Rosario Cruz RPH (3/5/2020)   Next INR check:   3/11/2020   Priority:   Maintenance   Target end date:   Indefinite    Indications    History of mechanical aortic valve replacement [Z95.2]  Long term (current) use of anticoagulants [Z79.01]             Anticoagulation Episode Summary     INR check location:   Home Draw    Preferred lab:       Send INR reminders to:    [facility]  [facility]    Comments:   **[facility]**      Anticoagulation Care Providers     Provider Role Specialty Phone number    Lorne Kam MD Referring Cardiology [phone]          Drug interactions: has remained unchanged.  Diet: has remained unchanged. Etoh use consistent but lower than before late Dec.    Clinic Interview:      INR History:  Anticoagulation Monitoring 2020 2020 3/5/2020   INR 2.40 2.10 2.30   INR Date 2020 2020 3/4/2020   INR Goal 2.5-3.5 2.5-3.5 2.5-3.5   Trend Up Up Up   Last Week Total 56 mg 58 mg 62 mg   Next Week Total 58 mg 62 mg 66 mg   Sun 8 mg 10 mg 10 mg   Mon 8 mg 8 mg 8 mg   Tue 8 mg 8 mg 10 mg   Wed - - -   Thu 10 mg 12 mg () 12 mg (3/5)   Fri 8 mg 8 mg 8 mg   Sat 8 mg 8 mg 10 mg   Visit Report - - -   Some recent data might be hidden       Plan:  1. INR is Subtherapeutic today- see above in Anticoagulation Summary.   Will instruct Man Ramsey to Increase their warfarin regimen- see above in Anticoagulation Summary.  2. Follow up in 1 weeks  3. Spoke with pt today. They have been instructed to call if any changes in medications, doses, concerns, etc. Patient expresses understanding and has no further questions at this time.    Maria Del Rosario Cruz RPH

## 2020-03-11 LAB — INR PPP: 3.1

## 2020-03-12 ENCOUNTER — ANTICOAGULATION VISIT (OUTPATIENT)
Dept: PHARMACY | Facility: HOSPITAL | Age: 40
End: 2020-03-12

## 2020-03-12 DIAGNOSIS — Z79.01 LONG TERM (CURRENT) USE OF ANTICOAGULANTS: ICD-10-CM

## 2020-03-12 DIAGNOSIS — Z95.2 HISTORY OF MECHANICAL AORTIC VALVE REPLACEMENT: ICD-10-CM

## 2020-03-12 NOTE — PROGRESS NOTES
Anticoagulation Clinic Progress Note    Anticoagulation Summary  As of 3/12/2020    INR goal:   2.5-3.5   TTR:   73.3 % (1.2 y)   INR used for dosing:   3.10 (3/11/2020)   Warfarin maintenance plan:   8 mg every Mon, Wed, Fri; 10 mg all other days   Weekly warfarin total:   64 mg   No change documented:   Neo Cornejo RPH   Plan last modified:   Maria Del Rosario Cruz RPH (3/5/2020)   Next INR check:   3/18/2020   Priority:   Maintenance   Target end date:   Indefinite    Indications    History of mechanical aortic valve replacement [Z95.2]  Long term (current) use of anticoagulants [Z79.01]             Anticoagulation Episode Summary     INR check location:   Home Draw    Preferred lab:       Send INR reminders to:   [facility]  POOL    Comments:   **[facility]**      Anticoagulation Care Providers     Provider Role Specialty Phone number    Lorne Kam MD Referring Cardiology [phone]            Clinic Interview:  Patient Findings     Negatives:   Signs/symptoms of thrombosis, Signs/symptoms of bleeding,   Laboratory test error suspected, Change in health, Change in alcohol use,   Change in activity, Upcoming invasive procedure, Emergency department   visit, Upcoming dental procedure, Missed doses, Extra doses, Change in   medications, Change in diet/appetite, Hospital admission, Bruising, Other   complaints      Clinical Outcomes     Negatives:   Major bleeding event, Thromboembolic event,   Anticoagulation-related hospital admission, Anticoagulation-related ED   visit, Anticoagulation-related fatality        INR History:  Anticoagulation Monitoring 2/27/2020 3/5/2020 3/12/2020   INR 2.10 2.30 3.10   INR Date 2/26/2020 3/4/2020 3/11/2020   INR Goal 2.5-3.5 2.5-3.5 2.5-3.5   Trend Up Up Same   Last Week Total 58 mg 62 mg 66 mg   Next Week Total 62 mg 66 mg 64 mg   Sun 10 mg 10 mg 10 mg   Mon 8 mg 8 mg 8 mg   Tue 8 mg 10 mg 10 mg   Wed - - -   Thu 12 mg (2/27) 12 mg (3/5) 10 mg   Fri 8 mg 8  mg 8 mg   Sat 8 mg 10 mg 10 mg   Visit Report - - -   Some recent data might be hidden       Plan:  1. INR is Therapeutic today- see above in Anticoagulation Summary.   Will instruct Man Ramsey to Continue their warfarin regimen- see above in Anticoagulation Summary.  2. Follow up in 1 week  3. They have been instructed to call if any changes in medications, doses, concerns, etc. Patient expresses understanding and has no further questions at this time.    Neo Cornejo [facility]

## 2020-03-16 NOTE — TELEPHONE ENCOUNTER
Attempted to contact patient regarding changing tablet strength from 2 mg tabs to 4 mg tabs for the sake of decreasing daily pill burden.

## 2020-03-18 RX ORDER — WARFARIN SODIUM 2 MG/1
TABLET ORAL
Qty: 412 TABLET | Refills: 1 | Status: SHIPPED | OUTPATIENT
Start: 2020-03-18 | End: 2020-10-13

## 2020-03-18 NOTE — TELEPHONE ENCOUNTER
Warfarin refill for 2-mg tablets sent to [facility] in [place], [place], per request. Mr. Ramsey voiced preference for 2-mg tablets.

## 2020-03-19 ENCOUNTER — ANTICOAGULATION VISIT (OUTPATIENT)
Dept: PHARMACY | Facility: HOSPITAL | Age: 40
End: 2020-03-19

## 2020-03-19 DIAGNOSIS — Z79.01 LONG TERM (CURRENT) USE OF ANTICOAGULANTS: ICD-10-CM

## 2020-03-19 DIAGNOSIS — Z95.2 HISTORY OF MECHANICAL AORTIC VALVE REPLACEMENT: ICD-10-CM

## 2020-03-19 LAB — INR PPP: 3.3

## 2020-03-19 NOTE — PROGRESS NOTES
Anticoagulation Clinic Progress Note    Anticoagulation Summary  As of 3/19/2020    INR goal:   2.5-3.5   TTR:   73.7 % (1.3 y)   INR used for dosing:   3.30 (3/19/2020)   Warfarin maintenance plan:   8 mg every Mon, Wed, Fri; 10 mg all other days   Weekly warfarin total:   64 mg   No change documented:   Almaz Faria   Plan last modified:   Maria Del Rosario Cruz RPH (3/5/2020)   Next INR check:   4/2/2020   Priority:   Maintenance   Target end date:   Indefinite    Indications    History of mechanical aortic valve replacement [Z95.2]  Long term (current) use of anticoagulants [Z79.01]             Anticoagulation Episode Summary     INR check location:   Home Draw    Preferred lab:       Send INR reminders to:    [facility]  [facility]OL    Comments:   **[facility]**      Anticoagulation Care Providers     Provider Role Specialty Phone number    Lorne Kam MD Referring Cardiology [phone]          Clinic Interview:  No pertinent clinical findings have been reported.    INR History:  Anticoagulation Monitoring 3/5/2020 3/12/2020 3/19/2020   INR 2.30 3.10 3.30   INR Date 3/4/2020 3/11/2020 3/19/2020   INR Goal 2.5-3.5 2.5-3.5 2.5-3.5   Trend Up Same Same   Last Week Total 62 mg 66 mg 64 mg   Next Week Total 66 mg 64 mg 64 mg   Sun 10 mg 10 mg 10 mg   Mon 8 mg 8 mg 8 mg   Tue 10 mg 10 mg 10 mg   Wed - - 8 mg   Thu 12 mg (3/5) 10 mg 10 mg   Fri 8 mg 8 mg 8 mg   Sat 10 mg 10 mg 10 mg   Visit Report - - -   Some recent data might be hidden       Plan:  1. INR is therapeutic today- see above in Anticoagulation Summary.    Man PAT Ramsey to continue their warfarin regimen- see above in Anticoagulation Summary.  2. Follow up in 2 weeks  3. Pt has agreed to only be called if INR out of range. They have been instructed to call if any changes in medications, doses, concerns, etc. Patient expresses understanding and has no further questions at this time.    Almaz Faria

## 2020-04-01 ENCOUNTER — ANTICOAGULATION VISIT (OUTPATIENT)
Dept: PHARMACY | Facility: HOSPITAL | Age: 40
End: 2020-04-01

## 2020-04-01 DIAGNOSIS — Z95.2 HISTORY OF MECHANICAL AORTIC VALVE REPLACEMENT: ICD-10-CM

## 2020-04-01 DIAGNOSIS — Z79.01 LONG TERM (CURRENT) USE OF ANTICOAGULANTS: ICD-10-CM

## 2020-04-01 LAB — INR PPP: 2.6

## 2020-04-01 NOTE — PROGRESS NOTES
Anticoagulation Clinic Progress Note    Anticoagulation Summary  As of 2020    INR goal:   2.5-3.5   TTR:   74.5 % (1.3 y)   INR used for dosin.60 (2020)   Warfarin maintenance plan:   8 mg every Mon, Wed, Fri; 10 mg all other days   Weekly warfarin total:   64 mg   No change documented:   Almaz Faria   Plan last modified:   Maria Del Rosario Cruz RPH (3/5/2020)   Next INR check:   4/15/2020   Priority:   Maintenance   Target end date:   Indefinite    Indications    History of mechanical aortic valve replacement [Z95.2]  Long term (current) use of anticoagulants [Z79.01]             Anticoagulation Episode Summary     INR check location:   Home Draw    Preferred lab:       Send INR reminders to:    [facility]  POOL    Comments:   **[facility]**      Anticoagulation Care Providers     Provider Role Specialty Phone number    Lorne Kam MD Referring Cardiology [phone]          Clinic Interview:  No pertinent clinical findings have been reported.    INR History:  Anticoagulation Monitoring 3/12/2020 3/19/2020 2020   INR 3.10 3.30 2.60   INR Date 3/11/2020 3/19/2020 2020   INR Goal 2.5-3.5 2.5-3.5 2.5-3.5   Trend Same Same Same   Last Week Total 66 mg 64 mg 64 mg   Next Week Total 64 mg 64 mg 64 mg   Sun 10 mg 10 mg 10 mg   Mon 8 mg 8 mg 8 mg   Tue 10 mg 10 mg 10 mg   Wed - 8 mg 8 mg   Thu 10 mg 10 mg 10 mg   Fri 8 mg 8 mg 8 mg   Sat 10 mg 10 mg 10 mg   Visit Report - - -   Some recent data might be hidden       Plan:  1. INR is therapeutic today- see above in Anticoagulation Summary.    Man Ramsey to continue their warfarin regimen- see above in Anticoagulation Summary.  2. Follow up in 2 weeks  3. Pt has agreed to only be called if INR out of range. They have been instructed to call if any changes in medications, doses, concerns, etc. Patient expresses understanding and has no further questions at this time.    Almaz Faria

## 2020-04-13 RX ORDER — LISINOPRIL 20 MG/1
TABLET ORAL
Qty: 90 TABLET | Refills: 0 | Status: SHIPPED | OUTPATIENT
Start: 2020-04-13 | End: 2020-07-14

## 2020-04-16 ENCOUNTER — ANTICOAGULATION VISIT (OUTPATIENT)
Dept: PHARMACY | Facility: HOSPITAL | Age: 40
End: 2020-04-16

## 2020-04-16 DIAGNOSIS — Z79.01 LONG TERM (CURRENT) USE OF ANTICOAGULANTS: ICD-10-CM

## 2020-04-16 DIAGNOSIS — Z95.2 HISTORY OF MECHANICAL AORTIC VALVE REPLACEMENT: ICD-10-CM

## 2020-04-16 LAB — INR PPP: 2.8

## 2020-04-30 ENCOUNTER — ANTICOAGULATION VISIT (OUTPATIENT)
Dept: PHARMACY | Facility: HOSPITAL | Age: 40
End: 2020-04-30

## 2020-04-30 DIAGNOSIS — Z95.2 HISTORY OF MECHANICAL AORTIC VALVE REPLACEMENT: ICD-10-CM

## 2020-04-30 DIAGNOSIS — Z79.01 LONG TERM (CURRENT) USE OF ANTICOAGULANTS: ICD-10-CM

## 2020-04-30 LAB
INR PPP: 3.1
INR PPP: 3.1

## 2020-04-30 NOTE — PROGRESS NOTES
Anticoagulation Clinic Progress Note    Anticoagulation Summary  As of 4/30/2020    INR goal:   2.5-3.5   TTR:   75.9 % (1.4 y)   INR used for dosing:   3.10 (4/30/2020)   Warfarin maintenance plan:   8 mg every Mon, Wed, Fri; 10 mg all other days   Weekly warfarin total:   64 mg   No change documented:   Almaz Faria   Plan last modified:   Maria Del Rosario Cruz RPH (3/5/2020)   Next INR check:   5/14/2020   Priority:   Maintenance   Target end date:   Indefinite    Indications    History of mechanical aortic valve replacement [Z95.2]  Long term (current) use of anticoagulants [Z79.01]             Anticoagulation Episode Summary     INR check location:   Home Draw    Preferred lab:       Send INR reminders to:    [facility]  POOL    Comments:   **[facility]**      Anticoagulation Care Providers     Provider Role Specialty Phone number    Lorne Kam MD Referring Cardiology [phone]          Clinic Interview:  No pertinent clinical findings have been reported.    INR History:  Anticoagulation Monitoring 4/1/2020 4/16/2020 4/30/2020   INR 2.60 2.80 3.10   INR Date 4/1/2020 4/16/2020 4/30/2020   INR Goal 2.5-3.5 2.5-3.5 2.5-3.5   Trend Same Same Same   Last Week Total 64 mg 64 mg 64 mg   Next Week Total 64 mg 64 mg 64 mg   Sun 10 mg 10 mg 10 mg   Mon 8 mg 8 mg 8 mg   Tue 10 mg 10 mg 10 mg   Wed 8 mg 8 mg 8 mg   Thu 10 mg 10 mg 10 mg   Fri 8 mg 8 mg 8 mg   Sat 10 mg 10 mg 10 mg   Visit Report - - -   Some recent data might be hidden       Plan:  1. INR is therapeutic today- see above in Anticoagulation Summary.    Man Ramsey to continue their warfarin regimen- see above in Anticoagulation Summary.  2. Follow up in 2 weeks  3. Pt has agreed to only be called if INR out of range. They have been instructed to call if any changes in medications, doses, concerns, etc. Patient expresses understanding and has no further questions at this time.    Almaz Faria

## 2020-04-30 NOTE — PROGRESS NOTES
Anticoagulation Clinic Progress Note    Anticoagulation Summary  As of 4/30/2020    INR goal:   2.5-3.5   TTR:   75.9 % (1.4 y)   INR used for dosing:   3.10 (4/30/2020)   Warfarin maintenance plan:   8 mg every Mon, Wed, Fri; 10 mg all other days   Weekly warfarin total:   64 mg   No change documented:   Almaz Faria   Plan last modified:   Maria Del Rosario Cruz RPH (3/5/2020)   Next INR check:   5/14/2020   Priority:   Maintenance   Target end date:   Indefinite    Indications    History of mechanical aortic valve replacement [Z95.2]  Long term (current) use of anticoagulants [Z79.01]             Anticoagulation Episode Summary     INR check location:   Home Draw    Preferred lab:       Send INR reminders to:    JOSE LECHUGA  POOL    Comments:   **[facility]**      Anticoagulation Care Providers     Provider Role Specialty Phone number    Lorne Kam MD Referring Cardiology [phone]          Clinic Interview:  No pertinent clinical findings have been reported.    INR History:  Anticoagulation Monitoring 4/16/2020 4/30/2020 4/30/2020   INR 2.80 3.10 3.10   INR Date 4/16/2020 4/30/2020 4/30/2020   INR Goal 2.5-3.5 2.5-3.5 2.5-3.5   Trend Same Same Same   Last Week Total 64 mg 64 mg 64 mg   Next Week Total 64 mg 64 mg 64 mg   Sun 10 mg 10 mg 10 mg   Mon 8 mg 8 mg 8 mg   Tue 10 mg 10 mg 10 mg   Wed 8 mg 8 mg 8 mg   Thu 10 mg 10 mg 10 mg   Fri 8 mg 8 mg 8 mg   Sat 10 mg 10 mg 10 mg   Visit Report - - -   Some recent data might be hidden       Plan:  1. INR is therapeutic today- see above in Anticoagulation Summary.    Man Ramsey to continue their warfarin regimen- see above in Anticoagulation Summary.  2. Follow up in 2 weeks  3. Pt has agreed to only be called if INR out of range. They have been instructed to call if any changes in medications, doses, concerns, etc. Patient expresses understanding and has no further questions at this time.    Almaz Faria

## 2020-05-14 ENCOUNTER — ANTICOAGULATION VISIT (OUTPATIENT)
Dept: PHARMACY | Facility: HOSPITAL | Age: 40
End: 2020-05-14

## 2020-05-14 DIAGNOSIS — Z79.01 LONG TERM (CURRENT) USE OF ANTICOAGULANTS: ICD-10-CM

## 2020-05-14 DIAGNOSIS — Z95.2 HISTORY OF MECHANICAL AORTIC VALVE REPLACEMENT: ICD-10-CM

## 2020-05-14 LAB — INR PPP: 3.4

## 2020-05-14 NOTE — PROGRESS NOTES
Anticoagulation Clinic Progress Note    Anticoagulation Summary  As of 5/14/2020    INR goal:   2.5-3.5   TTR:   76.6 % (1.4 y)   INR used for dosing:   3.40 (5/13/2020)   Warfarin maintenance plan:   8 mg every Mon, Wed, Fri; 10 mg all other days   Weekly warfarin total:   64 mg   No change documented:   Almaz Faria   Plan last modified:   Maria Del Rosario Cruz RPH (3/5/2020)   Next INR check:   5/28/2020   Priority:   High   Target end date:   Indefinite    Indications    History of mechanical aortic valve replacement [Z95.2]  Long term (current) use of anticoagulants [Z79.01]             Anticoagulation Episode Summary     INR check location:   Home Draw    Preferred lab:       Send INR reminders to:    JOSE LECHUGA  POOL    Comments:   **[facility]**      Anticoagulation Care Providers     Provider Role Specialty Phone number    Lorne Kam MD Referring Cardiology [phone]          Clinic Interview:  No pertinent clinical findings have been reported.    INR History:  Anticoagulation Monitoring 4/30/2020 4/30/2020 5/14/2020   INR 3.10 3.10 3.40   INR Date 4/30/2020 4/30/2020 5/13/2020   INR Goal 2.5-3.5 2.5-3.5 2.5-3.5   Trend Same Same Same   Last Week Total 64 mg 64 mg 64 mg   Next Week Total 64 mg 64 mg 64 mg   Sun 10 mg 10 mg 10 mg   Mon 8 mg 8 mg 8 mg   Tue 10 mg 10 mg 10 mg   Wed 8 mg 8 mg 8 mg   Thu 10 mg 10 mg 10 mg   Fri 8 mg 8 mg 8 mg   Sat 10 mg 10 mg 10 mg   Visit Report - - -   Some recent data might be hidden       Plan:  1. INR is therapeutic today- see above in Anticoagulation Summary.    Man Ramsey to continue their warfarin regimen- see above in Anticoagulation Summary.  2. Follow up in 2 weeks  3. Pt has agreed to only be called if INR out of range. They have been instructed to call if any changes in medications, doses, concerns, etc. Patient expresses understanding and has no further questions at this time.    Almaz Faria

## 2020-05-18 RX ORDER — CARVEDILOL 12.5 MG/1
TABLET ORAL
Qty: 180 TABLET | Refills: 0 | Status: SHIPPED | OUTPATIENT
Start: 2020-05-18 | End: 2020-08-06 | Stop reason: SDUPTHER

## 2020-05-29 ENCOUNTER — ANTICOAGULATION VISIT (OUTPATIENT)
Dept: PHARMACY | Facility: HOSPITAL | Age: 40
End: 2020-05-29

## 2020-05-29 DIAGNOSIS — Z79.01 LONG TERM (CURRENT) USE OF ANTICOAGULANTS: ICD-10-CM

## 2020-05-29 DIAGNOSIS — Z95.2 HISTORY OF MECHANICAL AORTIC VALVE REPLACEMENT: ICD-10-CM

## 2020-05-29 LAB — INR PPP: 2.5

## 2020-05-29 NOTE — PROGRESS NOTES
Anticoagulation Clinic Progress Note    Anticoagulation Summary  As of 2020    INR goal:   2.5-3.5   TTR:   77.3 % (1.5 y)   INR used for dosin.50 (2020)   Warfarin maintenance plan:   8 mg every Mon, Wed, Fri; 10 mg all other days   Weekly warfarin total:   64 mg   No change documented:   Almaz Faria   Plan last modified:   Maria Del Rosario Cruz RPH (3/5/2020)   Next INR check:   2020   Priority:   High   Target end date:   Indefinite    Indications    History of mechanical aortic valve replacement [Z95.2]  Long term (current) use of anticoagulants [Z79.01]             Anticoagulation Episode Summary     INR check location:   Home Draw    Preferred lab:       Send INR reminders to:    JOSE [facility]CHUGA  POOL    Comments:   **[facility]**      Anticoagulation Care Providers     Provider Role Specialty Phone number    Lorne Kam MD Referring Cardiology [phone]          Clinic Interview:  No pertinent clinical findings have been reported.    INR History:  Anticoagulation Monitoring 2020   INR 3.10 3.40 2.50   INR Date 2020   INR Goal 2.5-3.5 2.5-3.5 2.5-3.5   Trend Same Same Same   Last Week Total 64 mg 64 mg 64 mg   Next Week Total 64 mg 64 mg 64 mg   Sun 10 mg 10 mg 10 mg   Mon 8 mg 8 mg 8 mg   Tue 10 mg 10 mg 10 mg   Wed 8 mg 8 mg 8 mg   Thu 10 mg 10 mg 10 mg   Fri 8 mg 8 mg 8 mg   Sat 10 mg 10 mg 10 mg   Visit Report - - -   Some recent data might be hidden       Plan:  1. INR is therapeutic today- see above in Anticoagulation Summary.    Man Ramsey to continue their warfarin regimen- see above in Anticoagulation Summary.  2. Follow up in 2 weeks  3. Pt has agreed to only be called if INR out of range. They have been instructed to call if any changes in medications, doses, concerns, etc. Patient expresses understanding and has no further questions at this time.    Almaz Faria

## 2020-06-04 ENCOUNTER — OFFICE VISIT (OUTPATIENT)
Dept: FAMILY MEDICINE CLINIC | Facility: CLINIC | Age: 40
End: 2020-06-04

## 2020-06-04 VITALS
DIASTOLIC BLOOD PRESSURE: 80 MMHG | SYSTOLIC BLOOD PRESSURE: 130 MMHG | WEIGHT: 266 LBS | BODY MASS INDEX: 37.24 KG/M2 | TEMPERATURE: 97.9 F | HEIGHT: 71 IN | HEART RATE: 70 BPM | OXYGEN SATURATION: 98 %

## 2020-06-04 DIAGNOSIS — Z00.00 ANNUAL PHYSICAL EXAM: Primary | ICD-10-CM

## 2020-06-04 DIAGNOSIS — Z11.59 ENCOUNTER FOR HEPATITIS C SCREENING TEST FOR LOW RISK PATIENT: ICD-10-CM

## 2020-06-04 PROCEDURE — 99396 PREV VISIT EST AGE 40-64: CPT | Performed by: INTERNAL MEDICINE

## 2020-06-04 NOTE — PROGRESS NOTES
Subjective Chief complaint is annual physical exam  Man Ramsey is a 40 y.o. male.     History of Present Illness Man is here today for an annual physical exam.  Since his last visit he basically has worked hard on trying to lose weight.  He has lost approximately 13 pounds.  He did have a mildly elevated hemoglobin A1c of 5.9 and his cholesterol panel showed an LDL of 159.  His rectal bleeding turned out to be from internal hemorrhoids.  He had one benign polyp.  He has had no new problems since his last visit.  We did go over his health maintenance tab.  He did have a Pneumovax after his valvular surgery in 2012.  I am not sure that he needs to continue to have these but I did advise him to ask his cardiologist whether he would be considered to have chronic heart disease because of the valve replacement.  The following portions of the patient's history were reviewed and updated as appropriate:   He  has a past medical history of Aortic insufficiency due to bicuspid aortic valve, Coronary artery disease (Feb 2012), Fever of unknown origin, GI (gastrointestinal bleed) (11/12/2019), Hypertension, Nonischemic cardiomyopathy (CMS/HCC), Obesity, and Ocular migraine.  He does not have any pertinent problems on file.  He  has a past surgical history that includes Aortic valve replacement; Abdominal surgery (May 2012); and Colonoscopy (N/A, 12/17/2019).  His family history includes Colon polyps in his mother.  He  reports that he has never smoked. He has never used smokeless tobacco. He reports that he drinks about 2.0 standard drinks of alcohol per week. He reports that he does not use drugs.  Current Outpatient Medications   Medication Sig Dispense Refill   • Ascorbic Acid (VITAMIN C PO) Take  by mouth Daily.     • Calcium Carb-Cholecalciferol (CALCIUM 1000 + D PO) Take  by mouth.     • carvedilol (COREG) 12.5 MG tablet Take 1 tablet by mouth twice daily 180 tablet 0   • Cholecalciferol (VITAMIN D) 1000 UNITS tablet  Take 2 tablets by mouth.     • Cyanocobalamin (VITAMIN B 12 PO) Take  by mouth Daily.     • fexofenadine (ALLEGRA) 180 MG tablet Take 180 mg by mouth Daily.     • lisinopril (PRINIVIL,ZESTRIL) 20 MG tablet Take 1 tablet by mouth once daily 90 tablet 0   • Melatonin 3 MG capsule Take  by mouth.     • Multiple Vitamin tablet Take  by mouth daily.     • Omega-3 Fatty Acids (FISH OIL) 1000 MG capsule capsule Take  by mouth Daily With Breakfast.     • warfarin (COUMADIN) 2 MG tablet TAKE 4 TABLETS BY MOUTH ON MON, WED, FRI (8MGS) AND 5 TABLETS (10 MGS) BY MOUTH ON ALL OTHER DAYS OR AS DIRECTED 412 tablet 1     No current facility-administered medications for this visit.      Current Outpatient Medications on File Prior to Visit   Medication Sig   • Ascorbic Acid (VITAMIN C PO) Take  by mouth Daily.   • Calcium Carb-Cholecalciferol (CALCIUM 1000 + D PO) Take  by mouth.   • carvedilol (COREG) 12.5 MG tablet Take 1 tablet by mouth twice daily   • Cholecalciferol (VITAMIN D) 1000 UNITS tablet Take 2 tablets by mouth.   • Cyanocobalamin (VITAMIN B 12 PO) Take  by mouth Daily.   • fexofenadine (ALLEGRA) 180 MG tablet Take 180 mg by mouth Daily.   • lisinopril (PRINIVIL,ZESTRIL) 20 MG tablet Take 1 tablet by mouth once daily   • Melatonin 3 MG capsule Take  by mouth.   • Multiple Vitamin tablet Take  by mouth daily.   • Omega-3 Fatty Acids (FISH OIL) 1000 MG capsule capsule Take  by mouth Daily With Breakfast.   • warfarin (COUMADIN) 2 MG tablet TAKE 4 TABLETS BY MOUTH ON MON, WED, FRI (8MGS) AND 5 TABLETS (10 MGS) BY MOUTH ON ALL OTHER DAYS OR AS DIRECTED   • [DISCONTINUED] hydrocortisone (ANUSOL-HC) 2.5 % rectal cream Insert  into the rectum 2 (Two) Times a Day.   • [DISCONTINUED] hydrocortisone-pramoxine (ANALPRAM HC) 2.5-1 % rectal cream Insert  into the rectum 2 (Two) Times a Day.     No current facility-administered medications on file prior to visit.      He has No Known Allergies..    Review of Systems   Constitutional:  Negative for unexpected weight gain and unexpected weight loss.   HENT: Negative for hearing loss, tinnitus and trouble swallowing.         He occasionally has a sore throat from allergies   Eyes: Negative for double vision and visual disturbance.   Respiratory: Negative for chest tightness and shortness of breath.    Cardiovascular: Negative for chest pain and leg swelling.   Gastrointestinal: Negative for abdominal pain.   Genitourinary: Negative for dysuria and hematuria.   Neurological: Negative for dizziness, facial asymmetry, weakness, numbness and headache.       Objective   Physical Exam   Constitutional: He appears well-developed and well-nourished.   HENT:   Head: Normocephalic and atraumatic.   Right Ear: External ear normal.   Left Ear: External ear normal.   Nose: Nose normal.   Mouth/Throat: Oropharynx is clear and moist.   Eyes: Pupils are equal, round, and reactive to light. Conjunctivae and EOM are normal. No scleral icterus.   Neck: Neck supple. No JVD present. No tracheal deviation present. No thyromegaly present.   Cardiovascular: Normal rate, regular rhythm, normal heart sounds and intact distal pulses. Exam reveals no gallop and no friction rub.   No murmur heard.  Pulmonary/Chest: Effort normal and breath sounds normal. No respiratory distress. He has no wheezes. He has no rales.   Abdominal: Soft. Bowel sounds are normal. He exhibits no distension and no mass. There is no tenderness. There is no rebound and no guarding. No hernia.   Musculoskeletal: Normal range of motion.   Lymphadenopathy:     He has no cervical adenopathy.   Neurological: He is alert. He has normal reflexes. No cranial nerve deficit. Coordination normal.   Skin: Skin is warm and dry.   Psychiatric: He has a normal mood and affect. His behavior is normal.   Nursing note and vitals reviewed.        Assessment/Plan   Man was seen today for annual exam.    Diagnoses and all orders for this visit:    Annual physical exam  -      Hemoglobin A1c  -     Comprehensive Metabolic Panel  -     Lipid Panel    Encounter for hepatitis C screening test for low risk patient  -     Hepatitis C Antibody    Man is here today for annual physical exam.  We did discuss him getting his flu shot a little bit earlier than usual this year.  We did discuss whether he is going to need ongoing Pneumovax shots.  I am going to have him check with his cardiologist about whether he is considered to have chronic heart disease.  If so then we will administer this.  He is otherwise up-to-date on screenings.

## 2020-06-05 LAB
ALBUMIN SERPL-MCNC: 5 G/DL (ref 3.5–5.2)
ALBUMIN/GLOB SERPL: 2.5 G/DL
ALP SERPL-CCNC: 78 U/L (ref 39–117)
ALT SERPL-CCNC: 45 U/L (ref 1–41)
AST SERPL-CCNC: 31 U/L (ref 1–40)
BILIRUB SERPL-MCNC: 0.5 MG/DL (ref 0.2–1.2)
BUN SERPL-MCNC: 18 MG/DL (ref 6–20)
BUN/CREAT SERPL: 18.4 (ref 7–25)
CALCIUM SERPL-MCNC: 9.8 MG/DL (ref 8.6–10.5)
CHLORIDE SERPL-SCNC: 101 MMOL/L (ref 98–107)
CHOLEST SERPL-MCNC: 215 MG/DL (ref 0–200)
CO2 SERPL-SCNC: 24.7 MMOL/L (ref 22–29)
CREAT SERPL-MCNC: 0.98 MG/DL (ref 0.76–1.27)
GLOBULIN SER CALC-MCNC: 2 GM/DL
GLUCOSE SERPL-MCNC: 98 MG/DL (ref 65–99)
HBA1C MFR BLD: 5.78 % (ref 4.8–5.6)
HCV AB S/CO SERPL IA: <0.1 S/CO RATIO (ref 0–0.9)
HDLC SERPL-MCNC: 40 MG/DL (ref 40–60)
LDLC SERPL CALC-MCNC: 147 MG/DL (ref 0–100)
POTASSIUM SERPL-SCNC: 4.7 MMOL/L (ref 3.5–5.2)
PROT SERPL-MCNC: 7 G/DL (ref 6–8.5)
SODIUM SERPL-SCNC: 140 MMOL/L (ref 136–145)
TRIGL SERPL-MCNC: 138 MG/DL (ref 0–150)
VLDLC SERPL CALC-MCNC: 27.6 MG/DL (ref 5–40)

## 2020-06-11 LAB — INR PPP: 3

## 2020-06-12 ENCOUNTER — ANTICOAGULATION VISIT (OUTPATIENT)
Dept: PHARMACY | Facility: HOSPITAL | Age: 40
End: 2020-06-12

## 2020-06-12 DIAGNOSIS — Z79.01 LONG TERM (CURRENT) USE OF ANTICOAGULANTS: ICD-10-CM

## 2020-06-12 DIAGNOSIS — Z95.2 HISTORY OF MECHANICAL AORTIC VALVE REPLACEMENT: ICD-10-CM

## 2020-06-12 NOTE — PROGRESS NOTES
Anticoagulation Clinic Progress Note    Anticoagulation Summary  As of 6/12/2020    INR goal:   2.5-3.5   TTR:   77.8 % (1.5 y)   INR used for dosing:   3.00 (6/11/2020)   Warfarin maintenance plan:   8 mg every Mon, Wed, Fri; 10 mg all other days   Weekly warfarin total:   64 mg   No change documented:   Sun Curtis   Plan last modified:   Maria Del Rosario Cruz RPH (3/5/2020)   Next INR check:   6/25/2020   Priority:   High   Target end date:   Indefinite    Indications    History of mechanical aortic valve replacement [Z95.2]  Long term (current) use of anticoagulants [Z79.01]             Anticoagulation Episode Summary     INR check location:   Home Draw    Preferred lab:       Send INR reminders to:   LISA [facility]CHU[facility]  POOL    Comments:   **[employer] [facility]**      Anticoagulation Care Providers     Provider Role Specialty Phone number    Lorne Kam MD Referring Cardiology [phone]          Clinic Interview:  No pertinent clinical findings have been reported.    INR History:  Anticoagulation Monitoring 5/14/2020 5/29/2020 6/12/2020   INR 3.40 2.50 3.00   INR Date 5/13/2020 5/29/2020 6/11/2020   INR Goal 2.5-3.5 2.5-3.5 2.5-3.5   Trend Same Same Same   Last Week Total 64 mg 64 mg 64 mg   Next Week Total 64 mg 64 mg 64 mg   Sun 10 mg 10 mg 10 mg   Mon 8 mg 8 mg 8 mg   Tue 10 mg 10 mg 10 mg   Wed 8 mg 8 mg 8 mg   Thu 10 mg 10 mg 10 mg   Fri 8 mg 8 mg 8 mg   Sat 10 mg 10 mg 10 mg   Visit Report - - -   Some recent data might be hidden       Plan:  1. INR is therapeutic today- see above in Anticoagulation Summary.    Man STEWART Roxy to continue their warfarin regimen- see above in Anticoagulation Summary.  2. Follow up in 2 weeks  3. Pt has agreed to only be called if INR out of range. They have been instructed to call if any changes in medications, doses, concerns, etc. Patient expresses understanding and has no further questions at this time.    Sun Curtis

## 2020-06-17 ENCOUNTER — ANTICOAGULATION VISIT (OUTPATIENT)
Dept: PHARMACY | Facility: HOSPITAL | Age: 40
End: 2020-06-17

## 2020-06-17 DIAGNOSIS — Z95.2 HISTORY OF MECHANICAL AORTIC VALVE REPLACEMENT: ICD-10-CM

## 2020-06-17 DIAGNOSIS — Z79.01 LONG TERM (CURRENT) USE OF ANTICOAGULANTS: ICD-10-CM

## 2020-06-17 NOTE — PROGRESS NOTES
Mr. Pederson called to report dental surgery scheduled for 7/7/20 in preparation for a dental implant. The surgeon has requested holding of warfarin x 3 days prior to the procedure. Dr. Kam has previously approved a 3-day hold prior to previously dental extraction (see 1/8/20 telephone note). Advised Mr. Ramsey to hold x 3 days as requested by the surgeon. He was advised to take 12 mg of warfarin the first two days upon resuming warfarin, then resume his usual warfarin dose.     For now, however, he will continue his current dose of 8 mg MWF, 10 mg rest of wk, and recheck his INR on 6/25/20 as dictated by his home testing company (q2wk test frequency). He is agreeable to call clinic if any questions/concerns arise prior to the procedure.

## 2020-06-30 ENCOUNTER — ANTICOAGULATION VISIT (OUTPATIENT)
Dept: PHARMACY | Facility: HOSPITAL | Age: 40
End: 2020-06-30

## 2020-06-30 DIAGNOSIS — Z95.2 HISTORY OF MECHANICAL AORTIC VALVE REPLACEMENT: ICD-10-CM

## 2020-06-30 DIAGNOSIS — Z79.01 LONG TERM (CURRENT) USE OF ANTICOAGULANTS: ICD-10-CM

## 2020-06-30 LAB — INR PPP: 2.8

## 2020-06-30 NOTE — PROGRESS NOTES
Anticoagulation Clinic Progress Note    Anticoagulation Summary  As of 2020    INR goal:   2.5-3.5   TTR:   78.6 % (1.5 y)   INR used for dosin.80 (2020)   Warfarin maintenance plan:   8 mg every Mon, Wed, Fri; 10 mg all other days   Weekly warfarin total:   64 mg   No change documented:   Almaz Faria   Plan last modified:   Maria Del Rosario Cruz RPH (3/5/2020)   Next INR check:   2020   Priority:   High   Target end date:   Indefinite    Indications    History of mechanical aortic valve replacement [Z95.2]  Long term (current) use of anticoagulants [Z79.01]             Anticoagulation Episode Summary     INR check location:   Home Draw    Preferred lab:       Send INR reminders to:   [facility]ISA [facility]CH[facility]  POOL    Comments:   **[employer] [facility]**      Anticoagulation Care Providers     Provider Role Specialty Phone number    Lorne Kam MD Referring Cardiology [phone]          Clinic Interview:  No pertinent clinical findings have been reported.    INR History:  Anticoagulation Monitoring 2020   INR 3.00 - 2.80   INR Date 2020 - 2020   INR Goal 2.5-3.5 2.5-3.5 2.5-3.5   Trend Same Same Same   Last Week Total 64 mg 64 mg 64 mg   Next Week Total 64 mg 64 mg 36 mg   Sun 10 mg 10 mg Hold (); Otherwise 10 mg   Mon 8 mg 8 mg Hold (); Otherwise 8 mg   Tue 10 mg 10 mg 12 mg (); Otherwise 10 mg   Wed 8 mg 8 mg 12 mg (); Otherwise 8 mg   Thu 10 mg 10 mg 10 mg   Fri 8 mg 8 mg 8 mg   Sat 10 mg 10 mg Hold (); Otherwise 10 mg   Visit Report - - -   Some recent data might be hidden       Plan:  1. INR is therapeutic today- see above in Anticoagulation Summary.    Man Ramsey to continue their warfarin regimen- see above in Anticoagulation Summary.  2. Follow up in 2 weeks  3. Pt has agreed to only be called if INR out of range. They have been instructed to call if any changes in medications, doses, concerns, etc. Patient expresses  understanding and has no further questions at this time.    Almaz Faria

## 2020-07-06 LAB — INR PPP: 1.6

## 2020-07-07 ENCOUNTER — ANTICOAGULATION VISIT (OUTPATIENT)
Dept: PHARMACY | Facility: HOSPITAL | Age: 40
End: 2020-07-07

## 2020-07-07 DIAGNOSIS — Z95.2 HISTORY OF MECHANICAL AORTIC VALVE REPLACEMENT: ICD-10-CM

## 2020-07-07 DIAGNOSIS — Z79.01 LONG TERM (CURRENT) USE OF ANTICOAGULANTS: ICD-10-CM

## 2020-07-07 LAB — INR PPP: 1.4

## 2020-07-07 NOTE — PROGRESS NOTES
Anticoagulation Clinic Progress Note    Anticoagulation Summary  As of 2020    INR goal:   2.5-3.5   TTR:   77.9 % (1.6 y)   INR used for dosin.60! (2020)   Warfarin maintenance plan:   8 mg every Mon, Wed, Fri; 10 mg all other days   Weekly warfarin total:   64 mg   Plan last modified:   Maria Del Rosario Cruz RPH (3/5/2020)   Next INR check:   2020   Priority:   High   Target end date:   Indefinite    Indications    History of mechanical aortic valve replacement [Z95.2]  Long term (current) use of anticoagulants [Z79.01]             Anticoagulation Episode Summary     INR check location:   Home Draw    Preferred lab:       Send INR reminders to:    [facility]  POOL    Comments:   **[employer] [facility]**      Anticoagulation Care Providers     Provider Role Specialty Phone number    Lorne Kam MD Referring Cardiology [phone]          Clinic Interview:      INR History:  Anticoagulation Monitoring 2020   INR - 2.80 1.60   INR Date - 2020   INR Goal 2.5-3.5 2.5-3.5 2.5-3.5   Trend Same Same Same   Last Week Total 64 mg 64 mg 36 mg   Next Week Total 64 mg 36 mg 70 mg   Sun 10 mg Hold (); Otherwise 10 mg 10 mg   Mon 8 mg Hold (); Otherwise 8 mg 8 mg   Tue 10 mg 12 mg (); Otherwise 10 mg 12 mg ()   Wed 8 mg 12 mg (); Otherwise 8 mg 12 mg ()   Thu 10 mg 10 mg 10 mg   Fri 8 mg 8 mg 8 mg   Sat 10 mg Hold (); Otherwise 10 mg 10 mg   Visit Report - - -   Some recent data might be hidden       Plan:  1. INR is Subtherapeutic today due to holding for planned procedure- see above in Anticoagulation Summary.   Will instruct Man Ramsey to Increase their warfarin regimen on restart of warfarin- see above in Anticoagulation Summary.  2. Follow up in 1 week  3. Planned procedure today. They have been instructed to call if any changes in medications, doses, concerns, etc.  Maria Del Rosario Cruz RPH

## 2020-07-14 ENCOUNTER — ANTICOAGULATION VISIT (OUTPATIENT)
Dept: PHARMACY | Facility: HOSPITAL | Age: 40
End: 2020-07-14

## 2020-07-14 DIAGNOSIS — Z95.2 HISTORY OF MECHANICAL AORTIC VALVE REPLACEMENT: ICD-10-CM

## 2020-07-14 DIAGNOSIS — Z79.01 LONG TERM (CURRENT) USE OF ANTICOAGULANTS: ICD-10-CM

## 2020-07-14 LAB — INR PPP: 2.4

## 2020-07-14 RX ORDER — LISINOPRIL 20 MG/1
TABLET ORAL
Qty: 90 TABLET | Refills: 0 | Status: SHIPPED | OUTPATIENT
Start: 2020-07-14 | End: 2020-08-06 | Stop reason: SDUPTHER

## 2020-07-14 NOTE — PROGRESS NOTES
Anticoagulation Clinic Progress Note    Anticoagulation Summary  As of 2020    INR goal:   2.5-3.5   TTR:   76.9 % (1.6 y)   INR used for dosin.40! (2020)   Warfarin maintenance plan:   8 mg every Mon, Wed, Fri; 10 mg all other days   Weekly warfarin total:   64 mg   No change documented:   Neo Cornejo RPH   Plan last modified:   Maria Del Rosario Cruz RPH (3/5/2020)   Next INR check:   2020   Priority:   High   Target end date:   Indefinite    Indications    History of mechanical aortic valve replacement [Z95.2]  Long term (current) use of anticoagulants [Z79.01]             Anticoagulation Episode Summary     INR check location:   Home Draw    Preferred lab:       Send INR reminders to:    [facility]SE [facility]  POOL    Comments:   **[facility]**      Anticoagulation Care Providers     Provider Role Specialty Phone number    Lorne Kam MD Referring Cardiology [phone]          Clinic Interview:  Patient Findings     Negatives:   Signs/symptoms of thrombosis, Signs/symptoms of bleeding,   Laboratory test error suspected, Change in health, Change in alcohol use,   Change in activity, Upcoming invasive procedure, Emergency department   visit, Upcoming dental procedure, Missed doses, Extra doses, Change in   medications, Change in diet/appetite, Hospital admission, Bruising, Other   complaints      Clinical Outcomes     Negatives:   Major bleeding event, Thromboembolic event,   Anticoagulation-related hospital admission, Anticoagulation-related ED   visit, Anticoagulation-related fatality        INR History:  Anticoagulation Monitoring 2020   INR 2.80 1.60 2.40   INR Date 2020   INR Goal 2.5-3.5 2.5-3.5 2.5-3.5   Trend Same Same Same   Last Week Total 64 mg 36 mg 70 mg   Next Week Total 36 mg 70 mg 64 mg   Sun Hold (); Otherwise 10 mg 10 mg 10 mg   Mon Hold (); Otherwise 8 mg 8 mg 8 mg   Tue 12 mg (); Otherwise 10 mg 12 mg  (7/7) 10 mg   Wed 12 mg (7/8); Otherwise 8 mg 12 mg (7/8) 8 mg   Thu 10 mg 10 mg 10 mg   Fri 8 mg 8 mg 8 mg   Sat Hold (7/4); Otherwise 10 mg 10 mg 10 mg   Visit Report - - -   Some recent data might be hidden       Plan:  1. INR is Subtherapeutic today- see above in Anticoagulation Summary.   Will instruct Man Ramsey to Continue their warfarin regimen- see above in Anticoagulation Summary.  2. Follow up in 2 weeks  3. They have been instructed to call if any changes in medications, doses, concerns, etc. Patient expresses understanding and has no further questions at this time.    Neo Cornejo [facility]

## 2020-07-24 ENCOUNTER — ANTICOAGULATION VISIT (OUTPATIENT)
Dept: PHARMACY | Facility: HOSPITAL | Age: 40
End: 2020-07-24

## 2020-07-24 DIAGNOSIS — Z79.01 LONG TERM (CURRENT) USE OF ANTICOAGULANTS: ICD-10-CM

## 2020-07-24 DIAGNOSIS — Z95.2 HISTORY OF MECHANICAL AORTIC VALVE REPLACEMENT: ICD-10-CM

## 2020-07-24 LAB — INR PPP: 2.5

## 2020-07-24 NOTE — PROGRESS NOTES
Anticoagulation Clinic Progress Note    Anticoagulation Summary  As of 2020    INR goal:   2.5-3.5   TTR:   75.6 % (1.6 y)   INR used for dosin.50 (2020)   Warfarin maintenance plan:   8 mg every Mon, Wed, Fri; 10 mg all other days   Weekly warfarin total:   64 mg   Plan last modified:   Maria Del Rosario Cruz RPH (3/5/2020)   Next INR check:   2020   Priority:   High   Target end date:   Indefinite    Indications    History of mechanical aortic valve replacement [Z95.2]  Long term (current) use of anticoagulants [Z79.01]             Anticoagulation Episode Summary     INR check location:   Home Draw    Preferred lab:       Send INR reminders to:    [facility]SE [facility]  POOL    Comments:   **[employer] [facility]**      Anticoagulation Care Providers     Provider Role Specialty Phone number    Lorne Kam MD Referring Cardiology [phone]          Clinic Interview:  No pertinent clinical findings have been reported.    INR History:  Anticoagulation Monitoring 2020   INR 1.60 2.40 2.50   INR Date 2020   INR Goal 2.5-3.5 2.5-3.5 2.5-3.5   Trend Same Same Same   Last Week Total 36 mg 70 mg 64 mg   Next Week Total 70 mg 64 mg 64 mg   Sun 10 mg 10 mg 10 mg   Mon 8 mg 8 mg 8 mg   Tue 12 mg () 10 mg 10 mg   Wed 12 mg () 8 mg 8 mg   Thu 10 mg 10 mg 10 mg   Fri 8 mg 8 mg 8 mg   Sat 10 mg 10 mg 10 mg   Visit Report - - -   Some recent data might be hidden       Plan:  1. INR is therapeutic today- see above in Anticoagulation Summary.    Man Ramsey to continue their warfarin regimen- see above in Anticoagulation Summary.  2. Follow up in 2 weeks  3. Pt has agreed to only be called if INR out of range. They have been instructed to call if any changes in medications, doses, concerns, etc. Patient expresses understanding and has no further questions at this time.    Ryan Vann [facility]

## 2020-08-06 ENCOUNTER — OFFICE VISIT (OUTPATIENT)
Dept: CARDIOLOGY | Facility: CLINIC | Age: 40
End: 2020-08-06

## 2020-08-06 VITALS
WEIGHT: 268 LBS | BODY MASS INDEX: 37.52 KG/M2 | DIASTOLIC BLOOD PRESSURE: 73 MMHG | SYSTOLIC BLOOD PRESSURE: 123 MMHG | HEART RATE: 62 BPM | HEIGHT: 71 IN

## 2020-08-06 DIAGNOSIS — Q23.1 AORTIC INSUFFICIENCY DUE TO BICUSPID AORTIC VALVE: Primary | ICD-10-CM

## 2020-08-06 DIAGNOSIS — I10 ESSENTIAL HYPERTENSION: ICD-10-CM

## 2020-08-06 DIAGNOSIS — Z86.79 HISTORY OF CARDIOMYOPATHY: ICD-10-CM

## 2020-08-06 DIAGNOSIS — Z95.2 HISTORY OF MECHANICAL AORTIC VALVE REPLACEMENT: ICD-10-CM

## 2020-08-06 PROBLEM — K62.5 RECTAL BLEEDING: Status: RESOLVED | Noted: 2019-12-02 | Resolved: 2020-08-06

## 2020-08-06 PROCEDURE — 99441 PR PHYS/QHP TELEPHONE EVALUATION 5-10 MIN: CPT | Performed by: INTERNAL MEDICINE

## 2020-08-06 RX ORDER — LISINOPRIL 20 MG/1
20 TABLET ORAL DAILY
Qty: 90 TABLET | Refills: 3 | Status: SHIPPED | OUTPATIENT
Start: 2020-08-06 | End: 2021-08-31 | Stop reason: SDUPTHER

## 2020-08-06 RX ORDER — CARVEDILOL 12.5 MG/1
12.5 TABLET ORAL 2 TIMES DAILY
Qty: 180 TABLET | Refills: 3 | Status: SHIPPED | OUTPATIENT
Start: 2020-08-06 | End: 2021-08-20

## 2020-08-06 NOTE — PROGRESS NOTES
Date of Office Visit: 2020  Encounter Provider: Lorne Kam MD  Place of Service: [facility] CARDIOLOGY  Patient Name: Man Ramsey  :1980    Chief Complaint   Patient presents with   • Cardiac Valve Problem   :     HPI: Man Ramsey is a 40 y.o. male who presents today to follow-up via telehealth.     He has a history of a bicuspid aortic valve with severe aortic insufficiency which caused nonischemic dilated cardiomyopathy. In , he underwent mechanical aortic valve replacement and had complete recovery of left ventricular systolic function. He has been maintained on warfarin and has had no cardiac issues. He denies dyspnea, chest pain, syncope, or edema.      He had an episode of rectal bleeding in ; a colonoscopy revealed hemorrhoids only.      An echo in 2019 showed normal LV size/systolic function, and normal valvular function.    Past Medical History:   Diagnosis Date   • Aortic insufficiency due to bicuspid aortic valve     with severe AI, s/p 29mm ATS mechanical AVR 2012.  Echo has shown normal function but mild AI.   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  He recovered with oral steroids.  GIANNA was negative for endocarditis.   • GI (gastrointestinal bleed) 2019   • Hypertension    • Nonischemic cardiomyopathy (CMS/HCC)     due to AI, LVEF 40% with LVE 2012; improved to EF 57% in 2012 with mild LV dilation.  Echo 3/2014 with normal LV size and systolic function   • Obesity    • Ocular migraine        Past Surgical History:   Procedure Laterality Date   • ABDOMINAL SURGERY  May 2012    sternotomy, heart valve replacement   • AORTIC VALVE REPAIR/REPLACEMENT      replacement   • COLONOSCOPY N/A 2019    Procedure: COLONOSCOPY into cecum with polypectomy;  Surgeon: Cosme Hill MD;   Location: [facility] ENDOSCOPY;  Service: Gastroenterology       Social History     Socioeconomic History   • Marital status: Single     Spouse name: Not on file   • Number of children: Not on file   • Years of education: Not on file   • Highest education level: Not on file   Tobacco Use   • Smoking status: Never Smoker   • Smokeless tobacco: Never Used   • Tobacco comment: caffeine use/ 2-3 CUPS OF TEADAILY    Substance and Sexual Activity   • Alcohol use: Yes     Alcohol/week: 2.0 standard drinks     Types: 1 Cans of beer, 1 Shots of liquor per week     Comment: 1-2 servings of alcohol daily   • Drug use: No   • Sexual activity: Never       Family History   Problem Relation Age of Onset   • Colon polyps Mother        Review of Systems   Cardiovascular: Negative for leg swelling.   Respiratory: Negative for shortness of breath.    Neurological: Positive for headaches. Negative for dizziness and light-headedness.   All other systems reviewed and are negative.      No Known Allergies      Current Outpatient Medications:   •  Ascorbic Acid (VITAMIN C PO), Take  by mouth Daily., Disp: , Rfl:   •  Calcium Carb-Cholecalciferol (CALCIUM 1000 + D PO), Take  by mouth., Disp: , Rfl:   •  carvedilol (COREG) 12.5 MG tablet, Take 1 tablet by mouth twice daily, Disp: 180 tablet, Rfl: 0  •  Cholecalciferol (VITAMIN D) 1000 UNITS tablet, Take 2 tablets by mouth., Disp: , Rfl:   •  Cyanocobalamin (VITAMIN B 12 PO), Take  by mouth Daily., Disp: , Rfl:   •  fexofenadine (ALLEGRA) 180 MG tablet, Take 180 mg by mouth Daily., Disp: , Rfl:   •  lisinopril (PRINIVIL,ZESTRIL) 20 MG tablet, Take 1 tablet by mouth once daily, Disp: 90 tablet, Rfl: 0  •  Melatonin 3 MG capsule, Take  by mouth., Disp: , Rfl:   •  Multiple Vitamin tablet, Take  by mouth daily., Disp: , Rfl:   •  Omega-3 Fatty Acids (FISH OIL) 1000 MG capsule capsule, Take  by mouth Daily With Breakfast., Disp: , Rfl:   •  warfarin (COUMADIN) 2 MG tablet, TAKE 4 TABLETS BY MOUTH ON  "MON, WED, FRI (8MGS) AND 5 TABLETS (10 MGS) BY MOUTH ON ALL OTHER DAYS OR AS DIRECTED, Disp: 412 tablet, Rfl: 1     Objective:     Vitals:    08/06/20 0944   BP: 123/73   Pulse: 62   Weight: 122 kg (268 lb)   Height: 180.3 cm (71\")     Body mass index is 37.38 kg/m².    Physical Exam   Constitutional: He is oriented to person, place, and time.   Neurological: He is alert and oriented to person, place, and time.   Psychiatric: He has a normal mood and affect. His behavior is normal. Judgment and thought content normal.       Procedures      Assessment:       Diagnosis Plan   1. Aortic insufficiency due to bicuspid aortic valve     2. History of mechanical aortic valve replacement     3. History of cardiomyopathy     4. Essential hypertension        Plan:       He is status post mechanical aortic valve replacement for severe bicuspid aortic insufficiency.  He had nonischemic dilated cardiopathy which resolved.  An echo in 2019 showed normal LV size/systolic function and normal valvular function.  I will repeat this in 2024 assuming he has no problems until then. His INR is well managed.  His BP is well controlled.     This patient has consented to a telehealth visit via phone. The visit was scheduled as a phone visit to comply with patient safety concerns in accordance with CDC recommendations.  All vitals recorded within this visit are reported by the patient.  I spent  10 minutes in total including but not limited to the 5 minutes spent in direct conversation with this patient.     Sincerely,       Lorne Kam MD                "

## 2020-08-08 NOTE — PROGRESS NOTES
Anticoagulation Clinic Progress Note    Anticoagulation Summary  As of 3/7/2019    INR goal:   2.5-3.5   TTR:   80.6 % (2.7 mo)   INR used for dosing:   3.50 (3/6/2019)   Warfarin maintenance plan:   8 mg every day   Weekly warfarin total:   56 mg   No change documented:   Neo Cornejo RPH   Plan last modified:   Forest Fisher RPH (12/6/2018)   Next INR check:   3/20/2019   Priority:   Maintenance   Target end date:   Indefinite    Indications    History of mechanical aortic valve replacement [Z95.2]  Long term (current) use of anticoagulants [Z79.01]             Anticoagulation Episode Summary     INR check location:   Home Draw    Preferred lab:       Send INR reminders to:    JOSE LECHUGA  POOL    Comments:   **[facility]**      Anticoagulation Care Providers     Provider Role Specialty Phone number    Lorne Kam MD Referring Cardiology [phone]                INR History:  Anticoagulation Monitoring 2/7/2019 2/21/2019 3/7/2019   INR 2.50 3.70 3.50   INR Date 2/6/2019 2/21/2019 3/6/2019   INR Goal 2.5-3.5 2.5-3.5 2.5-3.5   Trend Same Same Same   Last Week Total 56 mg 56 mg 56 mg   Next Week Total 56 mg 56 mg 56 mg   Sun 8 mg 8 mg 8 mg   Mon 8 mg 8 mg 8 mg   Tue 8 mg 8 mg 8 mg   Wed 8 mg 8 mg 8 mg   Thu 8 mg 8 mg 8 mg   Fri 8 mg 8 mg 8 mg   Sat 8 mg 8 mg 8 mg   Visit Report - - -   Some recent data might be hidden       Plan:  1. INR is Therapeutic today- see above in Anticoagulation Summary.   Will instruct Man Ramsey to Continue their warfarin regimen- see above in Anticoagulation Summary.  2. Follow up in 2 weeks  3. Pt has agreed to only be called if INR out of range. They have been instructed to call if any changes in medications, doses, concerns, etc. Patient expresses understanding and has no further questions at this time.    Neo Cornejo RPH   I performed the initial face to face bedside interview with this patient regarding history of present illness, review of symptoms and past medical, social and family history.  I completed an independent physical examination.  I was the initial provider who evaluated this patient.  The history, review of symptoms and examination was documented by the scribe in my presence and I attest to the accuracy of the documentation.  I have signed out the follow up of any pending tests (i.e. labs, radiological studies) to the ACP.  I have discussed the patient’s plan of care and disposition with the ACP

## 2020-08-10 ENCOUNTER — ANTICOAGULATION VISIT (OUTPATIENT)
Dept: PHARMACY | Facility: HOSPITAL | Age: 40
End: 2020-08-10

## 2020-08-10 DIAGNOSIS — Z95.2 HISTORY OF MECHANICAL AORTIC VALVE REPLACEMENT: ICD-10-CM

## 2020-08-10 DIAGNOSIS — Z79.01 LONG TERM (CURRENT) USE OF ANTICOAGULANTS: ICD-10-CM

## 2020-08-10 LAB — INR PPP: 3.2

## 2020-08-10 NOTE — PROGRESS NOTES
Anticoagulation Clinic Progress Note    Anticoagulation Summary  As of 8/10/2020    INR goal:   2.5-3.5   TTR:   76.2 % (1.6 y)   INR used for dosing:   3.20 (8/7/2020)   Warfarin maintenance plan:   8 mg every Mon, Wed, Fri; 10 mg all other days   Weekly warfarin total:   64 mg   No change documented:   Almaz Faria   Plan last modified:   Maria Del Rosario Cruz RPH (3/5/2020)   Next INR check:   8/24/2020   Priority:   High   Target end date:   Indefinite    Indications    History of mechanical aortic valve replacement [Z95.2]  Long term (current) use of anticoagulants [Z79.01]             Anticoagulation Episode Summary     INR check location:   Home Draw    Preferred lab:       Send INR reminders to:    JOSE LECHUGA  POOL    Comments:   **[facility]**      Anticoagulation Care Providers     Provider Role Specialty Phone number    Lorne Kam MD Referring Cardiology [phone]          Clinic Interview:  No pertinent clinical findings have been reported.    INR History:  Anticoagulation Monitoring 7/14/2020 7/24/2020 8/10/2020   INR 2.40 2.50 3.20   INR Date 7/14/2020 7/24/2020 8/7/2020   INR Goal 2.5-3.5 2.5-3.5 2.5-3.5   Trend Same Same Same   Last Week Total 70 mg 64 mg 64 mg   Next Week Total 64 mg 64 mg 64 mg   Sun 10 mg 10 mg 10 mg   Mon 8 mg 8 mg 8 mg   Tue 10 mg 10 mg 10 mg   Wed 8 mg 8 mg 8 mg   Thu 10 mg 10 mg 10 mg   Fri 8 mg 8 mg 8 mg   Sat 10 mg 10 mg 10 mg   Visit Report - - -   Some recent data might be hidden       Plan:  1. INR is therapeutic today- see above in Anticoagulation Summary.    Man Ramsey to continue their warfarin regimen- see above in Anticoagulation Summary.  2. Follow up in 2 weeks  3. Pt has agreed to only be called if INR out of range. They have been instructed to call if any changes in medications, doses, concerns, etc. Patient expresses understanding and has no further questions at this time.    Almaz Faria

## 2020-08-24 ENCOUNTER — ANTICOAGULATION VISIT (OUTPATIENT)
Dept: PHARMACY | Facility: HOSPITAL | Age: 40
End: 2020-08-24

## 2020-08-24 DIAGNOSIS — Z79.01 LONG TERM (CURRENT) USE OF ANTICOAGULANTS: ICD-10-CM

## 2020-08-24 DIAGNOSIS — Z95.2 HISTORY OF MECHANICAL AORTIC VALVE REPLACEMENT: ICD-10-CM

## 2020-08-24 LAB — INR PPP: 3.2

## 2020-08-26 NOTE — PROGRESS NOTES
Anticoagulation Clinic Progress Note    Anticoagulation Summary  As of 8/24/2020    INR goal:   2.5-3.5   TTR:   76.7 % (1.7 y)   INR used for dosing:   3.20 (8/22/2020)   Warfarin maintenance plan:   8 mg every Mon, Wed, Fri; 10 mg all other days   Weekly warfarin total:   64 mg   No change documented:   Almaz Faria   Plan last modified:   Maria Del Rosario Cruz RPH (3/5/2020)   Next INR check:   9/7/2020   Priority:   High   Target end date:   Indefinite    Indications    History of mechanical aortic valve replacement [Z95.2]  Long term (current) use of anticoagulants [Z79.01]             Anticoagulation Episode Summary     INR check location:   Home Draw    Preferred lab:       Send INR reminders to:    JOSE LECHUGA  POOL    Comments:   **[facility] [facility]**      Anticoagulation Care Providers     Provider Role Specialty Phone number    Lorne Kam MD Referring Cardiology [phone]          Clinic Interview:  No pertinent clinical findings have been reported.    INR History:  Anticoagulation Monitoring 7/24/2020 8/10/2020 8/24/2020   INR 2.50 3.20 3.20   INR Date 7/24/2020 8/7/2020 8/22/2020   INR Goal 2.5-3.5 2.5-3.5 2.5-3.5   Trend Same Same Same   Last Week Total 64 mg 64 mg 64 mg   Next Week Total 64 mg 64 mg 64 mg   Sun 10 mg 10 mg 10 mg   Mon 8 mg 8 mg 8 mg   Tue 10 mg 10 mg 10 mg   Wed 8 mg 8 mg 8 mg   Thu 10 mg 10 mg 10 mg   Fri 8 mg 8 mg 8 mg   Sat 10 mg 10 mg 10 mg   Visit Report - - -   Some recent data might be hidden       Plan:  1. INR is therapeutic today- see above in Anticoagulation Summary.    Man Ramsey to continue their warfarin regimen- see above in Anticoagulation Summary.  2. Follow up in 2 weeks  3. Pt has agreed to only be called if INR out of range. They have been instructed to call if any changes in medications, doses, concerns, etc. Patient expresses understanding and has no further questions at this time.    Almaz Faria

## 2020-09-02 LAB — INR PPP: 2.8

## 2020-09-03 ENCOUNTER — ANTICOAGULATION VISIT (OUTPATIENT)
Dept: PHARMACY | Facility: HOSPITAL | Age: 40
End: 2020-09-03

## 2020-09-03 DIAGNOSIS — Z95.2 HISTORY OF MECHANICAL AORTIC VALVE REPLACEMENT: ICD-10-CM

## 2020-09-03 DIAGNOSIS — Z79.01 LONG TERM (CURRENT) USE OF ANTICOAGULANTS: ICD-10-CM

## 2020-09-03 NOTE — PROGRESS NOTES
Anticoagulation Clinic Progress Note    Anticoagulation Summary  As of 9/3/2020    INR goal:   2.5-3.5   TTR:   77.2 % (1.7 y)   INR used for dosin.80 (2020)   Warfarin maintenance plan:   8 mg every Mon, Wed, Fri; 10 mg all other days   Weekly warfarin total:   64 mg   No change documented:   Sun Curtis   Plan last modified:   Maria Del Rosario Cruz RPH (3/5/2020)   Next INR check:   2020   Priority:   High   Target end date:   Indefinite    Indications    History of mechanical aortic valve replacement [Z95.2]  Long term (current) use of anticoagulants [Z79.01]             Anticoagulation Episode Summary     INR check location:   Home Draw    Preferred lab:       Send INR reminders to:    [facility]    Comments:   **[facility]**      Anticoagulation Care Providers     Provider Role Specialty Phone number    Lorne Kam MD Referring Cardiology [phone]          Clinic Interview:  No pertinent clinical findings have been reported.    INR History:  Anticoagulation Monitoring 8/10/2020 2020 9/3/2020   INR 3.20 3.20 2.80   INR Date 2020   INR Goal 2.5-3.5 2.5-3.5 2.5-3.5   Trend Same Same Same   Last Week Total 64 mg 64 mg 64 mg   Next Week Total 64 mg 64 mg 64 mg   Sun 10 mg 10 mg 10 mg   Mon 8 mg 8 mg 8 mg   Tue 10 mg 10 mg 10 mg   Wed 8 mg 8 mg 8 mg   Thu 10 mg 10 mg 10 mg   Fri 8 mg 8 mg 8 mg   Sat 10 mg 10 mg 10 mg   Visit Report - - -   Some recent data might be hidden       Plan:  1. INR is therapeutic today- see above in Anticoagulation Summary.    Man STEWART Roxy to continue their warfarin regimen- see above in Anticoagulation Summary.  2. Follow up in 2 weeks  3. Pt has agreed to only be called if INR out of range. They have been instructed to call if any changes in medications, doses, concerns, etc. Patient expresses understanding and has no further questions at this time.    Sun Curtis

## 2020-09-18 ENCOUNTER — ANTICOAGULATION VISIT (OUTPATIENT)
Dept: PHARMACY | Facility: HOSPITAL | Age: 40
End: 2020-09-18

## 2020-09-18 DIAGNOSIS — Z79.01 LONG TERM (CURRENT) USE OF ANTICOAGULANTS: ICD-10-CM

## 2020-09-18 DIAGNOSIS — Z95.2 HISTORY OF MECHANICAL AORTIC VALVE REPLACEMENT: ICD-10-CM

## 2020-09-18 LAB — INR PPP: 3.2

## 2020-09-18 NOTE — PROGRESS NOTES
Anticoagulation Clinic Progress Note    Anticoagulation Summary  As of 9/18/2020    INR goal:  2.5-3.5   TTR:  77.7 % (1.8 y)   INR used for dosing:  3.20 (9/18/2020)   Warfarin maintenance plan:  8 mg every Mon, Wed, Fri; 10 mg all other days   Weekly warfarin total:  64 mg   No change documented:  Almaz Faria   Plan last modified:  Maria Del Rosario Cruz RPH (3/5/2020)   Next INR check:  10/2/2020   Priority:  High   Target end date:  Indefinite    Indications    History of mechanical aortic valve replacement [Z95.2]  Long term (current) use of anticoagulants [Z79.01]             Anticoagulation Episode Summary     INR check location:  Home Draw    Preferred lab:      Send INR reminders to:   [facility]    Comments:  **[facility]**      Anticoagulation Care Providers     Provider Role Specialty Phone number    Lorne Kam MD Referring Cardiology [phone]          Clinic Interview:  No pertinent clinical findings have been reported.    INR History:  Anticoagulation Monitoring 8/24/2020 9/3/2020 9/18/2020   INR 3.20 2.80 3.20   INR Date 8/22/2020 9/2/2020 9/18/2020   INR Goal 2.5-3.5 2.5-3.5 2.5-3.5   Trend Same Same Same   Last Week Total 64 mg 64 mg 64 mg   Next Week Total 64 mg 64 mg 64 mg   Sun 10 mg 10 mg 10 mg   Mon 8 mg 8 mg 8 mg   Tue 10 mg 10 mg 10 mg   Wed 8 mg 8 mg 8 mg   Thu 10 mg 10 mg 10 mg   Fri 8 mg 8 mg 8 mg   Sat 10 mg 10 mg 10 mg   Visit Report - - -   Some recent data might be hidden       Plan:  1. INR is therapeutic today- see above in Anticoagulation Summary.    Man Ramsey to continue their warfarin regimen- see above in Anticoagulation Summary.  2. Follow up in 2 weeks  3. Pt has agreed to only be called if INR out of range. They have been instructed to call if any changes in medications, doses, concerns, etc. Patient expresses understanding and has no further questions at this time.    Almaz Faria

## 2020-09-30 ENCOUNTER — ANTICOAGULATION VISIT (OUTPATIENT)
Dept: PHARMACY | Facility: HOSPITAL | Age: 40
End: 2020-09-30

## 2020-09-30 DIAGNOSIS — Z95.2 HISTORY OF MECHANICAL AORTIC VALVE REPLACEMENT: ICD-10-CM

## 2020-09-30 DIAGNOSIS — Z79.01 LONG TERM (CURRENT) USE OF ANTICOAGULANTS: ICD-10-CM

## 2020-09-30 LAB — INR PPP: 3.3

## 2020-09-30 NOTE — PROGRESS NOTES
Anticoagulation Clinic Progress Note    Anticoagulation Summary  As of 9/30/2020    INR goal:  2.5-3.5   TTR:  78.1 % (1.8 y)   INR used for dosing:  3.30 (9/30/2020)   Warfarin maintenance plan:  8 mg every Mon, Wed, Fri; 10 mg all other days   Weekly warfarin total:  64 mg   No change documented:  Almaz Faria   Plan last modified:  Maria Del Rosario Cruz RPH (3/5/2020)   Next INR check:  10/14/2020   Priority:  High   Target end date:  Indefinite    Indications    History of mechanical aortic valve replacement [Z95.2]  Long term (current) use of anticoagulants [Z79.01]             Anticoagulation Episode Summary     INR check location:  Home Draw    Preferred lab:      Send INR reminders to:   [facility]    Comments:  [facility]      Anticoagulation Care Providers     Provider Role Specialty Phone number    Lorne Kam MD Referring Cardiology [phone]          Clinic Interview:  No pertinent clinical findings have been reported.    INR History:  Anticoagulation Monitoring 9/3/2020 9/18/2020 9/30/2020   INR 2.80 3.20 3.30   INR Date 9/2/2020 9/18/2020 9/30/2020   INR Goal 2.5-3.5 2.5-3.5 2.5-3.5   Trend Same Same Same   Last Week Total 64 mg 64 mg 64 mg   Next Week Total 64 mg 64 mg 64 mg   Sun 10 mg 10 mg 10 mg   Mon 8 mg 8 mg 8 mg   Tue 10 mg 10 mg 10 mg   Wed 8 mg 8 mg 8 mg   Thu 10 mg 10 mg 10 mg   Fri 8 mg 8 mg 8 mg   Sat 10 mg 10 mg 10 mg   Visit Report - - -   Some recent data might be hidden       Plan:  1. INR is therapeutic today- see above in Anticoagulation Summary.    Man Ramsey to continue their warfarin regimen- see above in Anticoagulation Summary.  2. Follow up in 2 weeks  3. Pt has agreed to only be called if INR out of range. They have been instructed to call if any changes in medications, doses, concerns, etc. Patient expresses understanding and has no further questions at this time.    Almaz Faria

## 2020-10-13 ENCOUNTER — ANTICOAGULATION VISIT (OUTPATIENT)
Dept: PHARMACY | Facility: HOSPITAL | Age: 40
End: 2020-10-13

## 2020-10-13 DIAGNOSIS — Z79.01 LONG TERM (CURRENT) USE OF ANTICOAGULANTS: ICD-10-CM

## 2020-10-13 DIAGNOSIS — Z95.2 HISTORY OF MECHANICAL AORTIC VALVE REPLACEMENT: ICD-10-CM

## 2020-10-13 LAB — INR PPP: 3.2

## 2020-10-13 RX ORDER — WARFARIN SODIUM 2 MG/1
TABLET ORAL
Qty: 412 TABLET | Refills: 1 | Status: SHIPPED | OUTPATIENT
Start: 2020-10-13 | End: 2021-04-05

## 2020-10-13 NOTE — PROGRESS NOTES
Anticoagulation Clinic Progress Note    Anticoagulation Summary  As of 10/13/2020    INR goal:  2.5-3.5   TTR:  78.6 % (1.8 y)   INR used for dosing:  3.20 (10/13/2020)   Warfarin maintenance plan:  8 mg every Mon, Wed, Fri; 10 mg all other days   Weekly warfarin total:  64 mg   No change documented:  Almaz Faria   Plan last modified:  Maria Del Rosario Cruz RPH (3/5/2020)   Next INR check:  10/27/2020   Priority:  High   Target end date:  Indefinite    Indications    History of mechanical aortic valve replacement [Z95.2]  Long term (current) use of anticoagulants [Z79.01]             Anticoagulation Episode Summary     INR check location:  Home Draw    Preferred lab:      Send INR reminders to:   [facility]    Comments:  [facility]      Anticoagulation Care Providers     Provider Role Specialty Phone number    Lorne Kam MD Referring Cardiology [phone]          Clinic Interview:  No pertinent clinical findings have been reported.    INR History:  Anticoagulation Monitoring 9/18/2020 9/30/2020 10/13/2020   INR 3.20 3.30 3.20   INR Date 9/18/2020 9/30/2020 10/13/2020   INR Goal 2.5-3.5 2.5-3.5 2.5-3.5   Trend Same Same Same   Last Week Total 64 mg 64 mg 64 mg   Next Week Total 64 mg 64 mg 64 mg   Sun 10 mg 10 mg 10 mg   Mon 8 mg 8 mg 8 mg   Tue 10 mg 10 mg 10 mg   Wed 8 mg 8 mg 8 mg   Thu 10 mg 10 mg 10 mg   Fri 8 mg 8 mg 8 mg   Sat 10 mg 10 mg 10 mg   Visit Report - - -   Some recent data might be hidden       Plan:  1. INR is therapeutic today- see above in Anticoagulation Summary.    Man Ramsey to continue their warfarin regimen- see above in Anticoagulation Summary.  2. Follow up in 2 weeks  3. Pt has agreed to only be called if INR out of range. They have been instructed to call if any changes in medications, doses, concerns, etc. Patient expresses understanding and has no further questions at this time.    Almaz Faria

## 2020-10-30 ENCOUNTER — ANTICOAGULATION VISIT (OUTPATIENT)
Dept: PHARMACY | Facility: HOSPITAL | Age: 40
End: 2020-10-30

## 2020-10-30 DIAGNOSIS — Z95.2 HISTORY OF MECHANICAL AORTIC VALVE REPLACEMENT: ICD-10-CM

## 2020-10-30 DIAGNOSIS — Z79.01 LONG TERM (CURRENT) USE OF ANTICOAGULANTS: ICD-10-CM

## 2020-10-30 LAB — INR PPP: 3.6

## 2020-10-30 NOTE — PROGRESS NOTES
Anticoagulation Clinic Progress Note    Anticoagulation Summary  As of 10/30/2020    INR goal:  2.5-3.5   TTR:  78.5 % (1.9 y)   INR used for dosing:  3.60 (10/30/2020)   Warfarin maintenance plan:  8 mg every Mon, Wed, Fri; 10 mg all other days   Weekly warfarin total:  64 mg   No change documented:  Neo Cornejo RPH   Plan last modified:  Maria Del Rosario Cruz RPH (3/5/2020)   Next INR check:  11/13/2020   Priority:  High   Target end date:  Indefinite    Indications    History of mechanical aortic valve replacement [Z95.2]  Long term (current) use of anticoagulants [Z79.01]             Anticoagulation Episode Summary     INR check location:  Home Draw    Preferred lab:      Send INR reminders to:  [facility]  [facility]    Comments:  [facility]**      Anticoagulation Care Providers     Provider Role Specialty Phone number    Lorne Kam MD Referring Cardiology [phone]          Clinic Interview:  Patient Findings     Positives:  Change in alcohol use    Negatives:  Signs/symptoms of thrombosis, Signs/symptoms of bleeding,   Laboratory test error suspected, Change in health, Change in activity,   Upcoming invasive procedure, Emergency department visit, Upcoming dental   procedure, Missed doses, Extra doses, Change in medications, Change in   diet/appetite, Hospital admission, Bruising, Other complaints    Comments:  Extra EtOH drink last night.       Clinical Outcomes     Negatives:  Major bleeding event, Thromboembolic event,   Anticoagulation-related hospital admission, Anticoagulation-related ED   visit, Anticoagulation-related fatality    Comments:  Extra EtOH drink last night.         INR History:  Anticoagulation Monitoring 9/30/2020 10/13/2020 10/30/2020   INR 3.30 3.20 3.60   INR Date 9/30/2020 10/13/2020 10/30/2020   INR Goal 2.5-3.5 2.5-3.5 2.5-3.5   Trend Same Same Same   Last Week Total 64 mg 64 mg 64 mg   Next Week Total 64 mg 64 mg 64 mg   Sun 10 mg 10 mg 10 mg   Mon 8 mg 8 mg 8  mg   Tue 10 mg 10 mg 10 mg   Wed 8 mg 8 mg 8 mg   Thu 10 mg 10 mg 10 mg   Fri 8 mg 8 mg 8 mg   Sat 10 mg 10 mg 10 mg   Visit Report - - -   Some recent data might be hidden       Plan:  1. INR is Supratherapeutic today- see above in Anticoagulation Summary.   Will instruct Man Ramsey to Continue their warfarin regimen- see above in Anticoagulation Summary.  2. Follow up in 2 weeks  3. They have been instructed to call if any changes in medications, doses, concerns, etc. Patient expresses understanding and has no further questions at this time.    Neo Cornejo [facility]

## 2020-11-12 ENCOUNTER — ANTICOAGULATION VISIT (OUTPATIENT)
Dept: PHARMACY | Facility: HOSPITAL | Age: 40
End: 2020-11-12

## 2020-11-12 DIAGNOSIS — Z79.01 LONG TERM (CURRENT) USE OF ANTICOAGULANTS: ICD-10-CM

## 2020-11-12 DIAGNOSIS — Z95.2 HISTORY OF MECHANICAL AORTIC VALVE REPLACEMENT: ICD-10-CM

## 2020-11-12 LAB — INR PPP: 2.7

## 2020-11-12 NOTE — PROGRESS NOTES
Anticoagulation Clinic Progress Note    Anticoagulation Summary  As of 2020    INR goal:  2.5-3.5   TTR:  78.7 % (1.9 y)   INR used for dosin.70 (2020)   Warfarin maintenance plan:  8 mg every Mon, Wed, Fri; 10 mg all other days   Weekly warfarin total:  64 mg   No change documented:  Neo Cornejo RPH   Plan last modified:  Maria Del Rosario Cruz RPH (3/5/2020)   Next INR check:  2020   Priority:  High   Target end date:  Indefinite    Indications    History of mechanical aortic valve replacement [Z95.2]  Long term (current) use of anticoagulants [Z79.01]             Anticoagulation Episode Summary     INR check location:  Home Draw    Preferred lab:      Send INR reminders to:  [facility]  [facility]    Comments:  [facility]**      Anticoagulation Care Providers     Provider Role Specialty Phone number    Lorne Kam MD Referring Cardiology [phone]          Clinic Interview:  No pertinent clinical findings have been reported.    INR History:  Anticoagulation Monitoring 10/13/2020 10/30/2020 2020   INR 3.20 3.60 2.70   INR Date 10/13/2020 10/30/2020 2020   INR Goal 2.5-3.5 2.5-3.5 2.5-3.5   Trend Same Same Same   Last Week Total 64 mg 64 mg 64 mg   Next Week Total 64 mg 64 mg 64 mg   Sun 10 mg 10 mg 10 mg   Mon 8 mg 8 mg 8 mg   Tue 10 mg 10 mg 10 mg   Wed 8 mg 8 mg 8 mg   Thu 10 mg 10 mg 10 mg   Fri 8 mg 8 mg 8 mg   Sat 10 mg 10 mg 10 mg   Visit Report - - -   Some recent data might be hidden       Plan:  1. INR is therapeutic today- see above in Anticoagulation Summary.    Man Ramsey to continue their warfarin regimen- see above in Anticoagulation Summary.  2. Follow up in 2 weeks  3. Pt has agreed to only be called if INR out of range. They have been instructed to call if any changes in medications, doses, concerns, etc. Patient expresses understanding and has no further questions at this time.    Neo Cornejo RPH

## 2020-11-25 LAB — INR PPP: 3.6

## 2020-11-26 LAB — INR PPP: 3

## 2020-11-27 ENCOUNTER — ANTICOAGULATION VISIT (OUTPATIENT)
Dept: PHARMACY | Facility: HOSPITAL | Age: 40
End: 2020-11-27

## 2020-11-27 DIAGNOSIS — Z95.2 HISTORY OF MECHANICAL AORTIC VALVE REPLACEMENT: ICD-10-CM

## 2020-11-27 DIAGNOSIS — Z79.01 LONG TERM (CURRENT) USE OF ANTICOAGULANTS: ICD-10-CM

## 2020-11-27 NOTE — PROGRESS NOTES
Anticoagulation Clinic Progress Note    Anticoagulation Summary  As of 11/27/2020    INR goal:  2.5-3.5   TTR:  78.9 % (1.9 y)   INR used for dosing:  3.00 (11/26/2020)   Warfarin maintenance plan:  8 mg every Mon, Wed, Fri; 10 mg all other days   Weekly warfarin total:  64 mg   No change documented:  Neo Cornejo RPH   Plan last modified:  Maria Del Rosario Crzu RPH (3/5/2020)   Next INR check:  12/11/2020   Priority:  High   Target end date:  Indefinite    Indications    History of mechanical aortic valve replacement [Z95.2]  Long term (current) use of anticoagulants [Z79.01]             Anticoagulation Episode Summary     INR check location:  Home Draw    Preferred lab:      Send INR reminders to:  [facility]  [facility]    Comments:  [facility]**      Anticoagulation Care Providers     Provider Role Specialty Phone number    Lorne Kam MD Referring Cardiology [phone]          Clinic Interview:  Patient Findings     Positives:  Change in alcohol use    Negatives:  Signs/symptoms of thrombosis, Signs/symptoms of bleeding,   Laboratory test error suspected, Change in health, Change in activity,   Upcoming invasive procedure, Emergency department visit, Upcoming dental   procedure, Missed doses, Extra doses, Change in medications, Change in   diet/appetite, Hospital admission, Bruising, Other complaints    Comments:  Did not drink any EtOH 11/25 after seeing high INR 3.6, but   continued his usual warfarin dose. Next day INR improved to 3.0.      Clinical Outcomes     Negatives:  Major bleeding event, Thromboembolic event,   Anticoagulation-related hospital admission, Anticoagulation-related ED   visit, Anticoagulation-related fatality    Comments:  Did not drink any EtOH 11/25 after seeing high INR 3.6, but   continued his usual warfarin dose. Next day INR improved to 3.0.        INR History:  Anticoagulation Monitoring 10/30/2020 11/12/2020 11/27/2020   INR 3.60 2.70 3.00   INR Date  10/30/2020 11/12/2020 11/26/2020   INR Goal 2.5-3.5 2.5-3.5 2.5-3.5   Trend Same Same Same   Last Week Total 64 mg 64 mg 64 mg   Next Week Total 64 mg 64 mg 64 mg   Sun 10 mg 10 mg 10 mg   Mon 8 mg 8 mg 8 mg   Tue 10 mg 10 mg 10 mg   Wed 8 mg 8 mg 8 mg   Thu 10 mg 10 mg 10 mg   Fri 8 mg 8 mg 8 mg   Sat 10 mg 10 mg 10 mg   Visit Report - - -   Some recent data might be hidden       Plan:  1. INR is Therapeutic today- see above in Anticoagulation Summary.   Will instruct Man PAT Ramsey to Continue their warfarin regimen- see above in Anticoagulation Summary.  2. Follow up in 2 weeks  3. They have been instructed to call if any changes in medications, doses, concerns, etc. Patient expresses understanding and has no further questions at this time.    Neo Cornejo [facility]

## 2020-12-10 ENCOUNTER — ANTICOAGULATION VISIT (OUTPATIENT)
Dept: PHARMACY | Facility: HOSPITAL | Age: 40
End: 2020-12-10

## 2020-12-10 DIAGNOSIS — Z79.01 LONG TERM (CURRENT) USE OF ANTICOAGULANTS: ICD-10-CM

## 2020-12-10 DIAGNOSIS — Z95.2 HISTORY OF MECHANICAL AORTIC VALVE REPLACEMENT: ICD-10-CM

## 2020-12-10 LAB — INR PPP: 2.8

## 2020-12-10 NOTE — PROGRESS NOTES
Anticoagulation Clinic Progress Note    Anticoagulation Summary  As of 12/10/2020    INR goal:  2.5-3.5   TTR:  79.3 % (2 y)   INR used for dosin.80 (12/10/2020)   Warfarin maintenance plan:  8 mg every Mon, Wed, Fri; 10 mg all other days   Weekly warfarin total:  64 mg   No change documented:  Yecenia Burns   Plan last modified:  Maria Del Rosario Cruz RPH (3/5/2020)   Next INR check:  2020   Priority:  High   Target end date:  Indefinite    Indications    History of mechanical aortic valve replacement [Z95.2]  Long term (current) use of anticoagulants [Z79.01]             Anticoagulation Episode Summary     INR check location:  Home Draw    Preferred lab:      Send INR reminders to:   [facility]  [facility]    Comments:  **[facility]**      Anticoagulation Care Providers     Provider Role Specialty Phone number    Lorne Kam MD Referring Cardiology [phone]          Clinic Interview:  No pertinent clinical findings have been reported.    INR History:  Anticoagulation Monitoring 2020 2020 12/10/2020   INR 2.70 3.00 2.80   INR Date 2020 2020 12/10/2020   INR Goal 2.5-3.5 2.5-3.5 2.5-3.5   Trend Same Same Same   Last Week Total 64 mg 64 mg 64 mg   Next Week Total 64 mg 64 mg 64 mg   Sun 10 mg 10 mg 10 mg   Mon 8 mg 8 mg 8 mg   Tue 10 mg 10 mg 10 mg   Wed 8 mg 8 mg 8 mg   Thu 10 mg 10 mg 10 mg   Fri 8 mg 8 mg 8 mg   Sat 10 mg 10 mg 10 mg   Visit Report - - -   Some recent data might be hidden       Plan:  1. INR is therapeutic today- see above in Anticoagulation Summary.    Man Ramsey to continue their warfarin regimen- see above in Anticoagulation Summary.  2. Follow up in 2 weeks  3. Pt has agreed to only be called if INR out of range. They have been instructed to call if any changes in medications, doses, concerns, etc. Patient expresses understanding and has no further questions at this time.    Yecenia Burns

## 2020-12-23 LAB — INR PPP: 2.9

## 2020-12-24 ENCOUNTER — ANTICOAGULATION VISIT (OUTPATIENT)
Dept: PHARMACY | Facility: HOSPITAL | Age: 40
End: 2020-12-24

## 2020-12-24 DIAGNOSIS — Z95.2 HISTORY OF MECHANICAL AORTIC VALVE REPLACEMENT: ICD-10-CM

## 2020-12-24 DIAGNOSIS — Z79.01 LONG TERM (CURRENT) USE OF ANTICOAGULANTS: ICD-10-CM

## 2020-12-24 NOTE — PROGRESS NOTES
Anticoagulation Clinic Progress Note    Anticoagulation Summary  As of 2020    INR goal:  2.5-3.5   TTR:  79.6 % (2 y)   INR used for dosin.90 (2020)   Warfarin maintenance plan:  8 mg every Mon, Wed, Fri; 10 mg all other days   Weekly warfarin total:  64 mg   No change documented:  Yecenia Burns   Plan last modified:  Maria Del Rosario Cruz RPH (3/5/2020)   Next INR check:  2021   Priority:  High   Target end date:  Indefinite    Indications    History of mechanical aortic valve replacement [Z95.2]  Long term (current) use of anticoagulants [Z79.01]             Anticoagulation Episode Summary     INR check location:  Home Draw    Preferred lab:      Send INR reminders to:   [facility]  [facility]    Comments:  **[facility]**      Anticoagulation Care Providers     Provider Role Specialty Phone number    Lorne Kam MD Referring Cardiology [phone]          Clinic Interview:  No pertinent clinical findings have been reported.    INR History:  Anticoagulation Monitoring 2020 12/10/2020 2020   INR 3.00 2.80 2.90   INR Date 2020 12/10/2020 2020   INR Goal 2.5-3.5 2.5-3.5 2.5-3.5   Trend Same Same Same   Last Week Total 64 mg 64 mg 64 mg   Next Week Total 64 mg 64 mg 64 mg   Sun 10 mg 10 mg 10 mg   Mon 8 mg 8 mg 8 mg   Tue 10 mg 10 mg 10 mg   Wed 8 mg 8 mg 8 mg   Thu 10 mg 10 mg 10 mg   Fri 8 mg 8 mg 8 mg   Sat 10 mg 10 mg 10 mg   Visit Report - - -   Some recent data might be hidden       Plan:  1. INR is therapeutic today- see above in Anticoagulation Summary.    Man Ramsey to continue their warfarin regimen- see above in Anticoagulation Summary.  2. Follow up in 2 weeks  3. Pt has agreed to only be called if INR out of range. They have been instructed to call if any changes in medications, doses, concerns, etc. Patient expresses understanding and has no further questions at this time.    Yecenia Burns

## 2021-01-06 ENCOUNTER — ANTICOAGULATION VISIT (OUTPATIENT)
Dept: PHARMACY | Facility: HOSPITAL | Age: 41
End: 2021-01-06

## 2021-01-06 DIAGNOSIS — Z79.01 LONG TERM (CURRENT) USE OF ANTICOAGULANTS: ICD-10-CM

## 2021-01-06 DIAGNOSIS — Z95.2 HISTORY OF MECHANICAL AORTIC VALVE REPLACEMENT: ICD-10-CM

## 2021-01-06 LAB — INR PPP: 3.5

## 2021-01-06 NOTE — PROGRESS NOTES
Anticoagulation Clinic Progress Note    Anticoagulation Summary  As of 1/6/2021    INR goal:  2.5-3.5   TTR:  80.0 % (2.1 y)   INR used for dosing:  3.50 (1/6/2021)   Warfarin maintenance plan:  8 mg every Mon, Wed, Fri; 10 mg all other days   Weekly warfarin total:  64 mg   No change documented:  Almaz Faria   Plan last modified:  Maria Del Rosario [facility] (3/5/2020)   Next INR check:  1/20/2021   Priority:  High   Target end date:  Indefinite    Indications    History of mechanical aortic valve replacement [Z95.2]  Long term (current) use of anticoagulants [Z79.01]             Anticoagulation Episode Summary     INR check location:  Home Draw    Preferred lab:      Send INR reminders to:   [facility]  [facility]    Comments:  **[facility]**      Anticoagulation Care Providers     Provider Role Specialty Phone number    Lorne Kam MD Referring Cardiology [phone]          Clinic Interview:  No pertinent clinical findings have been reported.    INR History:  Anticoagulation Monitoring 12/10/2020 12/24/2020 1/6/2021   INR 2.80 2.90 3.50   INR Date 12/10/2020 12/23/2020 1/6/2021   INR Goal 2.5-3.5 2.5-3.5 2.5-3.5   Trend Same Same Same   Last Week Total 64 mg 64 mg 64 mg   Next Week Total 64 mg 64 mg 64 mg   Sun 10 mg 10 mg 10 mg   Mon 8 mg 8 mg 8 mg   Tue 10 mg 10 mg 10 mg   Wed 8 mg 8 mg 8 mg   Thu 10 mg 10 mg 10 mg   Fri 8 mg 8 mg 8 mg   Sat 10 mg 10 mg 10 mg   Visit Report - - -   Some recent data might be hidden       Plan:  1. INR is therapeutic today- see above in Anticoagulation Summary.    Man Ramsey to continue their warfarin regimen- see above in Anticoagulation Summary.  2. Follow up in 2 weeks  3. Pt has agreed to only be called if INR out of range. They have been instructed to call if any changes in medications, doses, concerns, etc. Patient expresses understanding and has no further questions at this time.    Almaz Faria

## 2021-01-20 ENCOUNTER — ANTICOAGULATION VISIT (OUTPATIENT)
Dept: PHARMACY | Facility: HOSPITAL | Age: 41
End: 2021-01-20

## 2021-01-20 DIAGNOSIS — Z95.2 HISTORY OF MECHANICAL AORTIC VALVE REPLACEMENT: ICD-10-CM

## 2021-01-20 DIAGNOSIS — Z79.01 LONG TERM (CURRENT) USE OF ANTICOAGULANTS: ICD-10-CM

## 2021-01-20 LAB — INR PPP: 2.8

## 2021-01-20 NOTE — PROGRESS NOTES
Anticoagulation Clinic Progress Note    Anticoagulation Summary  As of 2021    INR goal:  2.5-3.5   TTR:  80.4 % (2.1 y)   INR used for dosin.80 (2021)   Warfarin maintenance plan:  8 mg every Mon, Wed, Fri; 10 mg all other days   Weekly warfarin total:  64 mg   No change documented:  Yecenia Burns   Plan last modified:  Maria Del Rosario Cruz RPH (3/5/2020)   Next INR check:  2/3/2021   Priority:  High   Target end date:  Indefinite    Indications    History of mechanical aortic valve replacement [Z95.2]  Long term (current) use of anticoagulants [Z79.01]             Anticoagulation Episode Summary     INR check location:  Home Draw    Preferred lab:      Send INR reminders to:   [facility]  [facility]    Comments:  **[facility]**      Anticoagulation Care Providers     Provider Role Specialty Phone number    Lorne aKm MD Referring Cardiology [phone]          Clinic Interview:  No pertinent clinical findings have been reported.    INR History:  Anticoagulation Monitoring 2020   INR 2.90 3.50 2.80   INR Date 2020   INR Goal 2.5-3.5 2.5-3.5 2.5-3.5   Trend Same Same Same   Last Week Total 64 mg 64 mg 64 mg   Next Week Total 64 mg 64 mg 64 mg   Sun 10 mg 10 mg 10 mg   Mon 8 mg 8 mg 8 mg   Tue 10 mg 10 mg 10 mg   Wed 8 mg 8 mg 8 mg   Thu 10 mg 10 mg 10 mg   Fri 8 mg 8 mg 8 mg   Sat 10 mg 10 mg 10 mg   Visit Report - - -   Some recent data might be hidden       Plan:  1. INR is therapeutic today- see above in Anticoagulation Summary.    Man Ramsey to continue their warfarin regimen- see above in Anticoagulation Summary.  2. Follow up in 2 weeks  3. Pt has agreed to only be called if INR out of range. They have been instructed to call if any changes in medications, doses, concerns, etc. Patient expresses understanding and has no further questions at this time.    Yecenia Burns

## 2021-02-03 LAB — INR PPP: 2.7

## 2021-02-04 ENCOUNTER — ANTICOAGULATION VISIT (OUTPATIENT)
Dept: PHARMACY | Facility: HOSPITAL | Age: 41
End: 2021-02-04

## 2021-02-04 DIAGNOSIS — Z95.2 HISTORY OF MECHANICAL AORTIC VALVE REPLACEMENT: ICD-10-CM

## 2021-02-04 DIAGNOSIS — Z79.01 LONG TERM (CURRENT) USE OF ANTICOAGULANTS: ICD-10-CM

## 2021-02-04 NOTE — PROGRESS NOTES
Anticoagulation Clinic Progress Note    Anticoagulation Summary  As of 2021    INR goal:  2.5-3.5   TTR:  80.7 % (2.1 y)   INR used for dosin.70 (2/3/2021)   Warfarin maintenance plan:  8 mg every Mon, Wed, Fri; 10 mg all other days   Weekly warfarin total:  64 mg   No change documented:  Yecenia Burns   Plan last modified:  Maria Del Rosario Cruz RPH (3/5/2020)   Next INR check:  2021   Priority:  High   Target end date:  Indefinite    Indications    History of mechanical aortic valve replacement [Z95.2]  Long term (current) use of anticoagulants [Z79.01]             Anticoagulation Episode Summary     INR check location:  Home Draw    Preferred lab:      Send INR reminders to:   [facility]  [facility]    Comments:  **[facility]**      Anticoagulation Care Providers     Provider Role Specialty Phone number    Lorne Kam MD Referring Cardiology [phone]          Clinic Interview:  No pertinent clinical findings have been reported.    INR History:  Anticoagulation Monitoring 2021   INR 3.50 2.80 2.70   INR Date 2021 2021 2/3/2021   INR Goal 2.5-3.5 2.5-3.5 2.5-3.5   Trend Same Same Same   Last Week Total 64 mg 64 mg 64 mg   Next Week Total 64 mg 64 mg 64 mg   Sun 10 mg 10 mg 10 mg   Mon 8 mg 8 mg 8 mg   Tue 10 mg 10 mg 10 mg   Wed 8 mg 8 mg 8 mg   Thu 10 mg 10 mg 10 mg   Fri 8 mg 8 mg 8 mg   Sat 10 mg 10 mg 10 mg   Visit Report - - -   Some recent data might be hidden       Plan:  1. INR is therapeutic today- see above in Anticoagulation Summary.    Man Ramsey to continue their warfarin regimen- see above in Anticoagulation Summary.  2. Follow up in 2 weeks  3. Pt has agreed to only be called if INR out of range. They have been instructed to call if any changes in medications, doses, concerns, etc. Patient expresses understanding and has no further questions at this time.    Yecenia Burns

## 2021-02-17 ENCOUNTER — ANTICOAGULATION VISIT (OUTPATIENT)
Dept: PHARMACY | Facility: HOSPITAL | Age: 41
End: 2021-02-17

## 2021-02-17 DIAGNOSIS — Z95.2 HISTORY OF MECHANICAL AORTIC VALVE REPLACEMENT: ICD-10-CM

## 2021-02-17 DIAGNOSIS — Z79.01 LONG TERM (CURRENT) USE OF ANTICOAGULANTS: ICD-10-CM

## 2021-02-17 LAB — INR PPP: 3.3

## 2021-02-17 NOTE — PROGRESS NOTES
Anticoagulation Clinic Progress Note    Anticoagulation Summary  As of 2/17/2021    INR goal:  2.5-3.5   TTR:  81.1 % (2.2 y)   INR used for dosing:  3.30 (2/17/2021)   Warfarin maintenance plan:  8 mg every Mon, Wed, Fri; 10 mg all other days   Weekly warfarin total:  64 mg   No change documented:  Yecenia Burns   Plan last modified:  Maria Del Rosario Cruz RPH (3/5/2020)   Next INR check:  3/3/2021   Priority:  High   Target end date:  Indefinite    Indications    History of mechanical aortic valve replacement [Z95.2]  Long term (current) use of anticoagulants [Z79.01]             Anticoagulation Episode Summary     INR check location:  Home Draw    Preferred lab:      Send INR reminders to:   [facility]  [facility]    Comments:  **[facility]**      Anticoagulation Care Providers     Provider Role Specialty Phone number    Lorne Kam MD Referring Cardiology [phone]          Clinic Interview:  No pertinent clinical findings have been reported.    INR History:  Anticoagulation Monitoring 1/20/2021 2/4/2021 2/17/2021   INR 2.80 2.70 3.30   INR Date 1/20/2021 2/3/2021 2/17/2021   INR Goal 2.5-3.5 2.5-3.5 2.5-3.5   Trend Same Same Same   Last Week Total 64 mg 64 mg 64 mg   Next Week Total 64 mg 64 mg 64 mg   Sun 10 mg 10 mg 10 mg   Mon 8 mg 8 mg 8 mg   Tue 10 mg 10 mg 10 mg   Wed 8 mg 8 mg 8 mg   Thu 10 mg 10 mg 10 mg   Fri 8 mg 8 mg 8 mg   Sat 10 mg 10 mg 10 mg   Visit Report - - -   Some recent data might be hidden       Plan:  1. INR is therapeutic today- see above in Anticoagulation Summary.    Man Ramsey to continue their warfarin regimen- see above in Anticoagulation Summary.  2. Follow up in 2 weeks  3. Pt has agreed to only be called if INR out of range. They have been instructed to call if any changes in medications, doses, concerns, etc. Patient expresses understanding and has no further questions at this time.    Yecenia Burns

## 2021-03-04 ENCOUNTER — ANTICOAGULATION VISIT (OUTPATIENT)
Dept: PHARMACY | Facility: HOSPITAL | Age: 41
End: 2021-03-04

## 2021-03-04 DIAGNOSIS — Z79.01 LONG TERM (CURRENT) USE OF ANTICOAGULANTS: ICD-10-CM

## 2021-03-04 DIAGNOSIS — Z95.2 HISTORY OF MECHANICAL AORTIC VALVE REPLACEMENT: ICD-10-CM

## 2021-03-04 LAB — INR PPP: 3.4

## 2021-03-04 NOTE — PROGRESS NOTES
Anticoagulation Clinic Progress Note    Anticoagulation Summary  As of 3/4/2021    INR goal:  2.5-3.5   TTR:  81.4 % (2.2 y)   INR used for dosing:  3.40 (3/3/2021)   Warfarin maintenance plan:  8 mg every Mon, Wed, Fri; 10 mg all other days   Weekly warfarin total:  64 mg   No change documented:  Almaz Faria   Plan last modified:  Maria Del Rosario Cruz [facility] (3/5/2020)   Next INR check:  3/18/2021   Priority:  High   Target end date:  Indefinite    Indications    History of mechanical aortic valve replacement [Z95.2]  Long term (current) use of anticoagulants [Z79.01]             Anticoagulation Episode Summary     INR check location:  Home Draw    Preferred lab:      Send INR reminders to:   [facility]  [facility]    Comments:  **[facility]**      Anticoagulation Care Providers     Provider Role Specialty Phone number    Lorne Kam MD Referring Cardiology [phone]          Clinic Interview:  No pertinent clinical findings have been reported.    INR History:  Anticoagulation Monitoring 2/4/2021 2/17/2021 3/4/2021   INR 2.70 3.30 3.40   INR Date 2/3/2021 2/17/2021 3/3/2021   INR Goal 2.5-3.5 2.5-3.5 2.5-3.5   Trend Same Same Same   Last Week Total 64 mg 64 mg 64 mg   Next Week Total 64 mg 64 mg 64 mg   Sun 10 mg 10 mg 10 mg   Mon 8 mg 8 mg 8 mg   Tue 10 mg 10 mg 10 mg   Wed 8 mg 8 mg 8 mg   Thu 10 mg 10 mg 10 mg   Fri 8 mg 8 mg 8 mg   Sat 10 mg 10 mg 10 mg   Visit Report - - -   Some recent data might be hidden       Plan:  1. INR is therapeutic today- see above in Anticoagulation Summary.    Man Ramsey to continue their warfarin regimen- see above in Anticoagulation Summary.  2. Follow up in 2 weeks  3. Pt has agreed to only be called if INR out of range. They have been instructed to call if any changes in medications, doses, concerns, etc. Patient expresses understanding and has no further questions at this time.    Almaz Faria

## 2021-03-17 ENCOUNTER — ANTICOAGULATION VISIT (OUTPATIENT)
Dept: PHARMACY | Facility: HOSPITAL | Age: 41
End: 2021-03-17

## 2021-03-17 DIAGNOSIS — Z95.2 HISTORY OF MECHANICAL AORTIC VALVE REPLACEMENT: ICD-10-CM

## 2021-03-17 DIAGNOSIS — Z79.01 LONG TERM (CURRENT) USE OF ANTICOAGULANTS: ICD-10-CM

## 2021-03-17 LAB — INR PPP: 3.4

## 2021-03-17 NOTE — PROGRESS NOTES
Anticoagulation Clinic Progress Note    Anticoagulation Summary  As of 3/17/2021    INR goal:  2.5-3.5   TTR:  81.7 % (2.3 y)   INR used for dosing:  3.40 (3/17/2021)   Warfarin maintenance plan:  8 mg every Mon, Wed, Fri; 10 mg all other days   Weekly warfarin total:  64 mg   No change documented:  Almaz Faria   Plan last modified:  Maria Del Rosario Cruz [facility] (3/5/2020)   Next INR check:  3/31/2021   Priority:  High   Target end date:  Indefinite    Indications    History of mechanical aortic valve replacement [Z95.2]  Long term (current) use of anticoagulants [Z79.01]             Anticoagulation Episode Summary     INR check location:  Home Draw    Preferred lab:      Send INR reminders to:   [facility]  POOL    Comments:  **[facility]**      Anticoagulation Care Providers     Provider Role Specialty Phone number    Lorne Kam MD Referring Cardiology [phone]          Clinic Interview:  No pertinent clinical findings have been reported.    INR History:  Anticoagulation Monitoring 2/17/2021 3/4/2021 3/17/2021   INR 3.30 3.40 3.40   INR Date 2/17/2021 3/3/2021 3/17/2021   INR Goal 2.5-3.5 2.5-3.5 2.5-3.5   Trend Same Same Same   Last Week Total 64 mg 64 mg 64 mg   Next Week Total 64 mg 64 mg 64 mg   Sun 10 mg 10 mg 10 mg   Mon 8 mg 8 mg 8 mg   Tue 10 mg 10 mg 10 mg   Wed 8 mg 8 mg 8 mg   Thu 10 mg 10 mg 10 mg   Fri 8 mg 8 mg 8 mg   Sat 10 mg 10 mg 10 mg   Visit Report - - -   Some recent data might be hidden       Plan:  1. INR is therapeutic today- see above in Anticoagulation Summary.    Man Ramsey to continue their warfarin regimen- see above in Anticoagulation Summary.  2. Follow up in 2 weeks  3. Pt has agreed to only be called if INR out of range. They have been instructed to call if any changes in medications, doses, concerns, etc. Patient expresses understanding and has no further questions at this time.    Almaz Faria

## 2021-03-25 ENCOUNTER — IMMUNIZATION (OUTPATIENT)
Dept: VACCINE CLINIC | Facility: HOSPITAL | Age: 41
End: 2021-03-25

## 2021-03-25 PROCEDURE — 91300 HC SARSCOV02 VAC 30MCG/0.3ML IM: CPT | Performed by: OBSTETRICS & GYNECOLOGY

## 2021-03-25 PROCEDURE — 0001A: CPT | Performed by: OBSTETRICS & GYNECOLOGY

## 2021-04-02 ENCOUNTER — ANTICOAGULATION VISIT (OUTPATIENT)
Dept: PHARMACY | Facility: HOSPITAL | Age: 41
End: 2021-04-02

## 2021-04-02 DIAGNOSIS — Z79.01 LONG TERM (CURRENT) USE OF ANTICOAGULANTS: ICD-10-CM

## 2021-04-02 DIAGNOSIS — Z95.2 HISTORY OF MECHANICAL AORTIC VALVE REPLACEMENT: ICD-10-CM

## 2021-04-02 LAB — INR PPP: 3.4

## 2021-04-02 NOTE — PROGRESS NOTES
Anticoagulation Clinic Progress Note    Anticoagulation Summary  As of 4/2/2021    INR goal:  2.5-3.5   TTR:  82.1 % (2.3 y)   INR used for dosing:  3.40 (4/2/2021)   Warfarin maintenance plan:  8 mg every Mon, Wed, Fri; 10 mg all other days   Weekly warfarin total:  64 mg   No change documented:  Almaz Faria   Plan last modified:  Maria Del Rosario Cruz [facility] (3/5/2020)   Next INR check:  4/16/2021   Priority:  High   Target end date:  Indefinite    Indications    History of mechanical aortic valve replacement [Z95.2]  Long term (current) use of anticoagulants [Z79.01]             Anticoagulation Episode Summary     INR check location:  Home Draw    Preferred lab:      Send INR reminders to:   [facility]  POOL    Comments:  **[facility]**      Anticoagulation Care Providers     Provider Role Specialty Phone number    Lorne Kam MD Referring Cardiology [phone]          Clinic Interview:  No pertinent clinical findings have been reported.    INR History:  Anticoagulation Monitoring 3/4/2021 3/17/2021 4/2/2021   INR 3.40 3.40 3.40   INR Date 3/3/2021 3/17/2021 4/2/2021   INR Goal 2.5-3.5 2.5-3.5 2.5-3.5   Trend Same Same Same   Last Week Total 64 mg 64 mg 64 mg   Next Week Total 64 mg 64 mg 64 mg   Sun 10 mg 10 mg 10 mg   Mon 8 mg 8 mg 8 mg   Tue 10 mg 10 mg 10 mg   Wed 8 mg 8 mg 8 mg   Thu 10 mg 10 mg 10 mg   Fri 8 mg 8 mg 8 mg   Sat 10 mg 10 mg 10 mg   Visit Report - - -   Some recent data might be hidden       Plan:  1. INR is therapeutic today- see above in Anticoagulation Summary.    Man aRmsey to continue their warfarin regimen- see above in Anticoagulation Summary.  2. Follow up in 2 weeks  3. Pt has agreed to only be called if INR out of range. They have been instructed to call if any changes in medications, doses, concerns, etc. Patient expresses understanding and has no further questions at this time.    Almaz Faria

## 2021-04-05 RX ORDER — WARFARIN SODIUM 2 MG/1
TABLET ORAL
Qty: 412 TABLET | Refills: 0 | Status: SHIPPED | OUTPATIENT
Start: 2021-04-05 | End: 2021-07-12

## 2021-04-14 ENCOUNTER — ANTICOAGULATION VISIT (OUTPATIENT)
Dept: PHARMACY | Facility: HOSPITAL | Age: 41
End: 2021-04-14

## 2021-04-14 DIAGNOSIS — Z95.2 HISTORY OF MECHANICAL AORTIC VALVE REPLACEMENT: Primary | ICD-10-CM

## 2021-04-14 DIAGNOSIS — Z79.01 LONG TERM (CURRENT) USE OF ANTICOAGULANTS: ICD-10-CM

## 2021-04-14 LAB — INR PPP: 3.5

## 2021-04-14 NOTE — PROGRESS NOTES
Anticoagulation Clinic Progress Note    Anticoagulation Summary  As of 4/14/2021    INR goal:  2.5-3.5   TTR:  82.3 % (2.3 y)   INR used for dosing:  3.50 (4/14/2021)   Warfarin maintenance plan:  8 mg every Mon, Wed, Fri; 10 mg all other days   Weekly warfarin total:  64 mg   Plan last modified:  Maria Del Rosario Cruz RPH (3/5/2020)   Next INR check:  4/28/2021   Priority:  High   Target end date:  Indefinite    Indications    History of mechanical aortic valve replacement [Z95.2]  Long term (current) use of anticoagulants [Z79.01]             Anticoagulation Episode Summary     INR check location:  Home Draw    Preferred lab:      Send INR reminders to:  [facility]  POOL    Comments:  **[facility]**      Anticoagulation Care Providers     Provider Role Specialty Phone number    Lorne Kam MD Referring Cardiology [phone]          Clinic Interview:  No pertinent clinical findings have been reported.    INR History:  Anticoagulation Monitoring 3/17/2021 4/2/2021 4/14/2021   INR 3.40 3.40 3.50   INR Date 3/17/2021 4/2/2021 4/14/2021   INR Goal 2.5-3.5 2.5-3.5 2.5-3.5   Trend Same Same Same   Last Week Total 64 mg 64 mg 64 mg   Next Week Total 64 mg 64 mg 64 mg   Sun 10 mg 10 mg 10 mg   Mon 8 mg 8 mg 8 mg   Tue 10 mg 10 mg 10 mg   Wed 8 mg 8 mg 8 mg   Thu 10 mg 10 mg 10 mg   Fri 8 mg 8 mg 8 mg   Sat 10 mg 10 mg 10 mg   Visit Report - - -   Some recent data might be hidden       Plan:  1. INR is therapeutic today- see above in Anticoagulation Summary.    Man Ramsey to continue their warfarin regimen- see above in Anticoagulation Summary.  2. Follow up in 2 weeks  3. Pt has agreed to only be called if INR out of range. They have been instructed to call if any changes in medications, doses, concerns, etc. Patient expresses understanding and has no further questions at this time.    Sun Curtis

## 2021-04-15 ENCOUNTER — IMMUNIZATION (OUTPATIENT)
Dept: VACCINE CLINIC | Facility: HOSPITAL | Age: 41
End: 2021-04-15

## 2021-04-15 PROCEDURE — 0002A: CPT | Performed by: OBSTETRICS & GYNECOLOGY

## 2021-04-15 PROCEDURE — 91300 HC SARSCOV02 VAC 30MCG/0.3ML IM: CPT | Performed by: OBSTETRICS & GYNECOLOGY

## 2021-04-28 ENCOUNTER — ANTICOAGULATION VISIT (OUTPATIENT)
Dept: PHARMACY | Facility: HOSPITAL | Age: 41
End: 2021-04-28

## 2021-04-28 DIAGNOSIS — Z95.2 HISTORY OF MECHANICAL AORTIC VALVE REPLACEMENT: Primary | ICD-10-CM

## 2021-04-28 DIAGNOSIS — Z79.01 LONG TERM (CURRENT) USE OF ANTICOAGULANTS: ICD-10-CM

## 2021-04-28 LAB — INR PPP: 3.5

## 2021-04-28 NOTE — PROGRESS NOTES
Anticoagulation Clinic Progress Note    Anticoagulation Summary  As of 4/28/2021    INR goal:  2.5-3.5   TTR:  82.6 % (2.4 y)   INR used for dosing:  3.5 (4/28/2021)   Warfarin maintenance plan:  8 mg every Mon, Wed, Fri; 10 mg all other days   Weekly warfarin total:  64 mg   No change documented:  Almaz Faria   Plan last modified:  Maria Del Rosario Cruz [facility] (3/5/2020)   Next INR check:  5/12/2021   Priority:  High   Target end date:  Indefinite    Indications    History of mechanical aortic valve replacement [Z95.2]  Long term (current) use of anticoagulants [Z79.01]             Anticoagulation Episode Summary     INR check location:  Home Draw    Preferred lab:      Send INR reminders to:   [facility]SE [facility]  POOL    Comments:  **[facility]**      Anticoagulation Care Providers     Provider Role Specialty Phone number    Lorne Kam MD Referring Cardiology [phone]          Clinic Interview:  No pertinent clinical findings have been reported.    INR History:  Anticoagulation Monitoring 4/2/2021 4/14/2021 4/28/2021   INR 3.40 3.50 3.5   INR Date 4/2/2021 4/14/2021 4/28/2021   INR Goal 2.5-3.5 2.5-3.5 2.5-3.5   Trend Same Same Same   Last Week Total 64 mg 64 mg 64 mg   Next Week Total 64 mg 64 mg 64 mg   Sun 10 mg 10 mg 10 mg   Mon 8 mg 8 mg 8 mg   Tue 10 mg 10 mg 10 mg   Wed 8 mg 8 mg 8 mg   Thu 10 mg 10 mg 10 mg   Fri 8 mg 8 mg 8 mg   Sat 10 mg 10 mg 10 mg   Visit Report - - -   Some recent data might be hidden       Plan:  1. INR is therapeutic today- see above in Anticoagulation Summary.    Man Ramsey to continue their warfarin regimen- see above in Anticoagulation Summary.  2. Follow up in 2 weeks  3. Pt has agreed to only be called if INR out of range. They have been instructed to call if any changes in medications, doses, concerns, etc. Patient expresses understanding and has no further questions at this time.    Almaz Faria

## 2021-05-14 ENCOUNTER — ANTICOAGULATION VISIT (OUTPATIENT)
Dept: PHARMACY | Facility: HOSPITAL | Age: 41
End: 2021-05-14

## 2021-05-14 DIAGNOSIS — Z95.2 HISTORY OF MECHANICAL AORTIC VALVE REPLACEMENT: Primary | ICD-10-CM

## 2021-05-14 DIAGNOSIS — Z79.01 LONG TERM (CURRENT) USE OF ANTICOAGULANTS: ICD-10-CM

## 2021-05-14 LAB — INR PPP: 4.1

## 2021-05-14 NOTE — PROGRESS NOTES
Anticoagulation Clinic Progress Note  Anticoagulation Summary  As of 2021    INR goal:  2.5-3.5   TTR:  81.1 % (2.4 y)   INR used for dosin.10 (2021)   Warfarin maintenance plan:  8 mg every Mon, Wed, Fri; 10 mg all other days   Weekly warfarin total:  64 mg   Plan last modified:  Maria Del Rosario Cruz RPH (3/5/2020)   Next INR check:  2021   Priority:  High   Target end date:  Indefinite    Indications    History of mechanical aortic valve replacement [Z95.2]  Long term (current) use of anticoagulants [Z79.01]             Anticoagulation Episode Summary     INR check location:  Home Draw    Preferred lab:      Send INR reminders to:  [facility]  POOL    Comments:  **[facility]**      Anticoagulation Care Providers     Provider Role Specialty Phone number    Lorne Kam MD Referring Cardiology [phone]        Clinic Interview:  Patient Findings     Negatives:  Signs/symptoms of thrombosis, Signs/symptoms of bleeding,   Laboratory test error suspected, Change in health, Change in alcohol use,   Change in activity, Upcoming invasive procedure, Emergency department   visit, Upcoming dental procedure, Missed doses, Extra doses, Change in   medications, Change in diet/appetite, Hospital admission, Bruising, Other   complaints    Comments:  Denies any bleeding, similar amount of EtOH, no diet changes,   additional doses      Clinical Outcomes     Negatives:  Major bleeding event, Thromboembolic event,   Anticoagulation-related hospital admission, Anticoagulation-related ED   visit, Anticoagulation-related fatality    Comments:  Denies any bleeding, similar amount of EtOH, no diet changes,   additional doses      INR History:  Anticoagulation Monitoring 2021   INR 3.50 3.5 4.10   INR Date 2021   INR Goal 2.5-3.5 2.5-3.5 2.5-3.5   Trend Same Same Same   Last Week Total 64 mg 64 mg 64 mg   Next Week Total 64 mg 64 mg 60 mg   Sun 10  mg 10 mg 10 mg   Mon 8 mg 8 mg 8 mg   Tue 10 mg 10 mg 10 mg   Wed 8 mg 8 mg 8 mg   Thu 10 mg 10 mg 10 mg   Fri 8 mg 8 mg 4 mg (5/14)   Sat 10 mg 10 mg 10 mg   Visit Report - - -   Some recent data might be hidden     Plan:  1. INR is Supratherapeutic today- see above in Anticoagulation Summary.   Will instruct Man PAT Roxy to Change their warfarin regimen today- see above in Anticoagulation Summary.  Plan for temporary reduction to 4mg today vs usual 8mg dose.  Patient agreed to contact MD immediately for any bleeding, falls, or bumps to the head  2. Follow up in 1 week  3. They have been instructed to call if any changes in medications, doses, concerns, etc. Patient expresses understanding and has no further questions at this time.    Fernando Smith, PharmD

## 2021-05-21 ENCOUNTER — ANTICOAGULATION VISIT (OUTPATIENT)
Dept: PHARMACY | Facility: HOSPITAL | Age: 41
End: 2021-05-21

## 2021-05-21 DIAGNOSIS — Z79.01 LONG TERM (CURRENT) USE OF ANTICOAGULANTS: ICD-10-CM

## 2021-05-21 DIAGNOSIS — Z95.2 HISTORY OF MECHANICAL AORTIC VALVE REPLACEMENT: Primary | ICD-10-CM

## 2021-05-21 LAB — INR PPP: 3.9

## 2021-05-21 NOTE — PROGRESS NOTES
Anticoagulation Clinic Progress Note    Anticoagulation Summary  As of 5/21/2021    INR goal:  2.5-3.5   TTR:  80.5 % (2.4 y)   INR used for dosing:  3.90 (5/21/2021)   Warfarin maintenance plan:  10 mg every Mon, Fri; 8 mg all other days   Weekly warfarin total:  60 mg   Plan last modified:  Forest Fisher RPH (5/21/2021)   Next INR check:  5/28/2021   Priority:  High   Target end date:  Indefinite    Indications    History of mechanical aortic valve replacement [Z95.2]  Long term (current) use of anticoagulants [Z79.01]             Anticoagulation Episode Summary     INR check location:  Home Draw    Preferred lab:      Send INR reminders to:  [facility]  POOL    Comments:  **[facility]**      Anticoagulation Care Providers     Provider Role Specialty Phone number    Lorne Kam MD Referring Cardiology [phone]          Clinic Interview:      INR History:  Anticoagulation Monitoring 4/28/2021 5/14/2021 5/21/2021   INR 3.5 4.10 3.90   INR Date 4/28/2021 5/14/2021 5/21/2021   INR Goal 2.5-3.5 2.5-3.5 2.5-3.5   Trend Same Same Down   Last Week Total 64 mg 64 mg 62 mg   Next Week Total 64 mg 60 mg 54 mg   Sun 10 mg 10 mg 8 mg   Mon 8 mg 8 mg 10 mg   Tue 10 mg 10 mg 8 mg   Wed 8 mg 8 mg 8 mg   Thu 10 mg 10 mg 8 mg   Fri 8 mg 4 mg (5/14) 4 mg (5/21)   Sat 10 mg 10 mg 8 mg   Visit Report - - -   Some recent data might be hidden       Plan:  1. INR is Supratherapeutic today- see above in Anticoagulation Summary.   Will instruct Man Ramsey to Change their warfarin regimen- see above in Anticoagulation Summary.  2. Follow up in 1 week  3. Pt has agreed to only be called if INR out of range. They have been instructed to call if any changes in medications, doses, concerns, etc. Patient expresses understanding and has no further questions at this time.    Forest Fisher RPH

## 2021-05-28 ENCOUNTER — ANTICOAGULATION VISIT (OUTPATIENT)
Dept: PHARMACY | Facility: HOSPITAL | Age: 41
End: 2021-05-28

## 2021-05-28 DIAGNOSIS — Z95.2 HISTORY OF MECHANICAL AORTIC VALVE REPLACEMENT: Primary | ICD-10-CM

## 2021-05-28 DIAGNOSIS — Z79.01 LONG TERM (CURRENT) USE OF ANTICOAGULANTS: ICD-10-CM

## 2021-05-28 LAB — INR PPP: 3.3

## 2021-05-28 NOTE — PROGRESS NOTES
Anticoagulation Clinic Progress Note    Anticoagulation Summary  As of 5/28/2021    INR goal:  2.5-3.5   TTR:  80.1 % (2.4 y)   INR used for dosing:  3.30 (5/28/2021)   Warfarin maintenance plan:  10 mg every Mon, Fri; 8 mg all other days   Weekly warfarin total:  60 mg   No change documented:  Mah[place]any Woods, Murali   Plan last modified:  Forest Fisher RPH (5/21/2021)   Next INR check:  6/4/2021   Priority:  High   Target end date:  Indefinite    Indications    History of mechanical aortic valve replacement [Z95.2]  Long term (current) use of anticoagulants [Z79.01]             Anticoagulation Episode Summary     INR check location:  Home Draw    Preferred lab:      Send INR reminders to:  LISA [facility]CH[facility]  POOL    Comments:  **[facility]**      Anticoagulation Care Providers     Provider Role Specialty Phone number    Lorne Kam MD Referring Cardiology [phone]          Clinic Interview:  Patient Findings     Negatives:  Signs/symptoms of thrombosis, Signs/symptoms of bleeding,   Laboratory test error suspected, Change in health, Change in alcohol use,   Change in activity, Upcoming invasive procedure, Emergency department   visit, Upcoming dental procedure, Missed doses, Extra doses, Change in   medications, Change in diet/appetite, Hospital admission, Bruising, Other   complaints      Clinical Outcomes     Negatives:  Major bleeding event, Thromboembolic event,   Anticoagulation-related hospital admission, Anticoagulation-related ED   visit, Anticoagulation-related fatality        INR History:  Anticoagulation Monitoring 5/14/2021 5/21/2021 5/28/2021   INR 4.10 3.90 3.30   INR Date 5/14/2021 5/21/2021 5/28/2021   INR Goal 2.5-3.5 2.5-3.5 2.5-3.5   Trend Same Down Same   Last Week Total 64 mg 62 mg 54 mg   Next Week Total 60 mg 54 mg 60 mg   Sun 10 mg 8 mg 8 mg   Mon 8 mg 10 mg 10 mg   Tue 10 mg 8 mg 8 mg   Wed 8 mg 8 mg 8 mg   Thu 10 mg 8 mg 8 mg   Fri 4 mg (5/14) 4 mg (5/21) 10 mg    Sat 10 mg 8 mg 8 mg   Visit Report - - -   Some recent data might be hidden       Plan:  1. INR is Therapeutic today- see above in Anticoagulation Summary.   Will instruct Man Ramsey to Continue their warfarin regimen- see above in Anticoagulation Summary.  2. Follow up in 1 week  3. Pt has agreed to only be called if INR out of range. - spoke with patient today. They have been instructed to call if any changes in medications, doses, concerns, etc. Patient expresses understanding and has no further questions at this time.    Mahogany Woods, ConnerD

## 2021-06-04 ENCOUNTER — ANTICOAGULATION VISIT (OUTPATIENT)
Dept: PHARMACY | Facility: HOSPITAL | Age: 41
End: 2021-06-04

## 2021-06-04 DIAGNOSIS — Z95.2 HISTORY OF MECHANICAL AORTIC VALVE REPLACEMENT: Primary | ICD-10-CM

## 2021-06-04 DIAGNOSIS — Z79.01 LONG TERM (CURRENT) USE OF ANTICOAGULANTS: ICD-10-CM

## 2021-06-04 LAB — INR PPP: 3

## 2021-06-04 NOTE — PROGRESS NOTES
Anticoagulation Clinic Progress Note    Anticoagulation Summary  As of 6/4/2021    INR goal:  2.5-3.5   TTR:  80.2 % (2.5 y)   INR used for dosing:  3.00 (6/4/2021)   Warfarin maintenance plan:  10 mg every Mon, Fri; 8 mg all other days   Weekly warfarin total:  60 mg   No change documented:  Yecenia Bursn   Plan last modified:  Forest Fisher [facility] (5/21/2021)   Next INR check:  6/18/2021   Priority:  High   Target end date:  Indefinite    Indications    History of mechanical aortic valve replacement [Z95.2]  Long term (current) use of anticoagulants [Z79.01]             Anticoagulation Episode Summary     INR check location:  Home Draw    Preferred lab:      Send INR reminders to:   [facility]SE [facility]CH[facility]  POOL    Comments:  **[facility]**      Anticoagulation Care Providers     Provider Role Specialty Phone number    Lorne Kam MD Referring Cardiology [phone]          Clinic Interview:  No pertinent clinical findings have been reported.    INR History:  Anticoagulation Monitoring 5/21/2021 5/28/2021 6/4/2021   INR 3.90 3.30 3.00   INR Date 5/21/2021 5/28/2021 6/4/2021   INR Goal 2.5-3.5 2.5-3.5 2.5-3.5   Trend Down Same Same   Last Week Total 62 mg 54 mg 60 mg   Next Week Total 54 mg 60 mg 60 mg   Sun 8 mg 8 mg 8 mg   Mon 10 mg 10 mg 10 mg   Tue 8 mg 8 mg 8 mg   Wed 8 mg 8 mg 8 mg   Thu 8 mg 8 mg 8 mg   Fri 4 mg (5/21) 10 mg 10 mg   Sat 8 mg 8 mg 8 mg   Visit Report - - -   Some recent data might be hidden       Plan:  1. INR is therapeutic today- see above in Anticoagulation Summary.    Man Ramsey to continue their warfarin regimen- see above in Anticoagulation Summary.  2. Follow up in 2 weeks  3. Pt has agreed to only be called if INR out of range. They have been instructed to call if any changes in medications, doses, concerns, etc. Patient expresses understanding and has no further questions at this time.    Yecenia Burns

## 2021-06-18 ENCOUNTER — ANTICOAGULATION VISIT (OUTPATIENT)
Dept: PHARMACY | Facility: HOSPITAL | Age: 41
End: 2021-06-18

## 2021-06-18 DIAGNOSIS — Z95.2 HISTORY OF MECHANICAL AORTIC VALVE REPLACEMENT: Primary | ICD-10-CM

## 2021-06-18 DIAGNOSIS — Z79.01 LONG TERM (CURRENT) USE OF ANTICOAGULANTS: ICD-10-CM

## 2021-06-18 LAB — INR PPP: 3.3

## 2021-06-18 NOTE — PROGRESS NOTES
Anticoagulation Clinic Progress Note    Anticoagulation Summary  As of 6/18/2021    INR goal:  2.5-3.5   TTR:  80.5 % (2.5 y)   INR used for dosing:  3.30 (6/18/2021)   Warfarin maintenance plan:  10 mg every Mon, Fri; 8 mg all other days   Weekly warfarin total:  60 mg   No change documented:  [place], Murali   Plan last modified:  Forest Fisher RPH (5/21/2021)   Next INR check:  7/2/2021   Priority:  High   Target end date:  Indefinite    Indications    History of mechanical aortic valve replacement [Z95.2]  Long term (current) use of anticoagulants [Z79.01]             Anticoagulation Episode Summary     INR check location:  Home Draw    Preferred lab:      Send INR reminders to:  LISA LECHUGA  POOL    Comments:  **[facility]**      Anticoagulation Care Providers     Provider Role Specialty Phone number    Lorne Kam MD Referring Cardiology [phone]          Clinic Interview:  Patient Findings     Negatives:  Signs/symptoms of thrombosis, Signs/symptoms of bleeding,   Laboratory test error suspected, Change in health, Change in alcohol use,   Change in activity, Upcoming invasive procedure, Emergency department   visit, Upcoming dental procedure, Missed doses, Extra doses, Change in   medications, Change in diet/appetite, Hospital admission, Bruising, Other   complaints      Clinical Outcomes     Negatives:  Major bleeding event, Thromboembolic event,   Anticoagulation-related hospital admission, Anticoagulation-related ED   visit, Anticoagulation-related fatality        INR History:  Anticoagulation Monitoring 5/28/2021 6/4/2021 6/18/2021   INR 3.30 3.00 3.30   INR Date 5/28/2021 6/4/2021 6/18/2021   INR Goal 2.5-3.5 2.5-3.5 2.5-3.5   Trend Same Same Same   Last Week Total 54 mg 60 mg 60 mg   Next Week Total 60 mg 60 mg 60 mg   Sun 8 mg 8 mg 8 mg   Mon 10 mg 10 mg 10 mg   Tue 8 mg 8 mg 8 mg   Wed 8 mg 8 mg 8 mg   Thu 8 mg 8 mg 8 mg   Fri 10 mg 10 mg 10 mg   Sat 8 mg 8 mg  8 mg   Visit Report - - -   Some recent data might be hidden       Plan:  1. INR is Therapeutic today- see above in Anticoagulation Summary.   Will instruct Man Ramsey to Continue their warfarin regimen- see above in Anticoagulation Summary.  2. Follow up in 2 weeks  3. They have been instructed to call if any changes in medications, doses, concerns, etc. Patient expresses understanding and has no further questions at this time.    Mahogany Woods, ConnerD

## 2021-07-02 ENCOUNTER — ANTICOAGULATION VISIT (OUTPATIENT)
Dept: PHARMACY | Facility: HOSPITAL | Age: 41
End: 2021-07-02

## 2021-07-02 DIAGNOSIS — Z79.01 LONG TERM (CURRENT) USE OF ANTICOAGULANTS: ICD-10-CM

## 2021-07-02 DIAGNOSIS — Z95.2 HISTORY OF MECHANICAL AORTIC VALVE REPLACEMENT: Primary | ICD-10-CM

## 2021-07-02 LAB — INR PPP: 2.7

## 2021-07-02 NOTE — PROGRESS NOTES
Anticoagulation Clinic Progress Note    Anticoagulation Summary  As of 2021    INR goal:  2.5-3.5   TTR:  80.8 % (2.5 y)   INR used for dosin.70 (2021)   Warfarin maintenance plan:  10 mg every Mon, Fri; 8 mg all other days   Weekly warfarin total:  60 mg   No change documented:  Almaz Faria   Plan last modified:  Forest Fisher [facility] (2021)   Next INR check:  2021   Priority:  High   Target end date:  Indefinite    Indications    History of mechanical aortic valve replacement [Z95.2]  Long term (current) use of anticoagulants [Z79.01]             Anticoagulation Episode Summary     INR check location:  Home Draw    Preferred lab:      Send INR reminders to:   JOSE LECHUGA  POOL    Comments:  **[facility]**      Anticoagulation Care Providers     Provider Role Specialty Phone number    Lorne Kam MD Referring Cardiology [phone]          Clinic Interview:  No pertinent clinical findings have been reported.    INR History:  Anticoagulation Monitoring 2021   INR 3.00 3.30 2.70   INR Date 2021   INR Goal 2.5-3.5 2.5-3.5 2.5-3.5   Trend Same Same Same   Last Week Total 60 mg 60 mg 60 mg   Next Week Total 60 mg 60 mg 60 mg   Sun 8 mg 8 mg 8 mg   Mon 10 mg 10 mg 10 mg   Tue 8 mg 8 mg 8 mg   Wed 8 mg 8 mg 8 mg   Thu 8 mg 8 mg 8 mg   Fri 10 mg 10 mg 10 mg   Sat 8 mg 8 mg 8 mg   Visit Report - - -   Some recent data might be hidden       Plan:  1. INR is therapeutic today- see above in Anticoagulation Summary.    Man Ramsey to continue their warfarin regimen- see above in Anticoagulation Summary.  2. Follow up in 2 weeks  3. Pt has agreed to only be called if INR out of range. They have been instructed to call if any changes in medications, doses, concerns, etc. Patient expresses understanding and has no further questions at this time.    Almaz Faria

## 2021-07-12 RX ORDER — WARFARIN SODIUM 2 MG/1
TABLET ORAL
Qty: 400 TABLET | Refills: 1 | Status: SHIPPED | OUTPATIENT
Start: 2021-07-12 | End: 2022-01-04

## 2021-07-16 ENCOUNTER — ANTICOAGULATION VISIT (OUTPATIENT)
Dept: PHARMACY | Facility: HOSPITAL | Age: 41
End: 2021-07-16

## 2021-07-16 DIAGNOSIS — Z95.2 HISTORY OF MECHANICAL AORTIC VALVE REPLACEMENT: Primary | ICD-10-CM

## 2021-07-16 DIAGNOSIS — Z79.01 LONG TERM (CURRENT) USE OF ANTICOAGULANTS: ICD-10-CM

## 2021-07-16 LAB — INR PPP: 2.9

## 2021-07-16 NOTE — PROGRESS NOTES
Anticoagulation Clinic Progress Note    Anticoagulation Summary  As of 2021    INR goal:  2.5-3.5   TTR:  81.1 % (2.6 y)   INR used for dosin.90 (2021)   Warfarin maintenance plan:  10 mg every Mon, Fri; 8 mg all other days   Weekly warfarin total:  60 mg   No change documented:  Yecenia Burns   Plan last modified:  Forest Fisher [facility] (2021)   Next INR check:  2021   Priority:  High   Target end date:  Indefinite    Indications    History of mechanical aortic valve replacement [Z95.2]  Long term (current) use of anticoagulants [Z79.01]             Anticoagulation Episode Summary     INR check location:  Home Draw    Preferred lab:      Send INR reminders to:   JOSE SERRANO  POOL    Comments:  **[facility]**      Anticoagulation Care Providers     Provider Role Specialty Phone number    Lorne Kam MD Referring Cardiology [phone]          Clinic Interview:  No pertinent clinical findings have been reported.    INR History:  Anticoagulation Monitoring 2021   INR 3.30 2.70 2.90   INR Date 2021   INR Goal 2.5-3.5 2.5-3.5 2.5-3.5   Trend Same Same Same   Last Week Total 60 mg 60 mg 60 mg   Next Week Total 60 mg 60 mg 60 mg   Sun 8 mg 8 mg 8 mg   Mon 10 mg 10 mg 10 mg   Tue 8 mg 8 mg 8 mg   Wed 8 mg 8 mg 8 mg   Thu 8 mg 8 mg 8 mg   Fri 10 mg 10 mg 10 mg   Sat 8 mg 8 mg 8 mg   Visit Report - - -   Some recent data might be hidden       Plan:  1. INR is therapeutic today- see above in Anticoagulation Summary.    Man Ramsey to continue their warfarin regimen- see above in Anticoagulation Summary.  2. Follow up in 2 weeks  3. Pt has agreed to only be called if INR out of range. They have been instructed to call if any changes in medications, doses, concerns, etc. Patient expresses understanding and has no further questions at this time.    Yecenia Burns

## 2021-07-30 ENCOUNTER — ANTICOAGULATION VISIT (OUTPATIENT)
Dept: PHARMACY | Facility: HOSPITAL | Age: 41
End: 2021-07-30

## 2021-07-30 DIAGNOSIS — Z79.01 LONG TERM (CURRENT) USE OF ANTICOAGULANTS: ICD-10-CM

## 2021-07-30 DIAGNOSIS — Z95.2 HISTORY OF MECHANICAL AORTIC VALVE REPLACEMENT: Primary | ICD-10-CM

## 2021-07-30 LAB — INR PPP: 2.8

## 2021-07-30 NOTE — PROGRESS NOTES
Anticoagulation Clinic Progress Note    Anticoagulation Summary  As of 2021    INR goal:  2.5-3.5   TTR:  81.4 % (2.6 y)   INR used for dosin.80 (2021)   Warfarin maintenance plan:  10 mg every Mon, Fri; 8 mg all other days   Weekly warfarin total:  60 mg   No change documented:  Sun Curtis   Plan last modified:  Forest Fisher RP (2021)   Next INR check:  2021   Priority:  High   Target end date:  Indefinite    Indications    History of mechanical aortic valve replacement [Z95.2]  Long term (current) use of anticoagulants [Z79.01]             Anticoagulation Episode Summary     INR check location:  Home Draw    Preferred lab:      Send INR reminders to:  LISA LECHUGA  POOL    Comments:  **[facility]**      Anticoagulation Care Providers     Provider Role Specialty Phone number    Lorne Kam MD Referring Cardiology [phone]          Clinic Interview:  No pertinent clinical findings have been reported.    INR History:  Anticoagulation Monitoring 2021   INR 2.70 2.90 2.80   INR Date 2021   INR Goal 2.5-3.5 2.5-3.5 2.5-3.5   Trend Same Same Same   Last Week Total 60 mg 60 mg 60 mg   Next Week Total 60 mg 60 mg 60 mg   Sun 8 mg 8 mg 8 mg   Mon 10 mg 10 mg 10 mg   Tue 8 mg 8 mg 8 mg   Wed 8 mg 8 mg 8 mg   Thu 8 mg 8 mg 8 mg   Fri 10 mg 10 mg 10 mg   Sat 8 mg 8 mg 8 mg   Visit Report - - -   Some recent data might be hidden       Plan:  1. INR is therapeutic today- see above in Anticoagulation Summary.    Man Ramsey to continue their warfarin regimen- see above in Anticoagulation Summary.  2. Follow up in 2 weeks  3. Pt has agreed to only be called if INR out of range. They have been instructed to call if any changes in medications, doses, concerns, etc. Patient expresses understanding and has no further questions at this time.    Sun Curtis

## 2021-08-13 ENCOUNTER — ANTICOAGULATION VISIT (OUTPATIENT)
Dept: PHARMACY | Facility: HOSPITAL | Age: 41
End: 2021-08-13

## 2021-08-13 DIAGNOSIS — Z79.01 LONG TERM (CURRENT) USE OF ANTICOAGULANTS: ICD-10-CM

## 2021-08-13 DIAGNOSIS — Z95.2 HISTORY OF MECHANICAL AORTIC VALVE REPLACEMENT: Primary | ICD-10-CM

## 2021-08-13 LAB — INR PPP: 2.9

## 2021-08-13 NOTE — PROGRESS NOTES
Anticoagulation Clinic Progress Note    Anticoagulation Summary  As of 2021    INR goal:  2.5-3.5   TTR:  81.7 % (2.7 y)   INR used for dosin.90 (2021)   Warfarin maintenance plan:  10 mg every Mon, Fri; 8 mg all other days   Weekly warfarin total:  60 mg   No change documented:  Yecenia Burns   Plan last modified:  Forest Fisher [facility] (2021)   Next INR check:  2021   Priority:  High   Target end date:  Indefinite    Indications    History of mechanical aortic valve replacement [Z95.2]  Long term (current) use of anticoagulants [Z79.01]             Anticoagulation Episode Summary     INR check location:  Home Draw    Preferred lab:      Send INR reminders to:   JOSE SERRANO  POOL    Comments:  **[facility]**      Anticoagulation Care Providers     Provider Role Specialty Phone number    Lorne Kam MD Referring Cardiology [phone]          Clinic Interview:  No pertinent clinical findings have been reported.    INR History:  Anticoagulation Monitoring 2021   INR 2.90 2.80 2.90   INR Date 2021   INR Goal 2.5-3.5 2.5-3.5 2.5-3.5   Trend Same Same Same   Last Week Total 60 mg 60 mg 60 mg   Next Week Total 60 mg 60 mg 60 mg   Sun 8 mg 8 mg 8 mg   Mon 10 mg 10 mg 10 mg   Tue 8 mg 8 mg 8 mg   Wed 8 mg 8 mg 8 mg   Thu 8 mg 8 mg 8 mg   Fri 10 mg 10 mg 10 mg   Sat 8 mg 8 mg 8 mg   Visit Report - - -   Some recent data might be hidden       Plan:  1. INR is therapeutic today- see above in Anticoagulation Summary.    Man Ramsey to continue their warfarin regimen- see above in Anticoagulation Summary.  2. Follow up in 2 weeks  3. Pt has agreed to only be called if INR out of range. They have been instructed to call if any changes in medications, doses, concerns, etc. Patient expresses understanding and has no further questions at this time.    Yecenia Burns

## 2021-08-20 RX ORDER — CARVEDILOL 12.5 MG/1
TABLET ORAL
Qty: 60 TABLET | Refills: 0 | Status: SHIPPED | OUTPATIENT
Start: 2021-08-20 | End: 2021-08-31 | Stop reason: SDUPTHER

## 2021-08-27 ENCOUNTER — ANTICOAGULATION VISIT (OUTPATIENT)
Dept: PHARMACY | Facility: HOSPITAL | Age: 41
End: 2021-08-27

## 2021-08-27 DIAGNOSIS — Z79.01 LONG TERM (CURRENT) USE OF ANTICOAGULANTS: ICD-10-CM

## 2021-08-27 DIAGNOSIS — Z95.2 HISTORY OF MECHANICAL AORTIC VALVE REPLACEMENT: Primary | ICD-10-CM

## 2021-08-27 LAB — INR PPP: 3.3

## 2021-08-27 NOTE — PROGRESS NOTES
Anticoagulation Clinic Progress Note    Anticoagulation Summary  As of 8/27/2021    INR goal:  2.5-3.5   TTR:  81.9 % (2.7 y)   INR used for dosing:  3.30 (8/27/2021)   Warfarin maintenance plan:  10 mg every Mon, Fri; 8 mg all other days   Weekly warfarin total:  60 mg   No change documented:  Almaz Faria   Plan last modified:  Forest Fisher [facility] (5/21/2021)   Next INR check:  9/10/2021   Priority:  High   Target end date:  Indefinite    Indications    History of mechanical aortic valve replacement [Z95.2]  Long term (current) use of anticoagulants [Z79.01]             Anticoagulation Episode Summary     INR check location:  Home Draw    Preferred lab:      Send INR reminders to:   JOSE LECHUGA  POOL    Comments:  **[facility]**      Anticoagulation Care Providers     Provider Role Specialty Phone number    Lorne Kam MD Referring Cardiology [phone]          Clinic Interview:  No pertinent clinical findings have been reported.    INR History:  Anticoagulation Monitoring 7/30/2021 8/13/2021 8/27/2021   INR 2.80 2.90 3.30   INR Date 7/30/2021 8/13/2021 8/27/2021   INR Goal 2.5-3.5 2.5-3.5 2.5-3.5   Trend Same Same Same   Last Week Total 60 mg 60 mg 60 mg   Next Week Total 60 mg 60 mg 60 mg   Sun 8 mg 8 mg 8 mg   Mon 10 mg 10 mg 10 mg   Tue 8 mg 8 mg 8 mg   Wed 8 mg 8 mg 8 mg   Thu 8 mg 8 mg 8 mg   Fri 10 mg 10 mg 10 mg   Sat 8 mg 8 mg 8 mg   Visit Report - - -   Some recent data might be hidden       Plan:  1. INR is therapeutic today- see above in Anticoagulation Summary.    Man Ramsey to continue their warfarin regimen- see above in Anticoagulation Summary.  2. Follow up in 2 weeks  3. Pt has agreed to only be called if INR out of range. They have been instructed to call if any changes in medications, doses, concerns, etc. Patient expresses understanding and has no further questions at this time.    Almaz Faria

## 2021-08-31 ENCOUNTER — LAB (OUTPATIENT)
Dept: LAB | Facility: HOSPITAL | Age: 41
End: 2021-08-31

## 2021-08-31 ENCOUNTER — OFFICE VISIT (OUTPATIENT)
Dept: CARDIOLOGY | Facility: CLINIC | Age: 41
End: 2021-08-31

## 2021-08-31 VITALS
HEIGHT: 71 IN | BODY MASS INDEX: 38.22 KG/M2 | DIASTOLIC BLOOD PRESSURE: 92 MMHG | SYSTOLIC BLOOD PRESSURE: 130 MMHG | WEIGHT: 273 LBS | HEART RATE: 59 BPM

## 2021-08-31 DIAGNOSIS — E78.2 MIXED HYPERLIPIDEMIA: ICD-10-CM

## 2021-08-31 DIAGNOSIS — Z95.2 HISTORY OF MECHANICAL AORTIC VALVE REPLACEMENT: ICD-10-CM

## 2021-08-31 DIAGNOSIS — Z86.79 HISTORY OF CARDIOMYOPATHY: ICD-10-CM

## 2021-08-31 DIAGNOSIS — Z79.01 WARFARIN ANTICOAGULATION: ICD-10-CM

## 2021-08-31 DIAGNOSIS — I44.0 FIRST DEGREE AV BLOCK: ICD-10-CM

## 2021-08-31 DIAGNOSIS — I10 ESSENTIAL HYPERTENSION: ICD-10-CM

## 2021-08-31 DIAGNOSIS — Q23.1 AORTIC INSUFFICIENCY DUE TO BICUSPID AORTIC VALVE: ICD-10-CM

## 2021-08-31 DIAGNOSIS — Q23.1 AORTIC INSUFFICIENCY DUE TO BICUSPID AORTIC VALVE: Primary | ICD-10-CM

## 2021-08-31 PROBLEM — Q23.81 AORTIC INSUFFICIENCY DUE TO BICUSPID AORTIC VALVE: Status: RESOLVED | Noted: 2017-07-27 | Resolved: 2021-08-31

## 2021-08-31 LAB
ALBUMIN SERPL-MCNC: 4.6 G/DL (ref 3.5–5.2)
ALBUMIN/GLOB SERPL: 2 G/DL
ALP SERPL-CCNC: 83 U/L (ref 39–117)
ALT SERPL W P-5'-P-CCNC: 46 U/L (ref 1–41)
ANION GAP SERPL CALCULATED.3IONS-SCNC: 9.1 MMOL/L (ref 5–15)
AST SERPL-CCNC: 29 U/L (ref 1–40)
BILIRUB SERPL-MCNC: 0.5 MG/DL (ref 0–1.2)
BUN SERPL-MCNC: 12 MG/DL (ref 6–20)
BUN/CREAT SERPL: 13 (ref 7–25)
CALCIUM SPEC-SCNC: 9.3 MG/DL (ref 8.6–10.5)
CHLORIDE SERPL-SCNC: 102 MMOL/L (ref 98–107)
CHOLEST SERPL-MCNC: 239 MG/DL (ref 0–200)
CO2 SERPL-SCNC: 23.9 MMOL/L (ref 22–29)
CREAT SERPL-MCNC: 0.92 MG/DL (ref 0.76–1.27)
DEPRECATED RDW RBC AUTO: 40.6 FL (ref 37–54)
ERYTHROCYTE [DISTWIDTH] IN BLOOD BY AUTOMATED COUNT: 13 % (ref 12.3–15.4)
GFR SERPL CREATININE-BSD FRML MDRD: 90 ML/MIN/1.73
GLOBULIN UR ELPH-MCNC: 2.3 GM/DL
GLUCOSE SERPL-MCNC: 106 MG/DL (ref 65–99)
HCT VFR BLD AUTO: 42.7 % (ref 37.5–51)
HDLC SERPL-MCNC: 45 MG/DL (ref 40–60)
HGB BLD-MCNC: 14.5 G/DL (ref 13–17.7)
LDLC SERPL CALC-MCNC: 154 MG/DL (ref 0–100)
LDLC/HDLC SERPL: 3.35 {RATIO}
MCH RBC QN AUTO: 29.4 PG (ref 26.6–33)
MCHC RBC AUTO-ENTMCNC: 34 G/DL (ref 31.5–35.7)
MCV RBC AUTO: 86.4 FL (ref 79–97)
PLATELET # BLD AUTO: 239 10*3/MM3 (ref 140–450)
PMV BLD AUTO: 9.3 FL (ref 6–12)
POTASSIUM SERPL-SCNC: 4.3 MMOL/L (ref 3.5–5.2)
PROT SERPL-MCNC: 6.9 G/DL (ref 6–8.5)
RBC # BLD AUTO: 4.94 10*6/MM3 (ref 4.14–5.8)
SODIUM SERPL-SCNC: 135 MMOL/L (ref 136–145)
TRIGL SERPL-MCNC: 217 MG/DL (ref 0–150)
VLDLC SERPL-MCNC: 40 MG/DL (ref 5–40)
WBC # BLD AUTO: 6.44 10*3/MM3 (ref 3.4–10.8)

## 2021-08-31 PROCEDURE — 80053 COMPREHEN METABOLIC PANEL: CPT

## 2021-08-31 PROCEDURE — 93000 ELECTROCARDIOGRAM COMPLETE: CPT | Performed by: NURSE PRACTITIONER

## 2021-08-31 PROCEDURE — 80061 LIPID PANEL: CPT

## 2021-08-31 PROCEDURE — 99214 OFFICE O/P EST MOD 30 MIN: CPT | Performed by: NURSE PRACTITIONER

## 2021-08-31 PROCEDURE — 36415 COLL VENOUS BLD VENIPUNCTURE: CPT

## 2021-08-31 PROCEDURE — 85027 COMPLETE CBC AUTOMATED: CPT

## 2021-08-31 RX ORDER — LISINOPRIL 20 MG/1
20 TABLET ORAL DAILY
Qty: 90 TABLET | Refills: 3 | Status: SHIPPED | OUTPATIENT
Start: 2021-08-31 | End: 2022-09-01 | Stop reason: SDUPTHER

## 2021-08-31 RX ORDER — CARVEDILOL 12.5 MG/1
12.5 TABLET ORAL 2 TIMES DAILY
Qty: 180 TABLET | Refills: 3 | Status: SHIPPED | OUTPATIENT
Start: 2021-08-31 | End: 2022-09-01 | Stop reason: SDUPTHER

## 2021-08-31 NOTE — PROGRESS NOTES
"  Date of Office Visit: 2021  Encounter Provider: ZOË Sykes  Place of Service: [facility] CARDIOLOGY  Patient Name: Man Ramsey  :1980  Primary Cardiologist: Dr. Lorne Kam     Chief Complaint   Patient presents with   • Aortic Insufficiency   • Follow-up   :     HPI: Man Ramsey is a pleasant 41 y.o. male who presents today for annual cardiac follow-up on aortic insufficiency and status post mechanical aortic valve replacement.  He is a new patient to me and I have reviewed his medical records.    In , he underwent mechanical aortic valve replacement and had complete recovery of left ventricular systolic function.  He has been maintained on warfarin with INRs followed at his PCP office.  In , he had an episode of rectal bleeding and colonoscopy revealed hemorrhoids. I reviewed his last echocardiogram from 2019: LVEF 56-60%, right ventricular cavity borderline dilated, normal functioning prosthetic aortic valve, and trace mitral valve regurgitation.  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      In 2020, he had a follow-up telehealth visit with Dr. Lorne Kam and was doing well.  Dr. Kam recommended a repeat echocardiogram in .    He presents today for his annual cardiac follow-up visit.  He has not had the COVID-19 virus and has received the vaccination.  He denies chest pain, shortness of air, palpitations, edema, syncope, or active bleeding.  A few months ago he noticed some red blood on the toilet tissue which he feels is from hemorrhoids.  He has had a couple episodes of \"vertigo\".  His diastolic portion of his blood pressure is mildly elevated.  He does not exercise and does not feel that he follows a heart healthy/low-sodium diet.  He is due for his annual blood work.  He continues to with INR checks at the [facility] and his last INR was 3.3 a few days ago.      Past Medical History:   Diagnosis Date   • Aortic insufficiency due to bicuspid aortic valve     with severe AI, s/p 29mm ATS mechanical AVR 5/2012.  Echo has shown normal function but mild AI.   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  He recovered with oral steroids.  GIANNA was negative for endocarditis.   • First degree AV block 8/31/2021   • GI (gastrointestinal bleed) 11/12/2019   • Hypertension    • Mixed hyperlipidemia    • Nonischemic cardiomyopathy (CMS/HCC)     due to AI, LVEF 40% with LVE 5/2012; improved to EF 57% in July 2012 with mild LV dilation.  Echo 3/2014 with normal LV size and systolic function   • Obesity    • Ocular migraine        Past Surgical History:   Procedure Laterality Date   • ABDOMINAL SURGERY  May 2012    sternotomy, heart valve replacement   • AORTIC VALVE REPAIR/REPLACEMENT  05/2012    Mechanical   • CARDIAC CATHETERIZATION  May 2012    prep for heart surgery   • COLONOSCOPY N/A 12/17/2019    Procedure: COLONOSCOPY into cecum with polypectomy;  Surgeon: Cosme Hill MD;  Location: [facility] ENDOSCOPY;  Service: Gastroenterology       Social History     Socioeconomic History   • Marital status: Single     Spouse name: Not on file   • Number of children: Not on file   • Years of education: Not on file   • Highest education level: Not on file   Tobacco Use   • Smoking status: Never Smoker   • Smokeless tobacco: Never Used   • Tobacco comment: caffeine use/ 2-3 CUPS of tea daily    Substance and Sexual Activity   • Alcohol use: Yes     Alcohol/week: 2.0 standard drinks     Types: 1 Cans of beer, 1 Shots of liquor per week     Comment: 2-3 servings of alcohol daily   • Drug use: No   • Sexual activity: Never       Family History   Problem Relation Age of Onset   • Colon polyps Mother  "   • Hypertension Mother    • Hypertension Father    • Hypertension Maternal Grandmother    • Hypertension Paternal Grandmother        The following portion of the patient's history were reviewed and updated as appropriate: past medical history, past surgical history, past social history, past family history, allergies, current medications, and problem list.    Review of Systems   Constitutional: Negative.   Cardiovascular: Negative.    Respiratory: Negative.    Hematologic/Lymphatic: Negative.    Neurological: Negative.        No Known Allergies      Current Outpatient Medications:   •  carvedilol (COREG) 12.5 MG tablet, Take 1 tablet by mouth 2 (Two) Times a Day., Disp: 180 tablet, Rfl: 3  •  fexofenadine (ALLEGRA) 180 MG tablet, Take 180 mg by mouth Daily., Disp: , Rfl:   •  lisinopril (PRINIVIL,ZESTRIL) 20 MG tablet, Take 1 tablet by mouth Daily., Disp: 90 tablet, Rfl: 3  •  Melatonin 3 MG capsule, Take  by mouth., Disp: , Rfl:   •  Omega-3 Fatty Acids (FISH OIL) 1000 MG capsule capsule, Take  by mouth Daily With Breakfast., Disp: , Rfl:   •  warfarin (COUMADIN) 2 MG tablet, TAKE 5 TABLETS (10 MG) BY MOUTH EVERY MONDAY AND FRIDAY AND TAKE 4 TABLETS (8 MG) ON ALL OTHER DAYS OR AS DIRECTED, Disp: 400 tablet, Rfl: 1        Objective:     Vitals:    08/31/21 0909   BP: 130/92   Pulse: 59   Weight: 124 kg (273 lb)   Height: 180.3 cm (71\")     Body mass index is 38.08 kg/m².    PHYSICAL EXAM:    Vitals Reviewed.   General Appearance: No acute distress, well developed and well nourished. Obese.   Eyes: Conjunctiva and lids: No erythema, swelling, or discharge. Sclera non-icteric.   HENT: Atraumatic, normocephalic. External eyes, ears, and nose normal. No hearing loss noted. Mucous membranes normal. Lips not cyanotic. Neck supple with no tenderness.  Respiratory: No signs of respiratory distress. Respiration rhythm and depth normal.   Clear to auscultation. No rales, crackles, rhonchi, or wheezing auscultated. "   Cardiovascular:  Jugular Venous Pressure: Normal  Heart Rate and Rhythm: Normal, Heart Sounds: Mechanical S1.  No S3 or S4 noted.  Murmurs: No murmurs noted. No rubs, thrills, or gallops.   Old chest incision well-healed.  Lower Extremities: No edema noted.  Gastrointestinal:  Abdomen soft, non-distended, non-tender. Normal bowel sounds.    Musculoskeletal: Normal movement of extremities  Skin and Nails: General appearance normal. No pallor, cyanosis, diaphoresis. Skin temperature normal. No clubbing of fingernails.   Psychiatric: Patient alert and oriented to person, place, and time. Speech and behavior appropriate. Normal mood and affect.       ECG 12 Lead    Date/Time: 8/31/2021 9:04 AM  Performed by: Orquidea Marlow APRN  Authorized by: Orquidea Marlow APRN   Comparison: compared with previous ECG from 7/31/2019  Similar to previous ECG  Rhythm: sinus rhythm  Rate: bradycardic  BPM: 59  Conduction: 1st degree AV block  ST Segments: ST segments normal  T Waves: T waves normal  QRS axis: normal    Clinical impression: non-specific ECG              Assessment:       Diagnosis Plan   1. Aortic insufficiency due to bicuspid aortic valve  CBC (No Diff)    Comprehensive Metabolic Panel    Lipid Panel   2. History of mechanical aortic valve replacement     3. Warfarin anticoagulation  CBC (No Diff)    Comprehensive Metabolic Panel    Lipid Panel   4. History of cardiomyopathy     5. Essential hypertension     6. Mixed hyperlipidemia     7. First degree AV block            Plan:       1/2.  Bicuspid aortic Valve and Severe Aortic Insufficiency: Status post mechanical aortic valve replacement in 2012.  Last echocardiogram showed stable aortic valve.  Dr. Kam recommended repeat echocardiogram in 2024.    3.  Warfarin Anticoagulation: He remains on warfarin for his mechanical aortic valve with a range of 2.5-3.5.  He occasionally experiences some red blood on the toilet tissue from hemorrhoids, but denies any nick  red blood or black stools.  He will continue to monitor.  We appreciate the [facility] monitoring his INRs.    4.  History of Cardiomyopathy: Resolved after mechanical aortic valve replacement.    5.  Hypertension: Blood pressure elevated today.  He would benefit from a low-sodium diet and regular exercise.    6.  Hyperlipidemia: Last lipid panel showed a total cholesterol above 200.  Repeat at his convenience.    7.  Obesity: BMI is 38.1.  He would benefit from regular exercise, weight loss, and heart healthy/low-sodium diet.    9.  He was noted to have a mild first-degree AV block () on EKG today.    10.  I have ordered routine blood work to be completed including a CBC, CMP, and lipid panel.  If everything is stable, he will follow-up with Dr. Lorne Kam in 1 year.  I have refilled his cardiac medications today.    ADDENDUM:  · I reviewed his blood work.  CMP showed mildly elevated glucose of 106 and he was not fasting.  I recommended that he decrease his fluid intake a little bit at 135.  ALT mildly elevated.  Total cholesterol and triglycerides have really elevated.  He is drinking 1-3 alcoholic beverages daily and I recommended that he decrease his sugar and carbohydrate intake.  For the LDL I recommended a lower fat diet.  CBC normal.  Patient would like to try diet and exercise before already medication.  He would like to give himself 6 months.    · I will call him for follow-up blood work in 6 months.  Patient informed and verbalizes understanding.      As always, it has been a pleasure to participate in your patient's care. Thank you.       Sincerely,       ZOË Sorenson  [facility]      · COVID-19 Precautions - Patient was compliant in wearing a mask. When I saw the patient, I used appropriate personal protective equipment (PPE) including mask and eye shield (standard procedure).  Additionally, I used gown and gloves if indicated.  Hand  hygiene was completed before and after seeing the patient.  · Dictated utilizing Dragon Dictation  · I spent 30 minutes reviewing her medical records/testing/previous office notes/labs, face-to-face interaction with patient, physical examination, formulating the plan of care, and discussion of plan of care with patient.

## 2021-09-10 ENCOUNTER — ANTICOAGULATION VISIT (OUTPATIENT)
Dept: PHARMACY | Facility: HOSPITAL | Age: 41
End: 2021-09-10

## 2021-09-10 DIAGNOSIS — Z79.01 LONG TERM (CURRENT) USE OF ANTICOAGULANTS: ICD-10-CM

## 2021-09-10 DIAGNOSIS — Z95.2 HISTORY OF MECHANICAL AORTIC VALVE REPLACEMENT: Primary | ICD-10-CM

## 2021-09-10 LAB — INR PPP: 2

## 2021-09-10 NOTE — PROGRESS NOTES
Anticoagulation Clinic Progress Note    Anticoagulation Summary  As of 9/10/2021    INR goal:  2.5-3.5   TTR:  81.6 % (2.7 y)   INR used for dosin.00 (9/10/2021)   Warfarin maintenance plan:  10 mg every Mon, Wed, Fri; 8 mg all other days   Weekly warfarin total:  62 mg   Plan last modified:  Heather Kline RPH (9/10/2021)   Next INR check:  2021   Priority:  High   Target end date:  Indefinite    Indications    History of mechanical aortic valve replacement [Z95.2]  Long term (current) use of anticoagulants [Z79.01]             Anticoagulation Episode Summary     INR check location:  Home Draw    Preferred lab:      Send INR reminders to:  LISA LECHUGA  POOL    Comments:  **[facility]**      Anticoagulation Care Providers     Provider Role Specialty Phone number    Lorne Kam MD Referring Cardiology [phone]          Clinic Interview:  Patient Findings     Positives:  Change in alcohol use, Change in diet/appetite    Negatives:  Signs/symptoms of thrombosis, Signs/symptoms of bleeding,   Laboratory test error suspected, Change in health, Change in activity,   Upcoming invasive procedure, Emergency department visit, Upcoming dental   procedure, Missed doses, Extra doses, Change in medications, Hospital   admission, Bruising, Other complaints    Comments:  Patient is decreasing alcohol intake and increasing leafy   greens       Clinical Outcomes     Negatives:  Major bleeding event, Thromboembolic event,   Anticoagulation-related hospital admission, Anticoagulation-related ED   visit, Anticoagulation-related fatality    Comments:  Patient is decreasing alcohol intake and increasing leafy   greens         INR History:  Anticoagulation Monitoring 2021 2021 9/10/2021   INR 2.90 3.30 2.00   INR Date 2021 2021 9/10/2021   INR Goal 2.5-3.5 2.5-3.5 2.5-3.5   Trend Same Same Up   Last Week Total 60 mg 60 mg 60 mg   Next Week Total 60 mg 60 mg 64 mg   Sun 8 mg 8  mg 8 mg   Mon 10 mg 10 mg 10 mg   Tue 8 mg 8 mg 8 mg   Wed 8 mg 8 mg 10 mg   Thu 8 mg 8 mg 8 mg   Fri 10 mg 10 mg 10 mg   Sat 8 mg 8 mg 10 mg (9/11); Otherwise 8 mg   Visit Report - - -   Some recent data might be hidden       Plan:  1. INR is Subtherapeutic today- see above in Anticoagulation Summary.   Will instruct Man Ramsey to Increase their warfarin regimen- see above in Anticoagulation Summary.  2. Follow up in 2 weeks  3. Pt has agreed to only be called if INR out of range. They have been instructed to call if any changes in medications, doses, concerns, etc. Patient expresses understanding and has no further questions at this time.    Heather Kline [facility]

## 2021-09-24 ENCOUNTER — ANTICOAGULATION VISIT (OUTPATIENT)
Dept: PHARMACY | Facility: HOSPITAL | Age: 41
End: 2021-09-24

## 2021-09-24 DIAGNOSIS — Z95.2 HISTORY OF MECHANICAL AORTIC VALVE REPLACEMENT: Primary | ICD-10-CM

## 2021-09-24 DIAGNOSIS — Z79.01 LONG TERM (CURRENT) USE OF ANTICOAGULANTS: ICD-10-CM

## 2021-09-24 LAB — INR PPP: 2.4

## 2021-09-24 NOTE — PROGRESS NOTES
Anticoagulation Clinic Progress Note    Anticoagulation Summary  As of 2021    INR goal:  2.5-3.5   TTR:  80.5 % (2.8 y)   INR used for dosin.40 (2021)   Warfarin maintenance plan:  8 mg every Mon, Wed, Fri; 10 mg all other days   Weekly warfarin total:  64 mg   Plan last modified:  Samuel Jerome III, PharmD (2021)   Next INR check:  10/4/2021   Priority:  High   Target end date:  Indefinite    Indications    History of mechanical aortic valve replacement [Z95.2]  Long term (current) use of anticoagulants [Z79.01]             Anticoagulation Episode Summary     INR check location:  Home Draw    Preferred lab:      Send INR reminders to:  LISA LECHUGA  POOL    Comments:  **[facility]**      Anticoagulation Care Providers     Provider Role Specialty Phone number    Lorne Kam MD Referring Cardiology [phone]          Clinic Interview:  Patient Findings     Positives:  Change in diet/appetite    Negatives:  Signs/symptoms of thrombosis, Signs/symptoms of bleeding,   Laboratory test error suspected, Change in health, Change in alcohol use,   Change in activity, Upcoming invasive procedure, Emergency department   visit, Upcoming dental procedure, Missed doses, Extra doses, Change in   medications, Hospital admission, Bruising, Other complaints    Comments:  Reports continuing to cut back on alcohol and increase salads.         Clinical Outcomes     Negatives:  Major bleeding event, Thromboembolic event,   Anticoagulation-related hospital admission, Anticoagulation-related ED   visit, Anticoagulation-related fatality    Comments:  Reports continuing to cut back on alcohol and increase salads.           INR History:  Anticoagulation Monitoring 2021 9/10/2021 2021   INR 3.30 2.00 2.40   INR Date 2021 9/10/2021 2021   INR Goal 2.5-3.5 2.5-3.5 2.5-3.5   Trend Same Up Up   Last Week Total 60 mg 60 mg 62 mg   Next Week Total 60 mg 64 mg 66 mg   Sun 8 mg 8  mg 10 mg   Mon 10 mg 10 mg 8 mg   Tue 8 mg 8 mg 10 mg   Wed 8 mg 10 mg 8 mg   Thu 8 mg 8 mg 10 mg   Fri 10 mg 10 mg 10 mg (9/24), 8 mg (10/1)   Sat 8 mg 10 mg (9/11); Otherwise 8 mg 10 mg   Visit Report - - -   Some recent data might be hidden       Plan:  1. INR is Subtherapeutic. Will instruct Man PAT Roxy to Increase their warfarin regimen - see above in Anticoagulation Summary.  2. Follow up in 10 days  3. Patient declines warfarin refills.  4. Patient has been instructed to call if any changes in medications, doses, concerns, etc. Patient expresses understanding and has no further questions at this time.      Samuel Jerome III, PharmD

## 2021-10-04 ENCOUNTER — ANTICOAGULATION VISIT (OUTPATIENT)
Dept: PHARMACY | Facility: HOSPITAL | Age: 41
End: 2021-10-04

## 2021-10-04 DIAGNOSIS — Z95.2 HISTORY OF MECHANICAL AORTIC VALVE REPLACEMENT: Primary | ICD-10-CM

## 2021-10-04 DIAGNOSIS — Z79.01 LONG TERM (CURRENT) USE OF ANTICOAGULANTS: ICD-10-CM

## 2021-10-04 LAB — INR PPP: 2.8

## 2021-10-04 NOTE — PROGRESS NOTES
Anticoagulation Clinic Progress Note    Anticoagulation Summary  As of 10/4/2021    INR goal:  2.5-3.5   TTR:  80.5 % (2.8 y)   INR used for dosin.80 (10/4/2021)   Warfarin maintenance plan:  8 mg every Mon, Wed, Fri; 10 mg all other days   Weekly warfarin total:  64 mg   No change documented:  Heather Kline RPH   Plan last modified:  Samuel Jerome III, PharmD (2021)   Next INR check:  10/18/2021   Priority:  High   Target end date:  Indefinite    Indications    History of mechanical aortic valve replacement [Z95.2]  Long term (current) use of anticoagulants [Z79.01]             Anticoagulation Episode Summary     INR check location:  Home Draw    Preferred lab:      Send INR reminders to:   JOSE LECHUGA  POOL    Comments:  **[facility]**      Anticoagulation Care Providers     Provider Role Specialty Phone number    Lorne Kam MD Referring Cardiology [phone]          Clinic Interview:  No pertinent clinical findings have been reported.    INR History:  Anticoagulation Monitoring 9/10/2021 2021 10/4/2021   INR 2.00 2.40 2.80   INR Date 9/10/2021 2021 10/4/2021   INR Goal 2.5-3.5 2.5-3.5 2.5-3.5   Trend Up Up Same   Last Week Total 60 mg 62 mg 64 mg   Next Week Total 64 mg 66 mg 64 mg   Sun 8 mg 10 mg 10 mg   Mon 10 mg 8 mg 8 mg   Tue 8 mg 10 mg 10 mg   Wed 10 mg 8 mg 8 mg   Thu 8 mg 10 mg 10 mg   Fri 10 mg 10 mg (), 8 mg (10/1) 8 mg   Sat 10 mg (); Otherwise 8 mg 10 mg 10 mg   Visit Report - - -   Some recent data might be hidden       Plan:  1. INR is therapeutic today- see above in Anticoagulation Summary.    Man Ramsey to continue their warfarin regimen- see above in Anticoagulation Summary.  2. Follow up in 2 weeks  3. Pt has agreed to only be called if INR out of range. They have been instructed to call if any changes in medications, doses, concerns, etc. Patient expresses understanding and has no further questions at this time.    Heather  Raisa, [facility]

## 2021-10-06 ENCOUNTER — HOSPITAL ENCOUNTER (EMERGENCY)
Facility: HOSPITAL | Age: 41
Discharge: HOME | End: 2021-10-06
Attending: EMERGENCY MEDICINE | Admitting: EMERGENCY MEDICINE

## 2021-10-06 ENCOUNTER — ANTICOAGULATION VISIT (OUTPATIENT)
Dept: PHARMACY | Facility: HOSPITAL | Age: 41
End: 2021-10-06

## 2021-10-06 VITALS
DIASTOLIC BLOOD PRESSURE: 96 MMHG | RESPIRATION RATE: 14 BRPM | TEMPERATURE: 97.2 F | BODY MASS INDEX: 38.22 KG/M2 | WEIGHT: 273 LBS | SYSTOLIC BLOOD PRESSURE: 146 MMHG | HEIGHT: 71 IN | HEART RATE: 59 BPM | OXYGEN SATURATION: 98 %

## 2021-10-06 DIAGNOSIS — K62.5 BRIGHT RED RECTAL BLEEDING: ICD-10-CM

## 2021-10-06 DIAGNOSIS — Z79.01 LONG TERM (CURRENT) USE OF ANTICOAGULANTS: ICD-10-CM

## 2021-10-06 DIAGNOSIS — K64.8 INTERNAL HEMORRHOIDS: Primary | ICD-10-CM

## 2021-10-06 DIAGNOSIS — Z95.2 HISTORY OF MECHANICAL AORTIC VALVE REPLACEMENT: Primary | ICD-10-CM

## 2021-10-06 LAB
ABO GROUP BLD: NORMAL
ALBUMIN SERPL-MCNC: 4.7 G/DL (ref 3.5–5.2)
ALBUMIN/GLOB SERPL: 1.9 G/DL
ALP SERPL-CCNC: 72 U/L (ref 39–117)
ALT SERPL W P-5'-P-CCNC: 46 U/L (ref 1–41)
ANION GAP SERPL CALCULATED.3IONS-SCNC: 11.4 MMOL/L (ref 5–15)
AST SERPL-CCNC: 30 U/L (ref 1–40)
BASOPHILS # BLD AUTO: 0.04 10*3/MM3 (ref 0–0.2)
BASOPHILS NFR BLD AUTO: 0.6 % (ref 0–1.5)
BILIRUB SERPL-MCNC: 0.5 MG/DL (ref 0–1.2)
BLD GP AB SCN SERPL QL: NEGATIVE
BUN SERPL-MCNC: 19 MG/DL (ref 6–20)
BUN/CREAT SERPL: 22.4 (ref 7–25)
CALCIUM SPEC-SCNC: 9.8 MG/DL (ref 8.6–10.5)
CHLORIDE SERPL-SCNC: 104 MMOL/L (ref 98–107)
CO2 SERPL-SCNC: 23.6 MMOL/L (ref 22–29)
CREAT SERPL-MCNC: 0.85 MG/DL (ref 0.76–1.27)
D-LACTATE SERPL-SCNC: 1 MMOL/L (ref 0.5–2)
DEPRECATED RDW RBC AUTO: 44.9 FL (ref 37–54)
EOSINOPHIL # BLD AUTO: 0.1 10*3/MM3 (ref 0–0.4)
EOSINOPHIL NFR BLD AUTO: 1.4 % (ref 0.3–6.2)
ERYTHROCYTE [DISTWIDTH] IN BLOOD BY AUTOMATED COUNT: 13.5 % (ref 12.3–15.4)
EXPIRATION DATE: ABNORMAL
FECAL OCCULT BLOOD SCREEN, POC: POSITIVE
GFR SERPL CREATININE-BSD FRML MDRD: 99 ML/MIN/1.73
GLOBULIN UR ELPH-MCNC: 2.5 GM/DL
GLUCOSE SERPL-MCNC: 109 MG/DL (ref 65–99)
HCT VFR BLD AUTO: 44.9 % (ref 37.5–51)
HGB BLD-MCNC: 14.6 G/DL (ref 13–17.7)
IMM GRANULOCYTES # BLD AUTO: 0.01 10*3/MM3 (ref 0–0.05)
IMM GRANULOCYTES NFR BLD AUTO: 0.1 % (ref 0–0.5)
INR PPP: 2.08 (ref 0.9–1.1)
INR PPP: 2.1
LYMPHOCYTES # BLD AUTO: 1.77 10*3/MM3 (ref 0.7–3.1)
LYMPHOCYTES NFR BLD AUTO: 24.7 % (ref 19.6–45.3)
Lab: 174
MCH RBC QN AUTO: 29.3 PG (ref 26.6–33)
MCHC RBC AUTO-ENTMCNC: 32.5 G/DL (ref 31.5–35.7)
MCV RBC AUTO: 90 FL (ref 79–97)
MONOCYTES # BLD AUTO: 0.8 10*3/MM3 (ref 0.1–0.9)
MONOCYTES NFR BLD AUTO: 11.1 % (ref 5–12)
NEGATIVE CONTROL: NEGATIVE
NEUTROPHILS NFR BLD AUTO: 4.46 10*3/MM3 (ref 1.7–7)
NEUTROPHILS NFR BLD AUTO: 62.1 % (ref 42.7–76)
NRBC BLD AUTO-RTO: 0 /100 WBC (ref 0–0.2)
PLATELET # BLD AUTO: 251 10*3/MM3 (ref 140–450)
PMV BLD AUTO: 9.6 FL (ref 6–12)
POSITIVE CONTROL: POSITIVE
POTASSIUM SERPL-SCNC: 4.3 MMOL/L (ref 3.5–5.2)
PROT SERPL-MCNC: 7.2 G/DL (ref 6–8.5)
PROTHROMBIN TIME: 23.1 SECONDS (ref 11.7–14.2)
RBC # BLD AUTO: 4.99 10*6/MM3 (ref 4.14–5.8)
RH BLD: POSITIVE
SODIUM SERPL-SCNC: 139 MMOL/L (ref 136–145)
T&S EXPIRATION DATE: NORMAL
WBC # BLD AUTO: 7.18 10*3/MM3 (ref 3.4–10.8)

## 2021-10-06 PROCEDURE — 86900 BLOOD TYPING SEROLOGIC ABO: CPT | Performed by: EMERGENCY MEDICINE

## 2021-10-06 PROCEDURE — 85610 PROTHROMBIN TIME: CPT | Performed by: EMERGENCY MEDICINE

## 2021-10-06 PROCEDURE — 80053 COMPREHEN METABOLIC PANEL: CPT | Performed by: EMERGENCY MEDICINE

## 2021-10-06 PROCEDURE — 86850 RBC ANTIBODY SCREEN: CPT | Performed by: EMERGENCY MEDICINE

## 2021-10-06 PROCEDURE — 85025 COMPLETE CBC W/AUTO DIFF WBC: CPT | Performed by: EMERGENCY MEDICINE

## 2021-10-06 PROCEDURE — 86901 BLOOD TYPING SEROLOGIC RH(D): CPT | Performed by: EMERGENCY MEDICINE

## 2021-10-06 PROCEDURE — 83605 ASSAY OF LACTIC ACID: CPT | Performed by: EMERGENCY MEDICINE

## 2021-10-06 PROCEDURE — 99283 EMERGENCY DEPT VISIT LOW MDM: CPT

## 2021-10-06 PROCEDURE — 82270 OCCULT BLOOD FECES: CPT | Performed by: EMERGENCY MEDICINE

## 2021-10-06 RX ORDER — PRAMOXINE HYDROCHLORIDE HYDROCORTISONE ACETATE 100; 100 MG/10G; MG/10G
1 AEROSOL, FOAM TOPICAL 2 TIMES DAILY
Qty: 10 G | Refills: 0 | Status: SHIPPED | OUTPATIENT
Start: 2021-10-06 | End: 2021-12-02

## 2021-10-06 RX ORDER — SODIUM CHLORIDE 0.9 % (FLUSH) 0.9 %
10 SYRINGE (ML) INJECTION AS NEEDED
Status: DISCONTINUED | OUTPATIENT
Start: 2021-10-06 | End: 2021-10-06 | Stop reason: HOSPADM

## 2021-10-06 NOTE — ED PROVIDER NOTES
EMERGENCY DEPARTMENT ENCOUNTER  Patient was placed in face mask in first look and the following protective measures were taken unless  documented below in the ED course. Patient was wearing facemask when I entered the room and throughout our encounter. I wore full protective equipment throughout this patient encounter including a N95 mask, eye shield, gown and gloves. Hand hygiene was performed before donning protective equipment and after removal when leaving the room.    Room Number:  25/25  Date of encounter:  10/6/2021  PCP: Orlando Mcguire MD    HPI:  Context: Man Ramsey is a 41 y.o. male who presents to the ED c/o chief complaint of rectal bleeding.  Patient states he has had a small amount of blood on the stool when he wipes over the past several months.  Patient has a history of hemorrhoids, is on warfarin and is status post an aortic valve replacement 10 years ago.  Today, he had a bowel movement that she was predominantly bright red blood per rectum.  He did not see clots.  He denies pain associated with his bowel movements or hard stools.  He denies abdominal pain, dizziness or lightheadedness.  He has not passed out.  He checked his INR at home today and was 2.1.  Patient denies being on other blood thinners but drinks approximately 2 alcoholic drinks a day.    MEDICAL HISTORY REVIEW  Reviewed in EPIC    PAST MEDICAL HISTORY  Active Ambulatory Problems     Diagnosis Date Noted   • Hypertension    • History of mechanical aortic valve replacement 07/27/2017   • Long term (current) use of anticoagulants 12/06/2018   • History of cardiomyopathy 07/31/2019   • Rectal bleed 12/03/2019   • Mixed hyperlipidemia    • First degree AV block 08/31/2021     Resolved Ambulatory Problems     Diagnosis Date Noted   • Nonischemic cardiomyopathy (HCC)    • Aortic insufficiency due to bicuspid aortic valve 07/27/2017   • Rectal bleeding 12/02/2019     Past Medical History:   Diagnosis Date   • Clotting  disorder (CMS/HCC) Nov 12 2019   • Fever of unknown origin    • GI (gastrointestinal bleed) 11/12/2019   • Obesity    • Ocular migraine        PAST SURGICAL HISTORY  Past Surgical History:   Procedure Laterality Date   • ABDOMINAL SURGERY  May 2012    sternotomy, heart valve replacement   • AORTIC VALVE REPAIR/REPLACEMENT  05/2012    Mechanical   • CARDIAC CATHETERIZATION  May 2012    prep for heart surgery   • COLONOSCOPY N/A 12/17/2019    Procedure: COLONOSCOPY into cecum with polypectomy;  Surgeon: Cosme Hill MD;  Location: [facility] ENDOSCOPY;  Service: Gastroenterology       FAMILY HISTORY  Family History   Problem Relation Age of Onset   • Colon polyps Mother    • Hypertension Mother    • Hypertension Father    • Hypertension Maternal Grandmother    • Hypertension Paternal Grandmother        SOCIAL HISTORY  Social History     Socioeconomic History   • Marital status: Single     Spouse name: Not on file   • Number of children: Not on file   • Years of education: Not on file   • Highest education level: Not on file   Tobacco Use   • Smoking status: Never Smoker   • Smokeless tobacco: Never Used   • Tobacco comment: caffeine use/ 2-3 CUPS of tea daily    Substance and Sexual Activity   • Alcohol use: Yes     Alcohol/week: 2.0 standard drinks     Types: 1 Cans of beer, 1 Shots of liquor per week     Comment: 2-3 servings of alcohol daily   • Drug use: No   • Sexual activity: Never       ALLERGIES  Patient has no known allergies.    The patient's allergies have been reviewed    REVIEW OF SYSTEMS  All systems reviewed and negative except for those discussed in HPI.     PHYSICAL EXAM  I have reviewed the triage vital signs and nursing notes.  ED Triage Vitals   Temp Heart Rate Resp BP SpO2   10/06/21 1122 10/06/21 1122 10/06/21 1122 10/06/21 1143 10/06/21 1122   97.2 °F (36.2 °C) 66 16 (!) 177/105 99 %      Temp src Heart Rate Source Patient Position BP Location FiO2 (%)   10/06/21 1122 10/06/21 1122 10/06/21 1143  10/06/21 1143 --   Tympanic Monitor Lying Right arm          General: Mild distress.  HENT: NCAT, PERRL, Nares patent.  Eyes: no scleral icterus.  Extraocular movements are intact.  His conjunctiva is not pale.  Neck: trachea midline, no ROM limitations.  CV: regular rhythm, regular rate.  Respiratory: normal effort, CTAB.  Abdomen: soft, nondistended, NTTP, no rebound tenderness, no guarding or rigidity.  : Positive tone no external hemorrhoids, brown stool that is heme positive.  Musculoskeletal: no deformity.  Neuro: alert, moves all extremities, follows commands.  Skin: warm, dry.  His skin is not pale.  His cap refill is 1 second.    LAB RESULTS  Recent Results (from the past 24 hour(s))   Protime-INR    Collection Time: 10/06/21 12:00 AM    Specimen: Blood   Result Value Ref Range    INR 2.10    POCT Occult Blood Stool    Collection Time: 10/06/21 11:41 AM    Specimen: Per Rectum; Stool   Result Value Ref Range    Fecal Occult Blood Positive (A) Negative    Lot Number [id number]     Expiration Date 04/30/2022     Positive Control Positive Positive    Negative Control Negative Negative   Comprehensive Metabolic Panel    Collection Time: 10/06/21 11:58 AM    Specimen: Blood   Result Value Ref Range    Glucose 109 (H) 65 - 99 mg/dL    BUN 19 6 - 20 mg/dL    Creatinine 0.85 0.76 - 1.27 mg/dL    Sodium 139 136 - 145 mmol/L    Potassium 4.3 3.5 - 5.2 mmol/L    Chloride 104 98 - 107 mmol/L    CO2 23.6 22.0 - 29.0 mmol/L    Calcium 9.8 8.6 - 10.5 mg/dL    Total Protein 7.2 6.0 - 8.5 g/dL    Albumin 4.70 3.50 - 5.20 g/dL    ALT (SGPT) 46 (H) 1 - 41 U/L    AST (SGOT) 30 1 - 40 U/L    Alkaline Phosphatase 72 39 - 117 U/L    Total Bilirubin 0.5 0.0 - 1.2 mg/dL    eGFR Non African Amer 99 >60 mL/min/1.73    Globulin 2.5 gm/dL    A/G Ratio 1.9 g/dL    BUN/Creatinine Ratio 22.4 7.0 - 25.0    Anion Gap 11.4 5.0 - 15.0 mmol/L   Protime-INR    Collection Time: 10/06/21 11:58 AM    Specimen: Blood   Result Value Ref Range    Protime  23.1 (H) 11.7 - 14.2 Seconds    INR 2.08 (H) 0.90 - 1.10   Lactic Acid, Plasma    Collection Time: 10/06/21 11:58 AM    Specimen: Blood   Result Value Ref Range    Lactate 1.0 0.5 - 2.0 mmol/L   Type & Screen    Collection Time: 10/06/21 11:58 AM    Specimen: Blood   Result Value Ref Range    ABO Type O     RH type Positive     Antibody Screen Negative     T&S Expiration Date 10/9/2021 11:59:59 PM    CBC Auto Differential    Collection Time: 10/06/21 11:58 AM    Specimen: Blood   Result Value Ref Range    WBC 7.18 3.40 - 10.80 10*3/mm3    RBC 4.99 4.14 - 5.80 10*6/mm3    Hemoglobin 14.6 13.0 - 17.7 g/dL    Hematocrit 44.9 37.5 - 51.0 %    MCV 90.0 79.0 - 97.0 fL    MCH 29.3 26.6 - 33.0 pg    MCHC 32.5 31.5 - 35.7 g/dL    RDW 13.5 12.3 - 15.4 %    RDW-SD 44.9 37.0 - 54.0 fl    MPV 9.6 6.0 - 12.0 fL    Platelets 251 140 - 450 10*3/mm3    Neutrophil % 62.1 42.7 - 76.0 %    Lymphocyte % 24.7 19.6 - 45.3 %    Monocyte % 11.1 5.0 - 12.0 %    Eosinophil % 1.4 0.3 - 6.2 %    Basophil % 0.6 0.0 - 1.5 %    Immature Grans % 0.1 0.0 - 0.5 %    Neutrophils, Absolute 4.46 1.70 - 7.00 10*3/mm3    Lymphocytes, Absolute 1.77 0.70 - 3.10 10*3/mm3    Monocytes, Absolute 0.80 0.10 - 0.90 10*3/mm3    Eosinophils, Absolute 0.10 0.00 - 0.40 10*3/mm3    Basophils, Absolute 0.04 0.00 - 0.20 10*3/mm3    Immature Grans, Absolute 0.01 0.00 - 0.05 10*3/mm3    nRBC 0.0 0.0 - 0.2 /100 WBC       I ordered the above labs and reviewed the results.    RADIOLOGY  No Radiology Exams Resulted Within Past 24 Hours    I ordered the above noted radiological studies. I reviewed the images and results. I agree with the radiologist interpretation.    PROCEDURES  Procedures    MEDICATIONS GIVEN IN ER  Medications - No data to display    PROGRESS, DATA ANALYSIS, CONSULTS, AND MEDICAL DECISION MAKING  A complete history and physical exam have been performed.  All available laboratory and imaging results have been reviewed by myself prior to  disposition.    MDM  After the initial H&P, I discussed pertinent information from history and physical exam with patient/family.  Discussed differential diagnosis.  Discussed plan for ED evaluation/work-up/treatment.  All questions answered.  Patient/family is agreeable with plan.  ED Course as of Oct 06 2042   Wed Oct 06, 2021   1144 Patient presents with bright red blood per rectum whilst on Coumadin.  We will check CBC, CMP and INR.  We will check a lactic acid to rule out significant bleeding.  We will also perform orthostatics.  Given that he had bright red blood per rectum, I suspect this is a lower GI bleed.    [DE]   1247 INR(!): 2.08 [DE]   1247 WBC: 7.18 [DE]   1247 Hemoglobin: 14.6 [DE]   1247 Lactate: 1.0 [DE]   1254 I updated patient on the results of his blood work and orthostatics.  Patient has not had a bowel movement since being here.  He states he had a colonoscopy by Dr. Hamm 2 years ago.  Reviewing patient's old records, patient did have internal hemorrhoids and external hemorrhoids.  I suspect that patient's bleeding today is from internal hemorrhoids.  Patient is agreeable the diagnosis.  I advised patient to return to the emergency department if he develops dizziness, lightheadedness multiple bloody bowel movements over the course of several hours or increasing weakness.  Otherwise, he should follow-up with either his family doctor or Dr. Hamm.    [DE]      ED Course User Index  [DE] Orlando Quick MD       AS OF 20:42 EDT VITALS:    BP - 146/96  HR - 59  TEMP - 97.2 °F (36.2 °C) (Tympanic)  O2 SATS - 98%    DIAGNOSIS  Final diagnoses:   Internal hemorrhoids   Bright red rectal bleeding   Long term (current) use of anticoagulants         DISPOSITION    DISCHARGE    Patient discharged in stable condition.    Reviewed implications of results, diagnosis, meds, responsibility to follow up, warning signs and symptoms of possible worsening, potential complications and reasons to return to  ER.    Patient/Family voiced understanding of above instructions.    Discussed plan for discharge, as there is no emergent indication for admission. Patient referred to primary care provider for BP management due to today's BP. Pt/family is agreeable and understands need for follow up and repeat testing.  Pt is aware that discharge does not mean that nothing is wrong but it indicates no emergency is present that requires admission and they must continue care with follow-up as given below or physician of their choice.     FOLLOW-UP  Orlando Mcguire MD  [address] [facility] [id number] [phone]    Schedule an appointment as soon as possible for a visit       Cosme Hill MD  [address] [id number] [phone]    Schedule an appointment as soon as possible for a visit            Medication List      New Prescriptions    Proctofoam HC 1-1 % rectal foam  Generic drug: Hydrocort-Pramoxine (Perianal)  Insert 1 applicator into the rectum 2 (Two) Times a Day.           Where to Get Your Medications      These medications were sent to [facility] [address] [place] [address] [facility] - [phone]  - [phone]   [address] [facility] [address]    Phone: [phone]   · Proctofoam HC 1-1 % rectal foam          Orlando Quick MD  10/06/21 2310

## 2021-10-06 NOTE — DISCHARGE INSTRUCTIONS
Use an over-the-counter stool softener to keep your stools soft.  You can use something such as MiraLAX once a day or Colace.  Have a sitz bath at least once a day for the next week.  Use the Proctofoam as directed.  Please return to the emergency department if you develop weakness, abdominal pain, fever, multiple bloody bowel movements over the course of 1 to 2 hours or dizziness.  Your bleeding should improve over the course of 1 to 2 days.

## 2021-10-06 NOTE — PROGRESS NOTES
Anticoagulation Clinic Progress Note    Anticoagulation Summary  As of 10/6/2021    INR goal:  2.5-3.5   TTR:  80.4 % (2.8 y)   INR used for dosin.10 (10/6/2021)   Warfarin maintenance plan:  8 mg every Mon, Wed, Fri; 10 mg all other days   Weekly warfarin total:  64 mg   No change documented:  Neo Cornejo, PharmD   Plan last modified:  Samuel Jerome III, PharmD (2021)   Next INR check:  10/11/2021   Priority:  High   Target end date:  Indefinite    Indications    History of mechanical aortic valve replacement [Z95.2]  Long term (current) use of anticoagulants [Z79.01]             Anticoagulation Episode Summary     INR check location:  Home Draw    Preferred lab:      Send INR reminders to:  LISA LECHUGA  POOL    Comments:  **[employer]**      Anticoagulation Care Providers     Provider Role Specialty Phone number    Lorne Kam MD Referring Cardiology [phone]          Clinic Interview:  Patient Findings     Positives:  Signs/symptoms of bleeding, Emergency department visit, Other   complaints    Negatives:  Signs/symptoms of thrombosis, Laboratory test error   suspected, Change in health, Change in alcohol use, Change in activity,   Upcoming invasive procedure, Upcoming dental procedure, Missed doses,   Extra doses, Change in medications, Change in diet/appetite, Hospital   admission, Bruising    Comments:  Pt visited ED today with report of bright red blood per rectum   with bowel movement. Reports this has recurred over the past several   months to a lesser degree. ED physician recommended OTC therapies for   constipation. Pt is hesitant to take boost dose today in light of rectal   bleeding.      Clinical Outcomes     Negatives:  Major bleeding event, Thromboembolic event,   Anticoagulation-related hospital admission, Anticoagulation-related ED   visit, Anticoagulation-related fatality    Comments:  Pt visited ED today with report of bright red blood per rectum    with bowel movement. Reports this has recurred over the past several   months to a lesser degree. ED physician recommended OTC therapies for   constipation. Pt is hesitant to take boost dose today in light of rectal   bleeding.        INR History:  Anticoagulation Monitoring 9/24/2021 10/4/2021 10/6/2021   INR 2.40 2.80 2.10   INR Date 9/24/2021 10/4/2021 10/6/2021   INR Goal 2.5-3.5 2.5-3.5 2.5-3.5   Trend Up Same Same   Last Week Total 62 mg 64 mg 64 mg   Next Week Total 66 mg 64 mg 64 mg   Sun 10 mg 10 mg 10 mg   Mon 8 mg 8 mg -   Tue 10 mg 10 mg -   Wed 8 mg 8 mg 8 mg   Thu 10 mg 10 mg 10 mg   Fri 10 mg (9/24), 8 mg (10/1) 8 mg 8 mg   Sat 10 mg 10 mg 10 mg   Visit Report - - -   Some recent data might be hidden       Plan:  1. INR is Subtherapeutic today- see above in Anticoagulation Summary.   Will instruct Man Ramsye to Continue their warfarin regimen for now in light of rectal bleeding noted today - see above in Anticoagulation Summary.  2. Follow up in 5 days; If INR trending low, will plan for dose increase.  3. They have been instructed to call if any changes in medications, doses, concerns, etc. Patient expresses understanding and has no further questions at this time.    Neo Cornejo, PharmD

## 2021-10-06 NOTE — ED TRIAGE NOTES
Patient presents to er via private vehicle from home.  Patient reports one episode of bright red with a formed stool.  Is on coumadin and INR today was 2.1.  Denies abdominal pain.  Patient was placed in face mask during first look triage.  Patient was wearing a face mask throughout encounter.  I wore personal protective equipment throughout the encounter.  Hand hygiene was performed before and after patient encounter.

## 2021-10-11 ENCOUNTER — ANTICOAGULATION VISIT (OUTPATIENT)
Dept: PHARMACY | Facility: HOSPITAL | Age: 41
End: 2021-10-11

## 2021-10-11 DIAGNOSIS — Z79.01 LONG TERM (CURRENT) USE OF ANTICOAGULANTS: ICD-10-CM

## 2021-10-11 DIAGNOSIS — Z95.2 HISTORY OF MECHANICAL AORTIC VALVE REPLACEMENT: Primary | ICD-10-CM

## 2021-10-11 LAB — INR PPP: 2.4

## 2021-10-11 NOTE — PROGRESS NOTES
Anticoagulation Clinic Progress Note    Anticoagulation Summary  As of 10/11/2021    INR goal:  2.5-3.5   TTR:  80.0 % (2.8 y)   INR used for dosin.40 (10/11/2021)   Warfarin maintenance plan:  8 mg every Mon, Thu; 10 mg all other days   Weekly warfarin total:  66 mg   Plan last modified:  Tami Tilley, [facility] (10/11/2021)   Next INR check:     Priority:  High   Target end date:  Indefinite    Indications    History of mechanical aortic valve replacement [Z95.2]  Long term (current) use of anticoagulants [Z79.01]             Anticoagulation Episode Summary     INR check location:  Home Draw    Preferred lab:      Send INR reminders to:   JOSE [facility]CHUGA  POOL    Comments:  **[facility]**      Anticoagulation Care Providers     Provider Role Specialty Phone number    Lorne Kam MD Referring Cardiology [phone]          Clinic Interview:  Patient Findings     Negatives:  Signs/symptoms of thrombosis, Signs/symptoms of bleeding,   Laboratory test error suspected, Change in health, Change in alcohol use,   Change in activity, Upcoming invasive procedure, Emergency department   visit, Upcoming dental procedure, Missed doses, Extra doses, Change in   medications, Change in diet/appetite, Hospital admission, Bruising, Other   complaints      Clinical Outcomes     Negatives:  Major bleeding event, Thromboembolic event,   Anticoagulation-related hospital admission, Anticoagulation-related ED   visit, Anticoagulation-related fatality        INR History:  Anticoagulation Monitoring 10/4/2021 10/6/2021 10/11/2021   INR 2.80 2.10 2.40   INR Date 10/4/2021 10/6/2021 10/11/2021   INR Goal 2.5-3.5 2.5-3.5 2.5-3.5   Trend Same Same Up   Last Week Total 64 mg 64 mg 64 mg   Next Week Total 64 mg 64 mg 68 mg   Sun 10 mg 10 mg 10 mg   Mon 8 mg - 10 mg (10/11)   Tue 10 mg - 10 mg   Wed 8 mg 8 mg 10 mg   Thu 10 mg 10 mg 8 mg   Fri 8 mg 8 mg 10 mg   Sat 10 mg 10 mg 10 mg   Visit Report - - -   Some  recent data might be hidden       Plan:  1. INR is Subtherapeutic today- see above in Anticoagulation Summary.   Will instruct Man Ramsey to Change their warfarin regimen- see above in Anticoagulation Summary.  2. Follow up in 1 weeks  3. Pt has agreed to only be called if INR out of range. They have been instructed to call if any changes in medications, doses, concerns, etc. Patient expresses understanding and has no further questions at this time.    Tami Tilley, [facility]

## 2021-10-18 ENCOUNTER — ANTICOAGULATION VISIT (OUTPATIENT)
Dept: PHARMACY | Facility: HOSPITAL | Age: 41
End: 2021-10-18

## 2021-10-18 DIAGNOSIS — Z79.01 LONG TERM (CURRENT) USE OF ANTICOAGULANTS: ICD-10-CM

## 2021-10-18 DIAGNOSIS — Z95.2 HISTORY OF MECHANICAL AORTIC VALVE REPLACEMENT: Primary | ICD-10-CM

## 2021-10-18 LAB — INR PPP: 2.7

## 2021-10-18 NOTE — PROGRESS NOTES
Anticoagulation Clinic Progress Note    Anticoagulation Summary  As of 10/18/2021    INR goal:  2.5-3.5   TTR:  79.9 % (2.8 y)   INR used for dosin.70 (10/18/2021)   Warfarin maintenance plan:  8 mg every Mon, Thu; 10 mg all other days   Weekly warfarin total:  66 mg   No change documented:  Heather Kline RPH   Plan last modified:  Tami Tilley RPH (10/11/2021)   Next INR check:  10/25/2021   Priority:  High   Target end date:  Indefinite    Indications    History of mechanical aortic valve replacement [Z95.2]  Long term (current) use of anticoagulants [Z79.01]             Anticoagulation Episode Summary     INR check location:  Home Draw    Preferred lab:      Send INR reminders to:   JOSE LECHUGA  POOL    Comments:  **[facility]**      Anticoagulation Care Providers     Provider Role Specialty Phone number    Lorne Kam MD Referring Cardiology [phone]          Clinic Interview:  Patient Findings     Positives:  Change in medications    Negatives:  Signs/symptoms of thrombosis, Signs/symptoms of bleeding,   Laboratory test error suspected, Change in health, Change in alcohol use,   Change in activity, Upcoming invasive procedure, Emergency department   visit, Upcoming dental procedure, Missed doses, Extra doses, Change in   diet/appetite, Hospital admission, Bruising, Other complaints    Comments:  Started a stool softener over the weekend       Clinical Outcomes     Negatives:  Major bleeding event, Thromboembolic event,   Anticoagulation-related hospital admission, Anticoagulation-related ED   visit, Anticoagulation-related fatality    Comments:  Started a stool softener over the weekend         INR History:  Anticoagulation Monitoring 10/6/2021 10/11/2021 10/18/2021   INR 2.10 2.40 2.70   INR Date 10/6/2021 10/11/2021 10/18/2021   INR Goal 2.5-3.5 2.5-3.5 2.5-3.5   Trend Same Up Same   Last Week Total 64 mg 64 mg 68 mg   Next Week Total 64 mg 68 mg 66 mg   Sun 10 mg  10 mg 10 mg   Mon - 10 mg (10/11) 8 mg   Tue - 10 mg 10 mg   Wed 8 mg 10 mg 10 mg   Thu 10 mg 8 mg 8 mg   Fri 8 mg 10 mg 10 mg   Sat 10 mg 10 mg 10 mg   Visit Report - - -   Some recent data might be hidden       Plan:  1. INR is Therapeutic today- see above in Anticoagulation Summary.   Will instruct Man Ramsey to change their warfarin regimen- see above in Anticoagulation Summary.  2. Follow up in 1 weeks  3. They have been instructed to call if any changes in medications, doses, concerns, etc. Patient expresses understanding and has no further questions at this time.    Heather Kline, [facility]

## 2021-10-25 ENCOUNTER — ANTICOAGULATION VISIT (OUTPATIENT)
Dept: PHARMACY | Facility: HOSPITAL | Age: 41
End: 2021-10-25

## 2021-10-25 DIAGNOSIS — Z79.01 LONG TERM (CURRENT) USE OF ANTICOAGULANTS: ICD-10-CM

## 2021-10-25 DIAGNOSIS — Z95.2 HISTORY OF MECHANICAL AORTIC VALVE REPLACEMENT: Primary | ICD-10-CM

## 2021-10-25 LAB — INR PPP: 3.6

## 2021-10-25 NOTE — PROGRESS NOTES
Anticoagulation Clinic Progress Note    Anticoagulation Summary  As of 10/25/2021    INR goal:  2.5-3.5   TTR:  80.0 % (2.9 y)   INR used for dosing:  3.60 (10/25/2021)   Warfarin maintenance plan:  8 mg every Mon, Thu; 10 mg all other days   Weekly warfarin total:  66 mg   No change documented:  Heather Kline RPH   Plan last modified:  Tami Tilley RPH (10/11/2021)   Next INR check:  11/1/2021   Priority:  High   Target end date:  Indefinite    Indications    History of mechanical aortic valve replacement [Z95.2]  Long term (current) use of anticoagulants [Z79.01]             Anticoagulation Episode Summary     INR check location:  Home Draw    Preferred lab:      Send INR reminders to:   JOSE LECHUGA  POOL    Comments:  **[facility]**      Anticoagulation Care Providers     Provider Role Specialty Phone number    Lorne Kam MD Referring Cardiology [phone]          Clinic Interview:  Patient Findings     Positives:  Change in alcohol use    Negatives:  Signs/symptoms of thrombosis, Signs/symptoms of bleeding,   Laboratory test error suspected, Change in health, Change in activity,   Upcoming invasive procedure, Emergency department visit, Upcoming dental   procedure, Missed doses, Extra doses, Change in medications, Change in   diet/appetite, Hospital admission, Bruising, Other complaints    Comments:  Increased alcohol over the weekend       Clinical Outcomes     Negatives:  Major bleeding event, Thromboembolic event,   Anticoagulation-related hospital admission, Anticoagulation-related ED   visit, Anticoagulation-related fatality    Comments:  Increased alcohol over the weekend         INR History:  Anticoagulation Monitoring 10/11/2021 10/18/2021 10/25/2021   INR 2.40 2.70 3.60   INR Date 10/11/2021 10/18/2021 10/25/2021   INR Goal 2.5-3.5 2.5-3.5 2.5-3.5   Trend Up Same Same   Last Week Total 64 mg 68 mg 66 mg   Next Week Total 68 mg 66 mg 66 mg   Sun 10 mg 10 mg 10 mg    Mon 10 mg (10/11) 8 mg 8 mg   Tue 10 mg 10 mg 10 mg   Wed 10 mg 10 mg 10 mg   Thu 8 mg 8 mg 8 mg   Fri 10 mg 10 mg 10 mg   Sat 10 mg 10 mg 10 mg   Visit Report - - -   Some recent data might be hidden       Plan:  1. INR is Supratherapeutic today- see above in Anticoagulation Summary.   Will instruct Man Ramsey to Continue their warfarin regimen- see above in Anticoagulation Summary.  2. Follow up in 1 week  3. Pt has agreed to only be called if INR out of range. They have been instructed to call if any changes in medications, doses, concerns, etc. Patient expresses understanding and has no further questions at this time.    Heather Kline [facility]

## 2021-10-27 ENCOUNTER — OFFICE VISIT (OUTPATIENT)
Dept: GASTROENTEROLOGY | Facility: CLINIC | Age: 41
End: 2021-10-27

## 2021-10-27 VITALS — BODY MASS INDEX: 36.99 KG/M2 | TEMPERATURE: 97.1 F | HEIGHT: 71 IN | WEIGHT: 264.2 LBS

## 2021-10-27 DIAGNOSIS — K64.9 HEMORRHOIDS, UNSPECIFIED HEMORRHOID TYPE: Primary | ICD-10-CM

## 2021-10-27 DIAGNOSIS — K62.5 RECTAL BLEEDING: ICD-10-CM

## 2021-10-27 PROCEDURE — 99214 OFFICE O/P EST MOD 30 MIN: CPT | Performed by: NURSE PRACTITIONER

## 2021-10-27 RX ORDER — MULTIVIT WITH MINERALS/LUTEIN
500 TABLET ORAL DAILY
COMMUNITY

## 2021-10-27 RX ORDER — MELATONIN
1000 DAILY
COMMUNITY

## 2021-10-27 RX ORDER — MULTIPLE VITAMINS W/ MINERALS TAB 9MG-400MCG
1 TAB ORAL DAILY
COMMUNITY

## 2021-10-27 NOTE — PROGRESS NOTES
Chief Complaint   Patient presents with   • Rectal Bleeding       HPI    Man Ramsey is a  41 y.o. male here for a follow up visit for rectal bleeding.    This patient follows w/ Dr. Hill, new to me.    History of aortic valve replacement, cardiomyopathy, on Coumadin.    Patient seen in 2019 for similar issue underwent colonoscopy found to have normal ileum, one 4 mm polyp in splenic flexure, and nonbleeding internal hemorrhoids.  Pathology was benign.    He was seen in the emergency room earlier this month for complaints of rectal bleeding.  INR at that time 2.08.  Hemoglobin was normal.  Patient treated with Proctofoam and told to follow-up with our services.    Today he reports some improvement after using Proctofoam.  He has had issues off and on with rectal bleeding since the colonoscopy not as severe as before the procedure.  Denies rectal pain, abdominal pain, diarrhea, constipation.  He is currently on stool softeners to prevent straining.    Past Medical History:   Diagnosis Date   • Aortic insufficiency due to bicuspid aortic valve     with severe AI, s/p 29mm ATS mechanical AVR 5/2012.  Echo has shown normal function but mild AI.   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  He recovered with oral steroids.  GIANNA was negative for endocarditis.   • First degree AV block 8/31/2021   • GI (gastrointestinal bleed) 11/12/2019   • Hypertension    • Mixed hyperlipidemia    • Nonischemic cardiomyopathy (HCC)     due to AI, LVEF 40% with LVE 5/2012; improved to EF 57% in July 2012 with mild LV dilation.  Echo 3/2014 with normal LV size and systolic function   • Obesity    • Ocular migraine        Past Surgical History:   Procedure Laterality Date   • ABDOMINAL SURGERY  May 2012    sternotomy, heart valve replacement   • AORTIC VALVE REPAIR/REPLACEMENT  05/2012     Mechanical   • CARDIAC CATHETERIZATION  May 2012    prep for heart surgery   • COLONOSCOPY N/A 12/17/2019    Procedure: COLONOSCOPY into cecum with polypectomy;  Surgeon: Cosme Hill MD;  Location: [facility] ENDOSCOPY;  Service: Gastroenterology       Scheduled Meds:     Continuous Infusions: No current facility-administered medications for this visit.      PRN Meds:     No Known Allergies    Social History     Socioeconomic History   • Marital status: Single   Tobacco Use   • Smoking status: Never Smoker   • Smokeless tobacco: Never Used   • Tobacco comment: caffeine use/ 2-3 CUPS of tea daily    Substance and Sexual Activity   • Alcohol use: Yes     Alcohol/week: 2.0 standard drinks     Types: 1 Cans of beer, 1 Shots of liquor per week     Comment: 2-3 servings of alcohol daily   • Drug use: No   • Sexual activity: Never       Family History   Problem Relation Age of Onset   • Colon polyps Mother    • Hypertension Mother    • Hypertension Father    • Hypertension Maternal Grandmother    • Hypertension Paternal Grandmother        Review of Systems   Constitutional: Negative for activity change, appetite change, fatigue, fever and unexpected weight change.   HENT: Negative for trouble swallowing.    Respiratory: Negative for apnea, cough, choking, chest tightness, shortness of breath and wheezing.    Cardiovascular: Negative for chest pain, palpitations and leg swelling.   Gastrointestinal: Positive for anal bleeding. Negative for abdominal distention, abdominal pain, blood in stool, constipation, diarrhea, nausea, rectal pain and vomiting.       Vitals:    10/27/21 1256   Temp: 97.1 °F (36.2 °C)       Physical Exam  Constitutional:       Appearance: He is well-developed.   Abdominal:      General: Bowel sounds are normal. There is no distension.      Palpations: Abdomen is soft. There is no mass.      Tenderness: There is no abdominal tenderness. There is no guarding.      Hernia: No hernia is present.   Skin:      General: Skin is warm and dry.      Capillary Refill: Capillary refill takes less than 2 seconds.   Neurological:      Mental Status: He is alert and oriented to person, place, and time.   Psychiatric:         Behavior: Behavior normal.     Assessment    Diagnoses and all orders for this visit:    1. Hemorrhoids, unspecified hemorrhoid type (Primary)  -     Ambulatory Referral to Colorectal Surgery    2. Rectal bleeding  -     Ambulatory Referral to Colorectal Surgery    Plan    Very pleasant 41-year-old male seen today for rectal bleeding with a history of internal hemorrhoids on anticoagulant therapy Coumadin.  Recommend referral to colorectal surgery, Dr. Edwards, to discuss treatment options for hemorrhoids.  Offered Anusol suppositories but patient prefers to hold off as he has been feeling better.  Encourage high-fiber diet, adequate hydration, and recommend he continue stool softeners.  Follow-up and further recommendations pending colorectal surgery evaluation.         ZOË Leavitt  [facility]  [address] [place] [address]  Office: [phone]

## 2021-11-01 ENCOUNTER — ANTICOAGULATION VISIT (OUTPATIENT)
Dept: PHARMACY | Facility: HOSPITAL | Age: 41
End: 2021-11-01

## 2021-11-01 DIAGNOSIS — Z95.2 HISTORY OF MECHANICAL AORTIC VALVE REPLACEMENT: Primary | ICD-10-CM

## 2021-11-01 DIAGNOSIS — Z79.01 LONG TERM (CURRENT) USE OF ANTICOAGULANTS: ICD-10-CM

## 2021-11-01 LAB — INR PPP: 2.7

## 2021-11-01 NOTE — PROGRESS NOTES
Anticoagulation Clinic Progress Note    Anticoagulation Summary  As of 2021    INR goal:  2.5-3.5   TTR:  80.0 % (2.9 y)   INR used for dosin.70 (2021)   Warfarin maintenance plan:  8 mg every Mon, Thu; 10 mg all other days   Weekly warfarin total:  66 mg   No change documented:  Manuela Grande, PharmD   Plan last modified:  Tami Tilley, [facility] (10/11/2021)   Next INR check:  2021   Priority:  High   Target end date:  Indefinite    Indications    History of mechanical aortic valve replacement [Z95.2]  Long term (current) use of anticoagulants [Z79.01]             Anticoagulation Episode Summary     INR check location:  Home Draw    Preferred lab:      Send INR reminders to:  LISA LECHUGA  POOL    Comments:  **[facility]**      Anticoagulation Care Providers     Provider Role Specialty Phone number    Lorne Kam MD Referring Cardiology [phone]          Clinic Interview:  No pertinent clinical findings have been reported.    INR History:  Anticoagulation Monitoring 10/18/2021 10/25/2021 2021   INR 2.70 3.60 2.70   INR Date 10/18/2021 10/25/2021 2021   INR Goal 2.5-3.5 2.5-3.5 2.5-3.5   Trend Same Same Same   Last Week Total 68 mg 66 mg 66 mg   Next Week Total 66 mg 66 mg 66 mg   Sun 10 mg 10 mg 10 mg   Mon 8 mg 8 mg 8 mg   Tue 10 mg 10 mg 10 mg   Wed 10 mg 10 mg 10 mg   Thu 8 mg 8 mg 8 mg   Fri 10 mg 10 mg 10 mg   Sat 10 mg 10 mg 10 mg   Visit Report - - -   Some recent data might be hidden       Plan:  1. INR is therapeutic today- see above in Anticoagulation Summary.    Man Ramsey to continue their warfarin regimen- see above in Anticoagulation Summary.  2. Follow up in 1 week  3. Pt has agreed to only be called if INR out of range. They have been instructed to call if any changes in medications, doses, concerns, etc. Patient expresses understanding and has no further questions at this time.    Manuela Grande, PharmD

## 2021-11-15 ENCOUNTER — ANTICOAGULATION VISIT (OUTPATIENT)
Dept: PHARMACY | Facility: HOSPITAL | Age: 41
End: 2021-11-15

## 2021-11-15 DIAGNOSIS — Z95.2 HISTORY OF MECHANICAL AORTIC VALVE REPLACEMENT: Primary | ICD-10-CM

## 2021-11-15 DIAGNOSIS — Z79.01 LONG TERM (CURRENT) USE OF ANTICOAGULANTS: ICD-10-CM

## 2021-11-15 LAB — INR PPP: 2.6

## 2021-11-15 NOTE — PROGRESS NOTES
Anticoagulation Clinic Progress Note    Anticoagulation Summary  As of 11/15/2021    INR goal:  2.5-3.5   TTR:  80.3 % (2.9 y)   INR used for dosin.60 (11/15/2021)   Warfarin maintenance plan:  8 mg every Mon, Thu; 10 mg all other days   Weekly warfarin total:  66 mg   No change documented:  Sun Curtis   Plan last modified:  Tami Tilley [facility] (10/11/2021)   Next INR check:  2021   Priority:  High   Target end date:  Indefinite    Indications    History of mechanical aortic valve replacement [Z95.2]  Long term (current) use of anticoagulants [Z79.01]             Anticoagulation Episode Summary     INR check location:  Home Draw    Preferred lab:      Send INR reminders to:  LISA LECHUGA  POOL    Comments:  **[facility]**      Anticoagulation Care Providers     Provider Role Specialty Phone number    Lorne Kam MD Referring Cardiology [phone]          Clinic Interview:  No pertinent clinical findings have been reported.    INR History:  Anticoagulation Monitoring 10/25/2021 2021 11/15/2021   INR 3.60 2.70 2.60   INR Date 10/25/2021 2021 11/15/2021   INR Goal 2.5-3.5 2.5-3.5 2.5-3.5   Trend Same Same Same   Last Week Total 66 mg 66 mg 66 mg   Next Week Total 66 mg 66 mg 66 mg   Sun 10 mg 10 mg 10 mg   Mon 8 mg 8 mg 8 mg   Tue 10 mg 10 mg 10 mg   Wed 10 mg 10 mg 10 mg   Thu 8 mg 8 mg 8 mg   Fri 10 mg 10 mg 10 mg   Sat 10 mg 10 mg 10 mg   Visit Report - - -   Some recent data might be hidden       Plan:  1. INR is therapeutic today- see above in Anticoagulation Summary.    Man Ramsey to continue their warfarin regimen- see above in Anticoagulation Summary.  2. Follow up in 2 weeks  3. Pt has agreed to only be called if INR out of range. They have been instructed to call if any changes in medications, doses, concerns, etc. Patient expresses understanding and has no further questions at this time.    Sun Curtis

## 2021-11-29 ENCOUNTER — ANTICOAGULATION VISIT (OUTPATIENT)
Dept: PHARMACY | Facility: HOSPITAL | Age: 41
End: 2021-11-29

## 2021-11-29 DIAGNOSIS — Z95.2 HISTORY OF MECHANICAL AORTIC VALVE REPLACEMENT: Primary | ICD-10-CM

## 2021-11-29 DIAGNOSIS — Z79.01 LONG TERM (CURRENT) USE OF ANTICOAGULANTS: ICD-10-CM

## 2021-11-29 LAB — INR PPP: 3.2

## 2021-11-29 NOTE — PROGRESS NOTES
Anticoagulation Clinic Progress Note    Anticoagulation Summary  As of 11/29/2021    INR goal:  2.5-3.5   TTR:  80.5 % (3 y)   INR used for dosing:  3.20 (11/29/2021)   Warfarin maintenance plan:  8 mg every Mon, Thu; 10 mg all other days   Weekly warfarin total:  66 mg   No change documented:  Yecenia Burns   Plan last modified:  Tami Tilley, [facility] (10/11/2021)   Next INR check:  12/13/2021   Priority:  High   Target end date:  Indefinite    Indications    History of mechanical aortic valve replacement [Z95.2]  Long term (current) use of anticoagulants [Z79.01]             Anticoagulation Episode Summary     INR check location:  Home Draw    Preferred lab:      Send INR reminders to:   JOSE [facility]  POOL    Comments:  **[facility]**      Anticoagulation Care Providers     Provider Role Specialty Phone number    Lorne Kam MD Referring Cardiology [phone]          Clinic Interview:  No pertinent clinical findings have been reported.    INR History:  Anticoagulation Monitoring 11/1/2021 11/15/2021 11/29/2021   INR 2.70 2.60 3.20   INR Date 11/1/2021 11/15/2021 11/29/2021   INR Goal 2.5-3.5 2.5-3.5 2.5-3.5   Trend Same Same Same   Last Week Total 66 mg 66 mg 66 mg   Next Week Total 66 mg 66 mg 66 mg   Sun 10 mg 10 mg 10 mg   Mon 8 mg 8 mg 8 mg   Tue 10 mg 10 mg 10 mg   Wed 10 mg 10 mg 10 mg   Thu 8 mg 8 mg 8 mg   Fri 10 mg 10 mg 10 mg   Sat 10 mg 10 mg 10 mg   Visit Report - - -   Some recent data might be hidden       Plan:  1. INR is therapeutic today- see above in Anticoagulation Summary.    Man Ramsey to continue their warfarin regimen- see above in Anticoagulation Summary.  2. Follow up in 2 weeks  3. Pt has agreed to only be called if INR out of range. They have been instructed to call if any changes in medications, doses, concerns, etc. Patient expresses understanding and has no further questions at this time.    Yecenia Burns

## 2021-11-30 ENCOUNTER — IMMUNIZATION (OUTPATIENT)
Dept: VACCINE CLINIC | Facility: HOSPITAL | Age: 41
End: 2021-11-30

## 2021-11-30 PROCEDURE — 0004A HC ADM SARSCOV2 30MCG/0.3ML BOOSTER: CPT | Performed by: INTERNAL MEDICINE

## 2021-11-30 PROCEDURE — 91300 HC SARSCOV02 VAC 30MCG/0.3ML IM: CPT | Performed by: INTERNAL MEDICINE

## 2021-12-02 ENCOUNTER — OFFICE VISIT (OUTPATIENT)
Dept: SURGERY | Facility: CLINIC | Age: 41
End: 2021-12-02

## 2021-12-02 VITALS
TEMPERATURE: 97.1 F | SYSTOLIC BLOOD PRESSURE: 130 MMHG | BODY MASS INDEX: 37.53 KG/M2 | HEIGHT: 71 IN | WEIGHT: 268.1 LBS | OXYGEN SATURATION: 99 % | DIASTOLIC BLOOD PRESSURE: 90 MMHG | HEART RATE: 62 BPM

## 2021-12-02 DIAGNOSIS — K62.5 RECTAL BLEEDING: Primary | ICD-10-CM

## 2021-12-02 PROCEDURE — 99204 OFFICE O/P NEW MOD 45 MIN: CPT | Performed by: PHYSICIAN ASSISTANT

## 2021-12-02 RX ORDER — PRAMOXINE HYDROCHLORIDE HYDROCORTISONE ACETATE 100; 100 MG/10G; MG/10G
1 AEROSOL, FOAM TOPICAL 2 TIMES DAILY
Qty: 30 G | Refills: 0 | Status: SHIPPED | OUTPATIENT
Start: 2021-12-02 | End: 2022-03-29

## 2021-12-02 NOTE — PROGRESS NOTES
Man Ramsey is a 41 y.o. male who is seen as a consult at the request of ZOË Leavitt for Hemorrhoids and Rectal Bleeding.      HPI:  Pt on long term anticoagulation with Warfain S/P mechanical aortic valve replacement 05/2012 secondary to aortic insufficiency of bicuspid aortic valve.    Pt presents today for evaluation of rectal bleeding.  Had intermittent RB x1-2 years.   Had an episode 2 months ago where he noted more blood than usual after passing a hard BM.   Went to the [facility] ED 10/06/2021. Hg and HCT at that time were WNL. INR 2.08. RB believed to be secondary to internal hemorrhoids. Discharged with Rx for Proctofoam.   Pt used Proctofoam x10 days with improvement in symptoms.   1 episode of bright red spotting after BM since his visit to the ED.   Pt denies any rectal pain or fever.   Pt notes a similar incident 2 years ago, which prompted endoscopic evaluation with a colonoscopy. A 4mm benign polyp at splenic flexure and moderately enlarged internal hemorrhoids were noted, but otherwise the exam was normal.     Regular BM  1 BM Daily.   Takes SS PRN  Not taking fiber supplements. Pt states that he took fiber in the past with no noticeable improvement in BM.  Feels dehydrated on weekends with harder stools. Has increased fluid intake over weekends with improvement.   Occasional lower abdominal discomfort that improves after passing gas or stool.     Dad with Hx of Colon polyps. No other known FHx of colon polyps or colon cancer.     Past Medical History:   Diagnosis Date   • Aortic insufficiency due to bicuspid aortic valve     with severe AI, s/p 29mm ATS mechanical AVR 5/2012.  Echo has shown normal function but mild AI.   • BRBPR (bright red blood per rectum) 10/06/2021    SEEN AT [facility] ER   • Colon polyps     FOLLOWED BY DR. TORSTEN GORE   • Fever of unknown origin 08/2012    was ultimately felt to be secondary to post-pericardiotomy syndrome.  He recovered with oral steroids.  GIANNA was negative  for endocarditis.   • First degree AV block 8/31/2021   • Hematochezia 11/14/2019    SEEN AT [facility] ER   • Hemorrhoids    • History of blood transfusion    • Hyperglycemia    • Hypertension    • Laceration of ankle 05/24/2010    RIGHT ANKLE, SEEN AT [facility] ER   • Long term (current) use of anticoagulants    • Mixed hyperlipidemia    • Nonischemic cardiomyopathy (HCC)     due to AI, LVEF 40% with LVE 5/2012; improved to EF 57% in July 2012 with mild LV dilation.  Echo 3/2014 with normal LV size and systolic function   • Obesity    • Ocular migraine        Past Surgical History:   Procedure Laterality Date   • AORTIC VALVE REPAIR/REPLACEMENT N/A 05/30/2012    Mechanical, DR. INGA BOLES AT [facility]   • CARDIAC CATHETERIZATION Bilateral 04/25/2012    MILD PULMONARY HYPERTENSION, ELEVATED LEFT VENTRICULAR END DIASTOLIC PRESSURE AS WELL AS PULMONARY CAPILLARY WEDGE PRESSURE CONSISTANT WITH CHF, DILATED LEFT VENTRICLE WITH LOW NORMAL LEFT VENTRICULAR SYSTOLIC FUNCTION, DR.REBECCA SESAY AT [facility]   • COLONOSCOPY N/A 12/17/2019    4 MM BENIGN POLYP IN SPLENIC FLEXURE, MODERATE HEMORRHOIDS, RESCOPE IN 5 YRS, DR. TORSTEN GORE AT [facility]   • HEAD/NECK LACERATION REPAIR N/A 05/27/2009    BACK OF HEAD, D/T FALL, DONE AT [facility] ER   • LEG LACERATION REPAIR Right 05/24/2010    RIGHT ANKLE, DONE AT [facility] ER   • TRANSESOPHAGEAL ECHOCARDIOGRAM (GIANNA) N/A 05/30/2012    DR. INGA BOLES AT [facility]       Social History:   reports that he has never smoked. He has never used smokeless tobacco. He reports current alcohol use of about 2.0 standard drinks of alcohol per week. He reports that he does not use drugs.      Marriage status: Single    Family History   Problem Relation Age of Onset   • Colon polyps Mother    • Hypertension Mother    • Arthritis Mother    • Hypertension Father    • Hypertension Maternal Grandmother    • Hypertension Paternal Grandmother    • Hyperlipidemia Sister    • Hypertension Sister          Current Outpatient Medications:   •  B  Complex Vitamins (VITAMIN B COMPLEX PO), Take  by mouth., Disp: , Rfl:   •  CALCIUM PO, Take  by mouth., Disp: , Rfl:   •  carvedilol (COREG) 12.5 MG tablet, Take 1 tablet by mouth 2 (Two) Times a Day., Disp: 180 tablet, Rfl: 3  •  cholecalciferol (VITAMIN D3) 25 MCG (1000 UT) tablet, Take 1,000 Units by mouth Daily., Disp: , Rfl:   •  Docusate Calcium (STOOL SOFTENER PO), Take  by mouth., Disp: , Rfl:   •  fexofenadine (ALLEGRA) 180 MG tablet, Take 180 mg by mouth Daily., Disp: , Rfl:   •  lisinopril (PRINIVIL,ZESTRIL) 20 MG tablet, Take 1 tablet by mouth Daily., Disp: 90 tablet, Rfl: 3  •  Melatonin 3 MG capsule, Take 10 mg by mouth., Disp: , Rfl:   •  multivitamin with minerals tablet tablet, Take 1 tablet by mouth Daily., Disp: , Rfl:   •  Omega-3 Fatty Acids (FISH OIL) 1000 MG capsule capsule, Take  by mouth Daily With Breakfast., Disp: , Rfl:   •  vitamin C (ASCORBIC ACID) 250 MG tablet, Take 500 mg by mouth Daily., Disp: , Rfl:   •  warfarin (COUMADIN) 2 MG tablet, TAKE 5 TABLETS (10 MG) BY MOUTH EVERY MONDAY AND FRIDAY AND TAKE 4 TABLETS (8 MG) ON ALL OTHER DAYS OR AS DIRECTED (Patient taking differently: TAKE 5 TABLETS (10 MG) BY MOUTH ON ALL OTHER DAYS OR AS DIRECTED AND TAKE 4 TABLETS (8 MG) EVERY MONDAY AND FRIDAY), Disp: 400 tablet, Rfl: 1  •  Hydrocort-Pramoxine, Perianal, (Proctofoam HC) 1-1 % rectal foam, Insert 1 application into the rectum 2 (Two) Times a Day., Disp: 30 g, Rfl: 0    Allergy  Patient has no known allergies.    Review of Systems   Constitutional: Negative for decreased appetite and weight gain.   HENT: Negative for congestion, hearing loss and hoarse voice.    Eyes: Negative for blurred vision, discharge and visual disturbance.   Cardiovascular: Negative for chest pain, cyanosis and leg swelling.   Respiratory: Negative for cough, shortness of breath, sleep disturbances due to breathing and snoring.    Endocrine: Negative for cold intolerance and heat intolerance.    Hematologic/Lymphatic: Does not bruise/bleed easily.   Skin: Negative for itching, poor wound healing and skin cancer.   Musculoskeletal: Negative for arthritis, back pain, joint pain and joint swelling.   Gastrointestinal: Positive for hematochezia. Negative for abdominal pain, change in bowel habit, bowel incontinence and constipation.   Genitourinary: Negative for bladder incontinence, dysuria and hematuria.   Neurological: Negative for brief paralysis, excessive daytime sleepiness, dizziness, focal weakness, headaches, light-headedness and weakness.   Psychiatric/Behavioral: Negative for altered mental status and hallucinations. The patient does not have insomnia.    Allergic/Immunologic: Negative for HIV exposure and persistent infections.   All other systems reviewed and are negative.      Vitals:    12/02/21 1009   BP: 130/90   Pulse: 62   Temp: 97.1 °F (36.2 °C)   SpO2: 99%     Body mass index is 37.39 kg/m².    Physical Exam  Exam conducted with a chaperone present.   Constitutional:       General: He is not in acute distress.     Appearance: He is well-developed.   HENT:      Head: Normocephalic and atraumatic.      Nose: Nose normal.   Eyes:      Conjunctiva/sclera: Conjunctivae normal.      Pupils: Pupils are equal, round, and reactive to light.   Neck:      Trachea: No tracheal deviation.   Pulmonary:      Effort: Pulmonary effort is normal. No respiratory distress.      Breath sounds: Normal breath sounds.   Abdominal:      General: Bowel sounds are normal. There is no distension.      Palpations: Abdomen is soft.   Genitourinary:     Comments: Perianal exam: WNL  IRVIN- Good tone, no masses  Anoscopy performed:  Grade 1 x 3 internal hem  Musculoskeletal:         General: No deformity. Normal range of motion.      Cervical back: Normal range of motion.   Skin:     General: Skin is warm and dry.   Neurological:      Mental Status: He is alert and oriented to person, place, and time.      Cranial Nerves:  No cranial nerve deficit.      Coordination: Coordination normal.      Gait: Gait normal.   Psychiatric:         Behavior: Behavior normal.         Judgment: Judgment normal.         Review of Medical Record:  I reviewed PMHx, Surgical Hx, Medication List, and FHx.    Colonoscopy 12/17/2019  - 4 mm Benign Polyp, Splenic Flexure   - Moderate Hemorrhoids  - Rescope: 5 Years  - Dr. Hill    Assessment:  1. Rectal bleeding    - New  - Likely secondary to previously enlarged hemorrhoids and exacerbated by long-term anticoagulation with Warfarin.     Plan:  - Start Fiber. Recommended 30-40g Daily.   - SS PRN  - Refill for Proctofoam provided.  - Discussed possible repeat colonoscopy for further evaluation of rectal bleeding other than hemorrhoids if symptoms do not improve.   - Follow up in 6-8 weeks with Dr. Edwards for further evaluation if symptoms do not improve with conservative management.

## 2021-12-13 ENCOUNTER — ANTICOAGULATION VISIT (OUTPATIENT)
Dept: PHARMACY | Facility: HOSPITAL | Age: 41
End: 2021-12-13

## 2021-12-13 DIAGNOSIS — Z95.2 HISTORY OF MECHANICAL AORTIC VALVE REPLACEMENT: Primary | ICD-10-CM

## 2021-12-13 DIAGNOSIS — Z79.01 LONG TERM (CURRENT) USE OF ANTICOAGULANTS: ICD-10-CM

## 2021-12-13 LAB — INR PPP: 3.3

## 2021-12-13 NOTE — PROGRESS NOTES
Anticoagulation Clinic Progress Note    Anticoagulation Summary  As of 12/13/2021    INR goal:  2.5-3.5   TTR:  80.8 % (3 y)   INR used for dosing:  3.30 (12/13/2021)   Warfarin maintenance plan:  8 mg every Mon, Thu; 10 mg all other days   Weekly warfarin total:  66 mg   Plan last modified:  Tami Tilley, [facility] (10/11/2021)   Next INR check:  12/27/2021   Priority:  High   Target end date:  Indefinite    Indications    History of mechanical aortic valve replacement [Z95.2]  Long term (current) use of anticoagulants [Z79.01]             Anticoagulation Episode Summary     INR check location:  Home Draw    Preferred lab:      Send INR reminders to:   [facility]  POOL    Comments:  **[facility]**      Anticoagulation Care Providers     Provider Role Specialty Phone number    Lorne Kam MD Referring Cardiology [phone]          Clinic Interview:  No pertinent clinical findings have been reported.    INR History:  Anticoagulation Monitoring 11/15/2021 11/29/2021 12/13/2021   INR 2.60 3.20 3.30   INR Date 11/15/2021 11/29/2021 12/13/2021   INR Goal 2.5-3.5 2.5-3.5 2.5-3.5   Trend Same Same Same   Last Week Total 66 mg 66 mg 66 mg   Next Week Total 66 mg 66 mg 66 mg   Sun 10 mg 10 mg 10 mg   Mon 8 mg 8 mg 8 mg   Tue 10 mg 10 mg 10 mg   Wed 10 mg 10 mg 10 mg   Thu 8 mg 8 mg 8 mg   Fri 10 mg 10 mg 10 mg   Sat 10 mg 10 mg 10 mg   Visit Report - - -   Some recent data might be hidden       Plan:  1. INR is therapeutic today- see above in Anticoagulation Summary.    Man Ramsey to continue their warfarin regimen- see above in Anticoagulation Summary.  2. Follow up in 2 weeks  3. Pt has agreed to only be called if INR out of range. They have been instructed to call if any changes in medications, doses, concerns, etc. Patient expresses understanding and has no further questions at this time.    Yecenia Burns

## 2021-12-27 LAB — INR PPP: 2.5

## 2022-01-04 RX ORDER — WARFARIN SODIUM 2 MG/1
TABLET ORAL
Qty: 400 TABLET | Refills: 0 | Status: SHIPPED | OUTPATIENT
Start: 2022-01-04 | End: 2022-03-28

## 2022-01-11 LAB — INR PPP: 2.2

## 2022-01-12 ENCOUNTER — ANTICOAGULATION VISIT (OUTPATIENT)
Dept: PHARMACY | Facility: HOSPITAL | Age: 42
End: 2022-01-12

## 2022-01-12 DIAGNOSIS — Z95.2 HISTORY OF MECHANICAL AORTIC VALVE REPLACEMENT: Primary | ICD-10-CM

## 2022-01-12 DIAGNOSIS — Z79.01 LONG TERM (CURRENT) USE OF ANTICOAGULANTS: ICD-10-CM

## 2022-01-12 NOTE — PROGRESS NOTES
Anticoagulation Clinic Progress Note    Anticoagulation Summary  As of 2022    INR goal:  2.5-3.5   TTR:  80.0 % (3.1 y)   INR used for dosin.20 (2022)   Warfarin maintenance plan:  8 mg every Mon, Thu; 10 mg all other days   Weekly warfarin total:  66 mg   Plan last modified:  Tami Tilley, [facility] (10/11/2021)   Next INR check:  2022   Priority:  High   Target end date:  Indefinite    Indications    History of mechanical aortic valve replacement [Z95.2]  Long term (current) use of anticoagulants [Z79.01]             Anticoagulation Episode Summary     INR check location:  Home Draw    Preferred lab:      Send INR reminders to:   [facility]  POOL    Comments:  **[facility]**      Anticoagulation Care Providers     Provider Role Specialty Phone number    Lorne Kam MD Referring Cardiology [phone]          Clinic Interview:  Patient Findings     Positives:  Change in alcohol use, Change in medications    Negatives:  Signs/symptoms of thrombosis, Signs/symptoms of bleeding,   Laboratory test error suspected, Change in health, Change in activity,   Upcoming invasive procedure, Emergency department visit, Upcoming dental   procedure, Missed doses, Extra doses, Change in diet/appetite, Hospital   admission, Bruising, Other complaints    Comments:  Decrease in alcohol yesterday and changed to metamucil from   fiber choice gummy       Clinical Outcomes     Negatives:  Major bleeding event, Thromboembolic event,   Anticoagulation-related hospital admission, Anticoagulation-related ED   visit, Anticoagulation-related fatality    Comments:  Decrease in alcohol yesterday and changed to metamucil from   fiber choice gummy         INR History:  Anticoagulation Monitoring 2021   INR 3.20 3.30 2.20   INR Date 2021   INR Goal 2.5-3.5 2.5-3.5 2.5-3.5   Trend Same Same Same   Last Week Total 66 mg 66 mg 66 mg   Next Week  Total 66 mg 66 mg 68 mg   Sun 10 mg 10 mg 10 mg   Mon 8 mg 8 mg 8 mg   Tue 10 mg 10 mg 10 mg   Wed 10 mg 10 mg 12 mg (1/12); Otherwise 10 mg   Thu 8 mg 8 mg 8 mg   Fri 10 mg 10 mg 10 mg   Sat 10 mg 10 mg 10 mg   Visit Report - - -   Some recent data might be hidden       Plan:  1. INR is Subtherapeutic today- see above in Anticoagulation Summary.   Will instruct Man Ramsey to Increase their warfarin regimen- see above in Anticoagulation Summary.  2. Follow up in 2 weeks  3. They have been instructed to call if any changes in medications, doses, concerns, etc. Patient expresses understanding and has no further questions at this time.    Heather Kline [facility]

## 2022-01-24 ENCOUNTER — ANTICOAGULATION VISIT (OUTPATIENT)
Dept: PHARMACY | Facility: HOSPITAL | Age: 42
End: 2022-01-24

## 2022-01-24 DIAGNOSIS — Z79.01 LONG TERM (CURRENT) USE OF ANTICOAGULANTS: ICD-10-CM

## 2022-01-24 DIAGNOSIS — Z95.2 HISTORY OF MECHANICAL AORTIC VALVE REPLACEMENT: Primary | ICD-10-CM

## 2022-01-24 LAB — INR PPP: 2.6

## 2022-01-24 NOTE — PROGRESS NOTES
Anticoagulation Clinic Progress Note    Anticoagulation Summary  As of 2022    INR goal:  2.5-3.5   TTR:  79.3 % (3.1 y)   INR used for dosin.60 (2022)   Warfarin maintenance plan:  8 mg every Mon, Thu; 10 mg all other days   Weekly warfarin total:  66 mg   No change documented:  Heather Kline RPH   Plan last modified:  Tami Tilley RPH (10/11/2021)   Next INR check:  2022   Priority:  High   Target end date:  Indefinite    Indications    History of mechanical aortic valve replacement [Z95.2]  Long term (current) use of anticoagulants [Z79.01]             Anticoagulation Episode Summary     INR check location:  Home Draw    Preferred lab:      Send INR reminders to:   [facility]  POOL    Comments:  **[facility]**      Anticoagulation Care Providers     Provider Role Specialty Phone number    Lorne Kam MD Referring Cardiology [phone]          Clinic Interview:  No pertinent clinical findings have been reported.    INR History:  Anticoagulation Monitoring 2021   INR 3.30 2.20 2.60   INR Date 2021   INR Goal 2.5-3.5 2.5-3.5 2.5-3.5   Trend Same Same Same   Last Week Total 66 mg 66 mg 66 mg   Next Week Total 66 mg 68 mg 66 mg   Sun 10 mg 10 mg 10 mg   Mon 8 mg 8 mg 8 mg   Tue 10 mg 10 mg 10 mg   Wed 10 mg 12 mg (); Otherwise 10 mg 10 mg   Thu 8 mg 8 mg 8 mg   Fri 10 mg 10 mg 10 mg   Sat 10 mg 10 mg 10 mg   Visit Report - - -   Some recent data might be hidden       Plan:  1. INR is therapeutic today- see above in Anticoagulation Summary.    Man Ramsey to continue their warfarin regimen- see above in Anticoagulation Summary.  2. Follow up in 2 weeks  3. They have been instructed to call if any changes in medications, doses, concerns, etc. Patient expresses understanding and has no further questions at this time.    Heather Kline RPH

## 2022-02-07 ENCOUNTER — ANTICOAGULATION VISIT (OUTPATIENT)
Dept: PHARMACY | Facility: HOSPITAL | Age: 42
End: 2022-02-07

## 2022-02-07 DIAGNOSIS — Z95.2 HISTORY OF MECHANICAL AORTIC VALVE REPLACEMENT: Primary | ICD-10-CM

## 2022-02-07 DIAGNOSIS — Z79.01 LONG TERM (CURRENT) USE OF ANTICOAGULANTS: ICD-10-CM

## 2022-02-07 LAB — INR PPP: 2.4

## 2022-02-07 NOTE — PROGRESS NOTES
Anticoagulation Clinic Progress Note    Anticoagulation Summary  As of 2022    INR goal:  2.5-3.5   TTR:  79.0 % (3.1 y)   INR used for dosin.40 (2022)   Warfarin maintenance plan:  8 mg every Mon, Thu; 10 mg all other days   Weekly warfarin total:  66 mg   Plan last modified:  Tami Tilley, [facility] (10/11/2021)   Next INR check:  2022   Priority:  High   Target end date:  Indefinite    Indications    History of mechanical aortic valve replacement [Z95.2]  Long term (current) use of anticoagulants [Z79.01]             Anticoagulation Episode Summary     INR check location:  Home Draw    Preferred lab:      Send INR reminders to:   [facility]  POOL    Comments:  **[facility]**      Anticoagulation Care Providers     Provider Role Specialty Phone number    Lorne Kam MD Referring Cardiology [phone]          Clinic Interview:  Patient Findings     Negatives:  Signs/symptoms of thrombosis, Signs/symptoms of bleeding,   Laboratory test error suspected, Change in health, Change in alcohol use,   Change in activity, Upcoming invasive procedure, Emergency department   visit, Upcoming dental procedure, Missed doses, Extra doses, Change in   medications, Change in diet/appetite, Hospital admission, Bruising, Other   complaints      Clinical Outcomes     Negatives:  Major bleeding event, Thromboembolic event,   Anticoagulation-related hospital admission, Anticoagulation-related ED   visit, Anticoagulation-related fatality        INR History:  Anticoagulation Monitoring 2022   INR 2.20 2.60 2.40   INR Date 2022   INR Goal 2.5-3.5 2.5-3.5 2.5-3.5   Trend Same Same Same   Last Week Total 66 mg 66 mg 66 mg   Next Week Total 68 mg 66 mg 68 mg   Sun 10 mg 10 mg 10 mg   Mon 8 mg 8 mg 10 mg (); Otherwise 8 mg   Tue 10 mg 10 mg 10 mg   Wed 12 mg (); Otherwise 10 mg 10 mg 10 mg   Thu 8 mg 8 mg 8 mg   Fri 10 mg 10 mg 10 mg   Sat  10 mg 10 mg 10 mg   Visit Report - - -   Some recent data might be hidden       Plan:  1. INR is Subtherapeutic today- see above in Anticoagulation Summary.   Will instruct Man Ramsey to Increase their warfarin regimen- see above in Anticoagulation Summary.  2. Follow up in 2 weeks  3.  They have been instructed to call if any changes in medications, doses, concerns, etc. Patient expresses understanding and has no further questions at this time.    Heather Kline, [facility]

## 2022-02-21 LAB — INR PPP: 2.9

## 2022-02-22 ENCOUNTER — ANTICOAGULATION VISIT (OUTPATIENT)
Dept: PHARMACY | Facility: HOSPITAL | Age: 42
End: 2022-02-22

## 2022-02-22 DIAGNOSIS — Z79.01 LONG TERM (CURRENT) USE OF ANTICOAGULANTS: ICD-10-CM

## 2022-02-22 DIAGNOSIS — Z95.2 HISTORY OF MECHANICAL AORTIC VALVE REPLACEMENT: Primary | ICD-10-CM

## 2022-02-22 NOTE — PROGRESS NOTES
Anticoagulation Clinic Progress Note    Anticoagulation Summary  As of 2022    INR goal:  2.5-3.5   TTR:  79.0 % (3.2 y)   INR used for dosin.90 (2022)   Warfarin maintenance plan:  8 mg every Mon, Thu; 10 mg all other days   Weekly warfarin total:  66 mg   No change documented:  Heather Kline RPH   Plan last modified:  Tami Tilley RP (10/11/2021)   Next INR check:  3/7/2022   Priority:  High   Target end date:  Indefinite    Indications    History of mechanical aortic valve replacement [Z95.2]  Long term (current) use of anticoagulants [Z79.01]             Anticoagulation Episode Summary     INR check location:  Home Draw    Preferred lab:      Send INR reminders to:   [facility]  POOL    Comments:  **[facility]**      Anticoagulation Care Providers     Provider Role Specialty Phone number    Lorne Kam MD Referring Cardiology [phone]          Clinic Interview:  Patient Findings     Negatives:  Signs/symptoms of thrombosis, Signs/symptoms of bleeding,   Laboratory test error suspected, Change in health, Change in alcohol use,   Change in activity, Upcoming invasive procedure, Emergency department   visit, Upcoming dental procedure, Missed doses, Extra doses, Change in   medications, Change in diet/appetite, Hospital admission, Bruising, Other   complaints      Clinical Outcomes     Negatives:  Major bleeding event, Thromboembolic event,   Anticoagulation-related hospital admission, Anticoagulation-related ED   visit, Anticoagulation-related fatality        INR History:  Anticoagulation Monitoring 2022   INR 2.60 2.40 2.90   INR Date 2022   INR Goal 2.5-3.5 2.5-3.5 2.5-3.5   Trend Same Same Same   Last Week Total 66 mg 66 mg 66 mg   Next Week Total 66 mg 68 mg 66 mg   Sun 10 mg 10 mg 10 mg   Mon 8 mg 10 mg (); Otherwise 8 mg 8 mg   Tue 10 mg 10 mg 10 mg   Wed 10 mg 10 mg 10 mg   Thu 8 mg 8 mg 8 mg   Fri 10  mg 10 mg 10 mg   Sat 10 mg 10 mg 10 mg   Visit Report - - -   Some recent data might be hidden       Plan:  1. INR is Therapeutic today- see above in Anticoagulation Summary.   Will instruct Man Ramsey to Continue their warfarin regimen- see above in Anticoagulation Summary.  2. Follow up in 2 weeks  3.  They have been instructed to call if any changes in medications, doses, concerns, etc. Patient expresses understanding and has no further questions at this time.    Heather Kline, [facility]

## 2022-03-01 ENCOUNTER — TELEPHONE (OUTPATIENT)
Dept: CARDIOLOGY | Facility: CLINIC | Age: 42
End: 2022-03-01

## 2022-03-01 DIAGNOSIS — E78.2 MIXED HYPERLIPIDEMIA: Primary | ICD-10-CM

## 2022-03-01 NOTE — TELEPHONE ENCOUNTER
Please call patient.  At that time to recheck his cholesterol panel.  I placed the orders to be done at the main lab at the hospital.    Thanks.

## 2022-03-02 ENCOUNTER — LAB (OUTPATIENT)
Dept: LAB | Facility: HOSPITAL | Age: 42
End: 2022-03-02

## 2022-03-02 DIAGNOSIS — E78.2 MIXED HYPERLIPIDEMIA: ICD-10-CM

## 2022-03-02 LAB
ALBUMIN SERPL-MCNC: 4.7 G/DL (ref 3.5–5.2)
ALBUMIN/GLOB SERPL: 1.9 G/DL
ALP SERPL-CCNC: 86 U/L (ref 39–117)
ALT SERPL W P-5'-P-CCNC: 49 U/L (ref 1–41)
ANION GAP SERPL CALCULATED.3IONS-SCNC: 10.2 MMOL/L (ref 5–15)
AST SERPL-CCNC: 26 U/L (ref 1–40)
BILIRUB SERPL-MCNC: 0.7 MG/DL (ref 0–1.2)
BUN SERPL-MCNC: 13 MG/DL (ref 6–20)
BUN/CREAT SERPL: 13 (ref 7–25)
CALCIUM SPEC-SCNC: 9.4 MG/DL (ref 8.6–10.5)
CHLORIDE SERPL-SCNC: 101 MMOL/L (ref 98–107)
CHOLEST SERPL-MCNC: 221 MG/DL (ref 0–200)
CO2 SERPL-SCNC: 27.8 MMOL/L (ref 22–29)
CREAT SERPL-MCNC: 1 MG/DL (ref 0.76–1.27)
EGFRCR SERPLBLD CKD-EPI 2021: 96.4 ML/MIN/1.73
GLOBULIN UR ELPH-MCNC: 2.5 GM/DL
GLUCOSE SERPL-MCNC: 95 MG/DL (ref 65–99)
HDLC SERPL-MCNC: 39 MG/DL (ref 40–60)
LDLC SERPL CALC-MCNC: 140 MG/DL (ref 0–100)
LDLC/HDLC SERPL: 3.46 {RATIO}
POTASSIUM SERPL-SCNC: 4.1 MMOL/L (ref 3.5–5.2)
PROT SERPL-MCNC: 7.2 G/DL (ref 6–8.5)
SODIUM SERPL-SCNC: 139 MMOL/L (ref 136–145)
TRIGL SERPL-MCNC: 235 MG/DL (ref 0–150)
VLDLC SERPL-MCNC: 42 MG/DL (ref 5–40)

## 2022-03-02 PROCEDURE — 36415 COLL VENOUS BLD VENIPUNCTURE: CPT

## 2022-03-02 PROCEDURE — 80053 COMPREHEN METABOLIC PANEL: CPT

## 2022-03-02 PROCEDURE — 80061 LIPID PANEL: CPT

## 2022-03-03 NOTE — TELEPHONE ENCOUNTER
Please call patient.  CMP normal except for ALT liver enzyme elevated at 49.  This has been consistent over the past year and I would recommend follow-up with his PCP.    Total cholesterol elevated at 221, triglycerides high at 235, HDL low at 39, and LDL-bad cholesterol is 140 high.    In August 2021, I recommended that he decrease his alcohol intake, sugar and carbohydrate intake.  He wanted to give himself 6 months for good lifestyle, good diet, and exercise.    His cholesterol remains elevated.  I would recommend starting Crestor 20 mg 1 tablet daily and repeating a cholesterol panel in 6 weeks.  If he says yes, please let me know where to send the prescription to and we will call him for reminder about the blood work.  Thank you

## 2022-03-07 ENCOUNTER — ANTICOAGULATION VISIT (OUTPATIENT)
Dept: PHARMACY | Facility: HOSPITAL | Age: 42
End: 2022-03-07

## 2022-03-07 DIAGNOSIS — Z95.2 HISTORY OF MECHANICAL AORTIC VALVE REPLACEMENT: Primary | ICD-10-CM

## 2022-03-07 DIAGNOSIS — Z79.01 LONG TERM (CURRENT) USE OF ANTICOAGULANTS: ICD-10-CM

## 2022-03-07 LAB — INR PPP: 2.2

## 2022-03-07 NOTE — PROGRESS NOTES
Anticoagulation Clinic Progress Note    Anticoagulation Summary  As of 3/7/2022    INR goal:  2.5-3.5   TTR:  78.6 % (3.2 y)   INR used for dosin.20 (3/7/2022)   Warfarin maintenance plan:  8 mg every Fri; 10 mg all other days   Weekly warfarin total:  68 mg   Plan last modified:  Heather Kline RPH (3/7/2022)   Next INR check:  3/21/2022   Priority:  High   Target end date:  Indefinite    Indications    History of mechanical aortic valve replacement [Z95.2]  Long term (current) use of anticoagulants [Z79.01]             Anticoagulation Episode Summary     INR check location:  Home Draw    Preferred lab:      Send INR reminders to:   [facility]  POOL    Comments:  **[facility]**      Anticoagulation Care Providers     Provider Role Specialty Phone number    Lorne Kam MD Referring Cardiology [phone]          Clinic Interview:  Patient Findings     Positives:  Change in alcohol use    Negatives:  Signs/symptoms of thrombosis, Signs/symptoms of bleeding,   Laboratory test error suspected, Change in health, Change in activity,   Upcoming invasive procedure, Emergency department visit, Upcoming dental   procedure, Missed doses, Extra doses, Change in medications, Change in   diet/appetite, Hospital admission, Bruising, Other complaints    Comments:  Stopped drinking alcohol for lent (typically drinks 2-3 drinks   per night)       Clinical Outcomes     Negatives:  Major bleeding event, Thromboembolic event,   Anticoagulation-related hospital admission, Anticoagulation-related ED   visit, Anticoagulation-related fatality    Comments:  Stopped drinking alcohol for lent (typically drinks 2-3 drinks   per night)         INR History:  Anticoagulation Monitoring 2022 2022 3/7/2022   INR 2.40 2.90 2.20   INR Date 2022 2022 3/7/2022   INR Goal 2.5-3.5 2.5-3.5 2.5-3.5   Trend Same Same Up   Last Week Total 66 mg 66 mg 66 mg   Next Week Total 68 mg 66 mg 68 mg   Sun 10 mg  10 mg 10 mg   Mon 10 mg (2/7); Otherwise 8 mg 8 mg 10 mg   Tue 10 mg 10 mg 10 mg   Wed 10 mg 10 mg 10 mg   Thu 8 mg 8 mg 10 mg   Fri 10 mg 10 mg 8 mg   Sat 10 mg 10 mg 10 mg   Visit Report - - -   Some recent data might be hidden       Plan:  1. INR is Subtherapeutic today- see above in Anticoagulation Summary.   Will instruct Man Ramsey to Increase their warfarin regimen- see above in Anticoagulation Summary.  2. Follow up in 2 weeks  3. They have been instructed to call if any changes in medications, doses, concerns, etc. Patient expresses understanding and has no further questions at this time.    Heather Kline [facility]

## 2022-03-08 RX ORDER — ROSUVASTATIN CALCIUM 20 MG/1
20 TABLET, COATED ORAL NIGHTLY
Qty: 90 TABLET | Refills: 0 | Status: SHIPPED | OUTPATIENT
Start: 2022-03-08 | End: 2022-04-15 | Stop reason: SDUPTHER

## 2022-03-08 NOTE — TELEPHONE ENCOUNTER
I sent the script to [facility]. Crestor 20 mg 1 tablet daily. I will call him in 6 weeks to repeat blood work at the lab. Thanks.

## 2022-03-10 ENCOUNTER — OFFICE VISIT (OUTPATIENT)
Dept: FAMILY MEDICINE CLINIC | Facility: CLINIC | Age: 42
End: 2022-03-10

## 2022-03-10 VITALS
DIASTOLIC BLOOD PRESSURE: 72 MMHG | SYSTOLIC BLOOD PRESSURE: 134 MMHG | WEIGHT: 265 LBS | BODY MASS INDEX: 37.1 KG/M2 | HEIGHT: 71 IN | HEART RATE: 68 BPM | OXYGEN SATURATION: 98 %

## 2022-03-10 DIAGNOSIS — R73.03 PREDIABETES: ICD-10-CM

## 2022-03-10 DIAGNOSIS — Z00.00 ANNUAL PHYSICAL EXAM: Primary | ICD-10-CM

## 2022-03-10 PROCEDURE — 90471 IMMUNIZATION ADMIN: CPT | Performed by: INTERNAL MEDICINE

## 2022-03-10 PROCEDURE — 90686 IIV4 VACC NO PRSV 0.5 ML IM: CPT | Performed by: INTERNAL MEDICINE

## 2022-03-10 PROCEDURE — 99396 PREV VISIT EST AGE 40-64: CPT | Performed by: INTERNAL MEDICINE

## 2022-03-10 NOTE — PROGRESS NOTES
Subjective If complaint is an annual exam  Man Ramsey is a 41 y.o. male.     History of Present Illness Man is here today for an annual exam.  Is been a few years since I have seen him.  He does have a mechanical valve.  He is on warfarin therapy.  He did recently had lab work done by his cardiologist for his cholesterol which was elevated.  They did initiate some rosuvastatin.  Patient also had some minor elevation in one of his transaminases.  The remainder of his liver enzymes looked okay.  I suspect this may be coming from a fatty liver or his alcohol intake.  He does report that for Lent she is going to try to stop drinking.  His cardiologist is already made adjustments in his warfarin because of this.  His mother has sleep apnea and he is concerned with his body habitus poor sleep that he might have it as well.  The patient has had a colonoscopy few years back for some rectal bleeding.  We did  that the new screening age for colonoscopies is 45 however with the recent colonoscopy that he had we could probably do this at age 49  The following portions of the patient's history were reviewed and updated as appropriate: allergies, current medications, past family history, past medical history, past social history, past surgical history and problem list.    Review of Systems   Constitutional: Negative for chills and fever.   HENT: Positive for congestion and tinnitus. Negative for ear pain, hearing loss, sore throat and trouble swallowing.    Eyes: Negative for blurred vision, double vision and visual disturbance.   Respiratory: Negative for cough, chest tightness and shortness of breath.    Cardiovascular: Negative for chest pain and leg swelling.   Gastrointestinal: Negative for abdominal pain.        He has had some prior rectal bleeding.  He is on some Proctofoam for hemorrhoids.   Genitourinary: Negative for dysuria and hematuria.   Musculoskeletal: Negative for arthralgias.   Neurological:  Positive for headache. Negative for dizziness, weakness and numbness.        He has an occasional headache   Hematological: Does not bruise/bleed easily.   Psychiatric/Behavioral: Positive for sleep disturbance.       Objective   Physical Exam  Vitals and nursing note reviewed.   Constitutional:       Appearance: Normal appearance. He is well-developed. He is obese.   HENT:      Head: Normocephalic and atraumatic.      Right Ear: Tympanic membrane and ear canal normal.      Left Ear: Tympanic membrane and ear canal normal.      Nose: Nose normal.      Mouth/Throat:      Mouth: Mucous membranes are moist.      Pharynx: Oropharynx is clear. No oropharyngeal exudate.   Eyes:      General: No scleral icterus.     Conjunctiva/sclera: Conjunctivae normal.      Pupils: Pupils are equal, round, and reactive to light.   Neck:      Thyroid: No thyromegaly.      Vascular: No JVD.      Trachea: No tracheal deviation.   Cardiovascular:      Rate and Rhythm: Normal rate and regular rhythm.      Heart sounds: Normal heart sounds. No murmur heard.    No friction rub. No gallop.      Comments: He has a mechanical click  Pulmonary:      Effort: Pulmonary effort is normal. No respiratory distress.      Breath sounds: Normal breath sounds. No wheezing or rales.   Abdominal:      General: Bowel sounds are normal. There is no distension.      Palpations: Abdomen is soft. There is no mass.      Tenderness: There is no abdominal tenderness. There is no guarding or rebound.      Hernia: No hernia is present.   Musculoskeletal:         General: Normal range of motion.      Cervical back: Neck supple.      Comments: There is mild crepitation in the shoulders   Lymphadenopathy:      Cervical: No cervical adenopathy.   Skin:     General: Skin is warm and dry.   Neurological:      General: No focal deficit present.      Mental Status: He is alert.      Cranial Nerves: No cranial nerve deficit.      Coordination: Coordination normal.      Deep  Tendon Reflexes: Reflexes are normal and symmetric.   Psychiatric:         Mood and Affect: Mood normal.         Behavior: Behavior normal.           Assessment/Plan   Diagnoses and all orders for this visit:    1. Annual physical exam (Primary)  -     CBC & Differential  -     Hemoglobin A1c  -     TSH+Free T4  -     Vitamin D 25 Hydroxy  -     Ambulatory Referral to Sleep Medicine    2. Prediabetes  -     Hemoglobin A1c    Other orders  -     FluLaval/Fluarix/Fluzone >6 Months      Man is here today for his annual physical.  We did discuss his elevated transaminase.  I suspect this is going to be on the basis of alcohol use and fatty liver.  It is not elevated to level that I would worry about it but did advise that at any point in time he would like we can get an ultrasound of his liver.  We are going to see what happens with him abstaining from alcohol.  His cardiologist will be doing lab work in approximately 6 weeks for his cholesterol level likely check a liver test then.

## 2022-03-11 LAB
25(OH)D3+25(OH)D2 SERPL-MCNC: 53 NG/ML (ref 30–100)
BASOPHILS # BLD AUTO: 0 X10E3/UL (ref 0–0.2)
BASOPHILS NFR BLD AUTO: 0 %
EOSINOPHIL # BLD AUTO: 0.1 X10E3/UL (ref 0–0.4)
EOSINOPHIL NFR BLD AUTO: 1 %
ERYTHROCYTE [DISTWIDTH] IN BLOOD BY AUTOMATED COUNT: 13 % (ref 11.6–15.4)
HBA1C MFR BLD: 5.7 % (ref 4.8–5.6)
HCT VFR BLD AUTO: 42.8 % (ref 37.5–51)
HGB BLD-MCNC: 15 G/DL (ref 13–17.7)
IMM GRANULOCYTES # BLD AUTO: 0 X10E3/UL (ref 0–0.1)
IMM GRANULOCYTES NFR BLD AUTO: 0 %
LYMPHOCYTES # BLD AUTO: 1.6 X10E3/UL (ref 0.7–3.1)
LYMPHOCYTES NFR BLD AUTO: 23 %
MCH RBC QN AUTO: 30.3 PG (ref 26.6–33)
MCHC RBC AUTO-ENTMCNC: 35 G/DL (ref 31.5–35.7)
MCV RBC AUTO: 87 FL (ref 79–97)
MONOCYTES # BLD AUTO: 0.6 X10E3/UL (ref 0.1–0.9)
MONOCYTES NFR BLD AUTO: 10 %
NEUTROPHILS # BLD AUTO: 4.4 X10E3/UL (ref 1.4–7)
NEUTROPHILS NFR BLD AUTO: 66 %
PLATELET # BLD AUTO: 267 X10E3/UL (ref 150–450)
RBC # BLD AUTO: 4.95 X10E6/UL (ref 4.14–5.8)
T4 FREE SERPL-MCNC: 1.57 NG/DL (ref 0.82–1.77)
TSH SERPL DL<=0.005 MIU/L-ACNC: 2.09 UIU/ML (ref 0.45–4.5)
WBC # BLD AUTO: 6.7 X10E3/UL (ref 3.4–10.8)

## 2022-03-11 NOTE — TELEPHONE ENCOUNTER
Advanced heart failure progress note      Serena Juarez, female  : 1952  PCP: Mary Barragan MD  Attending/Consulting Provider: Kari Story MD             Chief Complaint - LE edema, abdominal bloating SOB    Patient presents with   • Shortness of Breath   • Abdominal Pain         SUBJECTIVE:        Patient denies CP, dizziness, palpitations, SOB at rest. States appetite is good and states she slept well last night.       History of Present Illness:  Serena Juarez is a 69 year oldfemale with PMHx of ICM s/p CABG (LIMA to D1, SVG to OM, SVG to PDA) & PCI, HFrEF, CKD4, ascending aortic aneurysm w/ dissection s/p repair w/ 26mm Hemashield, HTN, HLD, DM who presents to the ED with worsening SOB, orthopnea, PND & LE edema x1 weeks. Advanced HF is consulted for end-stage HF therapies. She denies CP, palpitations, fever, chills, presyncope.  Workup revealed worsening NT-proBNP now >70k, RYLIE on CKD w/ creat 2.79, bilateral pulmonary edema on CXR.      On chart review she has had multiple admissions for HFrEF exacerbation, most recently was discharged on 22. Due to her worsening CKD her ACEi/ ARB & SGLT2 were discontinued.      PMHx:  History - past medical        Past Medical History:   Diagnosis Date   • Allergy     • Anemia     • Arthritis     • Cerebral infarction (CMS/HCC)     • Chronic kidney disease     • Chronic pain       Left hip   • Congestive cardiac failure (CMS/HCC)     • Coronary artery disease     • Diabetes mellitus (CMS/HCC)     • Essential (primary) hypertension     • GERD (gastroesophageal reflux disease)     • High cholesterol     • Hyperlipoproteinemia     • Myocardial infarction (CMS/HCC)              PSHx:  History - past surgical         Past Surgical History:   Procedure Laterality Date   • Cardiac surgery       • Coronary artery bypass graft            • Vascular surgery                Family Hx:  History - family          Family History   Problem Relation Age of Onset   •  Patient notified of lab results and recommendations; verbalized understanding. Patient agreeable to starting Crestor; instructed to take one 20 MG tablet daily. Please send the prescription to the [facility] pharmacy in [place].     Patient will follow up with PCP about elevated ALT and knows to expect a reminder in 6 weeks for repeat cholesterol panel.     Hypertension Mother     • Hypertension Father     • Diabetes Brother              Social Hx:  Social History           Tobacco Use   • Smoking status: Never Smoker   • Smokeless tobacco: Never Used   Substance Use Topics   • Alcohol use: No         Allergies:        ALLERGIES:   Allergen Reactions   • Acetaminophen Other (See Comments)       unknown   • Hydrocodone-Acetaminophen HIVES       unknown   • Pineapple   (Food Or Med) Other (See Comments)       unknown     unknown   • Tylenol DIZZINESS       unknown      • Hydrochlorothiazide RASH         Medications:          Prior to Admission medications    Medication Sig Start Date End Date Taking? Authorizing Provider   carvedilol (COREG) 12.5 MG tablet Take 12.5 mg by mouth 2 times daily (with meals).     Yes Outside Provider   ferrous sulfate 325 (65 FE) MG tablet Take 325 mg by mouth 2 times daily (with meals).     Yes Outside Provider   fluticasone (VERAMYST) 27.5 MCG/SPRAY nasal spray Spray 2 sprays in each nostril daily.     Yes Outside Provider   pantoprazole (PROTONIX) 40 MG tablet Take 40 mg by mouth daily. 2/22/22   Yes Outside Provider   furosemide (LASIX) 40 MG tablet Take 60 mg by mouth 2 times daily.  3/4/22   Yes Mary Barragan MD   spironolactone (ALDACTONE) 25 MG tablet Take 1 tablet by mouth every Monday, Wednesday, and Friday 2/22/22   Yes Mary Barragan MD   isosorbide mononitrate (IMDUR) 30 MG 24 hr tablet Take 1 tablet by mouth daily. 1/26/22   Yes Mary Barragan MD   hydrALAZINE (APRESOLINE) 25 MG tablet Take 1 tablet by mouth every 12 hours. 1/23/22   Yes Gera Martinez MD   Cholecalciferol (Vitamin D-3) 25 mcg (1,000 units) capsule Take 1 capsule by mouth daily. 10/26/21   Yes Mary Barragan MD   Omega-3 Fatty Acids (Fish Oil) 1000 MG capsule Take 1 g by mouth daily.     Yes Outside Provider   nitroGLYcerin (NITROSTAT) 0.4 MG sublingual tablet Place 1 tablet under the tongue every 5 minutes as needed for Chest pain. 9/1/21   Yes Jamila Newby MD    atorvastatin (LIPITOR) 80 MG tablet Take 1 tablet by mouth at bedtime. 1/3/21   Yes Mary Barragan MD   aspirin (ASPIRIN LOW DOSE) 81 MG EC tablet Take 81 mg by mouth daily.      Yes Outside Provider   Farxiga 5 MG tablet Take 5 mg by mouth daily. 2/27/22 3/9/22   Outside Provider   ranolazine (RANEXA) 500 MG 12 hr tablet Take 500 mg by mouth 2 times daily. 2/3/22 3/9/22   Outside Provider   famotidine (PEPCID) 20 MG tablet Take 1 tablet by mouth daily. 2/22/22 3/9/22   Mary Barragan MD   furosemide (LASIX) 40 MG tablet Take 1.5 tablets by mouth 2 times daily. 1/26/22 3/9/22   Mary Barragan MD   carvedilol (COREG) 12.5 MG tablet Take 1 tablet by mouth 2 times daily (with meals). 1/23/22 3/9/22   Sundar Wilkins MD   ferrous sulfate 325 (65 FE) MG tablet Take 1 tablet by mouth 2 times daily (with meals). 12/15/21 3/9/22   Mary Barragan MD         Current medications             Current Facility-Administered Medications   Medication Dose Route Frequency Provider Last Rate Last Admin   • potassium CHLORIDE (KLOR-CON M) marge ER tablet 40 mEq  40 mEq Oral Daily with breakfast Mehrdad Yang MD   40 mEq at 03/10/22 0944   • furosemide (LASIX INJECT) 250 mg/125 mL in sodium chloride 0.9 % infusion  10 mg/hr Intravenous Continuous Lisa De Jesus CNP       • potassium CHLORIDE (KLOR-CON M) marge ER tablet 40 mEq  40 mEq Oral Once Mehrdad Yang MD       • sodium chloride 0.9 % flush bag 25 mL  25 mL Intravenous PRN Jodeea Samsami, DO       • sodium chloride (PF) 0.9 % injection 2 mL  2 mL Intracatheter 2 times per day Jodeea Samsami, DO   2 mL at 03/10/22 0944   • sodium chloride 0.9 % flush bag 25 mL  25 mL Intravenous PRN Jodeea Samsami, DO       • ondansetron (ZOFRAN) injection 4 mg  4 mg Intravenous BID PRN Jodeea Samsami, DO       • polyethylene glycol (MIRALAX) packet 17 g  17 g Oral Daily PRN Aliyah Coronado DO       • docusate sodium-sennosides (SENOKOT S) 50-8.6 MG 2 tablet  2 tablet Oral Daily PRN Aliyah Coronado,  DO       • dextrose 50 % injection 25 g  25 g Intravenous PRN [facility]       • dextrose 50 % injection 12.5 g  12.5 g Intravenous PRN [facility], DO       • glucagon (GLUCAGEN) injection 1 mg  1 mg Intramuscular PRN [facility], DO       • dextrose (GLUTOSE) 40 % gel 15 g  15 g Oral PRN [facility], DO       • dextrose (GLUTOSE) 40 % gel 30 g  30 g Oral PRN [facility], DO       • insulin lispro (ADMELOG,HumaLOG) - Correction Dose   Subcutaneous TID [facility]   2 Units at 03/09/22 1247   • insulin lispro (ADMELOG,HumaLOG) - Correction Dose   Subcutaneous Nightly [facility], DO       • aspirin (ECOTRIN) enteric coated tablet 81 mg  81 mg Oral Daily Kari Story MD   81 mg at 03/10/22 0944   • atorvastatin (LIPITOR) tablet 80 mg  80 mg Oral QHS Kari Story MD   80 mg at 03/09/22 2052   • nitroGLYcerin (NITROSTAT) sublingual tablet 0.4 mg  0.4 mg Sublingual Q5 Min PRN Kari Story MD       • cholecalciferol (VITAMIN D) tablet 25 mcg  25 mcg Oral Daily Kari Story MD   25 mcg at 03/10/22 0944   • carvedilol (COREG) tablet 12.5 mg  12.5 mg Oral BID  Kari Story MD   12.5 mg at 03/10/22 0944   • ferrous sulfate (65 mg Fe per 325 mg) tablet 325 mg  325 mg Oral BID  Kari Story MD   325 mg at 03/10/22 0944   • fluticasone (FLONASE) 50 MCG/ACT nasal spray 2 spray  2 spray Each Nare Daily Kari Story MD   2 spray at 03/10/22 0944   • pantoprazole (PROTONIX) EC tablet 40 mg  40 mg Oral Daily Kari Story MD   40 mg at 03/10/22 0944   • isosorbide dinitrate (ISORDIL) tablet 10 mg  10 mg Oral TID Josie Rocha DO   10 mg at 03/10/22 1327   • hydrALAZINE (APRESOLINE) tablet 25 mg  25 mg Oral TID Josie Rocha DO   25 mg at 03/10/22 1452            Review of Systems   Constitutional: Positive for malaise/fatigue. Negative for decreased appetite.   HENT: Negative.    Cardiovascular: Positive for dyspnea on  exertion, leg swelling and orthopnea. Negative for chest pain, irregular heartbeat, near-syncope, palpitations, paroxysmal nocturnal dyspnea and syncope.   Respiratory: Negative for cough.    Skin: Negative.    Musculoskeletal: Negative.    Gastrointestinal: Negative for abdominal pain and nausea.   Neurological: Negative.    Psychiatric/Behavioral: Negative.            OBJECTIVE:         /71 (BP Location: LUE - Left upper extremity, Patient Position: Semi-Castellanos's)   Pulse 77   Temp 96.8 °F (36 °C) (Oral)   Resp 20   Ht 5' 3\" (1.6 m)   Wt 63 kg (138 lb 14.2 oz)   LMP 01/01/1980   BMI 24.60 kg/m²   BSA 1.66 m²         Intake/Output Summary (Last 24 hours) at 3/10/2022 1546  Last data filed at 3/10/2022 1300      Gross per 24 hour   Intake 1170 ml   Output 750 ml   Net 420 ml             Wt Readings from Last 3 Encounters:   03/10/22 63 kg (138 lb 14.2 oz)   03/08/22 59.8 kg (131 lb 11.6 oz)   02/22/22 58.1 kg (128 lb 1.4 oz)                PHYSICAL EXAMINATION:  Physical Exam  Constitutional:       General: She is in acute distress.      Appearance: She is not diaphoretic.   HENT:      Head: Normocephalic.      Nose: Nose normal.      Mouth/Throat:      Mouth: Mucous membranes are moist.   Eyes:      Pupils: Pupils are equal, round, and reactive to light.   Cardiovascular:      Rate and Rhythm: Normal rate and regular rhythm.      Pulses: Normal pulses.      Heart sounds: Normal heart sounds.      Comments: +JVD +HJR +LE edema   Pulmonary:      Breath sounds: Normal breath sounds.   Abdominal:      Palpations: Abdomen is soft.      Tenderness: There is no abdominal tenderness.   Musculoskeletal:      Cervical back: Neck supple.      Right lower leg: Edema present.      Left lower leg: Edema present.   Skin:     General: Skin is warm and dry.      Capillary Refill: Capillary refill takes less than 2 seconds.   Neurological:      Mental Status: She is alert and oriented to person, place, and time.    Psychiatric:         Mood and Affect: Mood normal.                   DIAGNOSTIC STUDIES:      Labs:  CBC:           Recent Labs   Lab 03/10/22  0445 03/09/22  0446 03/08/22  2015 01/23/22  0607 01/22/22  0510   WBC 3.2* 2.7* 2.9* 3.9* 3.8*   RBC 4.49 4.47 4.54 4.34 4.30   HGB 13.5 13.7 13.9 12.6 12.6   HCT 39.3 39.6 40.6 37.0 35.5*   MCV 87.5 88.6 89.4 85.3 82.6   MCHC 34.4 34.6 34.2 34.1 35.5   RDW-CV 17.5* 17.6* 18.1* 19.9* 19.8*   PLT 59* 47* 58* 129* 125*   Lymphocytes, Percent 23 18  --  17 21      CMP:          Recent Labs   Lab 03/10/22  1332 03/10/22  0445 03/09/22  0446 03/08/22  2016 03/08/22  2015   SODIUM 137 135 137  --  137   POTASSIUM 3.7 3.2* 3.4  --  2.6*   CHLORIDE 94* 93* 94*  --  91*   CO2 34* 33* 34*  --  37*   BUN 67* 62* 57*  --  55*   CREATININE 2.70* 2.67* 2.61*  --  2.69*   GLUCOSE 122* 114* 117*  --  126*   ALBUMIN  --  3.2* 3.1*  --  3.3*   AST  --  72* 81*  --  84*   GPT  --  38 36  --  46   ALKPT  --  141* 135*  --  149*   BILIRUBIN  --  2.2* 2.0*  --  1.7*   MG  --   --  2.7* 2.9*  --       COAGULATION STUDIES:        Recent Labs   Lab 03/10/22  0444 03/08/22  2016   PT  --  14.3*   PTT 28  --    INR  --  1.4      TSH:          Lab Results   Component Value Date     TSH 6.932 (H) 03/10/2022     TSH 2.564 08/24/2021     TSH 2.226 03/31/2017      HbA1c:       Hemoglobin A1C (%)   Date Value   01/10/2022 6.5 (H)      LIPID PANEL: No results found  NT-PRONBP:          Recent Labs   Lab 03/08/22 2015 01/19/22  2006 11/08/21  1717 09/24/21  1027   NT-proBNP >70,000* 41,243* 29,231* 14,936*      Troponin: No results found     Data:        Encounter Date: 03/08/22   Electrocardiogram 12-Lead   Result Value     Ventricular Rate EKG/Min (BPM) 68     Atrial Rate (BPM) 68     QRS-Interval (MSEC) 106     QT-Interval (MSEC) 452     QTc 481     R Axis (Degrees) -36     T Axis (Degrees) 146     REPORT TEXT         Sinus   with premature ventricular or aberrantly conducted complexes  Left axis  deviation  Voltage criteria for left ventricular hypertrophy  ST And  T wave abnormality, consider lateral ischemia  Abnormal ECG  Confirmed by DOTTIE SANCHEZ MD ([id number]) on 3/8/2022 8:30:58 PM                  ASSESSMENT AND PLAN:      Impression: 69 year oldfemale with PMHx of ICM s/p CABG (LIMA to D1, SVG to OM, SVG to PDA) & PCI, HFrEF, CKD4, ascending aortic aneurysm w/ dissection s/p repair w/ 26mm Hemashield, HTN, HLD, DM who presents to the ED with worsening SOB, orthopnea, PND & LE edema x1 weeks. Advanced HF is consulted for end-stage HF therapies.     Assessment:   1. Acute on chronic HFrEF 15% exacerbation   NYHA Class III, ACC/AHA Stage C   Etiology ICM  Most recent ECHO EF 15%, MR and TR   Consider cardiomems placement as outpatient  Probnp 70K  Prior GDMT- was on ace and sglt2 discontinued with CKD recent past   Follows with Dr Lozano, was planning on evaluation for ICD prior to admit      2. RYLIE on CKD IV - baseline creat ~2.1  3. ICM s/p CABG (LIMA to D1, SVG to OM, SVG to PDA at the time of aneurysm repair) & PCI  4. Severe MR  5. Ascending aortic aneurysm w/ dissection s/p repair w/ 26mm Hemashield (Feb 2014)  6. Moderate AI  7. HTN        Plan:  · Increase lasix gtt to 20 mg/hr  · Metolazone 5mg today    · EP to evaluate for ICD placement , consulted Dr Dunbar    · Cont Carvedilol 12.5mg q12h  · Continue hydralazine and isordil   · Jardiance not started as GFR 20 and unable to start inpatient due to formulary  restrictions, continue to evaluate kidney function and reassess   · Not an LVAD candidate due to renal dysfunction  · Severe MR but appears to be proportionate to LV dysfunction.   · Consider cardiomems in future as outpatient evaluation  · Patient likely has low cardiac output, may benefit from inotrope therapies, will continue to monitor   · Set up with CHF clinic for outpatient, can see Dr Joshi as well as continue to follow with Dr Lozano, Vera De Jesus  MSN, FNP    Advanced Heart Failure Team   Advanced [facility] Failure [facility]  Phone: [phone]    I seen and examined the patient with advanced nurse practitioner.  I agree with assessment plan as read above.  Continues to be volume overloaded.  Increase Lasix drip.  Not a candidate for advanced heart failure therapies.  Marginal candidate for percutaneous mitral valve repair.  Medical therapy, potential CardioMEMS.      Vicente Joshi MD [facility]  Advanced Heart Failure, Cardiac Transplant, Mechanical Circulatory Support  [facility]      Patient Privacy Notice      The 21st Century Cures Act makes medical notes like these available to patients in the interest of transparency. Please be advised that this is a medical document. Medical documents are intended to carry relevant information and the clinical opinion of the practitioner. The medical note is intended as medical provider to provider communication, and may appear blunt or direct. It is written in medical language, and may contain abbreviations or verbiage that are unfamiliar.

## 2022-03-21 ENCOUNTER — ANTICOAGULATION VISIT (OUTPATIENT)
Dept: PHARMACY | Facility: HOSPITAL | Age: 42
End: 2022-03-21

## 2022-03-21 DIAGNOSIS — Z95.2 HISTORY OF MECHANICAL AORTIC VALVE REPLACEMENT: Primary | ICD-10-CM

## 2022-03-21 DIAGNOSIS — Z79.01 LONG TERM (CURRENT) USE OF ANTICOAGULANTS: ICD-10-CM

## 2022-03-21 LAB — INR PPP: 3.1

## 2022-03-21 NOTE — PROGRESS NOTES
Anticoagulation Clinic Progress Note    Anticoagulation Summary  As of 3/21/2022    INR goal:  2.5-3.5   TTR:  78.5 % (3.3 y)   INR used for dosing:  3.10 (3/21/2022)   Warfarin maintenance plan:  8 mg every Fri; 10 mg all other days   Weekly warfarin total:  68 mg   No change documented:  Mckenzie Grayson [facility]   Plan last modified:  Heather Kline RP (3/7/2022)   Next INR check:  4/4/2022   Priority:  High   Target end date:  Indefinite    Indications    History of mechanical aortic valve replacement [Z95.2]  Long term (current) use of anticoagulants [Z79.01]             Anticoagulation Episode Summary     INR check location:  Home Draw    Preferred lab:      Send INR reminders to:   JOSE [facility]CHU[facility]  POOL    Comments:  **[facility]**      Anticoagulation Care Providers     Provider Role Specialty Phone number    Lorne Kam MD Referring Cardiology [phone]          Drug interactions: has remained unchanged.  Diet: has remained unchanged.    Clinic Interview:  No pertinent clinical findings have been reported.    INR History:  Anticoagulation Monitoring 2/22/2022 3/7/2022 3/21/2022   INR 2.90 2.20 3.10   INR Date 2/21/2022 3/7/2022 3/21/2022   INR Goal 2.5-3.5 2.5-3.5 2.5-3.5   Trend Same Up Same   Last Week Total 66 mg 66 mg 68 mg   Next Week Total 66 mg 68 mg 68 mg   Sun 10 mg 10 mg 10 mg   Mon 8 mg 10 mg 10 mg   Tue 10 mg 10 mg 10 mg   Wed 10 mg 10 mg 10 mg   Thu 8 mg 10 mg 10 mg   Fri 10 mg 8 mg 8 mg   Sat 10 mg 10 mg 10 mg   Visit Report - - -   Some recent data might be hidden       Plan:  1. INR is Therapeutic today- see above in Anticoagulation Summary.   Will instruct Man Ramsey to Continue their warfarin regimen- see above in Anticoagulation Summary.  2. Follow up in 2 weeks  3. Pt has agreed to only be called if INR out of range. They have been instructed to call if any changes in medications, doses, concerns, etc. Patient expresses understanding and has no further  questions at this time.    Mckenzie Grayson, [facility]

## 2022-03-28 RX ORDER — WARFARIN SODIUM 2 MG/1
TABLET ORAL
Qty: 430 TABLET | Refills: 0 | Status: SHIPPED | OUTPATIENT
Start: 2022-03-28 | End: 2022-06-28

## 2022-03-29 RX ORDER — HYDROCORTISONE ACETATE PRAMOXINE HCL 1; 1 G/100G; G/100G
CREAM TOPICAL 2 TIMES DAILY
Qty: 28.4 G | Refills: 1 | Status: SHIPPED | OUTPATIENT
Start: 2022-03-29

## 2022-04-04 ENCOUNTER — ANTICOAGULATION VISIT (OUTPATIENT)
Dept: PHARMACY | Facility: HOSPITAL | Age: 42
End: 2022-04-04

## 2022-04-04 DIAGNOSIS — Z95.2 HISTORY OF MECHANICAL AORTIC VALVE REPLACEMENT: Primary | ICD-10-CM

## 2022-04-04 DIAGNOSIS — Z79.01 LONG TERM (CURRENT) USE OF ANTICOAGULANTS: ICD-10-CM

## 2022-04-04 LAB — INR PPP: 3.6

## 2022-04-04 NOTE — PROGRESS NOTES
Anticoagulation Clinic Progress Note    Anticoagulation Summary  As of 4/4/2022    INR goal:  2.5-3.5   TTR:  78.5 % (3.3 y)   INR used for dosing:  3.60 (4/4/2022)   Warfarin maintenance plan:  8 mg every Fri; 10 mg all other days   Weekly warfarin total:  68 mg   No change documented:  Jose Hall RPH   Plan last modified:  Heather Kline RPH (3/7/2022)   Next INR check:  4/18/2022   Priority:  High   Target end date:  Indefinite    Indications    History of mechanical aortic valve replacement [Z95.2]  Long term (current) use of anticoagulants [Z79.01]             Anticoagulation Episode Summary     INR check location:  Home Draw    Preferred lab:      Send INR reminders to:   [facility]  [facility]    Comments:  **[facility]**      Anticoagulation Care Providers     Provider Role Specialty Phone number    Lorne Kam MD Referring Cardiology [phone]          Clinic Interview:  Patient Findings     Negatives:  Signs/symptoms of thrombosis, Signs/symptoms of bleeding,   Laboratory test error suspected, Change in health, Change in alcohol use,   Change in activity, Upcoming invasive procedure, Emergency department   visit, Upcoming dental procedure, Missed doses, Extra doses, Change in   medications, Change in diet/appetite, Hospital admission, Bruising, Other   complaints      Clinical Outcomes     Negatives:  Major bleeding event, Thromboembolic event,   Anticoagulation-related hospital admission, Anticoagulation-related ED   visit, Anticoagulation-related fatality        INR History:  Anticoagulation Monitoring 3/7/2022 3/21/2022 4/4/2022   INR 2.20 3.10 3.60   INR Date 3/7/2022 3/21/2022 4/4/2022   INR Goal 2.5-3.5 2.5-3.5 2.5-3.5   Trend Up Same Same   Last Week Total 66 mg 68 mg 68 mg   Next Week Total 68 mg 68 mg 68 mg   Sun 10 mg 10 mg 10 mg   Mon 10 mg 10 mg 10 mg   Tue 10 mg 10 mg 10 mg   Wed 10 mg 10 mg 10 mg   Thu 10 mg 10 mg 10 mg   Fri 8 mg 8 mg 8 mg   Sat 10 mg 10 mg 10  mg   Visit Report - - -   Some recent data might be hidden       Plan:  1. INR is Supratherapeutic today- see above in Anticoagulation Summary.   Will instruct Man Ramsey to Continue their warfarin regimen- see above in Anticoagulation Summary.  2. Follow up in 2 weeks  3. Pt has agreed to only be called if INR out of range. They have been instructed to call if any changes in medications, doses, concerns, etc. Patient expresses understanding and has no further questions at this time.    Jose Hall, [facility]

## 2022-04-13 ENCOUNTER — TELEPHONE (OUTPATIENT)
Dept: CARDIOLOGY | Facility: CLINIC | Age: 42
End: 2022-04-13

## 2022-04-13 DIAGNOSIS — E78.2 MIXED HYPERLIPIDEMIA: Primary | ICD-10-CM

## 2022-04-13 NOTE — TELEPHONE ENCOUNTER
Patient was recently started on Crestor 20 mg daily.  He is due for repeat lipid panel, AST, and ALT.  Orders have been placed to be done at the main lab at the hospital.  Please call to let them know.  Thank you.

## 2022-04-15 ENCOUNTER — LAB (OUTPATIENT)
Dept: LAB | Facility: HOSPITAL | Age: 42
End: 2022-04-15

## 2022-04-15 DIAGNOSIS — E78.2 MIXED HYPERLIPIDEMIA: ICD-10-CM

## 2022-04-15 LAB
ALT SERPL W P-5'-P-CCNC: 44 U/L (ref 1–41)
AST SERPL-CCNC: 19 U/L (ref 1–40)
CHOLEST SERPL-MCNC: 105 MG/DL (ref 0–200)
HDLC SERPL-MCNC: 35 MG/DL (ref 40–60)
LDLC SERPL CALC-MCNC: 51 MG/DL (ref 0–100)
LDLC/HDLC SERPL: 1.45 {RATIO}
TRIGL SERPL-MCNC: 96 MG/DL (ref 0–150)
VLDLC SERPL-MCNC: 19 MG/DL (ref 5–40)

## 2022-04-15 PROCEDURE — 36415 COLL VENOUS BLD VENIPUNCTURE: CPT

## 2022-04-15 PROCEDURE — 84450 TRANSFERASE (AST) (SGOT): CPT

## 2022-04-15 PROCEDURE — 84460 ALANINE AMINO (ALT) (SGPT): CPT

## 2022-04-15 PROCEDURE — 80061 LIPID PANEL: CPT

## 2022-04-15 RX ORDER — ROSUVASTATIN CALCIUM 20 MG/1
20 TABLET, COATED ORAL NIGHTLY
Qty: 90 TABLET | Refills: 3 | Status: SHIPPED | OUTPATIENT
Start: 2022-04-15

## 2022-04-15 NOTE — PROGRESS NOTES
Excellent cholesterol on an increased dose of rosuvastatin.  ALT always chronically abnormal.  Defer to PCP.  AST normal.  Patient informed.

## 2022-04-15 NOTE — TELEPHONE ENCOUNTER
Please call patient.  Lipid panel looks so much better on his current dose of Crestor.  Continue the medication.  His ALT liver enzyme is always mildly elevated, but stable.  AST liver enzyme normal.      I sent in a refill on his Crestor to his [facility] pharmacy.  Follow-up with Dr. Kam in September 2022.  Thank you.

## 2022-04-15 NOTE — TELEPHONE ENCOUNTER
Notified patient of results. Patient verbalized understanding.    Luisa Patel RN  Triage [facility]

## 2022-04-18 ENCOUNTER — ANTICOAGULATION VISIT (OUTPATIENT)
Dept: PHARMACY | Facility: HOSPITAL | Age: 42
End: 2022-04-18

## 2022-04-18 DIAGNOSIS — Z79.01 LONG TERM (CURRENT) USE OF ANTICOAGULANTS: ICD-10-CM

## 2022-04-18 DIAGNOSIS — Z95.2 HISTORY OF MECHANICAL AORTIC VALVE REPLACEMENT: Primary | ICD-10-CM

## 2022-04-18 LAB — INR PPP: 3

## 2022-04-18 NOTE — PROGRESS NOTES
Anticoagulation Clinic Progress Note    Anticoagulation Summary  As of 4/18/2022    INR goal:  2.5-3.5   TTR:  78.5 % (3.3 y)   INR used for dosing:  3.00 (4/18/2022)   Warfarin maintenance plan:  8 mg every Fri; 10 mg all other days   Weekly warfarin total:  68 mg   No change documented:  Jose Hall RPH   Plan last modified:  Heather Kline RPH (3/7/2022)   Next INR check:  5/2/2022   Priority:  High   Target end date:  Indefinite    Indications    History of mechanical aortic valve replacement [Z95.2]  Long term (current) use of anticoagulants [Z79.01]             Anticoagulation Episode Summary     INR check location:  Home Draw    Preferred lab:      Send INR reminders to:   [facility]  [facility]    Comments:  **[facility]**      Anticoagulation Care Providers     Provider Role Specialty Phone number    Lorne Kam MD Referring Cardiology [phone]          Clinic Interview:  Patient Findings     Negatives:  Signs/symptoms of thrombosis, Signs/symptoms of bleeding,   Laboratory test error suspected, Change in health, Change in alcohol use,   Change in activity, Upcoming invasive procedure, Emergency department   visit, Upcoming dental procedure, Missed doses, Extra doses, Change in   medications, Change in diet/appetite, Hospital admission, Bruising, Other   complaints      Clinical Outcomes     Negatives:  Major bleeding event, Thromboembolic event,   Anticoagulation-related hospital admission, Anticoagulation-related ED   visit, Anticoagulation-related fatality        INR History:  Anticoagulation Monitoring 3/21/2022 4/4/2022 4/18/2022   INR 3.10 3.60 3.00   INR Date 3/21/2022 4/4/2022 4/18/2022   INR Goal 2.5-3.5 2.5-3.5 2.5-3.5   Trend Same Same Same   Last Week Total 68 mg 68 mg 68 mg   Next Week Total 68 mg 68 mg 68 mg   Sun 10 mg 10 mg 10 mg   Mon 10 mg 10 mg 10 mg   Tue 10 mg 10 mg 10 mg   Wed 10 mg 10 mg 10 mg   Thu 10 mg 10 mg 10 mg   Fri 8 mg 8 mg 8 mg   Sat 10 mg 10  mg 10 mg   Visit Report - - -   Some recent data might be hidden       Plan:  1. INR is Therapeutic today- see above in Anticoagulation Summary.   Will instruct Man Ramsey to Continue their warfarin regimen- see above in Anticoagulation Summary.  2. Follow up in 2 weeks  3. Pt has agreed to only be called if INR out of range. They have been instructed to call if any changes in medications, doses, concerns, etc. Patient expresses understanding and has no further questions at this time.    Jose Hall, [facility]

## 2022-05-02 LAB — INR PPP: 4

## 2022-05-03 ENCOUNTER — OFFICE VISIT (OUTPATIENT)
Dept: SLEEP MEDICINE | Facility: HOSPITAL | Age: 42
End: 2022-05-03

## 2022-05-03 ENCOUNTER — ANTICOAGULATION VISIT (OUTPATIENT)
Dept: PHARMACY | Facility: HOSPITAL | Age: 42
End: 2022-05-03

## 2022-05-03 VITALS
HEIGHT: 71 IN | WEIGHT: 263 LBS | DIASTOLIC BLOOD PRESSURE: 89 MMHG | SYSTOLIC BLOOD PRESSURE: 139 MMHG | OXYGEN SATURATION: 98 % | BODY MASS INDEX: 36.82 KG/M2 | HEART RATE: 61 BPM

## 2022-05-03 DIAGNOSIS — Z95.2 HISTORY OF MECHANICAL AORTIC VALVE REPLACEMENT: Primary | ICD-10-CM

## 2022-05-03 DIAGNOSIS — Z86.79 HISTORY OF CARDIOMYOPATHY: ICD-10-CM

## 2022-05-03 DIAGNOSIS — Z79.01 LONG TERM (CURRENT) USE OF ANTICOAGULANTS: ICD-10-CM

## 2022-05-03 DIAGNOSIS — I10 ESSENTIAL HYPERTENSION: ICD-10-CM

## 2022-05-03 DIAGNOSIS — I44.0 FIRST DEGREE AV BLOCK: ICD-10-CM

## 2022-05-03 DIAGNOSIS — R53.82 CHRONIC FATIGUE: ICD-10-CM

## 2022-05-03 DIAGNOSIS — G47.33 OSA (OBSTRUCTIVE SLEEP APNEA): Primary | ICD-10-CM

## 2022-05-03 PROCEDURE — G0463 HOSPITAL OUTPT CLINIC VISIT: HCPCS

## 2022-05-03 NOTE — PROGRESS NOTES
Sleep Medicine initial Consultation    Man Ramsey  : 1980  41 y.o. male   Date of Service: 5/3/2022  Referring provider: Orlando Mcguire,*    History Of Present Illness:   Mr. Man Ramsey  is a 41 y.o. right handed Caucasianmale has a history of hypertension, mechanical aortic valve replacement and is on anticoagulation with Coumadin, cardiomyopathy, CHF, mixed hyper lipidemia, first-degree AV block and is seen in the sleep clinic for Snoring, Excessive Daytime Sleepiness, Sleep Apnea and Chronic Fatigue.     The patient c/o excessive daytime sleepiness,  and chronic fatigue.  There is no history of hypnagogic hallucinations, sleep paralysis or cataplexy.    The patient complains of snoring loud in all sleeping positions and has dry mouth or sore mouth when he wakes up.    There is no history of RLS or PLMD or bruxism.    The patient complains of difficulty falling asleep, difficulty staying asleep, frequent awakenings to use the bathroom, has restless sleep, Does not feel rested even after a long sleep and Still feels sleepy even when increasing sleep time.    There is no h/O sleepwalking or bedwetting or nightmares or sleep eating or acting out dreams.    Works: first Shift.   and is a manager at [employer].    Sleep schedule: While Working: Bed time: 8 PM-12 midnight and wake up time: 530-6:30 AM: sleep Latency : 20-60 minutes and Total Sleep Time: 5-6 hours.     Naps: No.    Sleep schedule: While NOT Working: Bed time: 8 PM-12 midnight and wake up time: 8-11 AM: sleep Latency : 20-60 minutes and Total Sleep Time: 8 hours,   2 large mugs of coffee per day.  He may drink 1 or 2 cocktails pretty much every day.    [place] Sleepiness Scale: .    PMH, PSH, Medications, allergies, FH, SH are reviewed and updated in the chart.     Review of Systems   Constitutional: Positive for fatigue.   All other systems reviewed and are negative.    Patient  "examination:  Vitals:    05/03/22 0900   BP: 139/89   Pulse: 61   SpO2: 98%   Weight: 119 kg (263 lb)   Height: 180.3 cm (71\")    Body mass index is 36.68 kg/m².  Neck Circumference: 18 inches   Well-developed, well-nourished in no apparent distress.  HEENT: Normal.  Neck: Supple no carotid bruits.  Lungs: Clear to auscultation.  Cardiac exam: Normal and regular rate and rhythm.  2/6 systolic murmur and aortic valve click.  Extremities: No edema clubbing or cyanosis.    ASSESSMENT AND PLAN:The patient has symptoms of obstructive sleep apnea syndrome with hypersomnia. We will proceed with polysomnography and treat with CPAP therapy if needed. Sleep hygiene techniques discussed.  Exercise diet and weight loss discussed.  His comorbidities include hypertension, cardiomyopathy, first-degree AV block and is status post aortic valve replacement.  BMI 36.68 and neck circumference 18 inches.  Do feel he needs in lab polysomnography.  Encounter Diagnoses   Name Primary?   • SENA (obstructive sleep apnea) Yes   • Essential hypertension    • BMI 36.0-36.9,adult    • History of cardiomyopathy    • First degree AV block    • Chronic fatigue      Orders Placed This Encounter   Procedures   • Polysomnography 4 or More Parameters     Return in about 3 months (around 8/3/2022).    Triny Miller MD  5/3/2022  12:01 EDT  "

## 2022-05-03 NOTE — PROGRESS NOTES
Anticoagulation Clinic Progress Note    Anticoagulation Summary  As of 5/3/2022    INR goal:  2.5-3.5   TTR:  78.2 % (3.4 y)   INR used for dosin.00 (2022)   Warfarin maintenance plan:  8 mg every Tu, Fri; 10 mg all other days   Weekly warfarin total:  66 mg   Plan last modified:  Heather Kline RPH (5/3/2022)   Next INR check:  2022   Priority:  High   Target end date:  Indefinite    Indications    History of mechanical aortic valve replacement [Z95.2]  Long term (current) use of anticoagulants [Z79.01]             Anticoagulation Episode Summary     INR check location:  Home Draw    Preferred lab:      Send INR reminders to:  [facility]  [facility]    Comments:  **[facility]**      Anticoagulation Care Providers     Provider Role Specialty Phone number    Lorne Kam MD Referring Cardiology [phone]          Clinic Interview:  Patient Findings     Positives:  Change in alcohol use    Negatives:  Signs/symptoms of thrombosis, Signs/symptoms of bleeding,   Laboratory test error suspected, Change in health, Change in activity,   Upcoming invasive procedure, Emergency department visit, Upcoming dental   procedure, Missed doses, Extra doses, Change in medications, Change in   diet/appetite, Hospital admission, Bruising, Other complaints    Comments:  Increase in alcohol       Clinical Outcomes     Negatives:  Major bleeding event, Thromboembolic event,   Anticoagulation-related hospital admission, Anticoagulation-related ED   visit, Anticoagulation-related fatality    Comments:  Increase in alcohol         INR History:  Anticoagulation Monitoring 2022 2022 5/3/2022   INR 3.60 3.00 4.00   INR Date 2022   INR Goal 2.5-3.5 2.5-3.5 2.5-3.5   Trend Same Same Down   Last Week Total 68 mg 68 mg 68 mg   Next Week Total 68 mg 68 mg 66 mg   Sun 10 mg 10 mg 10 mg   Mon 10 mg 10 mg 10 mg   Tue 10 mg 10 mg 8 mg   Wed 10 mg 10 mg 10 mg   Thu 10 mg 10 mg 10  mg   Fri 8 mg 8 mg 8 mg   Sat 10 mg 10 mg 10 mg   Visit Report - - -   Some recent data might be hidden       Plan:  1. INR is Supratherapeutic today- see above in Anticoagulation Summary.   Will instruct Man Ramsey to Change their warfarin regimen- see above in Anticoagulation Summary.  2. Follow up in 2 weeks  3.  They have been instructed to call if any changes in medications, doses, concerns, etc. Patient expresses understanding and has no further questions at this time.    Heather Kline, [facility]

## 2022-05-16 LAB
INR PPP: 4.5
INR PPP: 4.6

## 2022-05-17 ENCOUNTER — ANTICOAGULATION VISIT (OUTPATIENT)
Dept: PHARMACY | Facility: HOSPITAL | Age: 42
End: 2022-05-17

## 2022-05-17 DIAGNOSIS — Z79.01 LONG TERM (CURRENT) USE OF ANTICOAGULANTS: ICD-10-CM

## 2022-05-17 DIAGNOSIS — Z95.2 HISTORY OF MECHANICAL AORTIC VALVE REPLACEMENT: Primary | ICD-10-CM

## 2022-05-17 NOTE — PROGRESS NOTES
Anticoagulation Clinic Progress Note    Anticoagulation Summary  As of 2022    INR goal:  2.5-3.5   TTR:  77.3 % (3.4 y)   INR used for dosin.50 (2022)   Warfarin maintenance plan:  10 mg every Mon, Wed, Fri; 8 mg all other days   Weekly warfarin total:  62 mg   Plan last modified:  Heather Kline RPH (2022)   Next INR check:  2022   Priority:  High   Target end date:  Indefinite    Indications    History of mechanical aortic valve replacement [Z95.2]  Long term (current) use of anticoagulants [Z79.01]             Anticoagulation Episode Summary     INR check location:  Home Draw    Preferred lab:      Send INR reminders to:  [facility]  [facility]    Comments:  **[facility]**      Anticoagulation Care Providers     Provider Role Specialty Phone number    Lorne Kam MD Referring Cardiology [phone]          Clinic Interview:  Patient Findings     Negatives:  Signs/symptoms of thrombosis, Signs/symptoms of bleeding,   Laboratory test error suspected, Change in health, Change in alcohol use,   Change in activity, Upcoming invasive procedure, Emergency department   visit, Upcoming dental procedure, Missed doses, Extra doses, Change in   medications, Change in diet/appetite, Hospital admission, Bruising, Other   complaints      Clinical Outcomes     Negatives:  Major bleeding event, Thromboembolic event,   Anticoagulation-related hospital admission, Anticoagulation-related ED   visit, Anticoagulation-related fatality        INR History:  Anticoagulation Monitoring 2022 5/3/2022 2022   INR 3.00 4.00 4.50   INR Date 2022   INR Goal 2.5-3.5 2.5-3.5 2.5-3.5   Trend Same Down Down   Last Week Total 68 mg 68 mg 66 mg   Next Week Total 68 mg 66 mg 58 mg   Sun 10 mg 10 mg 8 mg   Mon 10 mg 10 mg 10 mg   Tue 10 mg 8 mg 4 mg (); Otherwise 8 mg   Wed 10 mg 10 mg 10 mg   Thu 10 mg 10 mg 8 mg   Fri 8 mg 8 mg 10 mg   Sat 10 mg 10 mg 8 mg    Visit Report - - -   Some recent data might be hidden       Plan:  1. INR is Supratherapeutic today- see above in Anticoagulation Summary.   Will instruct Man Ramsey to Change their warfarin regimen- see above in Anticoagulation Summary.  2. Follow up in 2 weeks  3. They have been instructed to call if any changes in medications, doses, concerns, etc. Patient expresses understanding and has no further questions at this time.    Heather Kline [facility]

## 2022-05-23 ENCOUNTER — ANTICOAGULATION VISIT (OUTPATIENT)
Dept: PHARMACY | Facility: HOSPITAL | Age: 42
End: 2022-05-23

## 2022-05-23 DIAGNOSIS — Z79.01 LONG TERM (CURRENT) USE OF ANTICOAGULANTS: ICD-10-CM

## 2022-05-23 DIAGNOSIS — Z95.2 HISTORY OF MECHANICAL AORTIC VALVE REPLACEMENT: Primary | ICD-10-CM

## 2022-05-23 LAB — INR PPP: 2.7

## 2022-06-06 ENCOUNTER — ANTICOAGULATION VISIT (OUTPATIENT)
Dept: PHARMACY | Facility: HOSPITAL | Age: 42
End: 2022-06-06

## 2022-06-06 DIAGNOSIS — Z95.2 HISTORY OF MECHANICAL AORTIC VALVE REPLACEMENT: Primary | ICD-10-CM

## 2022-06-06 DIAGNOSIS — Z79.01 LONG TERM (CURRENT) USE OF ANTICOAGULANTS: ICD-10-CM

## 2022-06-06 LAB — INR PPP: 2.8

## 2022-06-06 NOTE — PROGRESS NOTES
Anticoagulation Clinic Progress Note    Anticoagulation Summary  As of 2022    INR goal:  2.5-3.5   TTR:  77.4 % (3.5 y)   INR used for dosin.80 (2022)   Warfarin maintenance plan:  10 mg every Mon, Wed, Fri; 8 mg all other days   Weekly warfarin total:  62 mg   No change documented:  Sofy Whitaker, Pharmacy Technician   Plan last modified:  Heather Kline RPH (2022)   Next INR check:  2022   Priority:  High   Target end date:  Indefinite    Indications    History of mechanical aortic valve replacement [Z95.2]  Long term (current) use of anticoagulants [Z79.01]             Anticoagulation Episode Summary     INR check location:  Home Draw    Preferred lab:      Send INR reminders to:   [facility]  [facility]    Comments:  **[facility]**      Anticoagulation Care Providers     Provider Role Specialty Phone number    Lorne Kam MD Referring Cardiology [phone]          Clinic Interview:  No pertinent clinical findings have been reported.    INR History:  Anticoagulation Monitoring 2022   INR 4.50 2.70 2.80   INR Date 2022   INR Goal 2.5-3.5 2.5-3.5 2.5-3.5   Trend Down Same Same   Last Week Total 66 mg 58 mg 62 mg   Next Week Total 58 mg 62 mg 62 mg   Sun 8 mg 8 mg 8 mg   Mon 10 mg 10 mg 10 mg   Tue 4 mg (); Otherwise 8 mg 8 mg 8 mg   Wed 10 mg 10 mg 10 mg   Thu 8 mg 8 mg 8 mg   Fri 10 mg 10 mg 10 mg   Sat 8 mg 8 mg 8 mg   Visit Report - - -   Some recent data might be hidden       Plan:  1. INR is therapeutic today- see above in Anticoagulation Summary.    Man Ramsey to continue their warfarin regimen- see above in Anticoagulation Summary.  2. Follow up in 2 weeks  3. They have been instructed to call if any changes in medications, doses, concerns, etc. Patient expresses understanding and has no further questions at this time.    Sofy Whitaker, Pharmacy Technician

## 2022-06-21 ENCOUNTER — ANTICOAGULATION VISIT (OUTPATIENT)
Dept: PHARMACY | Facility: HOSPITAL | Age: 42
End: 2022-06-21

## 2022-06-21 DIAGNOSIS — Z79.01 LONG TERM (CURRENT) USE OF ANTICOAGULANTS: ICD-10-CM

## 2022-06-21 DIAGNOSIS — Z95.2 HISTORY OF MECHANICAL AORTIC VALVE REPLACEMENT: Primary | ICD-10-CM

## 2022-06-21 LAB — INR PPP: 2.8

## 2022-06-28 RX ORDER — WARFARIN SODIUM 2 MG/1
TABLET ORAL
Qty: 430 TABLET | Refills: 0 | Status: SHIPPED | OUTPATIENT
Start: 2022-06-28 | End: 2022-09-27 | Stop reason: SDUPTHER

## 2022-07-05 ENCOUNTER — ANTICOAGULATION VISIT (OUTPATIENT)
Dept: PHARMACY | Facility: HOSPITAL | Age: 42
End: 2022-07-05

## 2022-07-05 DIAGNOSIS — Z79.01 LONG TERM (CURRENT) USE OF ANTICOAGULANTS: ICD-10-CM

## 2022-07-05 DIAGNOSIS — Z95.2 HISTORY OF MECHANICAL AORTIC VALVE REPLACEMENT: Primary | ICD-10-CM

## 2022-07-05 LAB — INR PPP: 3.3

## 2022-07-05 NOTE — PROGRESS NOTES
Anticoagulation Clinic Progress Note    Anticoagulation Summary  As of 7/5/2022    INR goal:  2.5-3.5   TTR:  77.9 % (3.6 y)   INR used for dosing:  3.30 (7/5/2022)   Warfarin maintenance plan:  10 mg every Mon, Wed, Fri; 8 mg all other days   Weekly warfarin total:  62 mg   No change documented:  Sofy Whitaker, Pharmacy Technician   Plan last modified:  Heather Kline RPH (5/17/2022)   Next INR check:  7/19/2022   Priority:  High   Target end date:  Indefinite    Indications    History of mechanical aortic valve replacement [Z95.2]  Long term (current) use of anticoagulants [Z79.01]             Anticoagulation Episode Summary     INR check location:  Home Draw    Preferred lab:      Send INR reminders to:   [facility]  POOL    Comments:  **[facility]**      Anticoagulation Care Providers     Provider Role Specialty Phone number    Lorne Kam MD Referring Cardiology [phone]          Clinic Interview:  No pertinent clinical findings have been reported.    INR History:  Anticoagulation Monitoring 6/6/2022 6/21/2022 7/5/2022   INR 2.80 2.80 3.30   INR Date 6/6/2022 6/21/2022 7/5/2022   INR Goal 2.5-3.5 2.5-3.5 2.5-3.5   Trend Same Same Same   Last Week Total 62 mg 62 mg 62 mg   Next Week Total 62 mg 62 mg 62 mg   Sun 8 mg 8 mg 8 mg   Mon 10 mg 10 mg 10 mg   Tue 8 mg 8 mg 8 mg   Wed 10 mg 10 mg 10 mg   Thu 8 mg 8 mg 8 mg   Fri 10 mg 10 mg 10 mg   Sat 8 mg 8 mg 8 mg   Visit Report - - -   Some recent data might be hidden       Plan:  1. INR is therapeutic today- see above in Anticoagulation Summary.    Man Ramsey to continue their warfarin regimen- see above in Anticoagulation Summary.  2. Follow up in 2 weeks  3. They have been instructed to call if any changes in medications, doses, concerns, etc. Patient expresses understanding and has no further questions at this time.    Sofy Whitaker, Pharmacy Technician

## 2022-07-08 ENCOUNTER — HOSPITAL ENCOUNTER (OUTPATIENT)
Dept: SLEEP MEDICINE | Facility: HOSPITAL | Age: 42
Discharge: HOME | End: 2022-07-08
Admitting: PSYCHIATRY & NEUROLOGY

## 2022-07-08 VITALS — BODY MASS INDEX: 36.82 KG/M2 | HEIGHT: 71 IN | WEIGHT: 263 LBS

## 2022-07-08 DIAGNOSIS — I44.0 FIRST DEGREE AV BLOCK: ICD-10-CM

## 2022-07-08 DIAGNOSIS — G47.33 OSA (OBSTRUCTIVE SLEEP APNEA): ICD-10-CM

## 2022-07-08 DIAGNOSIS — I10 ESSENTIAL HYPERTENSION: ICD-10-CM

## 2022-07-08 DIAGNOSIS — R53.82 CHRONIC FATIGUE: ICD-10-CM

## 2022-07-08 DIAGNOSIS — Z86.79 HISTORY OF CARDIOMYOPATHY: ICD-10-CM

## 2022-07-08 PROCEDURE — 95810 POLYSOM 6/> YRS 4/> PARAM: CPT

## 2022-07-08 PROCEDURE — 95810 POLYSOM 6/> YRS 4/> PARAM: CPT | Performed by: INTERNAL MEDICINE

## 2022-07-15 DIAGNOSIS — G47.33 SEVERE OBSTRUCTIVE SLEEP APNEA: Primary | ICD-10-CM

## 2022-07-15 DIAGNOSIS — R06.83 SNORING: ICD-10-CM

## 2022-07-19 ENCOUNTER — ANTICOAGULATION VISIT (OUTPATIENT)
Dept: PHARMACY | Facility: HOSPITAL | Age: 42
End: 2022-07-19

## 2022-07-19 DIAGNOSIS — Z79.01 LONG TERM (CURRENT) USE OF ANTICOAGULANTS: ICD-10-CM

## 2022-07-19 DIAGNOSIS — Z95.2 HISTORY OF MECHANICAL AORTIC VALVE REPLACEMENT: Primary | ICD-10-CM

## 2022-07-19 LAB — INR PPP: 2.4

## 2022-07-19 NOTE — PROGRESS NOTES
Anticoagulation Clinic Progress Note    Anticoagulation Summary  As of 2022    INR goal:  2.5-3.5   TTR:  78.0 % (3.6 y)   INR used for dosin.40 (2022)   Warfarin maintenance plan:  10 mg every Mon, Wed, Fri; 8 mg all other days   Weekly warfarin total:  62 mg   Plan last modified:  Heather Kline RPH (2022)   Next INR check:  2022   Priority:  High   Target end date:  Indefinite    Indications    History of mechanical aortic valve replacement [Z95.2]  Long term (current) use of anticoagulants [Z79.01]             Anticoagulation Episode Summary     INR check location:  Home Draw    Preferred lab:      Send INR reminders to:  [facility]  POOL    Comments:  **[facility]**      Anticoagulation Care Providers     Provider Role Specialty Phone number    Lorne Kam MD Referring Cardiology [phone]          Clinic Interview:  Patient Findings     Negatives:  Signs/symptoms of thrombosis, Signs/symptoms of bleeding,   Laboratory test error suspected, Change in health, Change in alcohol use,   Change in activity, Upcoming invasive procedure, Emergency department   visit, Upcoming dental procedure, Missed doses, Extra doses, Change in   medications, Change in diet/appetite, Hospital admission, Bruising, Other   complaints    Comments:  States he is very tired today and thinks maybe he has had less   ETOH this past week but not sure.      Clinical Outcomes     Negatives:  Major bleeding event, Thromboembolic event,   Anticoagulation-related hospital admission, Anticoagulation-related ED   visit, Anticoagulation-related fatality    Comments:  States he is very tired today and thinks maybe he has had less   ETOH this past week but not sure.        INR History:  Anticoagulation Monitoring 2022   INR 2.80 3.30 2.40   INR Date 2022   INR Goal 2.5-3.5 2.5-3.5 2.5-3.5   Trend Same Same Same   Last Week Total 62 mg 62 mg 62 mg    Next Week Total 62 mg 62 mg 64 mg   Sun 8 mg 8 mg 8 mg   Mon 10 mg 10 mg 10 mg   Tue 8 mg 8 mg 10 mg (7/19); Otherwise 8 mg   Wed 10 mg 10 mg 10 mg   Thu 8 mg 8 mg 8 mg   Fri 10 mg 10 mg 10 mg   Sat 8 mg 8 mg 8 mg   Visit Report - - -   Some recent data might be hidden       Plan:  1. INR is Subtherapeutic today- see above in Anticoagulation Summary.   Will instruct Man Ramsey to Change their warfarin regimen- see above in Anticoagulation Summary.  Take 10mg today (instead of usual dose of 8mg) then resume previous weekly dosing until next INR.  2. Follow up in 2 weeks  3. They have been instructed to call if any changes in medications, doses, concerns, etc. Patient expresses understanding and has no further questions at this time.    Mona Jones, PharmD

## 2022-07-20 ENCOUNTER — TELEPHONE (OUTPATIENT)
Dept: SLEEP MEDICINE | Facility: HOSPITAL | Age: 42
End: 2022-07-20

## 2022-07-20 NOTE — TELEPHONE ENCOUNTER
Spoke with patient about sleep study results , sending orders to [facility], will follow up once set up on CPAP

## 2022-08-01 ENCOUNTER — ANTICOAGULATION VISIT (OUTPATIENT)
Dept: PHARMACY | Facility: HOSPITAL | Age: 42
End: 2022-08-01

## 2022-08-01 DIAGNOSIS — Z95.2 HISTORY OF MECHANICAL AORTIC VALVE REPLACEMENT: Primary | ICD-10-CM

## 2022-08-01 DIAGNOSIS — Z79.01 LONG TERM (CURRENT) USE OF ANTICOAGULANTS: ICD-10-CM

## 2022-08-01 LAB — INR PPP: 2.6

## 2022-08-01 NOTE — PROGRESS NOTES
Anticoagulation Clinic Progress Note    Anticoagulation Summary  As of 2022    INR goal:  2.5-3.5   TTR:  77.7 % (3.6 y)   INR used for dosin.60 (2022)   Warfarin maintenance plan:  10 mg every Mon, Wed, Fri; 8 mg all other days   Weekly warfarin total:  62 mg   No change documented:  Heather Kline RPH   Plan last modified:  Heather Kline RPH (2022)   Next INR check:  8/15/2022   Priority:  High   Target end date:  Indefinite    Indications    History of mechanical aortic valve replacement [Z95.2]  Long term (current) use of anticoagulants [Z79.01]             Anticoagulation Episode Summary     INR check location:  Home Draw    Preferred lab:      Send INR reminders to:   [facility]  POOL    Comments:  **[facility]**      Anticoagulation Care Providers     Provider Role Specialty Phone number    Lorne Kam MD Referring Cardiology [phone]          Clinic Interview:  No pertinent clinical findings have been reported.    INR History:  Anticoagulation Monitoring 2022   INR 3.30 2.40 2.60   INR Date 2022   INR Goal 2.5-3.5 2.5-3.5 2.5-3.5   Trend Same Same Same   Last Week Total 62 mg 62 mg 62 mg   Next Week Total 62 mg 64 mg 62 mg   Sun 8 mg 8 mg 8 mg   Mon 10 mg 10 mg 10 mg   Tue 8 mg 10 mg (); Otherwise 8 mg 8 mg   Wed 10 mg 10 mg 10 mg   Thu 8 mg 8 mg 8 mg   Fri 10 mg 10 mg 10 mg   Sat 8 mg 8 mg 8 mg   Visit Report - - -   Some recent data might be hidden       Plan:  1. INR is therapeutic today- see above in Anticoagulation Summary.    Man Ramsey to continue their warfarin regimen- see above in Anticoagulation Summary.  2. Follow up in 2 weeks  3. Pt has agreed to only be called if INR out of range. They have been instructed to call if any changes in medications, doses, concerns, etc. Patient expresses understanding and has no further questions at this time.    Heather Kline RPH

## 2022-08-16 ENCOUNTER — ANTICOAGULATION VISIT (OUTPATIENT)
Dept: PHARMACY | Facility: HOSPITAL | Age: 42
End: 2022-08-16

## 2022-08-16 DIAGNOSIS — Z79.01 LONG TERM (CURRENT) USE OF ANTICOAGULANTS: ICD-10-CM

## 2022-08-16 DIAGNOSIS — Z95.2 HISTORY OF MECHANICAL AORTIC VALVE REPLACEMENT: Primary | ICD-10-CM

## 2022-08-16 LAB — INR PPP: 2.8

## 2022-08-16 NOTE — PROGRESS NOTES
Anticoagulation Clinic Progress Note    Anticoagulation Summary  As of 2022    INR goal:  2.5-3.5   TTR:  78.0 % (3.7 y)   INR used for dosin.80 (2022)   Warfarin maintenance plan:  10 mg every Mon, Wed, Fri; 8 mg all other days   Weekly warfarin total:  62 mg   No change documented:  Sun Curtis   Plan last modified:  Heather Kline RPH (2022)   Next INR check:  2022   Priority:  High   Target end date:  Indefinite    Indications    History of mechanical aortic valve replacement [Z95.2]  Long term (current) use of anticoagulants [Z79.01]             Anticoagulation Episode Summary     INR check location:  Home Draw    Preferred lab:      Send INR reminders to:   [facility]  [facility]OL    Comments:  **[facility]**      Anticoagulation Care Providers     Provider Role Specialty Phone number    Lorne Kam MD Referring Cardiology [phone]          Clinic Interview:  No pertinent clinical findings have been reported.    INR History:  Anticoagulation Monitoring 2022   INR 2.40 2.60 2.80   INR Date 2022   INR Goal 2.5-3.5 2.5-3.5 2.5-3.5   Trend Same Same Same   Last Week Total 62 mg 62 mg 62 mg   Next Week Total 64 mg 62 mg 62 mg   Sun 8 mg 8 mg 8 mg   Mon 10 mg 10 mg 10 mg   Tue 10 mg (); Otherwise 8 mg 8 mg 8 mg   Wed 10 mg 10 mg 10 mg   Thu 8 mg 8 mg 8 mg   Fri 10 mg 10 mg 10 mg   Sat 8 mg 8 mg 8 mg   Visit Report - - -   Some recent data might be hidden       Plan:  1. INR is therapeutic today- see above in Anticoagulation Summary.    Man Ramsey to continue their warfarin regimen- see above in Anticoagulation Summary.  2. Follow up in 2 weeks  3. Pt has agreed to only be called if INR out of range. They have been instructed to call if any changes in medications, doses, concerns, etc. Patient expresses understanding and has no further questions at this time.    Sun Curtis

## 2022-08-31 ENCOUNTER — ANTICOAGULATION VISIT (OUTPATIENT)
Dept: PHARMACY | Facility: HOSPITAL | Age: 42
End: 2022-08-31

## 2022-08-31 DIAGNOSIS — Z95.2 HISTORY OF MECHANICAL AORTIC VALVE REPLACEMENT: Primary | ICD-10-CM

## 2022-08-31 DIAGNOSIS — Z79.01 LONG TERM (CURRENT) USE OF ANTICOAGULANTS: ICD-10-CM

## 2022-08-31 LAB — INR PPP: 2.8

## 2022-08-31 NOTE — PROGRESS NOTES
Anticoagulation Clinic Progress Note    Anticoagulation Summary  As of 2022    INR goal:  2.5-3.5   TTR:  78.2 % (3.7 y)   INR used for dosin.80 (2022)   Warfarin maintenance plan:  10 mg every Mon, Wed, Fri; 8 mg all other days   Weekly warfarin total:  62 mg   No change documented:  Sofy Whitaker, Pharmacy Technician   Plan last modified:  Heather Kline RPH (2022)   Next INR check:  2022   Priority:  High   Target end date:  Indefinite    Indications    History of mechanical aortic valve replacement [Z95.2]  Long term (current) use of anticoagulants [Z79.01]             Anticoagulation Episode Summary     INR check location:  Home Draw    Preferred lab:      Send INR reminders to:   [facility]  [facility]    Comments:  **[facility]**      Anticoagulation Care Providers     Provider Role Specialty Phone number    Lorne Kam MD Referring Cardiology [phone]          Clinic Interview:  No pertinent clinical findings have been reported.    INR History:  Anticoagulation Monitoring 2022   INR 2.60 2.80 2.80   INR Date 2022   INR Goal 2.5-3.5 2.5-3.5 2.5-3.5   Trend Same Same Same   Last Week Total 62 mg 62 mg 62 mg   Next Week Total 62 mg 62 mg 62 mg   Sun 8 mg 8 mg 8 mg   Mon 10 mg 10 mg 10 mg   Tue 8 mg 8 mg 8 mg   Wed 10 mg 10 mg 10 mg   Thu 8 mg 8 mg 8 mg   Fri 10 mg 10 mg 10 mg   Sat 8 mg 8 mg 8 mg   Visit Report - - -   Some recent data might be hidden       Plan:  1. INR is therapeutic today- see above in Anticoagulation Summary.    Man Ramsey to continue their warfarin regimen- see above in Anticoagulation Summary.  2. Follow up in 2 weeks  3. Pt has agreed to only be called if INR out of range. They have been instructed to call if any changes in medications, doses, concerns, etc. Patient expresses understanding and has no further questions at this time.    Sofy Whitaker, Pharmacy Technician

## 2022-09-01 ENCOUNTER — OFFICE VISIT (OUTPATIENT)
Dept: CARDIOLOGY | Facility: CLINIC | Age: 42
End: 2022-09-01

## 2022-09-01 VITALS
WEIGHT: 247 LBS | HEIGHT: 71 IN | BODY MASS INDEX: 34.58 KG/M2 | DIASTOLIC BLOOD PRESSURE: 86 MMHG | SYSTOLIC BLOOD PRESSURE: 128 MMHG | HEART RATE: 55 BPM

## 2022-09-01 DIAGNOSIS — Z86.79 HISTORY OF CARDIOMYOPATHY: ICD-10-CM

## 2022-09-01 DIAGNOSIS — Z95.2 HISTORY OF MECHANICAL AORTIC VALVE REPLACEMENT: Primary | ICD-10-CM

## 2022-09-01 PROCEDURE — 99214 OFFICE O/P EST MOD 30 MIN: CPT | Performed by: INTERNAL MEDICINE

## 2022-09-01 PROCEDURE — 93000 ELECTROCARDIOGRAM COMPLETE: CPT | Performed by: INTERNAL MEDICINE

## 2022-09-01 RX ORDER — CARVEDILOL 12.5 MG/1
12.5 TABLET ORAL 2 TIMES DAILY
Qty: 180 TABLET | Refills: 3 | Status: SHIPPED | OUTPATIENT
Start: 2022-09-01

## 2022-09-01 RX ORDER — LISINOPRIL 20 MG/1
20 TABLET ORAL DAILY
Qty: 90 TABLET | Refills: 3 | Status: SHIPPED | OUTPATIENT
Start: 2022-09-01

## 2022-09-01 NOTE — PROGRESS NOTES
Date of Office Visit: 2022  Encounter Provider: Lorne Kam MD  Place of Service: [facility]  Patient Name: Man Ramsey  :1980    Chief Complaint   Patient presents with   • Cardiac Valve Problem   :     HPI: Man Ramsey is a 42 y.o. male who presents today to follow-up. I have reviewed prior notes and there are no changes except for any new updates described below. I have also reviewed any information entered into the medical record by the patient or by ancillary staff.     He has a history of a bicuspid aortic valve with severe aortic insufficiency which caused nonischemic dilated cardiomyopathy. In , he underwent mechanical aortic valve replacement and had complete recovery of left ventricular systolic function. He has been maintained on warfarin and has had no cardiac issues. He denies dyspnea, chest pain, syncope, or edema.      He had an episode of rectal bleeding in ; a colonoscopy revealed hemorrhoids only.      An echo in 2019 showed normal LV size/systolic function, and normal valvular function.    Past Medical History:   Diagnosis Date   • Aortic insufficiency due to bicuspid aortic valve     with severe AI, s/p 29mm ATS mechanical AVR 2012.  Echo has shown normal function but mild AI.   • BRBPR (bright red blood per rectum) 10/06/2021    SEEN AT [facility] ER   • Clotting disorder (HCC) 2019    current concern/complaint   • Colon polyps     FOLLOWED BY DR. 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  He recovered with oral steroids.  GIANNA was negative for endocarditis.   • First degree AV block 2021   • Hematochezia 2019    SEEN AT [facility] ER   • Hemorrhoids    • History of blood transfusion    • Hyperglycemia    • Hypertension    • Laceration of ankle 2010    RIGHT ANKLE, SEEN AT [facility] ER   • Long term (current) use of anticoagulants    •  Mixed hyperlipidemia    • Nonischemic cardiomyopathy (HCC)     due to AI, LVEF 40% with LVE 5/2012; improved to EF 57% in July 2012 with mild LV dilation.  Echo 3/2014 with normal LV size and systolic function   • Obesity    • Ocular migraine        Past Surgical History:   Procedure Laterality Date   • AORTIC VALVE REPAIR/REPLACEMENT N/A 05/30/2012    Mechanical, DR. INGA BOLES AT [facility]   • CARDIAC CATHETERIZATION Bilateral 04/25/2012    MILD PULMONARY HYPERTENSION, ELEVATED LEFT VENTRICULAR END DIASTOLIC PRESSURE AS WELL AS PULMONARY CAPILLARY WEDGE PRESSURE CONSISTANT WITH CHF, DILATED LEFT VENTRICLE WITH LOW NORMAL LEFT VENTRICULAR SYSTOLIC FUNCTION, DR.REBECCA SESAY AT [facility]   • CARDIAC VALVE REPLACEMENT  5/29/2012    Aortic valve, mechanical prosthesis   • COLONOSCOPY N/A 12/17/2019    4 MM BENIGN POLYP IN SPLENIC FLEXURE, MODERATE HEMORRHOIDS, RESCOPE IN 5 YRS, DR. TORSTEN GORE AT [facility]   • HEAD/NECK LACERATION REPAIR N/A 05/27/2009    BACK OF HEAD, D/T FALL, DONE AT [facility] ER   • LEG LACERATION REPAIR Right 05/24/2010    RIGHT ANKLE, DONE AT [facility] ER   • TRANSESOPHAGEAL ECHOCARDIOGRAM (GIANNA) N/A 05/30/2012    DR. INGA BOLES AT [facility]       Social History     Socioeconomic History   • Marital status: Single   Tobacco Use   • Smoking status: Never Smoker   • Smokeless tobacco: Never Used   • Tobacco comment: caffeine use/ 2-3 CUPS of tea daily    Vaping Use   • Vaping Use: Never used   Substance and Sexual Activity   • Alcohol use: Yes     Comment: 2-3 servings of alcohol daily   • Drug use: No   • Sexual activity: Never       Family History   Problem Relation Age of Onset   • Colon polyps Mother    • Hypertension Mother    • Arthritis Mother    • Hypertension Father    • Hypertension Maternal Grandmother    • Hypertension Paternal Grandmother    • Hyperlipidemia Sister    • Hypertension Sister        Review of Systems   Cardiovascular: Negative for leg swelling.   Respiratory: Negative for shortness of breath.   "  Neurological: Positive for dizziness and headaches. Negative for light-headedness.   All other systems reviewed and are negative.      No Known Allergies      Current Outpatient Medications:   •  B Complex Vitamins (VITAMIN B COMPLEX PO), Take  by mouth., Disp: , Rfl:   •  CALCIUM PO, Take  by mouth., Disp: , Rfl:   •  carvedilol (COREG) 12.5 MG tablet, Take 1 tablet by mouth 2 (Two) Times a Day., Disp: 180 tablet, Rfl: 3  •  cholecalciferol (VITAMIN D3) 25 MCG (1000 UT) tablet, Take 1,000 Units by mouth Daily., Disp: , Rfl:   •  Docusate Calcium (STOOL SOFTENER PO), Take  by mouth., Disp: , Rfl:   •  fexofenadine (ALLEGRA) 180 MG tablet, Take 180 mg by mouth Daily., Disp: , Rfl:   •  lisinopril (PRINIVIL,ZESTRIL) 20 MG tablet, Take 1 tablet by mouth Daily., Disp: 90 tablet, Rfl: 3  •  Melatonin 3 MG capsule, Take 10 mg by mouth., Disp: , Rfl:   •  multivitamin with minerals tablet tablet, Take 1 tablet by mouth Daily., Disp: , Rfl:   •  Omega-3 Fatty Acids (FISH OIL) 1000 MG capsule capsule, Take  by mouth Daily With Breakfast., Disp: , Rfl:   •  Psyllium (METAMUCIL PO), Take  by mouth., Disp: , Rfl:   •  rosuvastatin (CRESTOR) 20 MG tablet, Take 1 tablet by mouth Every Night., Disp: 90 tablet, Rfl: 3  •  vitamin C (ASCORBIC ACID) 250 MG tablet, Take 500 mg by mouth Daily., Disp: , Rfl:   •  warfarin (COUMADIN) 2 MG tablet, TAKE 5 TABLETS (10 MG) EVERY Monday, Wednesday, and Friday AND TAKE 4 TABLETS (8 MG) ON ALL OTHER DAYS OR AS DIRECTED., Disp: 430 tablet, Rfl: 0  •  Hydrocortisone Ace-Pramoxine (Analpram-HC) 1-1 % rectal cream, Insert  into the rectum 2 (Two) Times a Day., Disp: 28.4 g, Rfl: 1     Objective:     Vitals:    09/01/22 1049   BP: 128/86   BP Location: Left arm   Pulse: 55   Weight: 112 kg (247 lb)   Height: 180.3 cm (71\")     Body mass index is 34.45 kg/m².    Physical Exam  Vitals reviewed.   Constitutional:       Appearance: He is well-developed.   HENT:      Head: Normocephalic.      Nose: " Nose normal.      Mouth/Throat:      Comments: masked  Eyes:      Conjunctiva/sclera: Conjunctivae normal.   Neck:      Vascular: No JVD.   Cardiovascular:      Rate and Rhythm: Normal rate and regular rhythm.      Pulses: Normal pulses and intact distal pulses.      Heart sounds: No murmur heard.     Comments: Mechanical S2  Pulmonary:      Effort: Pulmonary effort is normal.      Breath sounds: Normal breath sounds.   Abdominal:      Palpations: Abdomen is soft.      Tenderness: There is no abdominal tenderness.   Musculoskeletal:         General: No swelling. Normal range of motion.      Cervical back: Normal range of motion.   Lymphadenopathy:      Cervical: No cervical adenopathy.   Skin:     General: Skin is warm and dry.      Findings: No rash.   Neurological:      General: No focal deficit present.      Mental Status: He is alert.      Cranial Nerves: No cranial nerve deficit.   Psychiatric:         Mood and Affect: Mood normal.         Behavior: Behavior normal.         Thought Content: Thought content normal.           ECG 12 Lead    Date/Time: 9/1/2022 11:08 AM  Performed by: Lorne Kam MD  Authorized by: Lorne Kam MD   Comparison: compared with previous ECG   Similar to previous ECG  Rhythm: sinus bradycardia  Conduction: conduction normal  ST Segments: ST segments normal  T inversion: V6 and V5  QRS axis: normal  Other: no other findings    Clinical impression: non-specific ECG              Assessment:       Diagnosis Plan   1. History of mechanical aortic valve replacement  Adult Transthoracic Echo Complete W/ Cont if Necessary Per Protocol   2. History of cardiomyopathy  Adult Transthoracic Echo Complete W/ Cont if Necessary Per Protocol      Plan:       He is status post mechanical aortic valve replacement for severe bicuspid aortic insufficiency.  He had nonischemic dilated cardiopathy which resolved.  An echo in 2019 showed normal LV size/systolic function and normal valvular function. He  has a very mild, nonspecific T wave abnormality in V5-V6, so I will repeat an echo at this time. His INR is well managed.  His BP is well controlled.     Sincerely,       Lorne Kam MD

## 2022-09-12 ENCOUNTER — HOSPITAL ENCOUNTER (OUTPATIENT)
Dept: CARDIOLOGY | Facility: HOSPITAL | Age: 42
Discharge: HOME | End: 2022-09-12
Admitting: INTERNAL MEDICINE

## 2022-09-12 VITALS
BODY MASS INDEX: 34.58 KG/M2 | HEIGHT: 71 IN | HEART RATE: 73 BPM | DIASTOLIC BLOOD PRESSURE: 80 MMHG | WEIGHT: 247 LBS | OXYGEN SATURATION: 98 % | SYSTOLIC BLOOD PRESSURE: 110 MMHG

## 2022-09-12 DIAGNOSIS — Z86.79 HISTORY OF CARDIOMYOPATHY: ICD-10-CM

## 2022-09-12 DIAGNOSIS — Z95.2 HISTORY OF MECHANICAL AORTIC VALVE REPLACEMENT: ICD-10-CM

## 2022-09-12 LAB
AORTIC ARCH: 3 CM
AORTIC DIMENSIONLESS INDEX: 0.7 (DI)
ASCENDING AORTA: 3.6 CM
BH CV ECHO AV AORTIC VALVE AT ACCEL TIME CALCULATED: 110 MSEC
BH CV ECHO LEFT ATRIAL STRAIN: 23.3 %
BH CV ECHO LEFT VENTRICLE GLOBAL LONGITUDINAL STRAIN: -17.1 %
BH CV ECHO MEAS - ACS: 1.61 CM
BH CV ECHO MEAS - AO MAX PG: 12.2 MMHG
BH CV ECHO MEAS - AO MEAN PG: 6.6 MMHG
BH CV ECHO MEAS - AO ROOT DIAM: 3.5 CM
BH CV ECHO MEAS - AO V2 MAX: 174.4 CM/SEC
BH CV ECHO MEAS - AO V2 VTI: 35.6 CM
BH CV ECHO MEAS - AT: 0.11 SEC
BH CV ECHO MEAS - AVA(I,D): 2.16 CM2
BH CV ECHO MEAS - EDV(CUBED): 130.8 ML
BH CV ECHO MEAS - EDV(MOD-SP2): 138 ML
BH CV ECHO MEAS - EDV(MOD-SP4): 146 ML
BH CV ECHO MEAS - EF(MOD-BP): 55.5 %
BH CV ECHO MEAS - EF(MOD-SP2): 53.6 %
BH CV ECHO MEAS - EF(MOD-SP4): 56.8 %
BH CV ECHO MEAS - EF_3D-VOL: 52 %
BH CV ECHO MEAS - ESV(CUBED): 37.9 ML
BH CV ECHO MEAS - ESV(MOD-SP2): 64 ML
BH CV ECHO MEAS - ESV(MOD-SP4): 63 ML
BH CV ECHO MEAS - FS: 33.8 %
BH CV ECHO MEAS - IVS/LVPW: 1 CM
BH CV ECHO MEAS - IVSD: 1.22 CM
BH CV ECHO MEAS - LA 3D VOL INDEX: 39
BH CV ECHO MEAS - LAT PEAK E' VEL: 5.7 CM/SEC
BH CV ECHO MEAS - LV DIASTOLIC VOL/BSA (35-75): 63.3 CM2
BH CV ECHO MEAS - LV MASS(C)D: 246.5 GRAMS
BH CV ECHO MEAS - LV MAX PG: 5.6 MMHG
BH CV ECHO MEAS - LV MEAN PG: 3.6 MMHG
BH CV ECHO MEAS - LV SYSTOLIC VOL/BSA (12-30): 27.3 CM2
BH CV ECHO MEAS - LV V1 MAX: 118.7 CM/SEC
BH CV ECHO MEAS - LV V1 VTI: 25.7 CM
BH CV ECHO MEAS - LVIDD: 5.1 CM
BH CV ECHO MEAS - LVIDS: 3.4 CM
BH CV ECHO MEAS - LVOT AREA: 3 CM2
BH CV ECHO MEAS - LVOT DIAM: 1.95 CM
BH CV ECHO MEAS - LVPWD: 1.23 CM
BH CV ECHO MEAS - MED PEAK E' VEL: 6.1 CM/SEC
BH CV ECHO MEAS - MV A DUR: 0.12 SEC
BH CV ECHO MEAS - MV A MAX VEL: 66.5 CM/SEC
BH CV ECHO MEAS - MV DEC SLOPE: 440.6 CM/SEC2
BH CV ECHO MEAS - MV DEC TIME: 0.21 MSEC
BH CV ECHO MEAS - MV E MAX VEL: 70.8 CM/SEC
BH CV ECHO MEAS - MV E/A: 1.07
BH CV ECHO MEAS - MV MAX PG: 2.8 MMHG
BH CV ECHO MEAS - MV MEAN PG: 1.17 MMHG
BH CV ECHO MEAS - MV P1/2T: 50.5 MSEC
BH CV ECHO MEAS - MV V2 VTI: 24.6 CM
BH CV ECHO MEAS - MVA(P1/2T): 4.4 CM2
BH CV ECHO MEAS - MVA(VTI): 3.1 CM2
BH CV ECHO MEAS - PA ACC TIME: 0.1 SEC
BH CV ECHO MEAS - PA PR(ACCEL): 34.6 MMHG
BH CV ECHO MEAS - PA V2 MAX: 101.8 CM/SEC
BH CV ECHO MEAS - PULM A REVS DUR: 0.12 SEC
BH CV ECHO MEAS - PULM A REVS VEL: 22.7 CM/SEC
BH CV ECHO MEAS - PULM DIAS VEL: 55.3 CM/SEC
BH CV ECHO MEAS - PULM S/D: 0.64
BH CV ECHO MEAS - PULM SYS VEL: 35.3 CM/SEC
BH CV ECHO MEAS - QP/QS: 1.08
BH CV ECHO MEAS - RAP SYSTOLE: 3 MMHG
BH CV ECHO MEAS - RV MAX PG: 1.5 MMHG
BH CV ECHO MEAS - RV V1 MAX: 61.3 CM/SEC
BH CV ECHO MEAS - RV V1 VTI: 15.4 CM
BH CV ECHO MEAS - RVOT DIAM: 2.6 CM
BH CV ECHO MEAS - SI(MOD-SP2): 32.1 ML/M2
BH CV ECHO MEAS - SI(MOD-SP4): 36 ML/M2
BH CV ECHO MEAS - SV(LVOT): 76.6 ML
BH CV ECHO MEAS - SV(MOD-SP2): 74 ML
BH CV ECHO MEAS - SV(MOD-SP4): 83 ML
BH CV ECHO MEAS - SV(RVOT): 82.7 ML
BH CV ECHO MEAS - TAPSE (>1.6): 2.19 CM
BH CV ECHO MEASUREMENTS AVERAGE E/E' RATIO: 12
BH CV VAS BP RIGHT ARM: NORMAL MMHG
BH CV XLRA - RV BASE: 2.9 CM
BH CV XLRA - RV LENGTH: 8.6 CM
BH CV XLRA - RV MID: 3.1 CM
BH CV XLRA - TDI S': 8.2 CM/SEC
LEFT ATRIUM VOLUME INDEX: 21.1 ML/M2
MAXIMAL PREDICTED HEART RATE: 177 BPM
SINUS: 3.6 CM
STJ: 3.2 CM
STRESS TARGET HR: 150 BPM

## 2022-09-12 PROCEDURE — 93306 TTE W/DOPPLER COMPLETE: CPT

## 2022-09-12 PROCEDURE — 93356 MYOCRD STRAIN IMG SPCKL TRCK: CPT | Performed by: INTERNAL MEDICINE

## 2022-09-12 PROCEDURE — 93356 MYOCRD STRAIN IMG SPCKL TRCK: CPT

## 2022-09-12 PROCEDURE — 93306 TTE W/DOPPLER COMPLETE: CPT | Performed by: INTERNAL MEDICINE

## 2022-09-14 ENCOUNTER — ANTICOAGULATION VISIT (OUTPATIENT)
Dept: PHARMACY | Facility: HOSPITAL | Age: 42
End: 2022-09-14

## 2022-09-14 DIAGNOSIS — Z79.01 LONG TERM (CURRENT) USE OF ANTICOAGULANTS: ICD-10-CM

## 2022-09-14 DIAGNOSIS — Z95.2 HISTORY OF MECHANICAL AORTIC VALVE REPLACEMENT: Primary | ICD-10-CM

## 2022-09-14 LAB — INR PPP: 3.1

## 2022-09-14 NOTE — PROGRESS NOTES
Anticoagulation Clinic Progress Note    Anticoagulation Summary  As of 9/14/2022    INR goal:  2.5-3.5   TTR:  78.4 % (3.7 y)   INR used for dosing:  3.10 (9/14/2022)   Warfarin maintenance plan:  10 mg every Mon, Wed, Fri; 8 mg all other days   Weekly warfarin total:  62 mg   No change documented:  Sun Curtis   Plan last modified:  Heather Kline RPH (5/17/2022)   Next INR check:  9/28/2022   Priority:  High   Target end date:  Indefinite    Indications    History of mechanical aortic valve replacement [Z95.2]  Long term (current) use of anticoagulants [Z79.01]             Anticoagulation Episode Summary     INR check location:  Home Draw    Preferred lab:      Send INR reminders to:   [facility]  POOL    Comments:  **[facility]**      Anticoagulation Care Providers     Provider Role Specialty Phone number    Lorne Kam MD Referring Cardiology [phone]          Clinic Interview:  No pertinent clinical findings have been reported.    INR History:  Anticoagulation Monitoring 8/16/2022 8/31/2022 9/14/2022   INR 2.80 2.80 3.10   INR Date 8/16/2022 8/31/2022 9/14/2022   INR Goal 2.5-3.5 2.5-3.5 2.5-3.5   Trend Same Same Same   Last Week Total 62 mg 62 mg 62 mg   Next Week Total 62 mg 62 mg 62 mg   Sun 8 mg 8 mg 8 mg   Mon 10 mg 10 mg 10 mg   Tue 8 mg 8 mg 8 mg   Wed 10 mg 10 mg 10 mg   Thu 8 mg 8 mg 8 mg   Fri 10 mg 10 mg 10 mg   Sat 8 mg 8 mg 8 mg   Visit Report - - -   Some recent data might be hidden       Plan:  1. INR is therapeutic today- see above in Anticoagulation Summary.    Man Ramsey to continue their warfarin regimen- see above in Anticoagulation Summary.  2. Follow up in 2 weeks  3. Pt has agreed to only be called if INR out of range. They have been instructed to call if any changes in medications, doses, concerns, etc. Patient expresses understanding and has no further questions at this time.    Sun Curtis

## 2022-09-26 LAB — INR PPP: 2.8

## 2022-09-27 ENCOUNTER — ANTICOAGULATION VISIT (OUTPATIENT)
Dept: PHARMACY | Facility: HOSPITAL | Age: 42
End: 2022-09-27

## 2022-09-27 DIAGNOSIS — Z79.01 LONG TERM (CURRENT) USE OF ANTICOAGULANTS: ICD-10-CM

## 2022-09-27 DIAGNOSIS — Z95.2 HISTORY OF MECHANICAL AORTIC VALVE REPLACEMENT: Primary | ICD-10-CM

## 2022-09-27 RX ORDER — WARFARIN SODIUM 2 MG/1
TABLET ORAL
Qty: 430 TABLET | Refills: 0 | Status: SHIPPED | OUTPATIENT
Start: 2022-09-27 | End: 2022-12-30

## 2022-09-27 NOTE — PROGRESS NOTES
Anticoagulation Clinic Progress Note    Anticoagulation Summary  As of 2022    INR goal:  2.5-3.5   TTR:  78.6 % (3.8 y)   INR used for dosin.80 (2022)   Warfarin maintenance plan:  10 mg every Mon, Wed, Fri; 8 mg all other days   Weekly warfarin total:  62 mg   No change documented:  Sofy Whitaker, Pharmacy Technician   Plan last modified:  Heather Kline RPH (2022)   Next INR check:  10/10/2022   Priority:  High   Target end date:  Indefinite    Indications    History of mechanical aortic valve replacement [Z95.2]  Long term (current) use of anticoagulants [Z79.01]             Anticoagulation Episode Summary     INR check location:  Home Draw    Preferred lab:      Send INR reminders to:   [facility]  POOL    Comments:  **[facility]**      Anticoagulation Care Providers     Provider Role Specialty Phone number    Lorne Kam MD Referring Cardiology [phone]          Clinic Interview:  No pertinent clinical findings have been reported.    INR History:  Anticoagulation Monitoring 2022   INR 2.80 3.10 2.80   INR Date 2022   INR Goal 2.5-3.5 2.5-3.5 2.5-3.5   Trend Same Same Same   Last Week Total 62 mg 62 mg 62 mg   Next Week Total 62 mg 62 mg 62 mg   Sun 8 mg 8 mg 8 mg   Mon 10 mg 10 mg 10 mg   Tue 8 mg 8 mg 8 mg   Wed 10 mg 10 mg 10 mg   Thu 8 mg 8 mg 8 mg   Fri 10 mg 10 mg 10 mg   Sat 8 mg 8 mg 8 mg   Visit Report - - -   Some recent data might be hidden       Plan:  1. INR is therapeutic today- see above in Anticoagulation Summary.    Man Ramsey to continue their warfarin regimen- see above in Anticoagulation Summary.  2. Follow up in 2 weeks  3. They have been instructed to call if any changes in medications, doses, concerns, etc. Patient expresses understanding and has no further questions at this time.    Sofy Whitaker, Pharmacy Technician

## 2022-10-12 ENCOUNTER — ANTICOAGULATION VISIT (OUTPATIENT)
Dept: PHARMACY | Facility: HOSPITAL | Age: 42
End: 2022-10-12

## 2022-10-12 DIAGNOSIS — Z95.2 HISTORY OF MECHANICAL AORTIC VALVE REPLACEMENT: Primary | ICD-10-CM

## 2022-10-12 DIAGNOSIS — Z79.01 LONG TERM (CURRENT) USE OF ANTICOAGULANTS: ICD-10-CM

## 2022-10-12 LAB — INR PPP: 3.1

## 2022-10-12 NOTE — PROGRESS NOTES
Anticoagulation Clinic Progress Note    Anticoagulation Summary  As of 10/12/2022    INR goal:  2.5-3.5   TTR:  78.9 % (3.8 y)   INR used for dosing:  3.10 (10/12/2022)   Warfarin maintenance plan:  10 mg every Mon, Wed, Fri; 8 mg all other days   Weekly warfarin total:  62 mg   No change documented:  Sofy Whitaker, Pharmacy Technician   Plan last modified:  Heather Kline RPH (5/17/2022)   Next INR check:  10/26/2022   Priority:  High   Target end date:  Indefinite    Indications    History of mechanical aortic valve replacement [Z95.2]  Long term (current) use of anticoagulants [Z79.01]             Anticoagulation Episode Summary     INR check location:  Home Draw    Preferred lab:      Send INR reminders to:   [facility]  POOL    Comments:  **[facility]**      Anticoagulation Care Providers     Provider Role Specialty Phone number    Lorne Kam MD Referring Cardiology [phone]          Clinic Interview:  No pertinent clinical findings have been reported.    INR History:  Anticoagulation Monitoring 9/14/2022 9/27/2022 10/12/2022   INR 3.10 2.80 3.10   INR Date 9/14/2022 9/26/2022 10/12/2022   INR Goal 2.5-3.5 2.5-3.5 2.5-3.5   Trend Same Same Same   Last Week Total 62 mg 62 mg 62 mg   Next Week Total 62 mg 62 mg 62 mg   Sun 8 mg 8 mg 8 mg   Mon 10 mg 10 mg 10 mg   Tue 8 mg 8 mg 8 mg   Wed 10 mg 10 mg 10 mg   Thu 8 mg 8 mg 8 mg   Fri 10 mg 10 mg 10 mg   Sat 8 mg 8 mg 8 mg   Visit Report - - -   Some recent data might be hidden       Plan:  1. INR is therapeutic today- see above in Anticoagulation Summary.    Man Ramsey to continue their warfarin regimen- see above in Anticoagulation Summary.  2. Follow up in 2 weeks  3. They have been instructed to call if any changes in medications, doses, concerns, etc. Patient expresses understanding and has no further questions at this time.    Sofy Whitaker, Pharmacy Technician

## 2022-10-27 ENCOUNTER — ANTICOAGULATION VISIT (OUTPATIENT)
Dept: PHARMACY | Facility: HOSPITAL | Age: 42
End: 2022-10-27

## 2022-10-27 DIAGNOSIS — Z95.2 HISTORY OF MECHANICAL AORTIC VALVE REPLACEMENT: Primary | ICD-10-CM

## 2022-10-27 DIAGNOSIS — Z79.01 LONG TERM (CURRENT) USE OF ANTICOAGULANTS: ICD-10-CM

## 2022-10-27 LAB — INR PPP: 3.3

## 2022-10-27 NOTE — PROGRESS NOTES
Anticoagulation Clinic Progress Note    Anticoagulation Summary  As of 10/27/2022    INR goal:  2.5-3.5   TTR:  79.1 % (3.9 y)   INR used for dosing:  3.30 (10/27/2022)   Warfarin maintenance plan:  10 mg every Mon, Wed, Fri; 8 mg all other days   Weekly warfarin total:  62 mg   No change documented:  Sofy Whitaker, Pharmacy Technician   Plan last modified:  Heather Kline RPH (5/17/2022)   Next INR check:     Priority:  High   Target end date:  Indefinite    Indications    History of mechanical aortic valve replacement [Z95.2]  Long term (current) use of anticoagulants [Z79.01]             Anticoagulation Episode Summary     INR check location:  Home Draw    Preferred lab:      Send INR reminders to:  [facility]  POOL    Comments:  **[facility]**      Anticoagulation Care Providers     Provider Role Specialty Phone number    Lorne Kam MD Referring Cardiology [phone]          Clinic Interview:  No pertinent clinical findings have been reported.    INR History:  Anticoagulation Monitoring 9/27/2022 10/12/2022 10/27/2022   INR 2.80 3.10 3.30   INR Date 9/26/2022 10/12/2022 10/27/2022   INR Goal 2.5-3.5 2.5-3.5 2.5-3.5   Trend Same Same Same   Last Week Total 62 mg 62 mg 62 mg   Next Week Total 62 mg 62 mg 62 mg   Sun 8 mg 8 mg 8 mg   Mon 10 mg 10 mg 10 mg   Tue 8 mg 8 mg 8 mg   Wed 10 mg 10 mg 10 mg   Thu 8 mg 8 mg 8 mg   Fri 10 mg 10 mg 10 mg   Sat 8 mg 8 mg 8 mg   Visit Report - - -   Some recent data might be hidden       Plan:  1. INR is therapeutic today- see above in Anticoagulation Summary.    Man STEWART Roxy to continue their warfarin regimen- see above in Anticoagulation Summary.  2. Follow up in 2 weeks  3. They have been instructed to call if any changes in medications, doses, concerns, etc. Patient expresses understanding and has no further questions at this time.    Sofy Whitaker, Pharmacy Technician

## 2022-11-07 ENCOUNTER — OFFICE VISIT (OUTPATIENT)
Dept: SLEEP MEDICINE | Facility: HOSPITAL | Age: 42
End: 2022-11-07

## 2022-11-07 VITALS
WEIGHT: 284 LBS | BODY MASS INDEX: 39.76 KG/M2 | HEIGHT: 71 IN | SYSTOLIC BLOOD PRESSURE: 135 MMHG | HEART RATE: 70 BPM | DIASTOLIC BLOOD PRESSURE: 85 MMHG | OXYGEN SATURATION: 98 %

## 2022-11-07 DIAGNOSIS — Z86.79 HISTORY OF CARDIOMYOPATHY: ICD-10-CM

## 2022-11-07 DIAGNOSIS — E66.9 OBESITY (BMI 30-39.9): ICD-10-CM

## 2022-11-07 DIAGNOSIS — G47.34 NOCTURNAL HYPOXIA: ICD-10-CM

## 2022-11-07 DIAGNOSIS — I44.0 FIRST DEGREE AV BLOCK: ICD-10-CM

## 2022-11-07 DIAGNOSIS — G47.33 SEVERE OBSTRUCTIVE SLEEP APNEA: Primary | ICD-10-CM

## 2022-11-07 PROCEDURE — G0463 HOSPITAL OUTPT CLINIC VISIT: HCPCS

## 2022-11-07 NOTE — PROGRESS NOTES
[facility]  Telephone: [phone] / Fax: [phone] [place]  Telephone: [phone] / Fax: [phone] Carmela Krause    PCP: Orlando Mcguire MD    Reason for visit: SENA f/u    Man Ramsey is a 42 y.o.male  was last seen at [facility] sleep lab in July. He is a former patient of Dr Miller who is transferring to us. He had in lab PSG in the summer which showed very severe SENA with AHI 95. He started auto CPAP 8-20cm H2O. Pressures on the CPAP appear comfortable. If the mask sealed properly, there is no leak or excessive noise. He has facial hair, but mask seals well and it does not seem to interfere. His sleep schedule is 9-MN and up at 5:30m-6:30 am. He feels that the machine is helping. He is unaware of snoring or apneas on the machine. Sleep quality has improved. He stopped using Benadryl for sleep since we started CPAP Hs  ESS is 6. His health hs been stable overall. I reviewed prior records. He has history of mechanical aortic valve replacement and is on anticoagulation with Coumadin. He has cardiomyopathy, CHF, mixed hyper lipidemia, first-degree AV block. 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     SH- no tobacco, 12-14 alc per week, 2-4 caffeine per day    ROS- negative.    DME [facility]    Current Medications:    Current Outpatient Medications:   •  B Complex Vitamins (VITAMIN B COMPLEX PO), Take  by mouth., Disp: , Rfl:   •  CALCIUM PO, Take  by mouth., Disp: , Rfl:   •  carvedilol (COREG) 12.5 MG tablet, Take 1 tablet by mouth 2 (Two) Times a Day., Disp: 180 tablet, Rfl: 3  •  cholecalciferol (VITAMIN D3) 25 MCG (1000 UT) tablet, Take 1,000 Units by mouth Daily., Disp: , Rfl:   •  Docusate Calcium (STOOL SOFTENER PO), Take  by mouth., Disp: , Rfl:   •  fexofenadine (ALLEGRA) 180 MG tablet, Take 180 mg by mouth Daily., Disp: , Rfl:   •  Hydrocortisone Ace-Pramoxine (Analpram-HC) 1-1 % rectal cream, Insert  into the rectum 2 (Two) Times a Day., Disp: 28.4 g, Rfl: 1  •   "lisinopril (PRINIVIL,ZESTRIL) 20 MG tablet, Take 1 tablet by mouth Daily., Disp: 90 tablet, Rfl: 3  •  Melatonin 3 MG capsule, Take 10 mg by mouth., Disp: , Rfl:   •  multivitamin with minerals tablet tablet, Take 1 tablet by mouth Daily., Disp: , Rfl:   •  Omega-3 Fatty Acids (FISH OIL) 1000 MG capsule capsule, Take  by mouth Daily With Breakfast., Disp: , Rfl:   •  Psyllium (METAMUCIL PO), Take  by mouth., Disp: , Rfl:   •  rosuvastatin (CRESTOR) 20 MG tablet, Take 1 tablet by mouth Every Night., Disp: 90 tablet, Rfl: 3  •  vitamin C (ASCORBIC ACID) 250 MG tablet, Take 500 mg by mouth Daily., Disp: , Rfl:   •  warfarin (COUMADIN) 2 MG tablet, TAKE 5 TABLETS (10 MG) EVERY Monday, Wednesday, and Friday AND TAKE 4 TABLETS (8 MG) ON ALL OTHER DAYS OR AS DIRECTED., Disp: 430 tablet, Rfl: 0   also entered in Sleep Questionnaire    Patient  has a past medical history of Aortic insufficiency due to bicuspid aortic valve, BRBPR (bright red blood per rectum) (10/06/2021), Clotting disorder (HCC) (Nov 12 2019), Colon polyps, Fever of unknown origin (08/2012), First degree AV block (08/31/2021), Hematochezia (11/14/2019), Hemorrhoids, History of blood transfusion, Hyperglycemia, Hypertension, Laceration of ankle (05/24/2010), Long term (current) use of anticoagulants, Mixed hyperlipidemia, Nonischemic cardiomyopathy (HCC), Obesity, and Ocular migraine.    I have reviewed the Past Medical History, Past Surgical History, Social History and Family History.    Vital Signs /85   Pulse 70   Ht 180.3 cm (70.98\")   Wt 129 kg (284 lb)   SpO2 98%   BMI 39.63 kg/m²  Body mass index is 39.63 kg/m².    General Alert and oriented. No acute distress noted   Pharynx/Throat Class III  Mallampati airway, large tongue, no evidence of redundant lateral pharyngeal tissue. No oral lesions. No thrush. Moist mucous membranes.   Head Normocephalic. Symmetrical. Atraumatic.    Nose No septal deviation. No drainage   Chest Wall Normal shape. " Symmetric expansion with respiration. No tenderness.   Neck Trachea midline, no thyromegaly or adenopathy    Lungs Clear to auscultation bilaterally. No wheezes. No rhonchi. No rales. Respirations regular, even and unlabored.   Heart Regular rhythm and normal rate. Normal S1 and S2. Valvular sounds No murmur   Abdomen Soft, non-tender and non-distended. Normal bowel sounds. No masses.   Extremities Moves all extremities well. No edema   Psychiatric Normal mood and affect.     Testing:  · Download 10/16/22-11/6/22-95% use with average nightly use of 6 hours and 55 minutes on auto CPAP 8-20cm H2O, AHI 1.2, leak of 14.6 L/min, avg pr 16cm H2O    Study-Respiratory events: July 2022    # of events Events/hr    Central apneas        0  0 /hr    Obstructive apneas        373  62.3 hr    Hypopneas        198  33.1 /hr   AHI, apnea hypopnea index  571      95.4 /hr      RDI (RDI= AHI+RERA's)     96.2 /hr      Oxygen desaturation less than 88%, 45 minutes  Respiratory summary.  Snoring 41% of sleep time, 513 episodes  Arousals associated with periodic leg movements, 16,with index of 2.7 /hr    Impression:  1. Severe obstructive sleep apnea    2. Nocturnal hypoxia    3. History of cardiomyopathy    4. First degree AV block    5. Obesity (BMI 30-39.9)          Plan:  I reviewed prior records, recent sleep study and recent download report with Mr Ramsey. His SENA is now well controlled. Residual AHI is within acceptable range. Current pressures appear adequate. No pressure change is recommended at this time. I ordered overnight oximetry on CPAP RA to confirm that CPAP is correcting all the desaturations. Pt was asked to call us if he does not hear from DME in 2 week. If there is no significant desaturation on CPAP, he will f/u with Dr Giles in 1 year.    Thank you for allowing me to participate in your patient's care.      ZOË Moreno  [facility]  Phone: [phone]      Part of this note may be an  electronic transcription/translation of spoken language to printed text using the Dragon Dictation System.

## 2022-11-08 ENCOUNTER — ANTICOAGULATION VISIT (OUTPATIENT)
Dept: PHARMACY | Facility: HOSPITAL | Age: 42
End: 2022-11-08

## 2022-11-08 DIAGNOSIS — Z95.2 HISTORY OF MECHANICAL AORTIC VALVE REPLACEMENT: Primary | ICD-10-CM

## 2022-11-08 DIAGNOSIS — Z79.01 LONG TERM (CURRENT) USE OF ANTICOAGULANTS: ICD-10-CM

## 2022-11-08 LAB — INR PPP: 2.7

## 2022-11-08 NOTE — PROGRESS NOTES
Anticoagulation Clinic Progress Note    Anticoagulation Summary  As of 2022    INR goal:  2.5-3.5   TTR:  79.2 % (3.9 y)   INR used for dosin.70 (2022)   Warfarin maintenance plan:  10 mg every Mon, Wed, Fri; 8 mg all other days; Starting 2022   Weekly warfarin total:  62 mg   No change documented:  Sun Curtis   Plan last modified:  Heather Kline RP (2022)   Next INR check:  2022   Priority:  High   Target end date:  Indefinite    Indications    History of mechanical aortic valve replacement [Z95.2]  Long term (current) use of anticoagulants [Z79.01]             Anticoagulation Episode Summary     INR check location:  Home Draw    Preferred lab:      Send INR reminders to:  LISA [facility]CH[facility]  POOL    Comments:  **[facility]**      Anticoagulation Care Providers     Provider Role Specialty Phone number    Lorne Kam MD Referring Cardiology [phone]          Clinic Interview:  No pertinent clinical findings have been reported.    INR History:  Anticoagulation Monitoring 10/12/2022 10/27/2022 2022   INR 3.10 3.30 2.70   INR Date 10/12/2022 10/27/2022 2022   INR Goal 2.5-3.5 2.5-3.5 2.5-3.5   Trend Same Same Same   Last Week Total 62 mg 62 mg 62 mg   Next Week Total 62 mg 62 mg 62 mg   Sun 8 mg 8 mg 8 mg   Mon 10 mg 10 mg 10 mg   Tue 8 mg 8 mg 8 mg   Wed 10 mg 10 mg 10 mg   Thu 8 mg 8 mg 8 mg   Fri 10 mg 10 mg 10 mg   Sat 8 mg 8 mg 8 mg   Visit Report - - -   Some recent data might be hidden       Plan:  1. INR is therapeutic today- see above in Anticoagulation Summary.    Man Ramsey to continue their warfarin regimen- see above in Anticoagulation Summary.  2. Follow up in 2 weeks  3. Pt has agreed to only be called if INR out of range. They have been instructed to call if any changes in medications, doses, concerns, etc. Patient expresses understanding and has no further questions at this time.    Sun Curtis

## 2022-11-22 ENCOUNTER — ANTICOAGULATION VISIT (OUTPATIENT)
Dept: PHARMACY | Facility: HOSPITAL | Age: 42
End: 2022-11-22

## 2022-11-22 DIAGNOSIS — Z95.2 HISTORY OF MECHANICAL AORTIC VALVE REPLACEMENT: Primary | ICD-10-CM

## 2022-11-22 DIAGNOSIS — Z79.01 LONG TERM (CURRENT) USE OF ANTICOAGULANTS: ICD-10-CM

## 2022-11-22 LAB — INR PPP: 2.4

## 2022-11-22 NOTE — PROGRESS NOTES
Anticoagulation Clinic Progress Note    Anticoagulation Summary  As of 2022    INR goal:  2.5-3.5   TTR:  79.1 % (3.9 y)   INR used for dosin.40 (2022)   Warfarin maintenance plan:  10 mg every Mon, Wed, Fri; 8 mg all other days; Starting 2022   Weekly warfarin total:  62 mg   No change documented:  Mona Jones, PharmD   Plan last modified:  Heather Kline RPH (2022)   Next INR check:  2022   Priority:  High   Target end date:  Indefinite    Indications    History of mechanical aortic valve replacement [Z95.2]  Long term (current) use of anticoagulants [Z79.01]             Anticoagulation Episode Summary     INR check location:  Home Draw    Preferred lab:      Send INR reminders to:  LISA LEHCUGA  POOL    Comments:  **[facility]**      Anticoagulation Care Providers     Provider Role Specialty Phone number    Lorne Kam MD Referring Cardiology [phone]          Clinic Interview:  Patient Findings     Negatives:  Signs/symptoms of thrombosis, Signs/symptoms of bleeding,   Laboratory test error suspected, Change in health, Change in alcohol use,   Change in activity, Upcoming invasive procedure, Emergency department   visit, Upcoming dental procedure, Missed doses, Extra doses, Change in   medications, Change in diet/appetite, Hospital admission, Bruising, Other   complaints      Clinical Outcomes     Negatives:  Major bleeding event, Thromboembolic event,   Anticoagulation-related hospital admission, Anticoagulation-related ED   visit, Anticoagulation-related fatality        INR History:  Anticoagulation Monitoring 10/27/2022 2022 2022   INR 3.30 2.70 2.40   INR Date 10/27/2022 2022 2022   INR Goal 2.5-3.5 2.5-3.5 2.5-3.5   Trend Same Same Same   Last Week Total 62 mg 62 mg 62 mg   Next Week Total 62 mg 62 mg 62 mg   Sun 8 mg 8 mg 8 mg   Mon 10 mg 10 mg 10 mg   Tue 8 mg 8 mg 8 mg   Wed 10 mg 10 mg 10 mg   Thu 8 mg 8 mg 8 mg    Fri 10 mg 10 mg 10 mg   Sat 8 mg 8 mg 8 mg   Visit Report - - -   Some recent data might be hidden       Plan:  1. INR is Subtherapeutic today- see above in Anticoagulation Summary.   Will instruct Man Ramsey to Continue their warfarin regimen- see above in Anticoagulation Summary.  2. Follow up in 2 weeks  3. They have been instructed to call if any changes in medications, doses, concerns, etc. Patient expresses understanding and has no further questions at this time.    Mona Jones, PharmD

## 2022-12-06 ENCOUNTER — ANTICOAGULATION VISIT (OUTPATIENT)
Dept: PHARMACY | Facility: HOSPITAL | Age: 42
End: 2022-12-06

## 2022-12-06 DIAGNOSIS — Z79.01 LONG TERM (CURRENT) USE OF ANTICOAGULANTS: ICD-10-CM

## 2022-12-06 DIAGNOSIS — Z95.2 HISTORY OF MECHANICAL AORTIC VALVE REPLACEMENT: Primary | ICD-10-CM

## 2022-12-06 LAB — INR PPP: 3

## 2022-12-06 NOTE — PROGRESS NOTES
Anticoagulation Clinic Progress Note    Anticoagulation Summary  As of 12/6/2022    INR goal:  2.5-3.5   TTR:  79.1 % (4 y)   INR used for dosing:  3.00 (12/6/2022)   Warfarin maintenance plan:  10 mg every Mon, Wed, Fri; 8 mg all other days; Starting 12/6/2022   Weekly warfarin total:  62 mg   No change documented:  Mona Jones, PharmD   Plan last modified:  Heather Kline RPH (5/17/2022)   Next INR check:  12/20/2022   Priority:  High   Target end date:  Indefinite    Indications    History of mechanical aortic valve replacement [Z95.2]  Long term (current) use of anticoagulants [Z79.01]             Anticoagulation Episode Summary     INR check location:  Home Draw    Preferred lab:      Send INR reminders to:  [facility]ISA [facility]CH[facility]  POOL    Comments:  **[facility]**      Anticoagulation Care Providers     Provider Role Specialty Phone number    Lorne Kam MD Referring Cardiology [phone]          Clinic Interview:  Patient Findings     Negatives:  Signs/symptoms of thrombosis, Signs/symptoms of bleeding,   Laboratory test error suspected, Change in health, Change in alcohol use,   Change in activity, Upcoming invasive procedure, Emergency department   visit, Upcoming dental procedure, Missed doses, Extra doses, Change in   medications, Change in diet/appetite, Hospital admission, Bruising, Other   complaints      Clinical Outcomes     Negatives:  Major bleeding event, Thromboembolic event,   Anticoagulation-related hospital admission, Anticoagulation-related ED   visit, Anticoagulation-related fatality        INR History:  Anticoagulation Monitoring 11/8/2022 11/22/2022 12/6/2022   INR 2.70 2.40 3.00   INR Date 11/8/2022 11/22/2022 12/6/2022   INR Goal 2.5-3.5 2.5-3.5 2.5-3.5   Trend Same Same Same   Last Week Total 62 mg 62 mg 62 mg   Next Week Total 62 mg 62 mg 62 mg   Sun 8 mg 8 mg 8 mg   Mon 10 mg 10 mg 10 mg   Tue 8 mg 8 mg 8 mg   Wed 10 mg 10 mg 10 mg   Thu 8 mg 8 mg 8 mg   Fri  10 mg 10 mg 10 mg   Sat 8 mg 8 mg 8 mg   Visit Report - - -   Some recent data might be hidden       Plan:  1. INR is Therapeutic today- see above in Anticoagulation Summary.   Will instruct Man Ramsey to Continue their warfarin regimen- see above in Anticoagulation Summary.  2. Follow up in 2 weeks  3. Pt has agreed to only be called if INR out of range. They have been instructed to call if any changes in medications, doses, concerns, etc. Patient expresses understanding and has no further questions at this time.    Mona Jones, PharmD

## 2022-12-21 ENCOUNTER — ANTICOAGULATION VISIT (OUTPATIENT)
Dept: PHARMACY | Facility: HOSPITAL | Age: 42
End: 2022-12-21

## 2022-12-21 DIAGNOSIS — Z79.01 LONG TERM (CURRENT) USE OF ANTICOAGULANTS: ICD-10-CM

## 2022-12-21 DIAGNOSIS — Z95.2 HISTORY OF MECHANICAL AORTIC VALVE REPLACEMENT: Primary | ICD-10-CM

## 2022-12-21 LAB — INR PPP: 3.4

## 2022-12-21 NOTE — PROGRESS NOTES
Anticoagulation Clinic Progress Note    Anticoagulation Summary  As of 12/21/2022    INR goal:  2.5-3.5   TTR:  79.4 % (4 y)   INR used for dosing:  3.40 (12/21/2022)   Warfarin maintenance plan:  10 mg every Mon, Wed, Fri; 8 mg all other days; Starting 12/21/2022   Weekly warfarin total:  62 mg   No change documented:  Sun Curtis   Plan last modified:  Heather Kline [facility] (5/17/2022)   Next INR check:  1/4/2023   Priority:  High   Target end date:  Indefinite    Indications    History of mechanical aortic valve replacement [Z95.2]  Long term (current) use of anticoagulants [Z79.01]             Anticoagulation Episode Summary     INR check location:  Home Draw    Preferred lab:      Send INR reminders to:  LISA [facility]CH[facility]  POOL    Comments:  **[facility]**      Anticoagulation Care Providers     Provider Role Specialty Phone number    Lorne Kam MD Referring Cardiology [phone]          Clinic Interview:  No pertinent clinical findings have been reported.    INR History:  Anticoagulation Monitoring 11/22/2022 12/6/2022 12/21/2022   INR 2.40 3.00 3.40   INR Date 11/22/2022 12/6/2022 12/21/2022   INR Goal 2.5-3.5 2.5-3.5 2.5-3.5   Trend Same Same Same   Last Week Total 62 mg 62 mg 62 mg   Next Week Total 62 mg 62 mg 62 mg   Sun 8 mg 8 mg 8 mg   Mon 10 mg 10 mg 10 mg   Tue 8 mg 8 mg 8 mg   Wed 10 mg 10 mg 10 mg   Thu 8 mg 8 mg 8 mg   Fri 10 mg 10 mg 10 mg   Sat 8 mg 8 mg 8 mg   Visit Report - - -   Some recent data might be hidden       Plan:  1. INR is therapeutic today- see above in Anticoagulation Summary.    Man Ramsey to continue their warfarin regimen- see above in Anticoagulation Summary.  2. Follow up in 2 weeks  3. Pt has agreed to only be called if INR out of range. They have been instructed to call if any changes in medications, doses, concerns, etc. Patient expresses understanding and has no further questions at this time.    Sun Curtis

## 2022-12-30 RX ORDER — WARFARIN SODIUM 2 MG/1
TABLET ORAL
Qty: 430 TABLET | Refills: 0 | Status: SHIPPED | OUTPATIENT
Start: 2022-12-30

## 2023-01-02 LAB — INR PPP: 2.8

## 2023-01-03 ENCOUNTER — ANTICOAGULATION VISIT (OUTPATIENT)
Dept: PHARMACY | Facility: HOSPITAL | Age: 43
End: 2023-01-03
Payer: COMMERCIAL

## 2023-01-03 DIAGNOSIS — Z95.2 HISTORY OF MECHANICAL AORTIC VALVE REPLACEMENT: Primary | ICD-10-CM

## 2023-01-03 DIAGNOSIS — Z79.01 LONG TERM (CURRENT) USE OF ANTICOAGULANTS: ICD-10-CM

## 2023-01-03 NOTE — PROGRESS NOTES
Anticoagulation Clinic Progress Note    Anticoagulation Summary  As of 1/3/2023    INR goal:  2.5-3.5   TTR:  79.5 % (4 y)   INR used for dosin.80 (2023)   Warfarin maintenance plan:  10 mg every Mon, Wed, Fri; 8 mg all other days; Starting 1/3/2023   Weekly warfarin total:  62 mg   No change documented:  Augusta Lopez, Pharmacy Technician   Plan last modified:  Heather Kline [facility] (2022)   Next INR check:  2023   Priority:  High   Target end date:  Indefinite    Indications    History of mechanical aortic valve replacement [Z95.2]  Long term (current) use of anticoagulants [Z79.01]             Anticoagulation Episode Summary     INR check location:  Home Draw    Preferred lab:      Send INR reminders to:  LISA LECHUGA  POOL    Comments:  **[facility]**      Anticoagulation Care Providers     Provider Role Specialty Phone number    Lorne Kam MD Referring Cardiology [phone]          Clinic Interview:  No pertinent clinical findings have been reported.    INR History:  Anticoagulation Monitoring 2022 2022 1/3/2023   INR 3.00 3.40 2.80   INR Date 2022   INR Goal 2.5-3.5 2.5-3.5 2.5-3.5   Trend Same Same Same   Last Week Total 62 mg 62 mg 62 mg   Next Week Total 62 mg 62 mg 62 mg   Sun 8 mg 8 mg 8 mg   Mon 10 mg 10 mg 10 mg   Tue 8 mg 8 mg 8 mg   Wed 10 mg 10 mg 10 mg   Thu 8 mg 8 mg 8 mg   Fri 10 mg 10 mg 10 mg   Sat 8 mg 8 mg 8 mg   Visit Report - - -   Some recent data might be hidden       Plan:  1. INR is therapeutic today- see above in Anticoagulation Summary.    Man Ramsey to continue their warfarin regimen- see above in Anticoagulation Summary.  2. Follow up in 2 weeks  3. Pt has agreed to only be called if INR out of range. They have been instructed to call if any changes in medications, doses, concerns, etc. Patient expresses understanding and has no further questions at this time.    Augusta Lopez, Pharmacy  Technician

## 2023-01-17 ENCOUNTER — ANTICOAGULATION VISIT (OUTPATIENT)
Dept: PHARMACY | Facility: HOSPITAL | Age: 43
End: 2023-01-17
Payer: COMMERCIAL

## 2023-01-17 DIAGNOSIS — Z79.01 LONG TERM (CURRENT) USE OF ANTICOAGULANTS: ICD-10-CM

## 2023-01-17 DIAGNOSIS — Z95.2 HISTORY OF MECHANICAL AORTIC VALVE REPLACEMENT: Primary | ICD-10-CM

## 2023-01-17 LAB — INR PPP: 2.2

## 2023-01-17 NOTE — PROGRESS NOTES
Anticoagulation Clinic Progress Note    Anticoagulation Summary  As of 2023    INR goal:  2.5-3.5   TTR:  79.2 % (4.1 y)   INR used for dosin.20 (2023)   Warfarin maintenance plan:  10 mg every Mon, Wed, Fri; 8 mg all other days; Starting 2023   Weekly warfarin total:  62 mg   Plan last modified:  Heather Kline RPH (2022)   Next INR check:  2023   Priority:  High   Target end date:  Indefinite    Indications    History of mechanical aortic valve replacement [Z95.2]  Long term (current) use of anticoagulants [Z79.01]             Anticoagulation Episode Summary     INR check location:  Home Draw    Preferred lab:      Send INR reminders to:  LISA LECHUGA  POOL    Comments:  **[facility]**      Anticoagulation Care Providers     Provider Role Specialty Phone number    Lorne Kam MD Referring Cardiology [phone]          Clinic Interview:  Patient Findings     Positives:  Change in diet/appetite    Negatives:  Signs/symptoms of thrombosis, Signs/symptoms of bleeding,   Laboratory test error suspected, Change in health, Change in alcohol use,   Change in activity, Upcoming invasive procedure, Emergency department   visit, Upcoming dental procedure, Missed doses, Extra doses, Change in   medications, Hospital admission, Bruising, Other complaints    Comments:  Patient was doing the keto diet over the past two weeks. He   does not plan on continuing       Clinical Outcomes     Negatives:  Major bleeding event, Thromboembolic event,   Anticoagulation-related hospital admission, Anticoagulation-related ED   visit, Anticoagulation-related fatality    Comments:  Patient was doing the keto diet over the past two weeks. He   does not plan on continuing         INR History:  Anticoagulation Monitoring 2022 1/3/2023 2023 2023   INR 3.40 2.80 - 2.20   INR Date 2022 - 2023   INR Goal 2.5-3.5 2.5-3.5 2.5-3.5 2.5-3.5   Trend Same Same  - Same   Last Week Total 62 mg 62 mg - 62 mg   Next Week Total 62 mg 62 mg - 64 mg   Sun 8 mg 8 mg - 8 mg   Mon 10 mg 10 mg - 10 mg   Tue 8 mg 8 mg - 10 mg (1/17); Otherwise 8 mg   Wed 10 mg 10 mg - 10 mg   Thu 8 mg 8 mg - 8 mg   Fri 10 mg 10 mg - 10 mg   Sat 8 mg 8 mg - 8 mg   Visit Report - - - -   Some recent data might be hidden       Plan:  1. INR is Subtherapeutic today- see above in Anticoagulation Summary.   Will instruct Man Ramsey to Increase their warfarin regimen- see above in Anticoagulation Summary.  2. Follow up in 2 weeks  3. They have been instructed to call if any changes in medications, doses, concerns, etc. Patient expresses understanding and has no further questions at this time.    Heather Kline [facility]

## 2023-01-30 ENCOUNTER — ANTICOAGULATION VISIT (OUTPATIENT)
Dept: PHARMACY | Facility: HOSPITAL | Age: 43
End: 2023-01-30
Payer: COMMERCIAL

## 2023-01-30 DIAGNOSIS — Z95.2 HISTORY OF MECHANICAL AORTIC VALVE REPLACEMENT: Primary | ICD-10-CM

## 2023-01-30 DIAGNOSIS — Z79.01 LONG TERM (CURRENT) USE OF ANTICOAGULANTS: ICD-10-CM

## 2023-01-30 LAB — INR PPP: 2.6

## 2023-01-30 NOTE — PROGRESS NOTES
Anticoagulation Clinic Progress Note    Anticoagulation Summary  As of 2023    INR goal:  2.5-3.5   TTR:  78.8 % (4.1 y)   INR used for dosin.60 (2023)   Warfarin maintenance plan:  10 mg every Mon, Wed, Fri; 8 mg all other days; Starting 2023   Weekly warfarin total:  62 mg   No change documented:  Ricrado Espinoza [facility]   Plan last modified:  Heather Kline [facility] (2022)   Next INR check:  2023   Priority:  High   Target end date:  Indefinite    Indications    History of mechanical aortic valve replacement [Z95.2]  Long term (current) use of anticoagulants [Z79.01]             Anticoagulation Episode Summary     INR check location:  Home Draw    Preferred lab:      Send INR reminders to:  LISA LECHUGA  POOL    Comments:  **[facility]**      Anticoagulation Care Providers     Provider Role Specialty Phone number    Lorne Kam MD Referring Cardiology [phone]          Clinic Interview:      INR History:  Anticoagulation Monitoring 1/3/2023 2023 2023 2023   INR 2.80 - 2.20 2.60   INR Date 2023 - 2023   INR Goal 2.5-3.5 2.5-3.5 2.5-3.5 2.5-3.5   Trend Same - Same Same   Last Week Total 62 mg - 62 mg 62 mg   Next Week Total 62 mg - 64 mg 62 mg   Sun 8 mg - 8 mg 8 mg   Mon 10 mg - 10 mg 10 mg   Tue 8 mg - 10 mg (); Otherwise 8 mg 8 mg   Wed 10 mg - 10 mg 10 mg   Thu 8 mg - 8 mg 8 mg   Fri 10 mg - 10 mg 10 mg   Sat 8 mg - 8 mg 8 mg   Visit Report - - - -   Some recent data might be hidden       Plan:  1. INR is Therapeutic today- see above in Anticoagulation Summary.   Will instruct Man STEWART Roxy to Continue their warfarin regimen- see above in Anticoagulation Summary.  2. Follow up in 2 weeks  3. Pt has agreed to only be called if INR out of range. They have been instructed to call if any changes in medications, doses, concerns, etc. Patient expresses understanding and has no further questions at this time.    Ricardo  Olga, [facility]

## 2023-02-14 ENCOUNTER — ANTICOAGULATION VISIT (OUTPATIENT)
Dept: PHARMACY | Facility: HOSPITAL | Age: 43
End: 2023-02-14
Payer: COMMERCIAL

## 2023-02-14 DIAGNOSIS — Z79.01 LONG TERM (CURRENT) USE OF ANTICOAGULANTS: ICD-10-CM

## 2023-02-14 DIAGNOSIS — Z95.2 HISTORY OF MECHANICAL AORTIC VALVE REPLACEMENT: Primary | ICD-10-CM

## 2023-02-14 LAB — INR PPP: 3.4

## 2023-02-14 NOTE — PROGRESS NOTES
Anticoagulation Clinic Progress Note    Anticoagulation Summary  As of 2/14/2023    INR goal:  2.5-3.5   TTR:  79.0 % (4.2 y)   INR used for dosing:  3.40 (2/14/2023)   Warfarin maintenance plan:  10 mg every Mon, Wed, Fri; 8 mg all other days; Starting 2/14/2023   Weekly warfarin total:  62 mg   No change documented:  Sun Curtis   Plan last modified:  Heather Kline RPH (5/17/2022)   Next INR check:  2/28/2023   Priority:  High   Target end date:  Indefinite    Indications    History of mechanical aortic valve replacement [Z95.2]  Long term (current) use of anticoagulants [Z79.01]             Anticoagulation Episode Summary     INR check location:  Home Draw    Preferred lab:      Send INR reminders to:  LISA LECHUGA  POOL    Comments:  **[facility]**      Anticoagulation Care Providers     Provider Role Specialty Phone number    Lorne Kam MD Referring Cardiology [phone]          Clinic Interview:  No pertinent clinical findings have been reported.    INR History:  Anticoagulation Monitoring 1/17/2023 1/30/2023 2/14/2023   INR 2.20 2.60 3.40   INR Date 1/17/2023 1/30/2023 2/14/2023   INR Goal 2.5-3.5 2.5-3.5 2.5-3.5   Trend Same Same Same   Last Week Total 62 mg 62 mg 62 mg   Next Week Total 64 mg 62 mg 62 mg   Sun 8 mg 8 mg 8 mg   Mon 10 mg 10 mg 10 mg   Tue 10 mg (1/17); Otherwise 8 mg 8 mg 8 mg   Wed 10 mg 10 mg 10 mg   Thu 8 mg 8 mg 8 mg   Fri 10 mg 10 mg 10 mg   Sat 8 mg 8 mg 8 mg   Visit Report - - -   Some recent data might be hidden       Plan:  1. INR is therapeutic today- see above in Anticoagulation Summary.    Man Ramsey to continue their warfarin regimen- see above in Anticoagulation Summary.  2. Follow up in 2 weeks  3. Pt has agreed to only be called if INR out of range. They have been instructed to call if any changes in medications, doses, concerns, etc. Patient expresses understanding and has no further questions at this time.    Sun HILLS  Ever

## 2023-02-28 ENCOUNTER — ANTICOAGULATION VISIT (OUTPATIENT)
Dept: PHARMACY | Facility: HOSPITAL | Age: 43
End: 2023-02-28
Payer: COMMERCIAL

## 2023-02-28 DIAGNOSIS — Z95.2 HISTORY OF MECHANICAL AORTIC VALVE REPLACEMENT: Primary | ICD-10-CM

## 2023-02-28 DIAGNOSIS — Z79.01 LONG TERM (CURRENT) USE OF ANTICOAGULANTS: ICD-10-CM

## 2023-02-28 LAB — INR PPP: 3.2

## 2023-02-28 NOTE — PROGRESS NOTES
Anticoagulation Clinic Progress Note    Anticoagulation Summary  As of 2/28/2023    INR goal:  2.5-3.5   TTR:  79.2 % (4.2 y)   INR used for dosing:  3.20 (2/28/2023)   Warfarin maintenance plan:  10 mg every Mon, Wed, Fri; 8 mg all other days; Starting 2/28/2023   Weekly warfarin total:  62 mg   No change documented:  Sun Curtis   Plan last modified:  Heather Kline RP (5/17/2022)   Next INR check:  3/14/2023   Priority:  High   Target end date:  Indefinite    Indications    History of mechanical aortic valve replacement [Z95.2]  Long term (current) use of anticoagulants [Z79.01]             Anticoagulation Episode Summary     INR check location:  Home Draw    Preferred lab:      Send INR reminders to:  [facility]  [facility]    Comments:  **[facility]**      Anticoagulation Care Providers     Provider Role Specialty Phone number    Lorne Kam MD Referring Cardiology [phone]          Clinic Interview:  No pertinent clinical findings have been reported.    INR History:  Anticoagulation Monitoring 1/30/2023 2/14/2023 2/28/2023   INR 2.60 3.40 3.20   INR Date 1/30/2023 2/14/2023 2/28/2023   INR Goal 2.5-3.5 2.5-3.5 2.5-3.5   Trend Same Same Same   Last Week Total 62 mg 62 mg 62 mg   Next Week Total 62 mg 62 mg 62 mg   Sun 8 mg 8 mg 8 mg   Mon 10 mg 10 mg 10 mg   Tue 8 mg 8 mg 8 mg   Wed 10 mg 10 mg 10 mg   Thu 8 mg 8 mg 8 mg   Fri 10 mg 10 mg 10 mg   Sat 8 mg 8 mg 8 mg   Visit Report - - -   Some recent data might be hidden       Plan:  1. INR is therapeutic today- see above in Anticoagulation Summary.    Man Ramsey to continue their warfarin regimen- see above in Anticoagulation Summary.  2. Follow up in 2 weeks  3. Pt has agreed to only be called if INR out of range. They have been instructed to call if any changes in medications, doses, concerns, etc. Patient expresses understanding and has no further questions at this time.    Sun Curtis

## 2023-03-14 ENCOUNTER — ANTICOAGULATION VISIT (OUTPATIENT)
Dept: PHARMACY | Facility: HOSPITAL | Age: 43
End: 2023-03-14
Payer: COMMERCIAL

## 2023-03-14 DIAGNOSIS — Z95.2 HISTORY OF MECHANICAL AORTIC VALVE REPLACEMENT: Primary | ICD-10-CM

## 2023-03-14 DIAGNOSIS — Z79.01 LONG TERM (CURRENT) USE OF ANTICOAGULANTS: ICD-10-CM

## 2023-03-14 LAB — INR PPP: 2.9

## 2023-03-14 NOTE — PROGRESS NOTES
Anticoagulation Clinic Progress Note    Anticoagulation Summary  As of 3/14/2023    INR goal:  2.5-3.5   TTR:  79.4 % (4.2 y)   INR used for dosin.90 (3/14/2023)   Warfarin maintenance plan:  10 mg every Mon, Wed, Fri; 8 mg all other days; Starting 3/14/2023   Weekly warfarin total:  62 mg   No change documented:  Augusta Lopez, Pharmacy Technician   Plan last modified:  Heather Kline [facility] (2022)   Next INR check:  3/28/2023   Priority:  High   Target end date:  Indefinite    Indications    History of mechanical aortic valve replacement [Z95.2]  Long term (current) use of anticoagulants [Z79.01]             Anticoagulation Episode Summary     INR check location:  Home Draw    Preferred lab:      Send INR reminders to:  [facility]  [facility]    Comments:  **[facility]**      Anticoagulation Care Providers     Provider Role Specialty Phone number    Lorne Kam MD Referring Cardiology [phone]          Clinic Interview:  No pertinent clinical findings have been reported.    INR History:  Anticoagulation Monitoring 2023 2023 3/14/2023   INR 3.40 3.20 2.90   INR Date 2023 2023 3/14/2023   INR Goal 2.5-3.5 2.5-3.5 2.5-3.5   Trend Same Same Same   Last Week Total 62 mg 62 mg 62 mg   Next Week Total 62 mg 62 mg 62 mg   Sun 8 mg 8 mg 8 mg   Mon 10 mg 10 mg 10 mg   Tue 8 mg 8 mg 8 mg   Wed 10 mg 10 mg 10 mg   Thu 8 mg 8 mg 8 mg   Fri 10 mg 10 mg 10 mg   Sat 8 mg 8 mg 8 mg   Visit Report - - -   Some recent data might be hidden       Plan:  1. INR is therapeutic today- see above in Anticoagulation Summary.    Man Ramsey to continue their warfarin regimen- see above in Anticoagulation Summary.  2. Follow up in 2 weeks  3. Pt has agreed to only be called if INR out of range. They have been instructed to call if any changes in medications, doses, concerns, etc. Patient expresses understanding and has no further questions at this time.    Augusta Lopez, Pharmacy  Technician

## 2023-03-27 ENCOUNTER — ANTICOAGULATION VISIT (OUTPATIENT)
Dept: PHARMACY | Facility: HOSPITAL | Age: 43
End: 2023-03-27
Payer: COMMERCIAL

## 2023-03-27 DIAGNOSIS — Z79.01 LONG TERM (CURRENT) USE OF ANTICOAGULANTS: ICD-10-CM

## 2023-03-27 DIAGNOSIS — Z95.2 HISTORY OF MECHANICAL AORTIC VALVE REPLACEMENT: Primary | ICD-10-CM

## 2023-03-27 LAB — INR PPP: 2.8

## 2023-03-27 NOTE — PROGRESS NOTES
Anticoagulation Clinic Progress Note    Anticoagulation Summary  As of 3/27/2023    INR goal:  2.5-3.5   TTR:  79.6 % (4.3 y)   INR used for dosin.80 (3/27/2023)   Warfarin maintenance plan:  10 mg every Mon, Wed, Fri; 8 mg all other days; Starting 3/27/2023   Weekly warfarin total:  62 mg   No change documented:  Augusta Lopez, Pharmacy Technician   Plan last modified:  Heather Kline [facility] (2022)   Next INR check:  4/10/2023   Priority:  High   Target end date:  Indefinite    Indications    History of mechanical aortic valve replacement [Z95.2]  Long term (current) use of anticoagulants [Z79.01]             Anticoagulation Episode Summary     INR check location:  Home Draw    Preferred lab:      Send INR reminders to:  [facility]  [facility]    Comments:  **[facility]**      Anticoagulation Care Providers     Provider Role Specialty Phone number    Lorne Kam MD Referring Cardiology [phone]          Clinic Interview:  No pertinent clinical findings have been reported.    INR History:  Anticoagulation Monitoring 2023 3/14/2023 3/27/2023   INR 3.20 2.90 2.80   INR Date 2023 3/14/2023 3/27/2023   INR Goal 2.5-3.5 2.5-3.5 2.5-3.5   Trend Same Same Same   Last Week Total 62 mg 62 mg 62 mg   Next Week Total 62 mg 62 mg 62 mg   Sun 8 mg 8 mg 8 mg   Mon 10 mg 10 mg 10 mg   Tue 8 mg 8 mg 8 mg   Wed 10 mg 10 mg 10 mg   Thu 8 mg 8 mg 8 mg   Fri 10 mg 10 mg 10 mg   Sat 8 mg 8 mg 8 mg   Visit Report - - -   Some recent data might be hidden       Plan:  1. INR is therapeutic today- see above in Anticoagulation Summary.    Man Ramsey to continue their warfarin regimen- see above in Anticoagulation Summary.  2. Follow up in 2 weeks  3. Pt has agreed to only be called if INR out of range. They have been instructed to call if any changes in medications, doses, concerns, etc. Patient expresses understanding and has no further questions at this time.    Augusta Lopez, Pharmacy  Technician

## 2023-04-10 ENCOUNTER — ANTICOAGULATION VISIT (OUTPATIENT)
Dept: PHARMACY | Facility: HOSPITAL | Age: 43
End: 2023-04-10
Payer: COMMERCIAL

## 2023-04-10 DIAGNOSIS — Z79.01 LONG TERM (CURRENT) USE OF ANTICOAGULANTS: ICD-10-CM

## 2023-04-10 DIAGNOSIS — Z95.2 HISTORY OF MECHANICAL AORTIC VALVE REPLACEMENT: Primary | ICD-10-CM

## 2023-04-10 LAB — INR PPP: 3.2

## 2023-04-10 RX ORDER — WARFARIN SODIUM 2 MG/1
TABLET ORAL
Qty: 430 TABLET | Refills: 0 | Status: SHIPPED | OUTPATIENT
Start: 2023-04-10

## 2023-04-10 NOTE — PROGRESS NOTES
Anticoagulation Clinic Progress Note    Anticoagulation Summary  As of 4/10/2023    INR goal:  2.5-3.5   TTR:  79.7 % (4.3 y)   INR used for dosing:  3.20 (4/10/2023)   Warfarin maintenance plan:  10 mg every Mon, Wed, Fri; 8 mg all other days; Starting 4/10/2023   Weekly warfarin total:  62 mg   No change documented:  Augusta Lopez, Pharmacy Technician   Plan last modified:  Heather Kline [facility] (5/17/2022)   Next INR check:  4/24/2023   Priority:  High   Target end date:  Indefinite    Indications    History of mechanical aortic valve replacement [Z95.2]  Long term (current) use of anticoagulants [Z79.01]             Anticoagulation Episode Summary     INR check location:  Home Draw    Preferred lab:      Send INR reminders to:  [facility]  POOL    Comments:  **[facility]**      Anticoagulation Care Providers     Provider Role Specialty Phone number    Lorne Kam MD Referring Cardiology [phone]          Clinic Interview:  No pertinent clinical findings have been reported.    INR History:  Anticoagulation Monitoring 3/14/2023 3/27/2023 4/10/2023   INR 2.90 2.80 3.20   INR Date 3/14/2023 3/27/2023 4/10/2023   INR Goal 2.5-3.5 2.5-3.5 2.5-3.5   Trend Same Same Same   Last Week Total 62 mg 62 mg 62 mg   Next Week Total 62 mg 62 mg 62 mg   Sun 8 mg 8 mg 8 mg   Mon 10 mg 10 mg 10 mg   Tue 8 mg 8 mg 8 mg   Wed 10 mg 10 mg 10 mg   Thu 8 mg 8 mg 8 mg   Fri 10 mg 10 mg 10 mg   Sat 8 mg 8 mg 8 mg   Visit Report - - -   Some recent data might be hidden       Plan:  1. INR is therapeutic today- see above in Anticoagulation Summary.    Man Ramsey to continue their warfarin regimen- see above in Anticoagulation Summary.  2. Follow up in 2 weeks  3. Pt has agreed to only be called if INR out of range. They have been instructed to call if any changes in medications, doses, concerns, etc. Patient expresses understanding and has no further questions at this time.    Augusta Lopez, Pharmacy  Technician

## 2023-04-24 ENCOUNTER — ANTICOAGULATION VISIT (OUTPATIENT)
Dept: PHARMACY | Facility: HOSPITAL | Age: 43
End: 2023-04-24
Payer: COMMERCIAL

## 2023-04-24 DIAGNOSIS — Z95.2 HISTORY OF MECHANICAL AORTIC VALVE REPLACEMENT: Primary | ICD-10-CM

## 2023-04-24 DIAGNOSIS — Z79.01 LONG TERM (CURRENT) USE OF ANTICOAGULANTS: ICD-10-CM

## 2023-04-24 LAB — INR PPP: 3.4

## 2023-04-24 NOTE — PROGRESS NOTES
Anticoagulation Clinic Progress Note    Anticoagulation Summary  As of 4/24/2023    INR goal:  2.5-3.5   TTR:  79.9 % (4.4 y)   INR used for dosing:  3.40 (4/24/2023)   Warfarin maintenance plan:  10 mg every Mon, Wed, Fri; 8 mg all other days; Starting 4/24/2023   Weekly warfarin total:  62 mg   No change documented:  Sun Curtis   Plan last modified:  Heather Kline RPH (5/17/2022)   Next INR check:  5/8/2023   Priority:  High   Target end date:  Indefinite    Indications    History of mechanical aortic valve replacement [Z95.2]  Long term (current) use of anticoagulants [Z79.01]             Anticoagulation Episode Summary     INR check location:  Home Draw    Preferred lab:      Send INR reminders to:   [facility]  [facility]    Comments:  **[facility]**      Anticoagulation Care Providers     Provider Role Specialty Phone number    Lorne Kam MD Referring Cardiology [phone]          Clinic Interview:  No pertinent clinical findings have been reported.    INR History:      3/14/2023    11:45 AM 3/27/2023    12:00 AM 3/27/2023    10:52 AM 4/10/2023    12:00 AM 4/10/2023    10:45 AM 4/24/2023    12:00 AM 4/24/2023     2:46 PM   Anticoagulation Monitoring   INR 2.90  2.80  3.20  3.40   INR Date 3/14/2023  3/27/2023  4/10/2023  4/24/2023   INR Goal 2.5-3.5  2.5-3.5  2.5-3.5  2.5-3.5   Trend Same  Same  Same  Same   Last Week Total 62 mg  62 mg  62 mg  62 mg   Next Week Total 62 mg  62 mg  62 mg  62 mg   Sun 8 mg  8 mg  8 mg  8 mg   Mon 10 mg  10 mg  10 mg  10 mg   Tue 8 mg  8 mg  8 mg  8 mg   Wed 10 mg  10 mg  10 mg  10 mg   Thu 8 mg  8 mg  8 mg  8 mg   Fri 10 mg  10 mg  10 mg  10 mg   Sat 8 mg  8 mg  8 mg  8 mg   Historical INR  2.80       3.20       3.40             This result is from an external source.       Plan:  1. INR is therapeutic today- see above in Anticoagulation Summary.    Man Ramsey to continue their warfarin regimen- see above in Anticoagulation  Summary.  2. Follow up in 2 weeks  3. Pt has agreed to only be called if INR out of range. They have been instructed to call if any changes in medications, doses, concerns, etc. Patient expresses understanding and has no further questions at this time.    Sun Curtis

## 2023-05-10 ENCOUNTER — ANTICOAGULATION VISIT (OUTPATIENT)
Dept: PHARMACY | Facility: HOSPITAL | Age: 43
End: 2023-05-10
Payer: COMMERCIAL

## 2023-05-10 DIAGNOSIS — Z79.01 LONG TERM (CURRENT) USE OF ANTICOAGULANTS: ICD-10-CM

## 2023-05-10 DIAGNOSIS — Z95.2 HISTORY OF MECHANICAL AORTIC VALVE REPLACEMENT: Primary | ICD-10-CM

## 2023-05-10 LAB — INR PPP: 3.2

## 2023-05-10 NOTE — PROGRESS NOTES
Anticoagulation Clinic Progress Note    Anticoagulation Summary  As of 5/10/2023    INR goal:  2.5-3.5   TTR:  80.1 % (4.4 y)   INR used for dosing:  3.20 (5/10/2023)   Warfarin maintenance plan:  10 mg every Mon, Wed, Fri; 8 mg all other days; Starting 5/10/2023   Weekly warfarin total:  62 mg   No change documented:  Sun Curtis   Plan last modified:  Heather Kline RPH (5/17/2022)   Next INR check:  5/24/2023   Priority:  High   Target end date:  Indefinite    Indications    History of mechanical aortic valve replacement [Z95.2]  Long term (current) use of anticoagulants [Z79.01]             Anticoagulation Episode Summary     INR check location:  Home Draw    Preferred lab:      Send INR reminders to:   [facility]  [facility]    Comments:  **[facility]**      Anticoagulation Care Providers     Provider Role Specialty Phone number    Lorne Kam MD Referring Cardiology [phone]          Clinic Interview:  No pertinent clinical findings have been reported.    INR History:      3/27/2023    10:52 AM 4/10/2023    12:00 AM 4/10/2023    10:45 AM 4/24/2023    12:00 AM 4/24/2023     2:46 PM 5/10/2023    12:00 AM 5/10/2023     3:27 PM   Anticoagulation Monitoring   INR 2.80  3.20  3.40  3.20   INR Date 3/27/2023  4/10/2023  4/24/2023  5/10/2023   INR Goal 2.5-3.5  2.5-3.5  2.5-3.5  2.5-3.5   Trend Same  Same  Same  Same   Last Week Total 62 mg  62 mg  62 mg  62 mg   Next Week Total 62 mg  62 mg  62 mg  62 mg   Sun 8 mg  8 mg  8 mg  8 mg   Mon 10 mg  10 mg  10 mg  10 mg   Tue 8 mg  8 mg  8 mg  8 mg   Wed 10 mg  10 mg  10 mg  10 mg   Thu 8 mg  8 mg  8 mg  8 mg   Fri 10 mg  10 mg  10 mg  10 mg   Sat 8 mg  8 mg  8 mg  8 mg   Historical INR  3.20       3.40       3.20             This result is from an external source.       Plan:  1. INR is therapeutic today- see above in Anticoagulation Summary.    Man Ramsey to continue their warfarin regimen- see above in Anticoagulation  Summary.  2. Follow up in 2 weeks  3. Pt has agreed to only be called if INR out of range. They have been instructed to call if any changes in medications, doses, concerns, etc. Patient expresses understanding and has no further questions at this time.    Sun Curtis

## 2023-05-19 ENCOUNTER — OFFICE VISIT (OUTPATIENT)
Dept: FAMILY MEDICINE CLINIC | Facility: CLINIC | Age: 43
End: 2023-05-19
Payer: COMMERCIAL

## 2023-05-19 VITALS
SYSTOLIC BLOOD PRESSURE: 140 MMHG | DIASTOLIC BLOOD PRESSURE: 76 MMHG | OXYGEN SATURATION: 98 % | HEART RATE: 64 BPM | WEIGHT: 261 LBS | RESPIRATION RATE: 18 BRPM | HEIGHT: 71 IN | BODY MASS INDEX: 36.54 KG/M2

## 2023-05-19 DIAGNOSIS — R31.0 GROSS HEMATURIA: Primary | ICD-10-CM

## 2023-05-19 DIAGNOSIS — E78.2 MIXED HYPERLIPIDEMIA: ICD-10-CM

## 2023-05-19 DIAGNOSIS — R73.02 IMPAIRED GLUCOSE TOLERANCE: ICD-10-CM

## 2023-05-19 LAB
BILIRUB BLD-MCNC: NEGATIVE MG/DL
CLARITY, POC: ABNORMAL
COLOR UR: YELLOW
EXPIRATION DATE: ABNORMAL
GLUCOSE UR STRIP-MCNC: NEGATIVE MG/DL
KETONES UR QL: NEGATIVE
LEUKOCYTE EST, POC: NEGATIVE
Lab: ABNORMAL
NITRITE UR-MCNC: NEGATIVE MG/ML
PH UR: 6.5 [PH] (ref 5–8)
PROT UR STRIP-MCNC: NEGATIVE MG/DL
RBC # UR STRIP: ABNORMAL /UL
SP GR UR: 1 (ref 1–1.03)
UROBILINOGEN UR QL: ABNORMAL

## 2023-05-19 PROCEDURE — 81003 URINALYSIS AUTO W/O SCOPE: CPT | Performed by: INTERNAL MEDICINE

## 2023-05-19 PROCEDURE — 99213 OFFICE O/P EST LOW 20 MIN: CPT | Performed by: INTERNAL MEDICINE

## 2023-05-19 RX ORDER — ROSUVASTATIN CALCIUM 20 MG/1
TABLET, COATED ORAL
Qty: 90 TABLET | Refills: 0 | Status: SHIPPED | OUTPATIENT
Start: 2023-05-19

## 2023-05-19 NOTE — PROGRESS NOTES
Answers for HPI/ROS submitted by the patient on 5/18/2023  What is the primary reason for your visit?: Other  Please describe your symptoms.: (Small amount of) blood in urine. Mild abdominal discomfort that comes and goes: sensation of warmth on lower left, cramping sensation on lower right.  Have you had these symptoms before?: No  How long have you been having these symptoms?: 1-4 days  Please list any medications you are currently taking for this condition.: None for this.  Please describe any probable cause for these symptoms. : Kidney stone seems likely.    Subjective Chief complaint is blood in the urine  Man Ramsey is a 43 y.o. male.     History of Present Illness Man is here today for some complaints of blood in the urine.  This is a very small amount according to the patient.  It is somewhat gelled and bright.  He has not had any burning with urination.  Sometimes he has to strain a little bit.  He is under little bit of stress because of a cat that has been ill.  That sometimes makes it a little bit hard for him to urinate.  He is having some lower abdominal discomfort.  He has no prior history of kidney stones.  He is not having fever or chills.  He is not having any testicular pain.  He is on warfarin for a mechanical aortic valve replacement.  His recent INR that he did was 3.3.  That is in range for his valve.  He does have a prior history of some cholesterol issues.  He is now on medication.  He also has had some impaired glucose tolerance and is due for some lab work for that.  The following portions of the patient's history were reviewed and updated as appropriate: allergies, current medications, past family history, past medical history, past social history, past surgical history and problem list.    Review of Systems   Constitutional: Negative for chills and fever.   Genitourinary: Negative for dysuria and penile pain.       Objective   Physical Exam  Vitals and nursing note reviewed.    Constitutional:       Appearance: Normal appearance.   Cardiovascular:      Rate and Rhythm: Normal rate.   Pulmonary:      Effort: Pulmonary effort is normal.   Abdominal:      Tenderness: There is no abdominal tenderness. There is no right CVA tenderness or left CVA tenderness.      Comments: He has some mild suprapubic tenderness   Neurological:      Mental Status: He is alert.           Assessment & Plan   Diagnoses and all orders for this visit:    1. Gross hematuria (Primary)  -     Urine Culture - Urine, Urine, Clean Catch  -     POC Urinalysis Dipstick, Automated  -     Ambulatory Referral to Urology  -     CBC & Differential    2. Mixed hyperlipidemia  -     Lipid Panel    3. Impaired glucose tolerance  -     Comprehensive Metabolic Panel  -     Hemoglobin A1c    Man is here today for evaluation of gross hematuria.  We are going to check a urinalysis and culture.  I am going to refer him to urologist and let them decide whether a CAT scan is needed.

## 2023-05-20 LAB
ALBUMIN SERPL-MCNC: 5 G/DL (ref 3.5–5.2)
ALBUMIN/GLOB SERPL: 2.4 G/DL
ALP SERPL-CCNC: 77 U/L (ref 39–117)
ALT SERPL-CCNC: 55 U/L (ref 1–41)
AST SERPL-CCNC: 28 U/L (ref 1–40)
BASOPHILS # BLD AUTO: 0.04 10*3/MM3 (ref 0–0.2)
BASOPHILS NFR BLD AUTO: 0.5 % (ref 0–1.5)
BILIRUB SERPL-MCNC: 0.6 MG/DL (ref 0–1.2)
BUN SERPL-MCNC: 11 MG/DL (ref 6–20)
BUN/CREAT SERPL: 11.3 (ref 7–25)
CALCIUM SERPL-MCNC: 9.9 MG/DL (ref 8.6–10.5)
CHLORIDE SERPL-SCNC: 101 MMOL/L (ref 98–107)
CHOLEST SERPL-MCNC: 111 MG/DL (ref 0–200)
CO2 SERPL-SCNC: 26.9 MMOL/L (ref 22–29)
CREAT SERPL-MCNC: 0.97 MG/DL (ref 0.76–1.27)
EGFRCR SERPLBLD CKD-EPI 2021: 98.7 ML/MIN/1.73
EOSINOPHIL # BLD AUTO: 0.09 10*3/MM3 (ref 0–0.4)
EOSINOPHIL NFR BLD AUTO: 1.1 % (ref 0.3–6.2)
ERYTHROCYTE [DISTWIDTH] IN BLOOD BY AUTOMATED COUNT: 12.8 % (ref 12.3–15.4)
GLOBULIN SER CALC-MCNC: 2.1 GM/DL
GLUCOSE SERPL-MCNC: 96 MG/DL (ref 65–99)
HBA1C MFR BLD: 5.6 % (ref 4.8–5.6)
HCT VFR BLD AUTO: 45 % (ref 37.5–51)
HDLC SERPL-MCNC: 37 MG/DL (ref 40–60)
HGB BLD-MCNC: 15 G/DL (ref 13–17.7)
IMM GRANULOCYTES # BLD AUTO: 0.01 10*3/MM3 (ref 0–0.05)
IMM GRANULOCYTES NFR BLD AUTO: 0.1 % (ref 0–0.5)
LDLC SERPL CALC-MCNC: 51 MG/DL (ref 0–100)
LYMPHOCYTES # BLD AUTO: 1.66 10*3/MM3 (ref 0.7–3.1)
LYMPHOCYTES NFR BLD AUTO: 21.1 % (ref 19.6–45.3)
MCH RBC QN AUTO: 29.1 PG (ref 26.6–33)
MCHC RBC AUTO-ENTMCNC: 33.3 G/DL (ref 31.5–35.7)
MCV RBC AUTO: 87.4 FL (ref 79–97)
MONOCYTES # BLD AUTO: 0.69 10*3/MM3 (ref 0.1–0.9)
MONOCYTES NFR BLD AUTO: 8.8 % (ref 5–12)
NEUTROPHILS # BLD AUTO: 5.36 10*3/MM3 (ref 1.7–7)
NEUTROPHILS NFR BLD AUTO: 68.4 % (ref 42.7–76)
NRBC BLD AUTO-RTO: 0 /100 WBC (ref 0–0.2)
PLATELET # BLD AUTO: 280 10*3/MM3 (ref 140–450)
POTASSIUM SERPL-SCNC: 4.3 MMOL/L (ref 3.5–5.2)
PROT SERPL-MCNC: 7.1 G/DL (ref 6–8.5)
RBC # BLD AUTO: 5.15 10*6/MM3 (ref 4.14–5.8)
SODIUM SERPL-SCNC: 139 MMOL/L (ref 136–145)
TRIGL SERPL-MCNC: 127 MG/DL (ref 0–150)
VLDLC SERPL CALC-MCNC: 23 MG/DL (ref 5–40)
WBC # BLD AUTO: 7.85 10*3/MM3 (ref 3.4–10.8)

## 2023-05-21 LAB
BACTERIA UR CULT: NO GROWTH
BACTERIA UR CULT: NORMAL

## 2023-05-22 ENCOUNTER — ANTICOAGULATION VISIT (OUTPATIENT)
Dept: PHARMACY | Facility: HOSPITAL | Age: 43
End: 2023-05-22
Payer: COMMERCIAL

## 2023-05-22 DIAGNOSIS — Z95.2 HISTORY OF MECHANICAL AORTIC VALVE REPLACEMENT: Primary | ICD-10-CM

## 2023-05-22 DIAGNOSIS — Z79.01 LONG TERM (CURRENT) USE OF ANTICOAGULANTS: ICD-10-CM

## 2023-05-22 LAB — INR PPP: 3.2

## 2023-05-22 NOTE — PROGRESS NOTES
Anticoagulation Clinic Progress Note    Anticoagulation Summary  As of 5/22/2023    INR goal:  2.5-3.5   TTR:  80.3 % (4.4 y)   INR used for dosing:  3.20 (5/22/2023)   Warfarin maintenance plan:  10 mg every Mon, Wed, Fri; 8 mg all other days; Starting 5/22/2023   Weekly warfarin total:  62 mg   No change documented:  Augusta Lopez, Pharmacy Technician   Plan last modified:  Heather Kline RPH (5/17/2022)   Next INR check:  6/5/2023   Priority:  High   Target end date:  Indefinite    Indications    History of mechanical aortic valve replacement [Z95.2]  Long term (current) use of anticoagulants [Z79.01]             Anticoagulation Episode Summary     INR check location:  Home Draw    Preferred lab:      Send INR reminders to:  [facility]  POOL    Comments:  **[facility]**      Anticoagulation Care Providers     Provider Role Specialty Phone number    Lorne Kam MD Referring Cardiology [phone]          Clinic Interview:  No pertinent clinical findings have been reported.    INR History:      4/10/2023    10:45 AM 4/24/2023    12:00 AM 4/24/2023     2:46 PM 5/10/2023    12:00 AM 5/10/2023     3:27 PM 5/22/2023    12:00 AM 5/22/2023     2:09 PM   Anticoagulation Monitoring   INR 3.20  3.40  3.20  3.20   INR Date 4/10/2023  4/24/2023  5/10/2023  5/22/2023   INR Goal 2.5-3.5  2.5-3.5  2.5-3.5  2.5-3.5   Trend Same  Same  Same  Same   Last Week Total 62 mg  62 mg  62 mg  62 mg   Next Week Total 62 mg  62 mg  62 mg  62 mg   Sun 8 mg  8 mg  8 mg  8 mg   Mon 10 mg  10 mg  10 mg  10 mg   Tue 8 mg  8 mg  8 mg  8 mg   Wed 10 mg  10 mg  10 mg  10 mg   Thu 8 mg  8 mg  8 mg  8 mg   Fri 10 mg  10 mg  10 mg  10 mg   Sat 8 mg  8 mg  8 mg  8 mg   Historical INR  3.40       3.20       3.20             This result is from an external source.       Plan:  1. INR is therapeutic today- see above in Anticoagulation Summary.    Man Ramsey to continue their warfarin regimen- see above in  Anticoagulation Summary.  2. Follow up in 2 weeks  3. Pt has agreed to only be called if INR out of range. They have been instructed to call if any changes in medications, doses, concerns, etc. Patient expresses understanding and has no further questions at this time.    Augusta Lopez, Pharmacy Technician

## 2023-05-24 ENCOUNTER — TELEPHONE (OUTPATIENT)
Dept: CARDIOLOGY | Facility: CLINIC | Age: 43
End: 2023-05-24
Payer: COMMERCIAL

## 2023-05-24 NOTE — TELEPHONE ENCOUNTER
Patient called and wanted to know why he is needing to make appointment in September? Please advise.    Thanks,  Carla

## 2023-05-25 NOTE — TELEPHONE ENCOUNTER
Pt had mistaken his pulmonology appt in November as his Cardiology appt.  I offered to get him scheduled in September for his annual, but he was not able to schedule at this moment.  He will call our office back to get this appt scheduled.    Thank you,    Dorothea Carlin RN  Triage [facility]  05/25/23 13:10 EDT

## 2023-05-25 NOTE — TELEPHONE ENCOUNTER
He sees us annually for history of aortic valve replacement.  He will be due for a follow-up appointment in September 2023.

## 2023-06-07 ENCOUNTER — ANTICOAGULATION VISIT (OUTPATIENT)
Dept: PHARMACY | Facility: HOSPITAL | Age: 43
End: 2023-06-07
Payer: COMMERCIAL

## 2023-06-07 DIAGNOSIS — Z95.2 HISTORY OF MECHANICAL AORTIC VALVE REPLACEMENT: Primary | ICD-10-CM

## 2023-06-07 DIAGNOSIS — Z79.01 LONG TERM (CURRENT) USE OF ANTICOAGULANTS: ICD-10-CM

## 2023-06-07 LAB
INR PPP: 2
INR PPP: 2.4

## 2023-06-07 NOTE — PROGRESS NOTES
Anticoagulation Clinic Progress Note    Anticoagulation Summary  As of 2023      INR goal:  2.5-3.5   TTR:  80.0 % (4.5 y)   INR used for dosin.40 (2023)   Warfarin maintenance plan:  10 mg every Mon, Wed, Fri; 8 mg all other days; Starting 2023   Weekly warfarin total:  62 mg   Plan last modified:  Heather Kline RPH (2022)   Next INR check:  2023   Priority:  High   Target end date:  Indefinite    Indications    History of mechanical aortic valve replacement [Z95.2]  Long term (current) use of anticoagulants [Z79.01]                 Anticoagulation Episode Summary       INR check location:  Home Draw    Preferred lab:      Send INR reminders to:  [facility]  POOL    Comments:  **[facility]**          Anticoagulation Care Providers       Provider Role Specialty Phone number    Lorne Kam MD Referring Cardiology [phone]            Clinic Interview:  Patient Findings     Positives:  Signs/symptoms of bleeding, Change in alcohol use    Negatives:  Signs/symptoms of thrombosis, Laboratory test error   suspected, Change in health, Change in activity, Upcoming invasive   procedure, Emergency department visit, Upcoming dental procedure, Missed   doses, Extra doses, Change in medications, Change in diet/appetite,   Hospital admission, Bruising, Other complaints    Comments:  He performed INR twice today:  2.0 and 2.4. Reports he noted   hematuria a couple weeks ago; it has since resolved. Reports less alcohol   than usual.      Clinical Outcomes     Negatives:  Major bleeding event, Thromboembolic event,   Anticoagulation-related hospital admission, Anticoagulation-related ED   visit, Anticoagulation-related fatality    Comments:  He performed INR twice today:  2.0 and 2.4. Reports he noted   hematuria a couple weeks ago; it has since resolved. Reports less alcohol   than usual.        INR History:      2023     2:46 PM 5/10/2023    12:00 AM 5/10/2023      3:27 PM 5/22/2023    12:00 AM 5/22/2023     2:09 PM 6/7/2023    12:00 AM 6/7/2023    11:47 AM   Anticoagulation Monitoring   INR 3.40  3.20  3.20  2.40   INR Date 4/24/2023  5/10/2023  5/22/2023  6/7/2023   INR Goal 2.5-3.5  2.5-3.5  2.5-3.5  2.5-3.5   Trend Same  Same  Same  Same   Last Week Total 62 mg  62 mg  62 mg  62 mg   Next Week Total 62 mg  62 mg  62 mg  64 mg   Sun 8 mg  8 mg  8 mg  8 mg   Mon 10 mg  10 mg  10 mg  10 mg   Tue 8 mg  8 mg  8 mg  8 mg   Wed 10 mg  10 mg  10 mg  12 mg (6/7)   Thu 8 mg  8 mg  8 mg  8 mg   Fri 10 mg  10 mg  10 mg  10 mg   Sat 8 mg  8 mg  8 mg  8 mg   Historical INR  3.20      3.20      2.00  C       2.40  C          C Corrected result    This result is from an external source.    Multiple values from one day are sorted in reverse-chronological order       Plan:  1. INR is Subtherapeutic today- see above in Anticoagulation Summary.   Will instruct Man Ramsey to Change their warfarin regimen (12 mg today, then resume 10 mg MWF, 8 mg all other days) - see above in Anticoagulation Summary.  2. Follow up in 1 week.  3. They have been instructed to call if any changes in medications, doses, concerns, etc. Patient expresses understanding and has no further questions at this time.    Neo Cornejo, PharmD

## 2023-06-14 ENCOUNTER — ANTICOAGULATION VISIT (OUTPATIENT)
Dept: PHARMACY | Facility: HOSPITAL | Age: 43
End: 2023-06-14
Payer: COMMERCIAL

## 2023-06-14 DIAGNOSIS — Z95.2 HISTORY OF MECHANICAL AORTIC VALVE REPLACEMENT: Primary | ICD-10-CM

## 2023-06-14 DIAGNOSIS — Z79.01 LONG TERM (CURRENT) USE OF ANTICOAGULANTS: ICD-10-CM

## 2023-06-14 LAB — INR PPP: 2.5

## 2023-06-14 NOTE — PROGRESS NOTES
Anticoagulation Clinic Progress Note    Anticoagulation Summary  As of 2023    INR goal:  2.5-3.5   TTR:  79.7 % (4.5 y)   INR used for dosin.50 (2023)   Warfarin maintenance plan:  10 mg every Mon, Wed, Fri; 8 mg all other days; Starting 2023   Weekly warfarin total:  62 mg   No change documented:  Heather Kline RPH   Plan last modified:  Heather Kline RPH (2022)   Next INR check:  2023   Priority:  High   Target end date:  Indefinite    Indications    History of mechanical aortic valve replacement [Z95.2]  Long term (current) use of anticoagulants [Z79.01]             Anticoagulation Episode Summary     INR check location:  Home Draw    Preferred lab:      Send INR reminders to:  [facility]  POOL    Comments:  **[facility]**      Anticoagulation Care Providers     Provider Role Specialty Phone number    Lorne Kam MD Referring Cardiology [phone]          Clinic Interview:  No pertinent clinical findings have been reported.    INR History:      5/10/2023     3:27 PM 2023    12:00 AM 2023     2:09 PM 2023    12:00 AM 2023    11:47 AM 2023    12:00 AM 2023    12:02 PM   Anticoagulation Monitoring   INR 3.20  3.20  2.40  2.50   INR Date 5/10/2023  2023  2023  2023   INR Goal 2.5-3.5  2.5-3.5  2.5-3.5  2.5-3.5   Trend Same  Same  Same  Same   Last Week Total 62 mg  62 mg  62 mg  64 mg   Next Week Total 62 mg  62 mg  64 mg  62 mg   Sun 8 mg  8 mg  8 mg  8 mg   Mon 10 mg  10 mg  10 mg  10 mg   Tue 8 mg  8 mg  8 mg  8 mg   Wed 10 mg  10 mg  12 mg ()  10 mg   Thu 8 mg  8 mg  8 mg  8 mg   Fri 10 mg  10 mg  10 mg  10 mg   Sat 8 mg  8 mg  8 mg  8 mg   Historical INR  3.20      2.00  C       2.40  C     2.50           C Corrected result    This result is from an external source.    Multiple values from one day are sorted in reverse-chronological order       Plan:  1. INR is therapeutic today- see above in  Anticoagulation Summary.    Man Ramsey to continue their warfarin regimen- see above in Anticoagulation Summary.  2. Follow up in 2 weeks  3. They have been instructed to call if any changes in medications, doses, concerns, etc. Patient expresses understanding and has no further questions at this time.    Heather Kline, [facility]

## 2023-07-28 LAB — INR PPP: 3.3

## 2023-07-31 ENCOUNTER — ANTICOAGULATION VISIT (OUTPATIENT)
Dept: PHARMACY | Facility: HOSPITAL | Age: 43
End: 2023-07-31
Payer: COMMERCIAL

## 2023-07-31 DIAGNOSIS — Z95.2 HISTORY OF MECHANICAL AORTIC VALVE REPLACEMENT: Primary | ICD-10-CM

## 2023-07-31 DIAGNOSIS — Z79.01 LONG TERM (CURRENT) USE OF ANTICOAGULANTS: ICD-10-CM

## 2023-08-09 ENCOUNTER — ANTICOAGULATION VISIT (OUTPATIENT)
Dept: PHARMACY | Facility: HOSPITAL | Age: 43
End: 2023-08-09
Payer: COMMERCIAL

## 2023-08-09 DIAGNOSIS — Z79.01 LONG TERM (CURRENT) USE OF ANTICOAGULANTS: ICD-10-CM

## 2023-08-09 DIAGNOSIS — Z95.2 HISTORY OF MECHANICAL AORTIC VALVE REPLACEMENT: Primary | ICD-10-CM

## 2023-08-09 LAB — INR PPP: 3.8

## 2023-08-09 NOTE — PROGRESS NOTES
Anticoagulation Clinic Progress Note    Anticoagulation Summary  As of 8/9/2023      INR goal:  2.5-3.5   TTR:  80.1 % (4.6 y)   INR used for dosing:  3.80 (8/9/2023)   Warfarin maintenance plan:  10 mg every Mon, Wed, Fri; 8 mg all other days   Weekly warfarin total:  62 mg   Plan last modified:  Heather Kline RPH (5/17/2022)   Next INR check:  8/23/2023   Priority:  High   Target end date:  Indefinite    Indications    History of mechanical aortic valve replacement [Z95.2]  Long term (current) use of anticoagulants [Z79.01]                 Anticoagulation Episode Summary       INR check location:  Home Draw    Preferred lab:      Send INR reminders to:  [facility]ISA [facility]  POOL    Comments:  **[facility] [facility]**          Anticoagulation Care Providers       Provider Role Specialty Phone number    Lorne Kam MD Referring Cardiology [phone]            Clinic Interview:  Patient Findings     Positives:  Change in alcohol use    Negatives:  Signs/symptoms of thrombosis, Signs/symptoms of bleeding,   Laboratory test error suspected, Change in health, Change in activity,   Upcoming invasive procedure, Emergency department visit, Upcoming dental   procedure, Missed doses, Extra doses, Change in medications, Change in   diet/appetite, Hospital admission, Bruising, Other complaints    Comments:  Might be drinking a little more alcohol than previously      Clinical Outcomes     Negatives:  Major bleeding event, Thromboembolic event,   Anticoagulation-related hospital admission, Anticoagulation-related ED   visit, Anticoagulation-related fatality    Comments:  Might be drinking a little more alcohol than previously        INR History:      6/28/2023     1:40 PM 7/14/2023    12:00 AM 7/14/2023     9:34 AM 7/28/2023    12:00 AM 7/31/2023     8:27 AM 8/9/2023    12:00 AM 8/9/2023     1:22 PM   Anticoagulation Monitoring   INR 2.90  3.40  3.30  3.80   INR Date 6/28/2023 7/14/2023 7/28/2023 8/9/2023    INR Goal 2.5-3.5  2.5-3.5  2.5-3.5  2.5-3.5   Trend Same  Same  Same  Same   Last Week Total 62 mg  62 mg  62 mg  62 mg   Next Week Total 62 mg  62 mg  62 mg  60 mg   Sun 8 mg  8 mg  8 mg  8 mg   Mon 10 mg  10 mg  10 mg  10 mg   Tue 8 mg  8 mg  8 mg  8 mg   Wed 10 mg  10 mg  10 mg  8 mg (8/9); Otherwise 10 mg   Thu 8 mg  8 mg  8 mg  8 mg   Fri 10 mg  10 mg  10 mg  10 mg   Sat 8 mg  8 mg  8 mg  8 mg   Historical INR  3.40      3.30      3.80            This result is from an external source.       Plan:  1. INR is Supratherapeutic today- see above in Anticoagulation Summary.   Will instruct Man Ramsey to Change their warfarin regimen- see above in Anticoagulation Summary.  partial to 8 mg and then resume, rck 2 weeks   2. Follow up in 2 weeks  3.  They have been instructed to call if any changes in medications, doses, concerns, etc. Patient expresses understanding and has no further questions at this time.    Heather Kline [facility]

## 2023-08-23 ENCOUNTER — ANTICOAGULATION VISIT (OUTPATIENT)
Dept: PHARMACY | Facility: HOSPITAL | Age: 43
End: 2023-08-23
Payer: COMMERCIAL

## 2023-08-23 DIAGNOSIS — Z95.2 HISTORY OF MECHANICAL AORTIC VALVE REPLACEMENT: Primary | ICD-10-CM

## 2023-08-23 DIAGNOSIS — Z79.01 LONG TERM (CURRENT) USE OF ANTICOAGULANTS: ICD-10-CM

## 2023-08-23 LAB
INR PPP: 3.8
INR PPP: 4.1

## 2023-08-23 NOTE — PROGRESS NOTES
Anticoagulation Clinic Progress Note    Anticoagulation Summary  As of 2023      INR goal:  2.5-3.5   TTR:  79.4 % (4.7 y)   INR used for dosin.10 (2023)   Warfarin maintenance plan:  10 mg every Mon, Wed, Fri; 8 mg all other days   Weekly warfarin total:  62 mg   Plan last modified:  Heather Kline RPH (2022)   Next INR check:  2023   Priority:  High   Target end date:  Indefinite    Indications    History of mechanical aortic valve replacement [Z95.2]  Long term (current) use of anticoagulants [Z79.01]                 Anticoagulation Episode Summary       INR check location:  Home Draw    Preferred lab:      Send INR reminders to:  [facility]ISA [facility]  POOL    Comments:  **[facility]**          Anticoagulation Care Providers       Provider Role Specialty Phone number    Lorne Kam MD Referring Cardiology [phone]            Clinic Interview:  Patient Findings     Negatives:  Signs/symptoms of thrombosis, Signs/symptoms of bleeding,   Laboratory test error suspected, Change in health, Change in alcohol use,   Change in activity, Upcoming invasive procedure, Emergency department   visit, Upcoming dental procedure, Missed doses, Extra doses, Change in   medications, Change in diet/appetite, Hospital admission, Bruising, Other   complaints      Clinical Outcomes     Negatives:  Major bleeding event, Thromboembolic event,   Anticoagulation-related hospital admission, Anticoagulation-related ED   visit, Anticoagulation-related fatality        INR History:      2023     9:34 AM 2023    12:00 AM 2023     8:27 AM 2023    12:00 AM 2023     1:22 PM 2023    12:00 AM 2023     1:44 PM   Anticoagulation Monitoring   INR 3.40  3.30  3.80  4.10   INR Date 2023   INR Goal 2.5-3.5  2.5-3.5  2.5-3.5  2.5-3.5   Trend Same  Same  Same  Same   Last Week Total 62 mg  62 mg  62 mg  62 mg   Next Week Total 62 mg  62 mg   60 mg  52 mg   Sun 8 mg  8 mg  8 mg  8 mg   Mon 10 mg  10 mg  10 mg  10 mg   Tue 8 mg  8 mg  8 mg  8 mg   Wed 10 mg  10 mg  8 mg (8/9); Otherwise 10 mg  Hold (8/23)   Thu 8 mg  8 mg  8 mg  8 mg   Fri 10 mg  10 mg  10 mg  10 mg   Sat 8 mg  8 mg  8 mg  8 mg   Historical INR  3.30      3.80      3.80        4.10            This result is from an external source.    Multiple values from one day are sorted in reverse-chronological order       Plan:  1. INR is Supratherapeutic today- see above in Anticoagulation Summary.   Will instruct Man STEWART Roxy to Change their warfarin regimen- see above in Anticoagulation Summary.  2. Follow up in 1 weeks  3. They have been instructed to call if any changes in medications, doses, concerns, etc. Patient expresses understanding and has no further questions at this time.    Mona Jones, PharmD

## 2023-08-28 RX ORDER — ROSUVASTATIN CALCIUM 20 MG/1
TABLET, COATED ORAL
Qty: 90 TABLET | Refills: 0 | Status: SHIPPED | OUTPATIENT
Start: 2023-08-28

## 2023-08-30 ENCOUNTER — ANTICOAGULATION VISIT (OUTPATIENT)
Dept: PHARMACY | Facility: HOSPITAL | Age: 43
End: 2023-08-30
Payer: COMMERCIAL

## 2023-08-30 DIAGNOSIS — Z95.2 HISTORY OF MECHANICAL AORTIC VALVE REPLACEMENT: Primary | ICD-10-CM

## 2023-08-30 DIAGNOSIS — Z79.01 LONG TERM (CURRENT) USE OF ANTICOAGULANTS: ICD-10-CM

## 2023-08-30 LAB — INR PPP: 2.9

## 2023-08-30 NOTE — PROGRESS NOTES
Anticoagulation Clinic Progress Note    Anticoagulation Summary  As of 2023      INR goal:  2.5-3.5   TTR:  79.4 % (4.7 y)   INR used for dosin.90 (2023)   Warfarin maintenance plan:  10 mg every Mon, Wed, Fri; 8 mg all other days   Weekly warfarin total:  62 mg   No change documented:  Mona Jones, PharmD   Plan last modified:  Heather Kline RPH (2022)   Next INR check:  2023   Priority:  High   Target end date:  Indefinite    Indications    History of mechanical aortic valve replacement [Z95.2]  Long term (current) use of anticoagulants [Z79.01]                 Anticoagulation Episode Summary       INR check location:  Home Draw    Preferred lab:      Send INR reminders to:   [facility]SE [facility]  POOL    Comments:  **[facility]**          Anticoagulation Care Providers       Provider Role Specialty Phone number    Lorne Kam MD Referring Cardiology [phone]            Clinic Interview:  Patient Findings     Negatives:  Signs/symptoms of thrombosis, Signs/symptoms of bleeding,   Laboratory test error suspected, Change in health, Change in alcohol use,   Change in activity, Upcoming invasive procedure, Emergency department   visit, Upcoming dental procedure, Missed doses, Extra doses, Change in   medications, Change in diet/appetite, Hospital admission, Bruising, Other   complaints      Clinical Outcomes     Negatives:  Major bleeding event, Thromboembolic event,   Anticoagulation-related hospital admission, Anticoagulation-related ED   visit, Anticoagulation-related fatality        INR History:      2023     8:27 AM 2023    12:00 AM 2023     1:22 PM 2023    12:00 AM 2023     1:44 PM 2023    12:00 AM 2023     3:49 PM   Anticoagulation Monitoring   INR 3.30  3.80  4.10  2.90   INR Date 2023   INR Goal 2.5-3.5  2.5-3.5  2.5-3.5  2.5-3.5   Trend Same  Same  Same  Same   Last Week Total 62 mg  62  mg  62 mg  52 mg   Next Week Total 62 mg  60 mg  52 mg  62 mg   Sun 8 mg  8 mg  8 mg  8 mg   Mon 10 mg  10 mg  10 mg  10 mg   Tue 8 mg  8 mg  8 mg  8 mg   Wed 10 mg  8 mg (8/9); Otherwise 10 mg  Hold (8/23)  10 mg   Thu 8 mg  8 mg  8 mg  8 mg   Fri 10 mg  10 mg  10 mg  10 mg   Sat 8 mg  8 mg  8 mg  8 mg   Historical INR  3.80      3.80        4.10      2.90            This result is from an external source.    Multiple values from one day are sorted in reverse-chronological order       Plan:  1. INR is Therapeutic today- see above in Anticoagulation Summary.   Will instruct Man Ramsey to Continue their warfarin regimen- see above in Anticoagulation Summary.  2. Follow up in 2 weeks  3. Pt has agreed to only be called if INR out of range. They have been instructed to call if any changes in medications, doses, concerns, etc. Patient expresses understanding and has no further questions at this time.    Mona Jones, PharmD

## 2023-09-11 ENCOUNTER — OFFICE VISIT (OUTPATIENT)
Dept: CARDIOLOGY | Facility: CLINIC | Age: 43
End: 2023-09-11
Payer: COMMERCIAL

## 2023-09-11 VITALS
HEART RATE: 65 BPM | DIASTOLIC BLOOD PRESSURE: 90 MMHG | SYSTOLIC BLOOD PRESSURE: 130 MMHG | BODY MASS INDEX: 38.56 KG/M2 | HEIGHT: 71 IN | WEIGHT: 275.4 LBS

## 2023-09-11 DIAGNOSIS — E66.2 CLASS 2 OBESITY WITH ALVEOLAR HYPOVENTILATION, SERIOUS COMORBIDITY, AND BODY MASS INDEX (BMI) OF 38.0 TO 38.9 IN ADULT: ICD-10-CM

## 2023-09-11 DIAGNOSIS — Z95.2 HISTORY OF MECHANICAL AORTIC VALVE REPLACEMENT: Primary | ICD-10-CM

## 2023-09-11 DIAGNOSIS — I44.0 FIRST DEGREE AV BLOCK: ICD-10-CM

## 2023-09-11 DIAGNOSIS — Z86.79 HISTORY OF CARDIOMYOPATHY: ICD-10-CM

## 2023-09-11 DIAGNOSIS — I10 PRIMARY HYPERTENSION: ICD-10-CM

## 2023-09-11 DIAGNOSIS — E78.2 MIXED HYPERLIPIDEMIA: ICD-10-CM

## 2023-09-11 RX ORDER — LISINOPRIL 20 MG/1
20 TABLET ORAL DAILY
Qty: 90 TABLET | Refills: 3 | Status: SHIPPED | OUTPATIENT
Start: 2023-09-11

## 2023-09-11 RX ORDER — CARVEDILOL 12.5 MG/1
12.5 TABLET ORAL 2 TIMES DAILY
Qty: 180 TABLET | Refills: 3 | Status: SHIPPED | OUTPATIENT
Start: 2023-09-11

## 2023-09-11 NOTE — PROGRESS NOTES
Date of Office Visit: 2023  Encounter Provider: ZOË Sykes  Place of Service: [facility] CARDIOLOGY  Patient Name: Man Ramsey  :1980  Primary Cardiologist: Dr. Lorne Kam    Chief Complaint   Patient presents with    Aortic Insufficiency    Annual Exam   :     HPI: Man Ramsey is a 43 y.o. male who presents today for annual cardiac follow-up visit.  I reviewed his medical records.    He has a history of bicuspid aortic valve and severe aortic insufficiency with heart failure.  In , he underwent mechanical aortic valve replacement.  His ejection fraction normalized.  He remains on warfarin with INRs followed at the [facility], [place], operated by [facility].    In , he had an episode of rectal bleeding and colonoscopy revealed hemorrhoids only.    In 2022, echocardiogram showed normal EF, mild LVH, normal aortic valve gradients, and trace mitral valve regurgitation.    He presents today for his annual cardiac follow-up visit. He has forgotten to take his morning medications about 6 times in the past year.  When this occurs he experiences some upper chest muscle soreness.  Occasionally, he will experience a brief skipped heartbeat.  He denies shortness of breath, dizziness, or edema.      Past Medical History:   Diagnosis Date    Aortic insufficiency due to bicuspid aortic valve     with severe AI, s/p 29mm ATS mechanical AVR 2012.  Echo has shown normal function but mild AI.    BRBPR (bright red blood per rectum) 10/06/2021    SEEN AT [facility] ER    Clotting disorder 2019    current concern/complaint    Colon polyps     FOLLOWED BY DR. 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  He recovered with oral steroids.  GIANNA was negative for endocarditis.    First degree AV block 2021    Hematochezia 2019    SEEN AT [facility] ER    Hemorrhoids     History of blood  transfusion     Hyperglycemia     Hypertension     Laceration of ankle 05/24/2010    RIGHT ANKLE, SEEN AT [facility] ER    Long term (current) use of anticoagulants     Mixed hyperlipidemia     Nonischemic cardiomyopathy     due to AI, LVEF 40% with LVE 5/2012; improved to EF 57% in July 2012 with mild LV dilation.  Echo 3/2014 with normal LV size and systolic function    Obesity     Ocular migraine     Sleep apnea 07/2022       Past Surgical History:   Procedure Laterality Date    AORTIC VALVE REPAIR/REPLACEMENT N/A 05/30/2012    Mechanical, DR. INGA BOLES AT [facility]    CARDIAC CATHETERIZATION Bilateral 04/25/2012    MILD PULMONARY HYPERTENSION, ELEVATED LEFT VENTRICULAR END DIASTOLIC PRESSURE AS WELL AS PULMONARY CAPILLARY WEDGE PRESSURE CONSISTANT WITH CHF, DILATED LEFT VENTRICLE WITH LOW NORMAL LEFT VENTRICULAR SYSTOLIC FUNCTION, DR.REBECCA SESAY AT [facility]    CARDIAC VALVE REPLACEMENT  5/29/2012    Aortic valve, mechanical prosthesis    COLONOSCOPY N/A 12/17/2019    4 MM BENIGN POLYP IN SPLENIC FLEXURE, MODERATE HEMORRHOIDS, RESCOPE IN 5 YRS, DR. TORSTEN GORE AT [facility]    HEAD/NECK LACERATION REPAIR N/A 05/27/2009    BACK OF HEAD, D/T FALL, DONE AT [facility] ER    LEG LACERATION REPAIR Right 05/24/2010    RIGHT ANKLE, DONE AT [facility] ER    TRANSESOPHAGEAL ECHOCARDIOGRAM (GIANNA) N/A 05/30/2012    DR. INGA BOLES AT [facility]       Social History     Socioeconomic History    Marital status: Single   Tobacco Use    Smoking status: Never    Smokeless tobacco: Never    Tobacco comments:     caffeine use/ 2-3 CUPS of tea daily    Vaping Use    Vaping Use: Never used   Substance and Sexual Activity    Alcohol use: Yes     Comment: 2-3 servings of alcohol daily    Drug use: No    Sexual activity: Never       Family History   Problem Relation Age of Onset    Colon polyps Mother     Hypertension Mother     Arthritis Mother     Hypertension Father     Hypertension Maternal Grandmother     Hypertension Paternal Grandmother     Hyperlipidemia  Sister     Hypertension Sister        The following portion of the patient's history were reviewed and updated as appropriate: past medical history, past surgical history, past social history, past family history, allergies, current medications, and problem list.    Review of Systems   Constitutional: Negative.   Cardiovascular:  Positive for palpitations.   Respiratory: Negative.     Hematologic/Lymphatic: Negative.    Neurological:  Positive for excessive daytime sleepiness.     No Known Allergies      Current Outpatient Medications:     B Complex Vitamins (VITAMIN B COMPLEX PO), Take  by mouth., Disp: , Rfl:     CALCIUM PO, Take  by mouth., Disp: , Rfl:     carvedilol (COREG) 12.5 MG tablet, Take 1 tablet by mouth 2 (Two) Times a Day., Disp: 180 tablet, Rfl: 3    cholecalciferol (VITAMIN D3) 25 MCG (1000 UT) tablet, Take 1 tablet by mouth Daily., Disp: , Rfl:     Docusate Calcium (STOOL SOFTENER PO), Take  by mouth., Disp: , Rfl:     fexofenadine (ALLEGRA) 180 MG tablet, Take 1 tablet by mouth Daily., Disp: , Rfl:     Hydrocortisone Ace-Pramoxine (Analpram-HC) 1-1 % rectal cream, Insert  into the rectum 2 (Two) Times a Day., Disp: 28.4 g, Rfl: 1    lisinopril (PRINIVIL,ZESTRIL) 20 MG tablet, Take 1 tablet by mouth Daily., Disp: 90 tablet, Rfl: 3    multivitamin with minerals tablet tablet, Take 1 tablet by mouth Daily., Disp: , Rfl:     Omega-3 Fatty Acids (FISH OIL) 1000 MG capsule capsule, Take  by mouth Daily With Breakfast., Disp: , Rfl:     Psyllium (METAMUCIL PO), Take  by mouth., Disp: , Rfl:     rosuvastatin (CRESTOR) 20 MG tablet, TAKE 1 TABLET BY MOUTH ONCE DAILY AT NIGHT, Disp: 90 tablet, Rfl: 0    vitamin C (ASCORBIC ACID) 250 MG tablet, Take 2 tablets by mouth Daily., Disp: , Rfl:     warfarin (COUMADIN) 2 MG tablet, TAKE 5 TABLETS(10 MGS) BY MOUTH ON MONDAY, WEDNESDAY AND FRIDAY AND TAKE 4 TABLETS(8 MGS) ALL OTHER DAYS OR AS DIRECTED, Disp: 430 tablet, Rfl: 0         Objective:     Vitals:     "09/11/23 1442   BP: 130/90   BP Location: Right arm   Patient Position: Sitting   Cuff Size: Adult   Pulse: 65   Weight: 125 kg (275 lb 6.4 oz)   Height: 180.3 cm (70.98\")     Body mass index is 38.43 kg/m².    PHYSICAL EXAM:    Vitals Reviewed.   General Appearance: No acute distress, well developed and well nourished. Obese.   Eyes: Glasses.   HENT: No hearing loss noted.    Respiratory: No signs of respiratory distress. Respiration rhythm and depth normal.  Clear to auscultation.   Cardiovascular:  Jugular Venous Pressure: Normal  Heart Rate and Rhythm: Normal, Heart Sounds: Mechanical click.  Murmurs: No murmurs noted. No rubs, thrills, or gallops.   Lower Extremities: No edema noted.  Musculoskeletal: Normal movement of extremities.  Skin: General appearance normal.    Psychiatric: Patient alert and oriented to person, place, and time. Speech and behavior appropriate. Normal mood and affect.       ECG 12 Lead    Date/Time: 9/11/2023 2:45 PM  Performed by: Orquidea Marlow APRN  Authorized by: Orquidea Marlow APRN   Comparison: compared with previous ECG from 9/1/2022  Similar to previous ECG  Rhythm: sinus rhythm  Rate: normal  BPM: 65  Conduction: 1st degree AV block  ST Segments: ST segments normal  T Waves: T waves normal  QRS axis: normal    Clinical impression: non-specific ECG          Assessment:       Diagnosis Plan   1. History of mechanical aortic valve replacement        2. History of cardiomyopathy        3. First degree AV block        4. Primary hypertension        5. Mixed hyperlipidemia        6. Class 2 obesity with alveolar hypoventilation, serious comorbidity, and body mass index (BMI) of 38.0 to 38.9 in adult               Plan:       1.  History of bicuspid aortic valve, severe aortic insufficiency, and mechanical aortic valve replacement in 2012.  Echocardiogram in 2022 showed stable aortic valve.  Continue SBE prophylaxis and his dentist provides this for him.  He remains on warfarin " with INRs followed at the [facility] anticoagulation clinic.    2.  History of cardiomyopathy: Resolved after valve was replaced.    3.  Chronic first-degree AV block noted.    4.  Hypertension: Blood pressure mildly elevated today.  I recommended low-sodium diet, regular exercise program, and monitoring blood pressure at home.    5.  Hyperlipidemia: Remains on rosuvastatin.    6.  Obesity. Body mass index is 38.43 kg/m².     7.  I recommend a 1 year follow-up visit with Dr. Kam.     As always, it has been a pleasure to participate in your patient's care. Thank you.         Sincerely,         ZOË Sorenson  [facility] Cardiology      Dictated utilizing Dragon Dictation

## 2023-09-13 ENCOUNTER — ANTICOAGULATION VISIT (OUTPATIENT)
Dept: PHARMACY | Facility: HOSPITAL | Age: 43
End: 2023-09-13
Payer: COMMERCIAL

## 2023-09-13 DIAGNOSIS — Z79.01 LONG TERM (CURRENT) USE OF ANTICOAGULANTS: ICD-10-CM

## 2023-09-13 DIAGNOSIS — Z95.2 HISTORY OF MECHANICAL AORTIC VALVE REPLACEMENT: Primary | ICD-10-CM

## 2023-09-13 LAB — INR PPP: 3.5

## 2023-09-13 NOTE — PROGRESS NOTES
Anticoagulation Clinic Progress Note    Anticoagulation Summary  As of 9/13/2023      INR goal:  2.5-3.5   TTR:  79.5 % (4.7 y)   INR used for dosing:  3.50 (9/13/2023)   Warfarin maintenance plan:  10 mg every Mon, Wed, Fri; 8 mg all other days   Weekly warfarin total:  62 mg   No change documented:  Sun Curtis   Plan last modified:  Heather Kline RPH (5/17/2022)   Next INR check:  9/27/2023   Priority:  High   Target end date:  Indefinite    Indications    History of mechanical aortic valve replacement [Z95.2]  Long term (current) use of anticoagulants [Z79.01]                 Anticoagulation Episode Summary       INR check location:  Home Draw    Preferred lab:      Send INR reminders to:   [facility]  POOL    Comments:  **[facility]**          Anticoagulation Care Providers       Provider Role Specialty Phone number    Lorne Kam MD Referring Cardiology [phone]            Clinic Interview:  No pertinent clinical findings have been reported.    INR History:      8/9/2023     1:22 PM 8/23/2023    12:00 AM 8/23/2023     1:44 PM 8/30/2023    12:00 AM 8/30/2023     3:49 PM 9/13/2023    12:00 AM 9/13/2023    10:42 AM   Anticoagulation Monitoring   INR 3.80  4.10  2.90  3.50   INR Date 8/9/2023 8/23/2023 8/30/2023 9/13/2023   INR Goal 2.5-3.5  2.5-3.5  2.5-3.5  2.5-3.5   Trend Same  Same  Same  Same   Last Week Total 62 mg  62 mg  52 mg  62 mg   Next Week Total 60 mg  52 mg  62 mg  62 mg   Sun 8 mg  8 mg  8 mg  8 mg   Mon 10 mg  10 mg  10 mg  10 mg   Tue 8 mg  8 mg  8 mg  8 mg   Wed 8 mg (8/9); Otherwise 10 mg  Hold (8/23)  10 mg  10 mg   Thu 8 mg  8 mg  8 mg  8 mg   Fri 10 mg  10 mg  10 mg  10 mg   Sat 8 mg  8 mg  8 mg  8 mg   Historical INR  3.80        4.10      2.90      3.50            This result is from an external source.    Multiple values from one day are sorted in reverse-chronological order       Plan:  1. INR is therapeutic today- see above in  Anticoagulation Summary.    Man Ramsey to continue their warfarin regimen- see above in Anticoagulation Summary.  2. Follow up in 2 weeks  3. Pt has agreed to only be called if INR out of range. They have been instructed to call if any changes in medications, doses, concerns, etc. Patient expresses understanding and has no further questions at this time.    Sun Curtis

## 2023-09-21 ENCOUNTER — ANTICOAGULATION VISIT (OUTPATIENT)
Dept: PHARMACY | Facility: HOSPITAL | Age: 43
End: 2023-09-21
Payer: COMMERCIAL

## 2023-09-21 DIAGNOSIS — Z79.01 LONG TERM (CURRENT) USE OF ANTICOAGULANTS: ICD-10-CM

## 2023-09-21 DIAGNOSIS — Z95.2 HISTORY OF MECHANICAL AORTIC VALVE REPLACEMENT: Primary | ICD-10-CM

## 2023-09-21 LAB — INR PPP: 3.8

## 2023-09-21 RX ORDER — HYDROCORTISONE ACETATE PRAMOXINE HCL 1; 1 G/100G; G/100G
CREAM TOPICAL 2 TIMES DAILY
Qty: 28.4 G | Refills: 1 | OUTPATIENT
Start: 2023-09-21

## 2023-09-21 NOTE — TELEPHONE ENCOUNTER
RX REFILL REQ; ANALPRAM.  LAST FILLED 03/29/2022.  PT CXL'D F/UP APPT 01/14/2022.    LAST O.V. 12/02/2022;  Review of Medical Record:  I reviewed PMHx, Surgical Hx, Medication List, and FHx.     Colonoscopy 12/17/2019  - 4 mm Benign Polyp, Splenic Flexure   - Moderate Hemorrhoids  - Rescope: 5 Years  - Dr. Hill     Assessment:  1. Rectal bleeding    - New  - Likely secondary to previously enlarged hemorrhoids and exacerbated by long-term anticoagulation with Warfarin.      Plan:  - Start Fiber. Recommended 30-40g Daily.   - SS PRN  - Refill for Proctofoam provided.  - Discussed possible repeat colonoscopy for further evaluation of rectal bleeding other than hemorrhoids if symptoms do not improve.   - Follow up in 6-8 weeks with Dr. Edwards for further evaluation if symptoms do not improve with conservative management.

## 2023-09-21 NOTE — PROGRESS NOTES
Anticoagulation Clinic Progress Note    Anticoagulation Summary  As of 9/21/2023      INR goal:  2.5-3.5   TTR:  79.2 % (4.8 y)   INR used for dosing:  3.80 (9/21/2023)   Warfarin maintenance plan:  10 mg every Mon, Wed, Fri; 8 mg all other days   Weekly warfarin total:  62 mg   Plan last modified:  Heather Kline RPH (5/17/2022)   Next INR check:  10/5/2023   Priority:  High   Target end date:  Indefinite    Indications    History of mechanical aortic valve replacement [Z95.2]  Long term (current) use of anticoagulants [Z79.01]                 Anticoagulation Episode Summary       INR check location:  Home Draw    Preferred lab:      Send INR reminders to:  LISA [facility]CH[facility]  POOL    Comments:  **[facility]**          Anticoagulation Care Providers       Provider Role Specialty Phone number    Lorne Kam MD Referring Cardiology [phone]            Clinic Interview:  Patient Findings     Positives:  Signs/symptoms of bleeding    Negatives:  Signs/symptoms of thrombosis, Laboratory test error   suspected, Change in health, Change in alcohol use, Change in activity,   Upcoming invasive procedure, Emergency department visit, Upcoming dental   procedure, Missed doses, Extra doses, Change in medications, Change in   diet/appetite, Hospital admission, Bruising, Other complaints    Comments:  Bloody stool this morning (light amount of red blood). He has   had hemorrhoids in the past. He reports it has resolved currently.       Clinical Outcomes     Negatives:  Major bleeding event, Thromboembolic event,   Anticoagulation-related hospital admission, Anticoagulation-related ED   visit, Anticoagulation-related fatality    Comments:  Bloody stool this morning (light amount of red blood). He has   had hemorrhoids in the past. He reports it has resolved currently.         INR History:      8/23/2023     1:44 PM 8/30/2023    12:00 AM 8/30/2023     3:49 PM 9/13/2023    12:00 AM 9/13/2023    10:42 AM  9/21/2023    12:00 AM 9/21/2023     2:19 PM   Anticoagulation Monitoring   INR 4.10  2.90  3.50  3.80   INR Date 8/23/2023 8/30/2023 9/13/2023 9/21/2023   INR Goal 2.5-3.5  2.5-3.5  2.5-3.5  2.5-3.5   Trend Same  Same  Same  Same   Last Week Total 62 mg  52 mg  62 mg  62 mg   Next Week Total 52 mg  62 mg  62 mg  58 mg   Sun 8 mg  8 mg  8 mg  8 mg   Mon 10 mg  10 mg  10 mg  10 mg   Tue 8 mg  8 mg  8 mg  8 mg   Wed Hold (8/23)  10 mg  10 mg  10 mg   Thu 8 mg  8 mg  8 mg  4 mg (9/21); Otherwise 8 mg   Fri 10 mg  10 mg  10 mg  10 mg   Sat 8 mg  8 mg  8 mg  8 mg   Historical INR  2.90      3.50      3.80            This result is from an external source.       Plan:  1. INR is Supratherapeutic today- see above in Anticoagulation Summary.   Will instruct Man Ramsey to Change their warfarin regimen- see above in Anticoagulation Summary.  4 mg today then resume and rck in 2 weeks as long as no more issues with bleeding.   2. Follow up in 2 weeks  3. They have been instructed to call if any changes in medications, doses, concerns, etc. Patient expresses understanding and has no further questions at this time.    Heather Kline [facility]

## 2023-09-22 NOTE — TELEPHONE ENCOUNTER
PHONED PT AND RELAYED MESSAGE TO [facility] APPT FOR RX REFILL OR CAN HAVE PCP TAKE OVER. PT STATES WILL CONTACT PCP TO FILL IT. PT VOICED UNDERSTANDING.

## 2023-09-27 ENCOUNTER — ANTICOAGULATION VISIT (OUTPATIENT)
Dept: PHARMACY | Facility: HOSPITAL | Age: 43
End: 2023-09-27
Payer: COMMERCIAL

## 2023-09-27 DIAGNOSIS — Z79.01 LONG TERM (CURRENT) USE OF ANTICOAGULANTS: ICD-10-CM

## 2023-09-27 DIAGNOSIS — Z95.2 HISTORY OF MECHANICAL AORTIC VALVE REPLACEMENT: Primary | ICD-10-CM

## 2023-09-27 LAB — INR PPP: 3.4

## 2023-09-27 NOTE — PROGRESS NOTES
Anticoagulation Clinic Progress Note    Anticoagulation Summary  As of 9/27/2023      INR goal:  2.5-3.5   TTR:  79.0 % (4.8 y)   INR used for dosing:  3.40 (9/27/2023)   Warfarin maintenance plan:  10 mg every Mon, Wed, Fri; 8 mg all other days   Weekly warfarin total:  62 mg   No change documented:  Mona Jones, PharmD   Plan last modified:  Heather Kline RPH (5/17/2022)   Next INR check:  10/11/2023   Priority:  High   Target end date:  Indefinite    Indications    History of mechanical aortic valve replacement [Z95.2]  Long term (current) use of anticoagulants [Z79.01]                 Anticoagulation Episode Summary       INR check location:  Home Draw    Preferred lab:      Send INR reminders to:   [facility]  POOL    Comments:  **[facility]**          Anticoagulation Care Providers       Provider Role Specialty Phone number    Lorne Kam MD Referring Cardiology [phone]            Clinic Interview:  Patient Findings     Negatives:  Signs/symptoms of thrombosis, Signs/symptoms of bleeding,   Laboratory test error suspected, Change in health, Change in alcohol use,   Change in activity, Upcoming invasive procedure, Emergency department   visit, Upcoming dental procedure, Missed doses, Extra doses, Change in   medications, Change in diet/appetite, Hospital admission, Bruising, Other   complaints    Comments:  Confirms that bleeding has resolved and denies any other   issues.      Clinical Outcomes     Negatives:  Major bleeding event, Thromboembolic event,   Anticoagulation-related hospital admission, Anticoagulation-related ED   visit, Anticoagulation-related fatality    Comments:  Confirms that bleeding has resolved and denies any other   issues.        INR History:      8/30/2023     3:49 PM 9/13/2023    12:00 AM 9/13/2023    10:42 AM 9/21/2023    12:00 AM 9/21/2023     2:19 PM 9/27/2023    12:00 AM 9/27/2023     1:49 PM   Anticoagulation Monitoring   INR 2.90  3.50   3.80  3.40   INR Date 8/30/2023 9/13/2023 9/21/2023 9/27/2023   INR Goal 2.5-3.5  2.5-3.5  2.5-3.5  2.5-3.5   Trend Same  Same  Same  Same   Last Week Total 52 mg  62 mg  62 mg  58 mg   Next Week Total 62 mg  62 mg  58 mg  62 mg   Sun 8 mg  8 mg  8 mg  8 mg   Mon 10 mg  10 mg  10 mg  10 mg   Tue 8 mg  8 mg  8 mg  8 mg   Wed 10 mg  10 mg  10 mg  10 mg   Thu 8 mg  8 mg  4 mg (9/21); Otherwise 8 mg  8 mg   Fri 10 mg  10 mg  10 mg  10 mg   Sat 8 mg  8 mg  8 mg  8 mg   Historical INR  3.50      3.80      3.40            This result is from an external source.       Plan:  1. INR is Therapeutic today- see above in Anticoagulation Summary.   Will instruct Man Ramsey to Continue their warfarin regimen- see above in Anticoagulation Summary.  2. Follow up in 2 weeks  3. They have been instructed to call if any changes in medications, doses, concerns, etc. Patient expresses understanding and has no further questions at this time.    Mona Jones, PharmD

## 2023-10-03 RX ORDER — WARFARIN SODIUM 2 MG/1
TABLET ORAL
Qty: 400 TABLET | Refills: 0 | Status: SHIPPED | OUTPATIENT
Start: 2023-10-03

## 2023-10-11 ENCOUNTER — ANTICOAGULATION VISIT (OUTPATIENT)
Dept: PHARMACY | Facility: HOSPITAL | Age: 43
End: 2023-10-11
Payer: COMMERCIAL

## 2023-10-11 DIAGNOSIS — Z79.01 LONG TERM (CURRENT) USE OF ANTICOAGULANTS: ICD-10-CM

## 2023-10-11 DIAGNOSIS — Z95.2 HISTORY OF MECHANICAL AORTIC VALVE REPLACEMENT: Primary | ICD-10-CM

## 2023-10-11 LAB — INR PPP: 3

## 2023-10-11 NOTE — PROGRESS NOTES
Anticoagulation Clinic Progress Note    Anticoagulation Summary  As of 10/11/2023      INR goal:  2.5-3.5   TTR:  79.1% (4.8 y)   INR used for dosing:  3.00 (10/11/2023)   Warfarin maintenance plan:  10 mg every Mon, Wed, Fri; 8 mg all other days   Weekly warfarin total:  62 mg   No change documented:  Augusta Lopez, Pharmacy Technician   Plan last modified:  Heather Kline RPH (5/17/2022)   Next INR check:  10/25/2023   Priority:  High   Target end date:  Indefinite    Indications    History of mechanical aortic valve replacement [Z95.2]  Long term (current) use of anticoagulants [Z79.01]                 Anticoagulation Episode Summary       INR check location:  Home Draw    Preferred lab:      Send INR reminders to:   [facility]  POOL    Comments:  **[facility]**          Anticoagulation Care Providers       Provider Role Specialty Phone number    Lorne Kam MD Referring Cardiology [phone]            Clinic Interview:  No pertinent clinical findings have been reported.    INR History:      9/13/2023    10:42 AM 9/21/2023    12:00 AM 9/21/2023     2:19 PM 9/27/2023    12:00 AM 9/27/2023     1:49 PM 10/11/2023    12:00 AM 10/11/2023     1:43 PM   Anticoagulation Monitoring   INR 3.50  3.80  3.40  3.00   INR Date 9/13/2023  9/21/2023  9/27/2023  10/11/2023   INR Goal 2.5-3.5  2.5-3.5  2.5-3.5  2.5-3.5   Trend Same  Same  Same  Same   Last Week Total 62 mg  62 mg  58 mg  62 mg   Next Week Total 62 mg  58 mg  62 mg  62 mg   Sun 8 mg  8 mg  8 mg  8 mg   Mon 10 mg  10 mg  10 mg  10 mg   Tue 8 mg  8 mg  8 mg  8 mg   Wed 10 mg  10 mg  10 mg  10 mg   Thu 8 mg  4 mg (9/21); Otherwise 8 mg  8 mg  8 mg   Fri 10 mg  10 mg  10 mg  10 mg   Sat 8 mg  8 mg  8 mg  8 mg   Historical INR  3.80      3.40      3.00            This result is from an external source.       Plan:  1. INR is therapeutic today- see above in Anticoagulation Summary.    Man Ramsey to continue their warfarin regimen-  see above in Anticoagulation Summary.  2. Follow up in 2 weeks  3. Pt has agreed to only be called if INR out of range. They have been instructed to call if any changes in medications, doses, concerns, etc. Patient expresses understanding and has no further questions at this time.    Augusta Lopez, Pharmacy Technician

## 2023-10-26 ENCOUNTER — ANTICOAGULATION VISIT (OUTPATIENT)
Dept: PHARMACY | Facility: HOSPITAL | Age: 43
End: 2023-10-26
Payer: COMMERCIAL

## 2023-10-26 DIAGNOSIS — Z79.01 LONG TERM (CURRENT) USE OF ANTICOAGULANTS: ICD-10-CM

## 2023-10-26 DIAGNOSIS — Z95.2 HISTORY OF MECHANICAL AORTIC VALVE REPLACEMENT: Primary | ICD-10-CM

## 2023-10-26 LAB — INR PPP: 3.5

## 2023-10-26 NOTE — PROGRESS NOTES
Anticoagulation Clinic Progress Note    Anticoagulation Summary  As of 10/26/2023      INR goal:  2.5-3.5   TTR:  79.3% (4.9 y)   INR used for dosing:  3.50 (10/26/2023)   Warfarin maintenance plan:  10 mg every Mon, Wed, Fri; 8 mg all other days   Weekly warfarin total:  62 mg   No change documented:  Sun Curtis   Plan last modified:  Heather Kline RPH (5/17/2022)   Next INR check:  11/9/2023   Priority:  High   Target end date:  Indefinite    Indications    History of mechanical aortic valve replacement [Z95.2]  Long term (current) use of anticoagulants [Z79.01]                 Anticoagulation Episode Summary       INR check location:  Home Draw    Preferred lab:      Send INR reminders to:   [facility]  POOL    Comments:  **[facility]**          Anticoagulation Care Providers       Provider Role Specialty Phone number    Lorne Kam MD Referring Cardiology [phone]            Clinic Interview:  No pertinent clinical findings have been reported.    INR History:      9/21/2023     2:19 PM 9/27/2023    12:00 AM 9/27/2023     1:49 PM 10/11/2023    12:00 AM 10/11/2023     1:43 PM 10/26/2023    12:00 AM 10/26/2023     1:34 PM   Anticoagulation Monitoring   INR 3.80  3.40  3.00  3.50   INR Date 9/21/2023  9/27/2023  10/11/2023  10/26/2023   INR Goal 2.5-3.5  2.5-3.5  2.5-3.5  2.5-3.5   Trend Same  Same  Same  Same   Last Week Total 62 mg  58 mg  62 mg  62 mg   Next Week Total 58 mg  62 mg  62 mg  62 mg   Sun 8 mg  8 mg  8 mg  8 mg   Mon 10 mg  10 mg  10 mg  10 mg   Tue 8 mg  8 mg  8 mg  8 mg   Wed 10 mg  10 mg  10 mg  10 mg   Thu 4 mg (9/21); Otherwise 8 mg  8 mg  8 mg  8 mg   Fri 10 mg  10 mg  10 mg  10 mg   Sat 8 mg  8 mg  8 mg  8 mg   Historical INR  3.40      3.00      3.50            This result is from an external source.       Plan:  1. INR is therapeutic today- see above in Anticoagulation Summary.    Man Ramsey to continue their warfarin regimen- see above  in Anticoagulation Summary.  2. Follow up in 2 weeks  3. Pt has agreed to only be called if INR out of range. They have been instructed to call if any changes in medications, doses, concerns, etc. Patient expresses understanding and has no further questions at this time.    Sun Curtis

## 2023-11-08 LAB — INR PPP: 3.3

## 2023-11-09 ENCOUNTER — ANTICOAGULATION VISIT (OUTPATIENT)
Dept: PHARMACY | Facility: HOSPITAL | Age: 43
End: 2023-11-09
Payer: COMMERCIAL

## 2023-11-09 DIAGNOSIS — Z79.01 LONG TERM (CURRENT) USE OF ANTICOAGULANTS: ICD-10-CM

## 2023-11-09 DIAGNOSIS — Z95.2 HISTORY OF MECHANICAL AORTIC VALVE REPLACEMENT: Primary | ICD-10-CM

## 2023-11-09 NOTE — PROGRESS NOTES
Anticoagulation Clinic Progress Note    Anticoagulation Summary  As of 11/9/2023      INR goal:  2.5-3.5   TTR:  79.5% (4.9 y)   INR used for dosing:  3.30 (11/8/2023)   Warfarin maintenance plan:  10 mg every Mon, Wed, Fri; 8 mg all other days   Weekly warfarin total:  62 mg   No change documented:  Sun Curtis   Plan last modified:  Heather Kline RPH (5/17/2022)   Next INR check:  11/22/2023   Priority:  High   Target end date:  Indefinite    Indications    History of mechanical aortic valve replacement [Z95.2]  Long term (current) use of anticoagulants [Z79.01]                 Anticoagulation Episode Summary       INR check location:  Home Draw    Preferred lab:      Send INR reminders to:   [facility]  POOL    Comments:  **[facility]**          Anticoagulation Care Providers       Provider Role Specialty Phone number    Lorne Kam MD Referring Cardiology [phone]            Clinic Interview:  No pertinent clinical findings have been reported.    INR History:      9/27/2023     1:49 PM 10/11/2023    12:00 AM 10/11/2023     1:43 PM 10/26/2023    12:00 AM 10/26/2023     1:34 PM 11/8/2023    12:00 AM 11/9/2023     8:27 AM   Anticoagulation Monitoring   INR 3.40  3.00  3.50  3.30   INR Date 9/27/2023  10/11/2023  10/26/2023  11/8/2023   INR Goal 2.5-3.5  2.5-3.5  2.5-3.5  2.5-3.5   Trend Same  Same  Same  Same   Last Week Total 58 mg  62 mg  62 mg  62 mg   Next Week Total 62 mg  62 mg  62 mg  62 mg   Sun 8 mg  8 mg  8 mg  8 mg   Mon 10 mg  10 mg  10 mg  10 mg   Tue 8 mg  8 mg  8 mg  8 mg   Wed 10 mg  10 mg  10 mg  10 mg   Thu 8 mg  8 mg  8 mg  8 mg   Fri 10 mg  10 mg  10 mg  10 mg   Sat 8 mg  8 mg  8 mg  8 mg   Historical INR  3.00      3.50      3.30            This result is from an external source.       Plan:  1. INR is therapeutic today- see above in Anticoagulation Summary.    Man Ramsey to continue their warfarin regimen- see above in Anticoagulation  Summary.  2. Follow up in 2 weeks  3. Pt has agreed to only be called if INR out of range. They have been instructed to call if any changes in medications, doses, concerns, etc. Patient expresses understanding and has no further questions at this time.    Sun Curtis

## 2023-11-13 NOTE — PROGRESS NOTES
[facility] SLEEP MEDICINE  [address]  [address]  [place] [address] [phone]    PCP: Orlando Mcguire MD    Reason for visit:  Sleep disorders: SENA    Man is a 43 y.o.male who was seen in the [facility] today. Annual fu. Previously followed by Dr. DAS. Has underlying CMP. LEROY was ordered on CPAP but not available yet. He sleeps 8pm-12mn and wakes up 5:30am-8am. Has ffm and fits well. Has air leak and dry mouth - only because his mask is older and got hair cut - usually not an issue. Has to wake up several times to take care of his pets.  [place] Sleepiness Scale is 5. Caffeine 4 per day. Alcohol 8-14 per week.    Man  reports that he has never smoked. He has never used smokeless tobacco.    Pertinent Positive Review of Systems of anxiety  Rest of Review of Systems was negative as recorded in Sleep Questionnaire.    Patient  has a past medical history of Aortic insufficiency due to bicuspid aortic valve, BRBPR (bright red blood per rectum) (10/06/2021), Clotting disorder (Nov 12 2019), Colon polyps, Fever of unknown origin (08/2012), First degree AV block (08/31/2021), Hematochezia (11/14/2019), Hemorrhoids, History of blood transfusion, Hyperglycemia, Hypertension, Laceration of ankle (05/24/2010), Long term (current) use of anticoagulants, Mixed hyperlipidemia, Nonischemic cardiomyopathy, Obesity, Ocular migraine, and Sleep apnea (07/2022).     Current Medications:    Current Outpatient Medications:     B Complex Vitamins (VITAMIN B COMPLEX PO), Take  by mouth., Disp: , Rfl:     CALCIUM PO, Take  by mouth., Disp: , Rfl:     carvedilol (COREG) 12.5 MG tablet, Take 1 tablet by mouth 2 (Two) Times a Day., Disp: 180 tablet, Rfl: 3    cholecalciferol (VITAMIN D3) 25 MCG (1000 UT) tablet, Take 1 tablet by mouth Daily., Disp: , Rfl:     Docusate Calcium (STOOL SOFTENER PO), Take  by mouth., Disp: , Rfl:     fexofenadine (ALLEGRA) 180 MG tablet, Take 1 tablet by mouth  "Daily., Disp: , Rfl:     Hydrocortisone Ace-Pramoxine (Analpram-HC) 1-1 % rectal cream, Insert  into the rectum 2 (Two) Times a Day., Disp: 28.4 g, Rfl: 1    lisinopril (PRINIVIL,ZESTRIL) 20 MG tablet, Take 1 tablet by mouth Daily., Disp: 90 tablet, Rfl: 3    multivitamin with minerals tablet tablet, Take 1 tablet by mouth Daily., Disp: , Rfl:     Omega-3 Fatty Acids (FISH OIL) 1000 MG capsule capsule, Take  by mouth Daily With Breakfast., Disp: , Rfl:     Psyllium (METAMUCIL PO), Take  by mouth., Disp: , Rfl:     rosuvastatin (CRESTOR) 20 MG tablet, TAKE 1 TABLET BY MOUTH ONCE DAILY AT NIGHT, Disp: 90 tablet, Rfl: 0    vitamin C (ASCORBIC ACID) 250 MG tablet, Take 2 tablets by mouth Daily., Disp: , Rfl:     warfarin (COUMADIN) 2 MG tablet, TAKE 5 TABLETS(10 MGS) BY MOUTH ON MONDAY, WEDNESDAY AND FRIDAY AND TAKE 4 TABLETS(8 MGS) ALL OTHER DAYS OR AS DIRECTED, Disp: 400 tablet, Rfl: 0   also entered in Sleep Questionnaire         Vital Signs: /86   Pulse 70   Ht 180.3 cm (70.98\")   Wt 125 kg (275 lb)   SpO2 98%   BMI 38.38 kg/m²     Body mass index is 38.38 kg/m².       Tongue: Normal       Dentition: good       Pharynx: Posterior pharyngeal pillars are narrow   Mallampatti: II (hard and soft palate, upper portion of tonsils anduvula visible)        General: Alert. Cooperative. Well developed. No acute distress.             Head:  Normocephalic. Symmetrical. Atraumatic.              Nose: No septal deviation. No drainage.          Throat: No oral lesions. No thrush. Moist mucous membranes.    Chest Wall:  Normal shape. Symmetric expansion with respiration. No tenderness.             Neck:  Trachea midline.           Lungs:  Clear to auscultation bilaterally. No wheezes. No rhonchi. No rales. Respirations regular, even and unlabored.            Heart:  Regular rhythm and normal rate. Mechanical HV.     Abdomen:  Soft, non-tender and non-distended. Normal bowel sounds. No masses.  Extremities:  Moves all " "extremities well. No edema.    Psychiatric: Normal mood and affect.    Diagnostic data available to date is as below and was reviewed on current visit:  Study-Respiratory events: July 2022    # of events Events/hr    Central apneas        0  0 /hr    Obstructive apneas        373  62.3 hr    Hypopneas        198  33.1 /hr   AHI, apnea hypopnea index  571      95.4 /hr      RDI (RDI= AHI+RERA's)     96.2 /hr         No results found for: \"IRON\", \"TIBC\", \"FERRITIN\"    Most current available usage data reviewed on 11/14/2023:  No scans are attached to the encounter or orders placed in the encounter.  100% compliance average 8 hours 12 minutes AHI 0.5 average pressure 15.8    DME Company: [email or web address]    Prescription to DME for replacement supplies as below:    full face mask      Description Replacement    Nasal PILLOWS      A 7034 Nasal Pillows  every 3 mth    A 7033 Repl Nasal Pillows  2 per mth    Nasal MASK/CUSHION      A 7034 Nasal Mask/Cushion  every 3 mth    A 7032 Repl Nasal Mask/Cushion  2 per mth    Full Face MASK     x A 7030 Full Face Mask  every 3 mth   x A 7031 Repl Face Mask  1 per mth      A 4604 Heated Tubing  every 3 mth    A 7037 Standard Tubing  every 3 mth   x A 7035 Headgear  every 3 mth   x A 7046 Repl Humidifier Chamber  every 6 yrs   x A 7038 Disposable Filters  2 per mth   x A 7039 Non-disposable Filter  every 6 mth   x A 7036 Chin Strap  every 6 mth     No orders of the defined types were placed in this encounter.         Impression:  1. Obstructive sleep apnea    2. Nocturnal hypoxia    3. Cardiomyopathy, unspecified type    4. Obesity (BMI 30-39.9)        Plan:  Man is compliant and doing well. Occasional air leaks but resolved with new mask and adjustment. Does not want pr lowered. Keep same. CMP under control with cardiology.    LEROY shows no significant desaturation on CPAP.  I have the report from 11/24/2022.    I reiterated the importance of effective treatment of obstructive sleep apnea " with PAP machine.  Cardiovascular health risks of untreated sleep apnea were again reviewed.  Patient was asked to remain cautious if there is persistent hypersomnolence. The benefit of weight loss in reducing severity of obstructive sleep apnea was discussed.  Patient would benefit from adhering to a strict diet to achieve ideal BMI.     Change of PAP supplies regularly is important for effective use.  Change of cushion on the mask or plugs on nasal pillows along with disposable filters once every month and change of mask frame, tubing, headgear and Velcro straps every 6 months at the minimum was reiterated.    This patient is compliant with PAP machine and benefits from its use.  Apnea hypopneas index is corrected/improved.  Daytime hypersomnolence has resolved.     Patient will follow up in this clinic in 1 year APRN    Thank you for allowing me to participate in your patient's care.    Electronically signed by Daron Giles MD, 11/13/23, 10:35 AM EST.    Part of this note may be an electronic transcription/translation of spoken language to printed text using the Dragon Dictation System.

## 2023-11-14 ENCOUNTER — OFFICE VISIT (OUTPATIENT)
Dept: SLEEP MEDICINE | Facility: HOSPITAL | Age: 43
End: 2023-11-14
Payer: COMMERCIAL

## 2023-11-14 VITALS
HEART RATE: 70 BPM | HEIGHT: 71 IN | SYSTOLIC BLOOD PRESSURE: 145 MMHG | DIASTOLIC BLOOD PRESSURE: 86 MMHG | BODY MASS INDEX: 38.5 KG/M2 | WEIGHT: 275 LBS | OXYGEN SATURATION: 98 %

## 2023-11-14 DIAGNOSIS — I42.9 CARDIOMYOPATHY, UNSPECIFIED TYPE: ICD-10-CM

## 2023-11-14 DIAGNOSIS — G47.34 NOCTURNAL HYPOXIA: ICD-10-CM

## 2023-11-14 DIAGNOSIS — G47.33 OBSTRUCTIVE SLEEP APNEA: Primary | ICD-10-CM

## 2023-11-14 DIAGNOSIS — E66.9 OBESITY (BMI 30-39.9): ICD-10-CM

## 2023-11-14 PROCEDURE — G0463 HOSPITAL OUTPT CLINIC VISIT: HCPCS

## 2023-11-20 RX ORDER — ROSUVASTATIN CALCIUM 20 MG/1
TABLET, COATED ORAL
Qty: 90 TABLET | Refills: 2 | Status: SHIPPED | OUTPATIENT
Start: 2023-11-20

## 2023-11-22 ENCOUNTER — ANTICOAGULATION VISIT (OUTPATIENT)
Dept: PHARMACY | Facility: HOSPITAL | Age: 43
End: 2023-11-22
Payer: COMMERCIAL

## 2023-11-22 DIAGNOSIS — Z79.01 LONG TERM (CURRENT) USE OF ANTICOAGULANTS: ICD-10-CM

## 2023-11-22 DIAGNOSIS — Z95.2 HISTORY OF MECHANICAL AORTIC VALVE REPLACEMENT: Primary | ICD-10-CM

## 2023-11-22 LAB — INR PPP: 3.9

## 2023-11-22 NOTE — PROGRESS NOTES
Anticoagulation Clinic Progress Note    Anticoagulation Summary  As of 11/22/2023      INR goal:  2.5-3.5   TTR:  79.1% (4.9 y)   INR used for dosing:  3.90 (11/22/2023)   Warfarin maintenance plan:  10 mg every Mon, Wed, Fri; 8 mg all other days   Weekly warfarin total:  62 mg   Plan last modified:  Heather Kline RPH (5/17/2022)   Next INR check:  12/6/2023   Priority:  High   Target end date:  Indefinite    Indications    History of mechanical aortic valve replacement [Z95.2]  Long term (current) use of anticoagulants [Z79.01]                 Anticoagulation Episode Summary       INR check location:  Home Draw    Preferred lab:      Send INR reminders to:  [facility]  POOL    Comments:  **[facility]**          Anticoagulation Care Providers       Provider Role Specialty Phone number    Lorne Kam MD Referring Cardiology [phone]            Clinic Interview:  Patient Findings     Positives:  Change in alcohol use    Negatives:  Signs/symptoms of thrombosis, Signs/symptoms of bleeding,   Laboratory test error suspected, Change in health, Change in activity,   Upcoming invasive procedure, Emergency department visit, Upcoming dental   procedure, Missed doses, Extra doses, Change in medications, Change in   diet/appetite, Hospital admission, Bruising, Other complaints    Comments:  increase in alcohol last night      Clinical Outcomes     Negatives:  Major bleeding event, Thromboembolic event,   Anticoagulation-related hospital admission, Anticoagulation-related ED   visit, Anticoagulation-related fatality    Comments:  increase in alcohol last night        INR History:      10/11/2023     1:43 PM 10/26/2023    12:00 AM 10/26/2023     1:34 PM 11/8/2023    12:00 AM 11/9/2023     8:27 AM 11/22/2023    12:00 AM 11/22/2023     1:12 PM   Anticoagulation Monitoring   INR 3.00  3.50  3.30  3.90   INR Date 10/11/2023  10/26/2023  11/8/2023  11/22/2023   INR Goal 2.5-3.5  2.5-3.5  2.5-3.5   2.5-3.5   Trend Same  Same  Same  Same   Last Week Total 62 mg  62 mg  62 mg  62 mg   Next Week Total 62 mg  62 mg  62 mg  60 mg   Sun 8 mg  8 mg  8 mg  8 mg   Mon 10 mg  10 mg  10 mg  10 mg   Tue 8 mg  8 mg  8 mg  8 mg   Wed 10 mg  10 mg  10 mg  8 mg (11/22); Otherwise 10 mg   Thu 8 mg  8 mg  8 mg  8 mg   Fri 10 mg  10 mg  10 mg  10 mg   Sat 8 mg  8 mg  8 mg  8 mg   Historical INR  3.50      3.30      3.90            This result is from an external source.       Plan:  1. INR is Supratherapeutic today- see above in Anticoagulation Summary.   Will instruct Man STEWART Roxy to Change their warfarin regimen- see above in Anticoagulation Summary.  partial to 8 mg today then resume, rck 2 weeks   2. Follow up in 2 weeks  3. They have been instructed to call if any changes in medications, doses, concerns, etc. Patient expresses understanding and has no further questions at this time.    Heather Kline RP

## 2023-12-06 ENCOUNTER — ANTICOAGULATION VISIT (OUTPATIENT)
Dept: PHARMACY | Facility: HOSPITAL | Age: 43
End: 2023-12-06
Payer: COMMERCIAL

## 2023-12-06 DIAGNOSIS — Z79.01 LONG TERM (CURRENT) USE OF ANTICOAGULANTS: ICD-10-CM

## 2023-12-06 DIAGNOSIS — Z95.2 HISTORY OF MECHANICAL AORTIC VALVE REPLACEMENT: Primary | ICD-10-CM

## 2023-12-06 LAB — INR PPP: 4.6

## 2023-12-06 NOTE — PROGRESS NOTES
Anticoagulation Clinic Progress Note    Anticoagulation Summary  As of 2023      INR goal:  2.5-3.5   TTR:  78.5% (5 y)   INR used for dosin.60 (2023)   Warfarin maintenance plan:  10 mg every Mon, Wed, Fri; 8 mg all other days   Weekly warfarin total:  62 mg   Plan last modified:  Heather Kline RPH (2022)   Next INR check:  2023   Priority:  High   Target end date:  Indefinite    Indications    History of mechanical aortic valve replacement [Z95.2]  Long term (current) use of anticoagulants [Z79.01]                 Anticoagulation Episode Summary       INR check location:  Home Draw    Preferred lab:      Send INR reminders to:   [facility]SE [facility]  POOL    Comments:  **[facility]**          Anticoagulation Care Providers       Provider Role Specialty Phone number    Lorne Kam MD Referring Cardiology [phone]            Clinic Interview:  Patient Findings     Negatives:  Signs/symptoms of thrombosis, Signs/symptoms of bleeding,   Laboratory test error suspected, Change in health, Change in alcohol use,   Change in activity, Upcoming invasive procedure, Emergency department   visit, Upcoming dental procedure, Missed doses, Extra doses, Change in   medications, Change in diet/appetite, Hospital admission, Bruising, Other   complaints      Clinical Outcomes     Negatives:  Major bleeding event, Thromboembolic event,   Anticoagulation-related hospital admission, Anticoagulation-related ED   visit, Anticoagulation-related fatality        INR History:      10/26/2023     1:34 PM 2023    12:00 AM 2023     8:27 AM 2023    12:00 AM 2023     1:12 PM 2023    12:00 AM 2023     3:57 PM   Anticoagulation Monitoring   INR 3.50  3.30  3.90  4.60   INR Date 10/26/2023  2023  2023  2023   INR Goal 2.5-3.5  2.5-3.5  2.5-3.5  2.5-3.5   Trend Same  Same  Same  Same   Last Week Total 62 mg  62 mg  62 mg  62 mg   Next Week Total 62 mg   62 mg  60 mg  52 mg   Sun 8 mg  8 mg  8 mg  8 mg   Mon 10 mg  10 mg  10 mg  10 mg   Tue 8 mg  8 mg  8 mg  8 mg   Wed 10 mg  10 mg  8 mg (11/22); Otherwise 10 mg  Hold (12/6)   Thu 8 mg  8 mg  8 mg  8 mg   Fri 10 mg  10 mg  10 mg  10 mg   Sat 8 mg  8 mg  8 mg  8 mg   Historical INR  3.30      3.90      4.60            This result is from an external source.       Plan:  1. INR is Supratherapeutic today- see above in Anticoagulation Summary.   Will instruct Man Ramsey to Change their warfarin regimen- see above in Anticoagulation Summary.  2. Follow up in 2 weeks  3. They have been instructed to call if any changes in medications, doses, concerns, etc. Patient expresses understanding and has no further questions at this time.    Mnoa Jones, PharmD

## 2023-12-20 ENCOUNTER — ANTICOAGULATION VISIT (OUTPATIENT)
Dept: PHARMACY | Facility: HOSPITAL | Age: 43
End: 2023-12-20
Payer: COMMERCIAL

## 2023-12-20 DIAGNOSIS — Z79.01 LONG TERM (CURRENT) USE OF ANTICOAGULANTS: ICD-10-CM

## 2023-12-20 DIAGNOSIS — Z95.2 HISTORY OF MECHANICAL AORTIC VALVE REPLACEMENT: Primary | ICD-10-CM

## 2023-12-20 LAB
INR PPP: 4.4
INR PPP: 4.5
INR PPP: 4.6

## 2023-12-20 NOTE — PROGRESS NOTES
Anticoagulation Clinic Progress Note    Anticoagulation Summary  As of 2023      INR goal:  2.5-3.5   TTR:  77.9% (5 y)   INR used for dosin.50 (2023)   Warfarin maintenance plan:  10 mg every Mon, Wed, Fri; 8 mg all other days   Weekly warfarin total:  62 mg   Plan last modified:  Heather Kline RPH (2022)   Next INR check:  2023   Priority:  High   Target end date:  Indefinite    Indications    History of mechanical aortic valve replacement [Z95.2]  Long term (current) use of anticoagulants [Z79.01]                 Anticoagulation Episode Summary       INR check location:  Home Draw    Preferred lab:      Send INR reminders to:   [facility]  [facility]    Comments:  **[facility]**          Anticoagulation Care Providers       Provider Role Specialty Phone number    Lorne Kam MD Referring Cardiology [phone]            Clinic Interview:  Patient Findings     Positives:  Change in alcohol use, Other complaints    Negatives:  Signs/symptoms of thrombosis, Signs/symptoms of bleeding,   Laboratory test error suspected, Change in health, Change in activity,   Upcoming invasive procedure, Emergency department visit, Upcoming dental   procedure, Missed doses, Extra doses, Change in medications, Change in   diet/appetite, Hospital admission, Bruising    Comments:  Reports he drank a strong beer last night. Reports significant   stress at work.       Clinical Outcomes     Negatives:  Major bleeding event, Thromboembolic event,   Anticoagulation-related hospital admission, Anticoagulation-related ED   visit, Anticoagulation-related fatality    Comments:  Reports he drank a strong beer last night. Reports significant   stress at work.         INR History:      2023     8:27 AM 2023    12:00 AM 2023     1:12 PM 2023    12:00 AM 2023     3:57 PM 2023    12:00 AM 2023     3:47 PM   Anticoagulation Monitoring   INR 3.30  3.90  4.60   4.50   INR Date 11/8/2023 11/22/2023 12/6/2023 12/20/2023   INR Goal 2.5-3.5  2.5-3.5  2.5-3.5  2.5-3.5   Trend Same  Same  Same  Same   Last Week Total 62 mg  62 mg  62 mg  62 mg   Next Week Total 62 mg  60 mg  52 mg  50 mg   Sun 8 mg  8 mg  8 mg  8 mg   Mon 10 mg  10 mg  10 mg  10 mg   Tue 8 mg  8 mg  8 mg  8 mg   Wed 10 mg  8 mg (11/22); Otherwise 10 mg  Hold (12/6); Otherwise 10 mg  Hold (12/20)   Thu 8 mg  8 mg  8 mg  8 mg   Fri 10 mg  10 mg  10 mg  8 mg (12/22)   Sat 8 mg  8 mg  8 mg  8 mg   Historical INR  3.90      4.60      4.60  C       4.50  C       4.40           C Corrected result    This result is from an external source.    Multiple values from one day are sorted in reverse-chronological order       Plan:  1. INR is Supratherapeutic today- see above in Anticoagulation Summary.   Will instruct Man Ramsey to Change their warfarin regimen (HOLD today, 8 mg Fri rather than 10 mg; otherwise continue usual dose until INR check in 1 week) - see above in Anticoagulation Summary.  2. Follow up in 1 week. Pending INR will consider 0-2 days of 10 mg each week, 8 mg all other days.  3. They have been instructed to call if any changes in medications, doses, concerns, etc. Patient expresses understanding and has no further questions at this time.    Neo Cornejo, PharmD

## 2023-12-27 ENCOUNTER — ANTICOAGULATION VISIT (OUTPATIENT)
Dept: PHARMACY | Facility: HOSPITAL | Age: 43
End: 2023-12-27
Payer: COMMERCIAL

## 2023-12-27 DIAGNOSIS — Z95.2 HISTORY OF MECHANICAL AORTIC VALVE REPLACEMENT: Primary | ICD-10-CM

## 2023-12-27 DIAGNOSIS — Z79.01 LONG TERM (CURRENT) USE OF ANTICOAGULANTS: ICD-10-CM

## 2023-12-27 LAB — INR PPP: 3.6

## 2023-12-27 NOTE — PROGRESS NOTES
Anticoagulation Clinic Progress Note    Anticoagulation Summary  As of 12/27/2023      INR goal:  2.5-3.5   TTR:  77.6% (5 y)   INR used for dosing:  3.60 (12/27/2023)   Warfarin maintenance plan:  8 mg every day   Weekly warfarin total:  56 mg   Plan last modified:  Heather Kline RPH (12/27/2023)   Next INR check:  1/10/2024   Priority:  High   Target end date:  Indefinite    Indications    History of mechanical aortic valve replacement [Z95.2]  Long term (current) use of anticoagulants [Z79.01]                 Anticoagulation Episode Summary       INR check location:  Home Draw    Preferred lab:      Send INR reminders to:   [facility]    Comments:  **[facility]**          Anticoagulation Care Providers       Provider Role Specialty Phone number    Lorne Kam MD Referring Cardiology [phone]            Clinic Interview:  Patient Findings     Negatives:  Signs/symptoms of thrombosis, Signs/symptoms of bleeding,   Laboratory test error suspected, Change in health, Change in alcohol use,   Change in activity, Upcoming invasive procedure, Emergency department   visit, Upcoming dental procedure, Missed doses, Extra doses, Change in   medications, Change in diet/appetite, Hospital admission, Bruising, Other   complaints      Clinical Outcomes     Negatives:  Major bleeding event, Thromboembolic event,   Anticoagulation-related hospital admission, Anticoagulation-related ED   visit, Anticoagulation-related fatality        INR History:      11/22/2023     1:12 PM 12/6/2023    12:00 AM 12/6/2023     3:57 PM 12/20/2023    12:00 AM 12/20/2023     3:47 PM 12/27/2023    12:00 AM 12/27/2023     1:53 PM   Anticoagulation Monitoring   INR 3.90  4.60  4.50  3.60   INR Date 11/22/2023 12/6/2023 12/20/2023 12/27/2023   INR Goal 2.5-3.5  2.5-3.5  2.5-3.5  2.5-3.5   Trend Same  Same  Same  Down   Last Week Total 62 mg  62 mg  62 mg  50 mg   Next Week Total 60 mg  52 mg  50 mg  56 mg   Sun 8  mg  8 mg  8 mg  8 mg   Mon 10 mg  10 mg  10 mg  8 mg   Tue 8 mg  8 mg  8 mg  8 mg   Wed 8 mg (11/22); Otherwise 10 mg  Hold (12/6); Otherwise 10 mg  Hold (12/20)  8 mg   Thu 8 mg  8 mg  8 mg  8 mg   Fri 10 mg  10 mg  8 mg (12/22)  8 mg   Sat 8 mg  8 mg  8 mg  8 mg   Historical INR  4.60      4.60  C       4.50  C       4.40      3.60           C Corrected result    This result is from an external source.    Multiple values from one day are sorted in reverse-chronological order       Plan:  1. INR is Supratherapeutic today- see above in Anticoagulation Summary.   Will instruct Man STEWART Roxy to Change their warfarin regimen- see above in Anticoagulation Summary.  Trial on 8 mg daily  2. Follow up in 2 weeks  3. Pt has agreed to only be called if INR out of range. They have been instructed to call if any changes in medications, doses, concerns, etc. Patient expresses understanding and has no further questions at this time.    Heather Kline [facility]

## 2023-12-29 RX ORDER — WARFARIN SODIUM 2 MG/1
TABLET ORAL
Qty: 360 TABLET | Refills: 1 | Status: SHIPPED | OUTPATIENT
Start: 2023-12-29

## 2024-01-01 NOTE — PROGRESS NOTES
Anticoagulation Clinic Progress Note    Anticoagulation Summary  As of 2022    INR goal:  2.5-3.5   TTR:  77.7 % (3.5 y)   INR used for dosin.80 (2022)   Warfarin maintenance plan:  10 mg every Mon, Wed, Fri; 8 mg all other days   Weekly warfarin total:  62 mg   No change documented:  Sun Curtis   Plan last modified:  Heather Kline RPH (2022)   Next INR check:  2022   Priority:  High   Target end date:  Indefinite    Indications    History of mechanical aortic valve replacement [Z95.2]  Long term (current) use of anticoagulants [Z79.01]             Anticoagulation Episode Summary     INR check location:  Home Draw    Preferred lab:      Send INR reminders to:   [facility]  [facility]    Comments:  [facility]**      Anticoagulation Care Providers     Provider Role Specialty Phone number    Lorne Kam MD Referring Cardiology [phone]          Clinic Interview:  No pertinent clinical findings have been reported.    INR History:  Anticoagulation Monitoring 2022   INR 2.70 2.80 2.80   INR Date 2022   INR Goal 2.5-3.5 2.5-3.5 2.5-3.5   Trend Same Same Same   Last Week Total 58 mg 62 mg 62 mg   Next Week Total 62 mg 62 mg 62 mg   Sun 8 mg 8 mg 8 mg   Mon 10 mg 10 mg 10 mg   Tue 8 mg 8 mg 8 mg   Wed 10 mg 10 mg 10 mg   Thu 8 mg 8 mg 8 mg   Fri 10 mg 10 mg 10 mg   Sat 8 mg 8 mg 8 mg   Visit Report - - -   Some recent data might be hidden       Plan:  1. INR is therapeutic today- see above in Anticoagulation Summary.    Man Ramsey to continue their warfarin regimen- see above in Anticoagulation Summary.  2. Follow up in 2 weeks  3. Pt has agreed to only be called if INR out of range. They have been instructed to call if any changes in medications, doses, concerns, etc. Patient expresses understanding and has no further questions at this time.    Sun Curtis   left hyperpigmented macule

## 2024-01-05 ENCOUNTER — ANTICOAGULATION VISIT (OUTPATIENT)
Dept: PHARMACY | Facility: HOSPITAL | Age: 44
End: 2024-01-05
Payer: COMMERCIAL

## 2024-01-05 DIAGNOSIS — Z95.2 HISTORY OF MECHANICAL AORTIC VALVE REPLACEMENT: Primary | ICD-10-CM

## 2024-01-05 DIAGNOSIS — Z79.01 LONG TERM (CURRENT) USE OF ANTICOAGULANTS: ICD-10-CM

## 2024-01-05 LAB — INR PPP: 2.7

## 2024-01-05 NOTE — PROGRESS NOTES
Anticoagulation Clinic Progress Note    Anticoagulation Summary  As of 2024      INR goal:  2.5-3.5   TTR:  77.7% (5.1 y)   INR used for dosin.70 (2024)   Warfarin maintenance plan:  8 mg every day   Weekly warfarin total:  56 mg   No change documented:  Heather Kline RPH   Plan last modified:  Heather Kline RPH (2023)   Next INR check:  2024   Priority:  High   Target end date:  Indefinite    Indications    History of mechanical aortic valve replacement [Z95.2]  Long term (current) use of anticoagulants [Z79.01]                 Anticoagulation Episode Summary       INR check location:  Home Draw    Preferred lab:      Send INR reminders to:   [facility]  [facility]    Comments:  **[facility]**          Anticoagulation Care Providers       Provider Role Specialty Phone number    Lorne Kam MD Referring Cardiology [phone]            Clinic Interview:  No pertinent clinical findings have been reported.    INR History:      2023     3:57 PM 2023    12:00 AM 2023     3:47 PM 2023    12:00 AM 2023     1:53 PM 2024    12:00 AM 2024    10:17 AM   Anticoagulation Monitoring   INR 4.60  4.50  3.60  2.70   INR Date 2023   INR Goal 2.5-3.5  2.5-3.5  2.5-3.5  2.5-3.5   Trend Same  Same  Down  Same   Last Week Total 62 mg  62 mg  50 mg  56 mg   Next Week Total 52 mg  50 mg  56 mg  56 mg   Sun 8 mg  8 mg  8 mg  8 mg   Mon 10 mg  10 mg  8 mg  8 mg   Tue 8 mg  8 mg  8 mg  8 mg   Wed Hold (); Otherwise 10 mg  Hold ()  8 mg  8 mg   Thu 8 mg  8 mg  8 mg  8 mg   Fri 10 mg  8 mg ()  8 mg  8 mg   Sat 8 mg  8 mg  8 mg  8 mg   Historical INR  4.60  C       4.50  C       4.40      3.60      2.70           C Corrected result    This result is from an external source.    Multiple values from one day are sorted in reverse-chronological order       Plan:  1. INR is therapeutic today- see above in  Anticoagulation Summary.    Man Ramsey to continue their warfarin regimen- see above in Anticoagulation Summary.  2. Follow up in 2 weeks  3. Pt has agreed to only be called if INR out of range. They have been instructed to call if any changes in medications, doses, concerns, etc. Patient expresses understanding and has no further questions at this time.    Heather Kline, [facility]

## 2024-01-10 ENCOUNTER — ANTICOAGULATION VISIT (OUTPATIENT)
Dept: PHARMACY | Facility: HOSPITAL | Age: 44
End: 2024-01-10
Payer: COMMERCIAL

## 2024-01-10 DIAGNOSIS — Z79.01 LONG TERM (CURRENT) USE OF ANTICOAGULANTS: ICD-10-CM

## 2024-01-10 DIAGNOSIS — Z95.2 HISTORY OF MECHANICAL AORTIC VALVE REPLACEMENT: Primary | ICD-10-CM

## 2024-01-10 LAB — INR PPP: 3.3

## 2024-01-10 NOTE — PROGRESS NOTES
Anticoagulation Clinic Progress Note    Anticoagulation Summary  As of 1/10/2024      INR goal:  2.5-3.5   TTR:  77.7% (5.1 y)   INR used for dosing:  3.30 (1/10/2024)   Warfarin maintenance plan:  8 mg every day   Weekly warfarin total:  56 mg   No change documented:  Sun Curtis   Plan last modified:  Heather Kline RPH (12/27/2023)   Next INR check:  1/24/2024   Priority:  High   Target end date:  Indefinite    Indications    History of mechanical aortic valve replacement [Z95.2]  Long term (current) use of anticoagulants [Z79.01]                 Anticoagulation Episode Summary       INR check location:  Home Draw    Preferred lab:      Send INR reminders to:   [facility]  [facility]    Comments:  **[facility]**          Anticoagulation Care Providers       Provider Role Specialty Phone number    Lorne Kam MD Referring Cardiology [phone]            Clinic Interview:  No pertinent clinical findings have been reported.    INR History:      12/20/2023     3:47 PM 12/27/2023    12:00 AM 12/27/2023     1:53 PM 1/5/2024    12:00 AM 1/5/2024    10:17 AM 1/10/2024    12:00 AM 1/10/2024     3:44 PM   Anticoagulation Monitoring   INR 4.50  3.60  2.70  3.30   INR Date 12/20/2023  12/27/2023  1/5/2024  1/10/2024   INR Goal 2.5-3.5  2.5-3.5  2.5-3.5  2.5-3.5   Trend Same  Down  Same  Same   Last Week Total 62 mg  50 mg  56 mg  56 mg   Next Week Total 50 mg  56 mg  56 mg  56 mg   Sun 8 mg  8 mg  8 mg  8 mg   Mon 10 mg  8 mg  8 mg  8 mg   Tue 8 mg  8 mg  8 mg  8 mg   Wed Hold (12/20)  8 mg  8 mg  8 mg   Thu 8 mg  8 mg  8 mg  8 mg   Fri 8 mg (12/22)  8 mg  8 mg  8 mg   Sat 8 mg  8 mg  8 mg  8 mg   Historical INR  3.60      2.70      3.30            This result is from an external source.       Plan:  1. INR is therapeutic today- see above in Anticoagulation Summary.    Man Ramsey to continue their warfarin regimen- see above in Anticoagulation Summary.  2. Follow up in 2 weeks  3.  Pt has agreed to only be called if INR out of range. They have been instructed to call if any changes in medications, doses, concerns, etc. Patient expresses understanding and has no further questions at this time.    Sun Curtis

## 2024-01-26 ENCOUNTER — ANTICOAGULATION VISIT (OUTPATIENT)
Dept: PHARMACY | Facility: HOSPITAL | Age: 44
End: 2024-01-26
Payer: COMMERCIAL

## 2024-01-26 DIAGNOSIS — Z95.2 HISTORY OF MECHANICAL AORTIC VALVE REPLACEMENT: Primary | ICD-10-CM

## 2024-01-26 DIAGNOSIS — Z79.01 LONG TERM (CURRENT) USE OF ANTICOAGULANTS: ICD-10-CM

## 2024-01-26 LAB — INR PPP: 2.8

## 2024-01-26 NOTE — PROGRESS NOTES
Anticoagulation Clinic Progress Note    Anticoagulation Summary  As of 2024      INR goal:  2.5-3.5   TTR:  77.9% (5.1 y)   INR used for dosin.80 (2024)   Warfarin maintenance plan:  8 mg every day   Weekly warfarin total:  56 mg   No change documented:  Sun Curtis   Plan last modified:  Heather Kline RP (2023)   Next INR check:  2024   Priority:  High   Target end date:  Indefinite    Indications    History of mechanical aortic valve replacement [Z95.2]  Long term (current) use of anticoagulants [Z79.01]                 Anticoagulation Episode Summary       INR check location:  Home Draw    Preferred lab:      Send INR reminders to:   [facility]  [facility]    Comments:  **[facility]**          Anticoagulation Care Providers       Provider Role Specialty Phone number    Lorne Kam MD Referring Cardiology [phone]            Clinic Interview:  No pertinent clinical findings have been reported.    INR History:      2023     1:53 PM 2024    12:00 AM 2024    10:17 AM 1/10/2024    12:00 AM 1/10/2024     3:44 PM 2024    12:00 AM 2024     2:07 PM   Anticoagulation Monitoring   INR 3.60  2.70  3.30  2.80   INR Date 2023  2024  1/10/2024  2024   INR Goal 2.5-3.5  2.5-3.5  2.5-3.5  2.5-3.5   Trend Down  Same  Same  Same   Last Week Total 50 mg  56 mg  56 mg  56 mg   Next Week Total 56 mg  56 mg  56 mg  56 mg   Sun 8 mg  8 mg  8 mg  8 mg   Mon 8 mg  8 mg  8 mg  8 mg   Tue 8 mg  8 mg  8 mg  8 mg   Wed 8 mg  8 mg  8 mg  8 mg   Thu 8 mg  8 mg  8 mg  8 mg   Fri 8 mg  8 mg  8 mg  8 mg   Sat 8 mg  8 mg  8 mg  8 mg   Historical INR  2.70      3.30      2.80            This result is from an external source.       Plan:  1. INR is therapeutic today- see above in Anticoagulation Summary.    Man Ramsey to continue their warfarin regimen- see above in Anticoagulation Summary.  2. Follow up in 2 weeks  3. Pt has agreed to  only be called if INR out of range. They have been instructed to call if any changes in medications, doses, concerns, etc. Patient expresses understanding and has no further questions at this time.    Sun Curtis

## 2024-02-09 ENCOUNTER — ANTICOAGULATION VISIT (OUTPATIENT)
Dept: PHARMACY | Facility: HOSPITAL | Age: 44
End: 2024-02-09
Payer: COMMERCIAL

## 2024-02-09 DIAGNOSIS — Z95.2 HISTORY OF MECHANICAL AORTIC VALVE REPLACEMENT: Primary | ICD-10-CM

## 2024-02-09 DIAGNOSIS — Z79.01 LONG TERM (CURRENT) USE OF ANTICOAGULANTS: ICD-10-CM

## 2024-02-09 LAB — INR PPP: 3.2

## 2024-02-09 NOTE — PROGRESS NOTES
Anticoagulation Clinic Progress Note    Anticoagulation Summary  As of 2/9/2024      INR goal:  2.5-3.5   TTR:  78.1% (5.2 y)   INR used for dosing:  3.20 (2/9/2024)   Warfarin maintenance plan:  8 mg every day   Weekly warfarin total:  56 mg   No change documented:  Sun Curtis   Plan last modified:  Heather Kline [facility] (12/27/2023)   Next INR check:  2/23/2024   Priority:  High   Target end date:  Indefinite    Indications    History of mechanical aortic valve replacement [Z95.2]  Long term (current) use of anticoagulants [Z79.01]                 Anticoagulation Episode Summary       INR check location:  Home Draw    Preferred lab:      Send INR reminders to:   [facility]  [facility]    Comments:  **[facility]**          Anticoagulation Care Providers       Provider Role Specialty Phone number    Lorne Kam MD Referring Cardiology [phone]            Clinic Interview:  No pertinent clinical findings have been reported.    INR History:      1/5/2024    10:17 AM 1/10/2024    12:00 AM 1/10/2024     3:44 PM 1/26/2024    12:00 AM 1/26/2024     2:07 PM 2/9/2024    12:00 AM 2/9/2024     2:17 PM   Anticoagulation Monitoring   INR 2.70  3.30  2.80  3.20   INR Date 1/5/2024  1/10/2024  1/26/2024  2/9/2024   INR Goal 2.5-3.5  2.5-3.5  2.5-3.5  2.5-3.5   Trend Same  Same  Same  Same   Last Week Total 56 mg  56 mg  56 mg  56 mg   Next Week Total 56 mg  56 mg  56 mg  56 mg   Sun 8 mg  8 mg  8 mg  8 mg   Mon 8 mg  8 mg  8 mg  8 mg   Tue 8 mg  8 mg  8 mg  8 mg   Wed 8 mg  8 mg  8 mg  8 mg   Thu 8 mg  8 mg  8 mg  8 mg   Fri 8 mg  8 mg  8 mg  8 mg   Sat 8 mg  8 mg  8 mg  8 mg   Historical INR  3.30      2.80      3.20            This result is from an external source.       Plan:  1. INR is therapeutic today- see above in Anticoagulation Summary.    Man Ramsey to continue their warfarin regimen- see above in Anticoagulation Summary.  2. Follow up in 2 weeks  3. Pt has agreed to only be  called if INR out of range. They have been instructed to call if any changes in medications, doses, concerns, etc. Patient expresses understanding and has no further questions at this time.    Sun Curtis

## 2024-02-23 ENCOUNTER — ANTICOAGULATION VISIT (OUTPATIENT)
Dept: PHARMACY | Facility: HOSPITAL | Age: 44
End: 2024-02-23
Payer: COMMERCIAL

## 2024-02-23 DIAGNOSIS — Z95.2 HISTORY OF MECHANICAL AORTIC VALVE REPLACEMENT: Primary | ICD-10-CM

## 2024-02-23 DIAGNOSIS — Z79.01 LONG TERM (CURRENT) USE OF ANTICOAGULANTS: ICD-10-CM

## 2024-02-23 LAB — INR PPP: 3.6

## 2024-02-23 NOTE — PROGRESS NOTES
Anticoagulation Clinic Progress Note    Anticoagulation Summary  As of 2/23/2024      INR goal:  2.5-3.5   TTR:  78.0% (5.2 y)   INR used for dosing:  3.60 (2/23/2024)   Warfarin maintenance plan:  8 mg every day   Weekly warfarin total:  56 mg   Plan last modified:  Heather Kline RPH (12/27/2023)   Next INR check:  3/8/2024   Priority:  High   Target end date:  Indefinite    Indications    History of mechanical aortic valve replacement [Z95.2]  Long term (current) use of anticoagulants [Z79.01]                 Anticoagulation Episode Summary       INR check location:  Home Draw    Preferred lab:      Send INR reminders to:  [facility]  POOL    Comments:  **[facility]**          Anticoagulation Care Providers       Provider Role Specialty Phone number    Lorne Kam MD Referring Cardiology [phone]            Drug interactions: has remained unchanged.  Diet: has remained unchanged.    Clinic Interview:  No pertinent clinical findings have been reported.    INR History:      1/10/2024     3:44 PM 1/26/2024    12:00 AM 1/26/2024     2:07 PM 2/9/2024    12:00 AM 2/9/2024     2:17 PM 2/23/2024    12:00 AM 2/23/2024     1:14 PM   Anticoagulation Monitoring   INR 3.30  2.80  3.20  3.60   INR Date 1/10/2024  1/26/2024  2/9/2024  2/23/2024   INR Goal 2.5-3.5  2.5-3.5  2.5-3.5  2.5-3.5   Trend Same  Same  Same  Same   Last Week Total 56 mg  56 mg  56 mg  56 mg   Next Week Total 56 mg  56 mg  56 mg  54 mg   Sun 8 mg  8 mg  8 mg  8 mg   Mon 8 mg  8 mg  8 mg  8 mg   Tue 8 mg  8 mg  8 mg  8 mg   Wed 8 mg  8 mg  8 mg  8 mg   Thu 8 mg  8 mg  8 mg  8 mg   Fri 8 mg  8 mg  8 mg  6 mg (2/23); Otherwise 8 mg   Sat 8 mg  8 mg  8 mg  8 mg   Historical INR  2.80      3.20      3.60            This result is from an external source.       Plan:  1. INR is Supratherapeutic today- see above in Anticoagulation Summary.   Pt reports he did have a ranjan yesterday, instructed pt to take partial of 6mg  today, then cont same. Remedios in 2 wks- see above in Anticoagulation Summary.  2. Follow up in 2 weeks  3. Pt has agreed to only be called if INR out of range. They have been instructed to call if any changes in medications, doses, concerns, etc. Patient expresses understanding and has no further questions at this time.    Mckenzie Grayson, [facility]

## 2024-03-08 ENCOUNTER — ANTICOAGULATION VISIT (OUTPATIENT)
Dept: PHARMACY | Facility: HOSPITAL | Age: 44
End: 2024-03-08
Payer: COMMERCIAL

## 2024-03-08 DIAGNOSIS — Z79.01 LONG TERM (CURRENT) USE OF ANTICOAGULANTS: ICD-10-CM

## 2024-03-08 DIAGNOSIS — Z95.2 HISTORY OF MECHANICAL AORTIC VALVE REPLACEMENT: Primary | ICD-10-CM

## 2024-03-08 LAB — INR PPP: 3.2

## 2024-03-08 NOTE — PROGRESS NOTES
Anticoagulation Clinic Progress Note    Anticoagulation Summary  As of 3/8/2024      INR goal:  2.5-3.5   TTR:  78.0% (5.2 y)   INR used for dosing:  3.20 (3/8/2024)   Warfarin maintenance plan:  8 mg every day   Weekly warfarin total:  56 mg   No change documented:  Heather Kline RPH   Plan last modified:  Heather Kline RPH (12/27/2023)   Next INR check:  3/22/2024   Priority:  High   Target end date:  Indefinite    Indications    History of mechanical aortic valve replacement [Z95.2]  Long term (current) use of anticoagulants [Z79.01]                 Anticoagulation Episode Summary       INR check location:  Home Draw    Preferred lab:      Send INR reminders to:   [facility]  PO[facility]    Comments:  **[facility]**          Anticoagulation Care Providers       Provider Role Specialty Phone number    Lorne Kam MD Referring Cardiology [phone]            Clinic Interview:  No pertinent clinical findings have been reported.    INR History:      1/26/2024     2:07 PM 2/9/2024    12:00 AM 2/9/2024     2:17 PM 2/23/2024    12:00 AM 2/23/2024     1:14 PM 3/8/2024    12:00 AM 3/8/2024     1:49 PM   Anticoagulation Monitoring   INR 2.80  3.20  3.60  3.20   INR Date 1/26/2024  2/9/2024  2/23/2024  3/8/2024   INR Goal 2.5-3.5  2.5-3.5  2.5-3.5  2.5-3.5   Trend Same  Same  Same  Same   Last Week Total 56 mg  56 mg  56 mg  56 mg   Next Week Total 56 mg  56 mg  54 mg  56 mg   Sun 8 mg  8 mg  8 mg  8 mg   Mon 8 mg  8 mg  8 mg  8 mg   Tue 8 mg  8 mg  8 mg  8 mg   Wed 8 mg  8 mg  8 mg  8 mg   Thu 8 mg  8 mg  8 mg  8 mg   Fri 8 mg  8 mg  6 mg (2/23); Otherwise 8 mg  8 mg   Sat 8 mg  8 mg  8 mg  8 mg   Historical INR  3.20      3.60      3.20            This result is from an external source.       Plan:  1. INR is therapeutic today- see above in Anticoagulation Summary.    Man Ramsey to continue their warfarin regimen- see above in Anticoagulation Summary.  2. Follow up in 2 weeks  3. They  have been instructed to call if any changes in medications, doses, concerns, etc. Patient expresses understanding and has no further questions at this time.    Heather Kline RP

## 2024-03-22 ENCOUNTER — ANTICOAGULATION VISIT (OUTPATIENT)
Dept: PHARMACY | Facility: HOSPITAL | Age: 44
End: 2024-03-22
Payer: COMMERCIAL

## 2024-03-22 DIAGNOSIS — Z95.2 HISTORY OF MECHANICAL AORTIC VALVE REPLACEMENT: Primary | ICD-10-CM

## 2024-03-22 DIAGNOSIS — Z79.01 LONG TERM (CURRENT) USE OF ANTICOAGULANTS: ICD-10-CM

## 2024-03-22 LAB — INR PPP: 3.5

## 2024-03-22 NOTE — PROGRESS NOTES
Anticoagulation Clinic Progress Note    Anticoagulation Summary  As of 3/22/2024      INR goal:  2.5-3.5   TTR:  78.2% (5.3 y)   INR used for dosing:  3.50 (3/22/2024)   Warfarin maintenance plan:  8 mg every day   Weekly warfarin total:  56 mg   No change documented:  Sun Curtis   Plan last modified:  Heather Kline [facility] (12/27/2023)   Next INR check:  4/5/2024   Priority:  High   Target end date:  Indefinite    Indications    History of mechanical aortic valve replacement [Z95.2]  Long term (current) use of anticoagulants [Z79.01]                 Anticoagulation Episode Summary       INR check location:  Home Draw    Preferred lab:      Send INR reminders to:   [facility]  POOL    Comments:  **[facility]**          Anticoagulation Care Providers       Provider Role Specialty Phone number    Lorne Kam MD Referring Cardiology [phone]            Clinic Interview:  No pertinent clinical findings have been reported.    INR History:      2/9/2024     2:17 PM 2/23/2024    12:00 AM 2/23/2024     1:14 PM 3/8/2024    12:00 AM 3/8/2024     1:49 PM 3/22/2024    12:00 AM 3/22/2024     3:26 PM   Anticoagulation Monitoring   INR 3.20  3.60  3.20  3.50   INR Date 2/9/2024  2/23/2024  3/8/2024  3/22/2024   INR Goal 2.5-3.5  2.5-3.5  2.5-3.5  2.5-3.5   Trend Same  Same  Same  Same   Last Week Total 56 mg  56 mg  56 mg  56 mg   Next Week Total 56 mg  54 mg  56 mg  56 mg   Sun 8 mg  8 mg  8 mg  8 mg   Mon 8 mg  8 mg  8 mg  8 mg   Tue 8 mg  8 mg  8 mg  8 mg   Wed 8 mg  8 mg  8 mg  8 mg   Thu 8 mg  8 mg  8 mg  8 mg   Fri 8 mg  6 mg (2/23); Otherwise 8 mg  8 mg  8 mg   Sat 8 mg  8 mg  8 mg  8 mg   Historical INR  3.60      3.20      3.50            This result is from an external source.       Plan:  1. INR is therapeutic today- see above in Anticoagulation Summary.    Man Ramsey to continue their warfarin regimen- see above in Anticoagulation Summary.  2. Follow up in 2 weeks  3.  Pt has agreed to only be called if INR out of range. They have been instructed to call if any changes in medications, doses, concerns, etc. Patient expresses understanding and has no further questions at this time.    Sun Curtis

## 2024-04-05 ENCOUNTER — ANTICOAGULATION VISIT (OUTPATIENT)
Dept: PHARMACY | Facility: HOSPITAL | Age: 44
End: 2024-04-05
Payer: COMMERCIAL

## 2024-04-05 DIAGNOSIS — Z79.01 LONG TERM (CURRENT) USE OF ANTICOAGULANTS: ICD-10-CM

## 2024-04-05 DIAGNOSIS — Z95.2 HISTORY OF MECHANICAL AORTIC VALVE REPLACEMENT: Primary | ICD-10-CM

## 2024-04-05 LAB — INR PPP: 3.4

## 2024-04-05 NOTE — PROGRESS NOTES
Anticoagulation Clinic Progress Note    Anticoagulation Summary  As of 4/5/2024      INR goal:  2.5-3.5   TTR:  78.3% (5.3 y)   INR used for dosing:  3.40 (4/5/2024)   Warfarin maintenance plan:  8 mg every day   Weekly warfarin total:  56 mg   No change documented:  Sun Curtis   Plan last modified:  Heather Kline [facility] (12/27/2023)   Next INR check:  4/19/2024   Priority:  High   Target end date:  Indefinite    Indications    History of mechanical aortic valve replacement [Z95.2]  Long term (current) use of anticoagulants [Z79.01]                 Anticoagulation Episode Summary       INR check location:  Home Draw    Preferred lab:      Send INR reminders to:   [facility]  [facility]    Comments:  **[facility]**          Anticoagulation Care Providers       Provider Role Specialty Phone number    Lorne Kam MD Referring Cardiology [phone]            Clinic Interview:  No pertinent clinical findings have been reported.    INR History:      2/23/2024     1:14 PM 3/8/2024    12:00 AM 3/8/2024     1:49 PM 3/22/2024    12:00 AM 3/22/2024     3:26 PM 4/5/2024    12:00 AM 4/5/2024     2:22 PM   Anticoagulation Monitoring   INR 3.60  3.20  3.50  3.40   INR Date 2/23/2024  3/8/2024  3/22/2024  4/5/2024   INR Goal 2.5-3.5  2.5-3.5  2.5-3.5  2.5-3.5   Trend Same  Same  Same  Same   Last Week Total 56 mg  56 mg  56 mg  56 mg   Next Week Total 54 mg  56 mg  56 mg  56 mg   Sun 8 mg  8 mg  8 mg  8 mg   Mon 8 mg  8 mg  8 mg  8 mg   Tue 8 mg  8 mg  8 mg  8 mg   Wed 8 mg  8 mg  8 mg  8 mg   Thu 8 mg  8 mg  8 mg  8 mg   Fri 6 mg (2/23); Otherwise 8 mg  8 mg  8 mg  8 mg   Sat 8 mg  8 mg  8 mg  8 mg   Historical INR  3.20      3.50      3.40            This result is from an external source.       Plan:  1. INR is therapeutic today- see above in Anticoagulation Summary.    Man Ramsey to continue their warfarin regimen- see above in Anticoagulation Summary.  2. Follow up in 2 weeks  3. Pt  has agreed to only be called if INR out of range. They have been instructed to call if any changes in medications, doses, concerns, etc. Patient expresses understanding and has no further questions at this time.    Sun Curtis

## 2024-04-19 ENCOUNTER — ANTICOAGULATION VISIT (OUTPATIENT)
Dept: PHARMACY | Facility: HOSPITAL | Age: 44
End: 2024-04-19
Payer: COMMERCIAL

## 2024-04-19 DIAGNOSIS — Z95.2 HISTORY OF MECHANICAL AORTIC VALVE REPLACEMENT: Primary | ICD-10-CM

## 2024-04-19 DIAGNOSIS — Z79.01 LONG TERM (CURRENT) USE OF ANTICOAGULANTS: ICD-10-CM

## 2024-04-19 LAB — INR PPP: 3

## 2024-04-19 NOTE — PROGRESS NOTES
Anticoagulation Clinic Progress Note    Anticoagulation Summary  As of 4/19/2024      INR goal:  2.5-3.5   TTR:  78.5% (5.3 y)   INR used for dosing:  3.00 (4/19/2024)   Warfarin maintenance plan:  8 mg every day   Weekly warfarin total:  56 mg   No change documented:  Augusta Lopez, Pharmacy Technician   Plan last modified:  Heather Kline [facility] (12/27/2023)   Next INR check:  5/3/2024   Priority:  High   Target end date:  Indefinite    Indications    History of mechanical aortic valve replacement [Z95.2]  Long term (current) use of anticoagulants [Z79.01]                 Anticoagulation Episode Summary       INR check location:  Home Draw    Preferred lab:      Send INR reminders to:   [facility]  [facility]    Comments:  **[facility]**          Anticoagulation Care Providers       Provider Role Specialty Phone number    Lorne Kam MD Referring Cardiology [phone]            Clinic Interview:  No pertinent clinical findings have been reported.    INR History:      3/8/2024     1:49 PM 3/22/2024    12:00 AM 3/22/2024     3:26 PM 4/5/2024    12:00 AM 4/5/2024     2:22 PM 4/19/2024    12:00 AM 4/19/2024     2:53 PM   Anticoagulation Monitoring   INR 3.20  3.50  3.40  3.00   INR Date 3/8/2024  3/22/2024  4/5/2024  4/19/2024   INR Goal 2.5-3.5  2.5-3.5  2.5-3.5  2.5-3.5   Trend Same  Same  Same  Same   Last Week Total 56 mg  56 mg  56 mg  56 mg   Next Week Total 56 mg  56 mg  56 mg  56 mg   Sun 8 mg  8 mg  8 mg  8 mg   Mon 8 mg  8 mg  8 mg  8 mg   Tue 8 mg  8 mg  8 mg  8 mg   Wed 8 mg  8 mg  8 mg  8 mg   Thu 8 mg  8 mg  8 mg  8 mg   Fri 8 mg  8 mg  8 mg  8 mg   Sat 8 mg  8 mg  8 mg  8 mg   Historical INR  3.50      3.40      3.00            This result is from an external source.       Plan:  1. INR is therapeutic today- see above in Anticoagulation Summary.    Man Ramsey to continue their warfarin regimen- see above in Anticoagulation Summary.  2. Follow up in 2 weeks  3. Pt has agreed  to only be called if INR out of range. They have been instructed to call if any changes in medications, doses, concerns, etc. Patient expresses understanding and has no further questions at this time.    Augusta Lopez, Pharmacy Technician

## 2024-05-03 LAB — INR PPP: 3.7

## 2024-05-06 ENCOUNTER — ANTICOAGULATION VISIT (OUTPATIENT)
Dept: PHARMACY | Facility: HOSPITAL | Age: 44
End: 2024-05-06
Payer: COMMERCIAL

## 2024-05-06 DIAGNOSIS — Z79.01 LONG TERM (CURRENT) USE OF ANTICOAGULANTS: ICD-10-CM

## 2024-05-06 DIAGNOSIS — Z95.2 HISTORY OF MECHANICAL AORTIC VALVE REPLACEMENT: Primary | ICD-10-CM

## 2024-05-06 NOTE — PROGRESS NOTES
Anticoagulation Clinic Progress Note    Anticoagulation Summary  As of 10/31/2019    INR goal:   2.5-3.5   TTR:   76.2 % (10.6 mo)   INR used for dosin.50 (10/30/2019)   Warfarin maintenance plan:   6 mg every Thu; 8 mg all other days   Weekly warfarin total:   54 mg   No change documented:   Sun Curtis   Plan last modified:   Shani Vincent RP (2019)   Next INR check:   2019   Priority:   Maintenance   Target end date:   Indefinite    Indications    History of mechanical aortic valve replacement [Z95.2]  Long term (current) use of anticoagulants [Z79.01]             Anticoagulation Episode Summary     INR check location:   Home Draw    Preferred lab:       Send INR reminders to:    [facility]  [facility]    Comments:   **[facility]**      Anticoagulation Care Providers     Provider Role Specialty Phone number    Lorne Kam MD Referring Cardiology [phone]          Clinic Interview:      INR History:  Anticoagulation Monitoring 10/4/2019 10/17/2019 10/31/2019   INR 2.40 3.20 2.50   INR Date 10/3/2019 10/16/2019 10/30/2019   INR Goal 2.5-3.5 2.5-3.5 2.5-3.5   Trend Same Same Same   Last Week Total 54 mg 54 mg 54 mg   Next Week Total 54 mg 54 mg 54 mg   Sun 8 mg 8 mg 8 mg   Mon 8 mg 8 mg 8 mg   Tue 8 mg 8 mg 8 mg   Wed 8 mg 8 mg 8 mg   Thu 6 mg 6 mg 6 mg   Fri 8 mg 8 mg 8 mg   Sat 8 mg 8 mg 8 mg   Visit Report - - -   Some recent data might be hidden       Plan:  1. INR is out of range- see above in Anticoagulation Summary.   Will instruct Man Ramsey to {CONTINUE/INCREASE:82718}  their warfarin regimen- see above in Anticoagulation Summary.  2. Follow up in *** weeks.   3. Patient {DECLINES/DESIRES:89544} warfarin refills.  4. Verbal and written information provided. Patient expresses understanding and has no further questions at this time.    Sun Curtis     ambulatory

## 2024-05-17 ENCOUNTER — ANTICOAGULATION VISIT (OUTPATIENT)
Dept: PHARMACY | Facility: HOSPITAL | Age: 44
End: 2024-05-17
Payer: COMMERCIAL

## 2024-05-17 DIAGNOSIS — Z95.2 HISTORY OF MECHANICAL AORTIC VALVE REPLACEMENT: Primary | ICD-10-CM

## 2024-05-17 DIAGNOSIS — Z79.01 LONG TERM (CURRENT) USE OF ANTICOAGULANTS: ICD-10-CM

## 2024-05-17 LAB — INR PPP: 3.2

## 2024-05-17 NOTE — PROGRESS NOTES
Anticoagulation Clinic Progress Note    Anticoagulation Summary  As of 5/17/2024      INR goal:  2.5-3.5   TTR:  78.3% (5.4 y)   INR used for dosing:  3.20 (5/17/2024)   Warfarin maintenance plan:  8 mg every day   Weekly warfarin total:  56 mg   No change documented:  Heather Kline RPH   Plan last modified:  Heather Kline RPH (12/27/2023)   Next INR check:  5/31/2024   Priority:  High   Target end date:  Indefinite    Indications    History of mechanical aortic valve replacement [Z95.2]  Long term (current) use of anticoagulants [Z79.01]                 Anticoagulation Episode Summary       INR check location:  Home Draw    Preferred lab:      Send INR reminders to:   [facility]  [facility]    Comments:  **[facility]**          Anticoagulation Care Providers       Provider Role Specialty Phone number    Lorne Kam MD Referring Cardiology [phone]            Clinic Interview:  No pertinent clinical findings have been reported.    INR History:      4/5/2024     2:22 PM 4/19/2024    12:00 AM 4/19/2024     2:53 PM 5/3/2024    12:00 AM 5/6/2024     8:51 AM 5/17/2024    12:00 AM 5/17/2024     1:55 PM   Anticoagulation Monitoring   INR 3.40  3.00  3.70  3.20   INR Date 4/5/2024  4/19/2024  5/3/2024  5/17/2024   INR Goal 2.5-3.5  2.5-3.5  2.5-3.5  2.5-3.5   Trend Same  Same  Same  Same   Last Week Total 56 mg  56 mg  56 mg  56 mg   Next Week Total 56 mg  56 mg  54 mg  56 mg   Sun 8 mg  8 mg  8 mg  8 mg   Mon 8 mg  8 mg  6 mg (5/6); Otherwise 8 mg  8 mg   Tue 8 mg  8 mg  8 mg  8 mg   Wed 8 mg  8 mg  8 mg  8 mg   Thu 8 mg  8 mg  8 mg  8 mg   Fri 8 mg  8 mg  8 mg  8 mg   Sat 8 mg  8 mg  8 mg  8 mg   Historical INR  3.00      3.70      3.20            This result is from an external source.       Plan:  1. INR is therapeutic today- see above in Anticoagulation Summary.    Man Ramsey to continue their warfarin regimen- see above in Anticoagulation Summary.  2. Follow up in 2 weeks  3. Pt  has agreed to only be called if INR out of range. They have been instructed to call if any changes in medications, doses, concerns, etc. Patient expresses understanding and has no further questions at this time.    Heather Kline [facility]

## 2024-05-30 LAB — INR PPP: 2.9

## 2024-05-31 ENCOUNTER — ANTICOAGULATION VISIT (OUTPATIENT)
Dept: PHARMACY | Facility: HOSPITAL | Age: 44
End: 2024-05-31
Payer: COMMERCIAL

## 2024-05-31 DIAGNOSIS — Z79.01 LONG TERM (CURRENT) USE OF ANTICOAGULANTS: ICD-10-CM

## 2024-05-31 DIAGNOSIS — Z95.2 HISTORY OF MECHANICAL AORTIC VALVE REPLACEMENT: Primary | ICD-10-CM

## 2024-05-31 NOTE — PROGRESS NOTES
Anticoagulation Clinic Progress Note    Anticoagulation Summary  As of 2024      INR goal:  2.5-3.5   TTR:  78.5% (5.5 y)   INR used for dosin.90 (2024)   Warfarin maintenance plan:  8 mg every day   Weekly warfarin total:  56 mg   No change documented:  Sun Curtis   Plan last modified:  Heather Kline [facility] (2023)   Next INR check:  2024   Priority:  High   Target end date:  Indefinite    Indications    History of mechanical aortic valve replacement [Z95.2]  Long term (current) use of anticoagulants [Z79.01]                 Anticoagulation Episode Summary       INR check location:  Home Draw    Preferred lab:      Send INR reminders to:   [facility]  [facility]    Comments:  **[facility]**          Anticoagulation Care Providers       Provider Role Specialty Phone number    Lorne Kam MD Referring Cardiology [phone]            Clinic Interview:  No pertinent clinical findings have been reported.    INR History:      2024     2:53 PM 5/3/2024    12:00 AM 2024     8:51 AM 2024    12:00 AM 2024     1:55 PM 2024    12:00 AM 2024    10:49 AM   Anticoagulation Monitoring   INR 3.00  3.70  3.20  2.90   INR Date 2024  5/3/2024  2024  2024   INR Goal 2.5-3.5  2.5-3.5  2.5-3.5  2.5-3.5   Trend Same  Same  Same  Same   Last Week Total 56 mg  56 mg  56 mg  56 mg   Next Week Total 56 mg  54 mg  56 mg  56 mg   Sun 8 mg  8 mg  8 mg  8 mg   Mon 8 mg  6 mg (); Otherwise 8 mg  8 mg  8 mg   Tue 8 mg  8 mg  8 mg  8 mg   Wed 8 mg  8 mg  8 mg  8 mg   Thu 8 mg  8 mg  8 mg  8 mg   Fri 8 mg  8 mg  8 mg  8 mg   Sat 8 mg  8 mg  8 mg  8 mg   Historical INR  3.70      3.20      2.90            This result is from an external source.       Plan:  1. INR is therapeutic today- see above in Anticoagulation Summary.    Man Ramsey to continue their warfarin regimen- see above in Anticoagulation Summary.  2. Follow up in 2 weeks  3.  Pt has agreed to only be called if INR out of range. They have been instructed to call if any changes in medications, doses, concerns, etc. Patient expresses understanding and has no further questions at this time.    Sun Curtis

## 2024-06-14 LAB — INR PPP: 2.9

## 2024-06-17 ENCOUNTER — ANTICOAGULATION VISIT (OUTPATIENT)
Dept: PHARMACY | Facility: HOSPITAL | Age: 44
End: 2024-06-17
Payer: COMMERCIAL

## 2024-06-17 DIAGNOSIS — Z95.2 HISTORY OF MECHANICAL AORTIC VALVE REPLACEMENT: Primary | ICD-10-CM

## 2024-06-17 DIAGNOSIS — Z79.01 LONG TERM (CURRENT) USE OF ANTICOAGULANTS: ICD-10-CM

## 2024-06-17 NOTE — PROGRESS NOTES
Anticoagulation Clinic Progress Note    Anticoagulation Summary  As of 2024      INR goal:  2.5-3.5   TTR:  78.6% (5.5 y)   INR used for dosin.90 (2024)   Warfarin maintenance plan:  8 mg every day   Weekly warfarin total:  56 mg   No change documented:  Sun Curtis   Plan last modified:  Heather Kline [facility] (2023)   Next INR check:  2024   Priority:  High   Target end date:  Indefinite    Indications    History of mechanical aortic valve replacement [Z95.2]  Long term (current) use of anticoagulants [Z79.01]                 Anticoagulation Episode Summary       INR check location:  Home Draw    Preferred lab:      Send INR reminders to:   [facility]  PO[facility]    Comments:  **[facility]**          Anticoagulation Care Providers       Provider Role Specialty Phone number    Lorne Kam MD Referring Cardiology [phone]            Clinic Interview:  No pertinent clinical findings have been reported.    INR History:      2024     8:51 AM 2024    12:00 AM 2024     1:55 PM 2024    12:00 AM 2024    10:49 AM 2024    12:00 AM 2024     9:40 AM   Anticoagulation Monitoring   INR 3.70  3.20  2.90  2.90   INR Date 5/3/2024  2024  2024  2024   INR Goal 2.5-3.5  2.5-3.5  2.5-3.5  2.5-3.5   Trend Same  Same  Same  Same   Last Week Total 56 mg  56 mg  56 mg  56 mg   Next Week Total 54 mg  56 mg  56 mg  56 mg   Sun 8 mg  8 mg  8 mg  8 mg   Mon 6 mg (); Otherwise 8 mg  8 mg  8 mg  8 mg   Tue 8 mg  8 mg  8 mg  8 mg   Wed 8 mg  8 mg  8 mg  8 mg   Thu 8 mg  8 mg  8 mg  8 mg   Fri 8 mg  8 mg  8 mg  8 mg   Sat 8 mg  8 mg  8 mg  8 mg   Historical INR  3.20      2.90      2.90            This result is from an external source.       Plan:  1. INR is therapeutic today- see above in Anticoagulation Summary.    Man Ramsey to continue their warfarin regimen- see above in Anticoagulation Summary.  2. Follow up in 2  weeks  3. Pt has agreed to only be called if INR out of range. They have been instructed to call if any changes in medications, doses, concerns, etc. Patient expresses understanding and has no further questions at this time.    Sun Curtis

## 2024-06-19 RX ORDER — WARFARIN SODIUM 2 MG/1
TABLET ORAL
Qty: 360 TABLET | Refills: 0 | Status: SHIPPED | OUTPATIENT
Start: 2024-06-19

## 2024-06-28 ENCOUNTER — ANTICOAGULATION VISIT (OUTPATIENT)
Dept: PHARMACY | Facility: HOSPITAL | Age: 44
End: 2024-06-28
Payer: COMMERCIAL

## 2024-06-28 DIAGNOSIS — Z95.2 HISTORY OF MECHANICAL AORTIC VALVE REPLACEMENT: Primary | ICD-10-CM

## 2024-06-28 DIAGNOSIS — Z79.01 LONG TERM (CURRENT) USE OF ANTICOAGULANTS: ICD-10-CM

## 2024-06-28 LAB — INR PPP: 2.5

## 2024-06-28 NOTE — PROGRESS NOTES
Anticoagulation Clinic Progress Note    Anticoagulation Summary  As of 2024      INR goal:  2.5-3.5   TTR:  78.8% (5.5 y)   INR used for dosin.50 (2024)   Warfarin maintenance plan:  8 mg every day   Weekly warfarin total:  56 mg   No change documented:  Sun Curtis   Plan last modified:  Heather Kline [facility] (2023)   Next INR check:  2024   Priority:  High   Target end date:  Indefinite    Indications    History of mechanical aortic valve replacement [Z95.2]  Long term (current) use of anticoagulants [Z79.01]                 Anticoagulation Episode Summary       INR check location:  Home Draw    Preferred lab:      Send INR reminders to:   [facility]  POOL    Comments:  **[facility]**          Anticoagulation Care Providers       Provider Role Specialty Phone number    Lorne Kam MD Referring Cardiology [phone]            Clinic Interview:  No pertinent clinical findings have been reported.    INR History:      2024     1:55 PM 2024    12:00 AM 2024    10:49 AM 2024    12:00 AM 2024     9:40 AM 2024    12:00 AM 2024    11:24 AM   Anticoagulation Monitoring   INR 3.20  2.90  2.90  2.50   INR Date 2024   INR Goal 2.5-3.5  2.5-3.5  2.5-3.5  2.5-3.5   Trend Same  Same  Same  Same   Last Week Total 56 mg  56 mg  56 mg  56 mg   Next Week Total 56 mg  56 mg  56 mg  56 mg   Sun 8 mg  8 mg  8 mg  8 mg   Mon 8 mg  8 mg  8 mg  8 mg   Tue 8 mg  8 mg  8 mg  8 mg   Wed 8 mg  8 mg  8 mg  8 mg   Thu 8 mg  8 mg  8 mg  8 mg   Fri 8 mg  8 mg  8 mg  8 mg   Sat 8 mg  8 mg  8 mg  8 mg   Historical INR  2.90      2.90      2.50            This result is from an external source.       Plan:  1. INR is therapeutic today- see above in Anticoagulation Summary.    Man Ramsey to continue their warfarin regimen- see above in Anticoagulation Summary.  2. Follow up in 2 weeks  3. Pt has agreed to  only be called if INR out of range. They have been instructed to call if any changes in medications, doses, concerns, etc. Patient expresses understanding and has no further questions at this time.    Sun Curtis

## 2024-07-12 ENCOUNTER — ANTICOAGULATION VISIT (OUTPATIENT)
Dept: PHARMACY | Facility: HOSPITAL | Age: 44
End: 2024-07-12
Payer: COMMERCIAL

## 2024-07-12 DIAGNOSIS — Z95.2 HISTORY OF MECHANICAL AORTIC VALVE REPLACEMENT: Primary | ICD-10-CM

## 2024-07-12 DIAGNOSIS — Z79.01 LONG TERM (CURRENT) USE OF ANTICOAGULANTS: ICD-10-CM

## 2024-07-12 LAB — INR PPP: 3.1

## 2024-07-12 NOTE — PROGRESS NOTES
Anticoagulation Clinic Progress Note    Anticoagulation Summary  As of 7/12/2024      INR goal:  2.5-3.5   TTR:  78.9% (5.6 y)   INR used for dosing:  3.10 (7/12/2024)   Warfarin maintenance plan:  8 mg every day   Weekly warfarin total:  56 mg   No change documented:  Augusta Lopez, Pharmacy Technician   Plan last modified:  Heather Kline RP (12/27/2023)   Next INR check:  7/26/2024   Priority:  High   Target end date:  Indefinite    Indications    History of mechanical aortic valve replacement [Z95.2]  Long term (current) use of anticoagulants [Z79.01]                 Anticoagulation Episode Summary       INR check location:  Home Draw    Preferred lab:      Send INR reminders to:   [facility]  POOL    Comments:  **[facility]**          Anticoagulation Care Providers       Provider Role Specialty Phone number    Lorne Kam MD Referring Cardiology [phone]            Clinic Interview:  No pertinent clinical findings have been reported.    INR History:      5/31/2024    10:49 AM 6/14/2024    12:00 AM 6/17/2024     9:40 AM 6/28/2024    12:00 AM 6/28/2024    11:24 AM 7/12/2024    12:00 AM 7/12/2024     1:34 PM   Anticoagulation Monitoring   INR 2.90  2.90  2.50  3.10   INR Date 5/30/2024 6/14/2024 6/28/2024 7/12/2024   INR Goal 2.5-3.5  2.5-3.5  2.5-3.5  2.5-3.5   Trend Same  Same  Same  Same   Last Week Total 56 mg  56 mg  56 mg  56 mg   Next Week Total 56 mg  56 mg  56 mg  56 mg   Sun 8 mg  8 mg  8 mg  8 mg   Mon 8 mg  8 mg  8 mg  8 mg   Tue 8 mg  8 mg  8 mg  8 mg   Wed 8 mg  8 mg  8 mg  8 mg   Thu 8 mg  8 mg  8 mg  8 mg   Fri 8 mg  8 mg  8 mg  8 mg   Sat 8 mg  8 mg  8 mg  8 mg   Historical INR  2.90      2.50      3.10            This result is from an external source.       Plan:  1. INR is therapeutic today- see above in Anticoagulation Summary.    Man Ramsey to continue their warfarin regimen- see above in Anticoagulation Summary.  2. Follow up in 2 weeks  3. Pt has  agreed to only be called if INR out of range. They have been instructed to call if any changes in medications, doses, concerns, etc. Patient expresses understanding and has no further questions at this time.    Augusta Lopez, Pharmacy Technician

## 2024-07-26 ENCOUNTER — ANTICOAGULATION VISIT (OUTPATIENT)
Dept: PHARMACY | Facility: HOSPITAL | Age: 44
End: 2024-07-26
Payer: COMMERCIAL

## 2024-07-26 DIAGNOSIS — Z95.2 HISTORY OF MECHANICAL AORTIC VALVE REPLACEMENT: Primary | ICD-10-CM

## 2024-07-26 DIAGNOSIS — Z79.01 LONG TERM (CURRENT) USE OF ANTICOAGULANTS: ICD-10-CM

## 2024-07-26 LAB — INR PPP: 2.8

## 2024-07-26 NOTE — PROGRESS NOTES
Anticoagulation Clinic Progress Note    Anticoagulation Summary  As of 2024      INR goal:  2.5-3.5   TTR:  79.1% (5.6 y)   INR used for dosin.80 (2024)   Warfarin maintenance plan:  8 mg every day   Weekly warfarin total:  56 mg   No change documented:  Augusta Lopez, Pharmacy Technician   Plan last modified:  Heather Kline RP (2023)   Next INR check:  2024   Priority:  High   Target end date:  Indefinite    Indications    History of mechanical aortic valve replacement [Z95.2]  Long term (current) use of anticoagulants [Z79.01]                 Anticoagulation Episode Summary       INR check location:  Home Draw    Preferred lab:      Send INR reminders to:   [facility]  POOL    Comments:  **[facility]**          Anticoagulation Care Providers       Provider Role Specialty Phone number    Lorne Kam MD Referring Cardiology [phone]            Clinic Interview:  No pertinent clinical findings have been reported.    INR History:      2024     9:40 AM 2024    12:00 AM 2024    11:24 AM 2024    12:00 AM 2024     1:34 PM 2024    12:00 AM 2024     1:18 PM   Anticoagulation Monitoring   INR 2.90  2.50  3.10  2.80   INR Date 2024   INR Goal 2.5-3.5  2.5-3.5  2.5-3.5  2.5-3.5   Trend Same  Same  Same  Same   Last Week Total 56 mg  56 mg  56 mg  56 mg   Next Week Total 56 mg  56 mg  56 mg  56 mg   Sun 8 mg  8 mg  8 mg  8 mg   Mon 8 mg  8 mg  8 mg  8 mg   Tue 8 mg  8 mg  8 mg  8 mg   Wed 8 mg  8 mg  8 mg  8 mg   Thu 8 mg  8 mg  8 mg  8 mg   Fri 8 mg  8 mg  8 mg  8 mg   Sat 8 mg  8 mg  8 mg  8 mg   Historical INR  2.50      3.10      2.80            This result is from an external source.       Plan:  1. INR is therapeutic today- see above in Anticoagulation Summary.    Man Ramsey to continue their warfarin regimen- see above in Anticoagulation Summary.  2. Follow up in 2 weeks  3. Pt has  agreed to only be called if INR out of range. They have been instructed to call if any changes in medications, doses, concerns, etc. Patient expresses understanding and has no further questions at this time.    Augusta Lopez, Pharmacy Technician

## 2024-08-09 ENCOUNTER — ANTICOAGULATION VISIT (OUTPATIENT)
Dept: PHARMACY | Facility: HOSPITAL | Age: 44
End: 2024-08-09
Payer: COMMERCIAL

## 2024-08-09 DIAGNOSIS — Z95.2 HISTORY OF MECHANICAL AORTIC VALVE REPLACEMENT: Primary | ICD-10-CM

## 2024-08-09 DIAGNOSIS — Z79.01 LONG TERM (CURRENT) USE OF ANTICOAGULANTS: ICD-10-CM

## 2024-08-09 LAB — INR PPP: 3.4

## 2024-08-09 NOTE — PROGRESS NOTES
Anticoagulation Clinic Progress Note    Anticoagulation Summary  As of 8/9/2024      INR goal:  2.5-3.5   TTR:  79.2% (5.7 y)   INR used for dosing:  3.40 (8/9/2024)   Warfarin maintenance plan:  8 mg every day   Weekly warfarin total:  56 mg   No change documented:  Augusta Lopez, Pharmacy Technician   Plan last modified:  Heather Kline [facility] (12/27/2023)   Next INR check:  8/23/2024   Priority:  High   Target end date:  Indefinite    Indications    History of mechanical aortic valve replacement [Z95.2]  Long term (current) use of anticoagulants [Z79.01]                 Anticoagulation Episode Summary       INR check location:  Home Draw    Preferred lab:      Send INR reminders to:   [facility]  POOL    Comments:  **[facility]**          Anticoagulation Care Providers       Provider Role Specialty Phone number    Lorne Kam MD Referring Cardiology [phone]            Clinic Interview:  No pertinent clinical findings have been reported.    INR History:      6/28/2024    11:24 AM 7/12/2024    12:00 AM 7/12/2024     1:34 PM 7/26/2024    12:00 AM 7/26/2024     1:18 PM 8/9/2024    12:00 AM 8/9/2024     1:06 PM   Anticoagulation Monitoring   INR 2.50  3.10  2.80  3.40   INR Date 6/28/2024 7/12/2024 7/26/2024 8/9/2024   INR Goal 2.5-3.5  2.5-3.5  2.5-3.5  2.5-3.5   Trend Same  Same  Same  Same   Last Week Total 56 mg  56 mg  56 mg  56 mg   Next Week Total 56 mg  56 mg  56 mg  56 mg   Sun 8 mg  8 mg  8 mg  8 mg   Mon 8 mg  8 mg  8 mg  8 mg   Tue 8 mg  8 mg  8 mg  8 mg   Wed 8 mg  8 mg  8 mg  8 mg   Thu 8 mg  8 mg  8 mg  8 mg   Fri 8 mg  8 mg  8 mg  8 mg   Sat 8 mg  8 mg  8 mg  8 mg   Historical INR  3.10      2.80      3.40            This result is from an external source.       Plan:  1. INR is therapeutic today- see above in Anticoagulation Summary.    Man Ramsey to continue their warfarin regimen- see above in Anticoagulation Summary.  2. Follow up in 2 weeks  3. Pt has  agreed to only be called if INR out of range. They have been instructed to call if any changes in medications, doses, concerns, etc. Patient expresses understanding and has no further questions at this time.    Augusta Lopez, Pharmacy Technician

## 2024-08-21 ENCOUNTER — TELEPHONE (OUTPATIENT)
Dept: CARDIOLOGY | Facility: CLINIC | Age: 44
End: 2024-08-21
Payer: COMMERCIAL

## 2024-08-21 DIAGNOSIS — Z86.79 HISTORY OF CARDIOMYOPATHY: ICD-10-CM

## 2024-08-21 DIAGNOSIS — E78.2 MIXED HYPERLIPIDEMIA: Primary | ICD-10-CM

## 2024-08-21 RX ORDER — ROSUVASTATIN CALCIUM 20 MG/1
TABLET, COATED ORAL
Qty: 90 TABLET | Refills: 0 | Status: SHIPPED | OUTPATIENT
Start: 2024-08-21 | End: 2024-08-23 | Stop reason: SDUPTHER

## 2024-08-21 NOTE — TELEPHONE ENCOUNTER
Last OV  9/11/23 (TK)  Next OV 9/12/24 (JUAN LUISK)  Labs 5/19/23      Failed protocol checklist

## 2024-08-21 NOTE — TELEPHONE ENCOUNTER
Please call patient.  He is overdue for blood work with his PCP.  Does he have an appointment to see him in the near future?  He needs to have some blood work completed to continue with refills with our office.

## 2024-08-21 NOTE — TELEPHONE ENCOUNTER
I tried to call Man Ramsey but there was no answer.  Left a voicemail asking patient to call back.  Will continue to try to reach pt.    HUB- if pt calls back, please transfer through to triage.    Thank you,    Dorothea HANNAH RN  Triage [facility]  08/21/24 13:43 EDT

## 2024-08-21 NOTE — TELEPHONE ENCOUNTER
I spoke with pt who shares that he has no upcoming appts with his PCP.  He said he's happy to come to the lab here sometime next week to have labs drawn if you'd placed the orders for him.    Thank you,    Dorothea HANNAH RN  Triage [facility]  08/21/24 16:21 EDT

## 2024-08-22 NOTE — TELEPHONE ENCOUNTER
I tried to call Man Ramsey but there was no answer.   left a detailed message relaying information from provider regarding instructions to fast.  I encouraged pt to call our office back if they have any further questions.    Thank you,    Dorothea HANNAH RN  Triage [facility] [place]  08/22/24 12:48 EDT

## 2024-08-23 ENCOUNTER — ANTICOAGULATION VISIT (OUTPATIENT)
Dept: PHARMACY | Facility: HOSPITAL | Age: 44
End: 2024-08-23
Payer: COMMERCIAL

## 2024-08-23 ENCOUNTER — LAB (OUTPATIENT)
Dept: LAB | Facility: HOSPITAL | Age: 44
End: 2024-08-23
Payer: COMMERCIAL

## 2024-08-23 DIAGNOSIS — Z79.01 LONG TERM (CURRENT) USE OF ANTICOAGULANTS: ICD-10-CM

## 2024-08-23 DIAGNOSIS — Z95.2 HISTORY OF MECHANICAL AORTIC VALVE REPLACEMENT: Primary | ICD-10-CM

## 2024-08-23 DIAGNOSIS — E78.2 MIXED HYPERLIPIDEMIA: ICD-10-CM

## 2024-08-23 DIAGNOSIS — Z86.79 HISTORY OF CARDIOMYOPATHY: ICD-10-CM

## 2024-08-23 LAB
ALBUMIN SERPL-MCNC: 4.7 G/DL (ref 3.5–5.2)
ALBUMIN/GLOB SERPL: 1.7 G/DL
ALP SERPL-CCNC: 96 U/L (ref 39–117)
ALT SERPL W P-5'-P-CCNC: 78 U/L (ref 1–41)
ANION GAP SERPL CALCULATED.3IONS-SCNC: 10.4 MMOL/L (ref 5–15)
AST SERPL-CCNC: 37 U/L (ref 1–40)
BASOPHILS # BLD AUTO: 0.03 10*3/MM3 (ref 0–0.2)
BASOPHILS NFR BLD AUTO: 0.4 % (ref 0–1.5)
BILIRUB SERPL-MCNC: 0.4 MG/DL (ref 0–1.2)
BUN SERPL-MCNC: 15 MG/DL (ref 6–20)
BUN/CREAT SERPL: 12.6 (ref 7–25)
CALCIUM SPEC-SCNC: 9.8 MG/DL (ref 8.6–10.5)
CHLORIDE SERPL-SCNC: 102 MMOL/L (ref 98–107)
CHOLEST SERPL-MCNC: 117 MG/DL (ref 0–200)
CO2 SERPL-SCNC: 26.6 MMOL/L (ref 22–29)
CREAT SERPL-MCNC: 1.19 MG/DL (ref 0.76–1.27)
DEPRECATED RDW RBC AUTO: 42 FL (ref 37–54)
EGFRCR SERPLBLD CKD-EPI 2021: 76.8 ML/MIN/1.73
EOSINOPHIL # BLD AUTO: 0.11 10*3/MM3 (ref 0–0.4)
EOSINOPHIL NFR BLD AUTO: 1.4 % (ref 0.3–6.2)
ERYTHROCYTE [DISTWIDTH] IN BLOOD BY AUTOMATED COUNT: 13.1 % (ref 12.3–15.4)
GLOBULIN UR ELPH-MCNC: 2.8 GM/DL
GLUCOSE SERPL-MCNC: 123 MG/DL (ref 65–99)
HCT VFR BLD AUTO: 44.3 % (ref 37.5–51)
HDLC SERPL-MCNC: 31 MG/DL (ref 40–60)
HGB BLD-MCNC: 14.7 G/DL (ref 13–17.7)
IMM GRANULOCYTES # BLD AUTO: 0.02 10*3/MM3 (ref 0–0.05)
IMM GRANULOCYTES NFR BLD AUTO: 0.3 % (ref 0–0.5)
INR PPP: 3.7
LDLC SERPL CALC-MCNC: 61 MG/DL (ref 0–100)
LDLC/HDLC SERPL: 1.84 {RATIO}
LYMPHOCYTES # BLD AUTO: 1.66 10*3/MM3 (ref 0.7–3.1)
LYMPHOCYTES NFR BLD AUTO: 21.8 % (ref 19.6–45.3)
MCH RBC QN AUTO: 29 PG (ref 26.6–33)
MCHC RBC AUTO-ENTMCNC: 33.2 G/DL (ref 31.5–35.7)
MCV RBC AUTO: 87.4 FL (ref 79–97)
MONOCYTES # BLD AUTO: 0.78 10*3/MM3 (ref 0.1–0.9)
MONOCYTES NFR BLD AUTO: 10.2 % (ref 5–12)
NEUTROPHILS NFR BLD AUTO: 5.03 10*3/MM3 (ref 1.7–7)
NEUTROPHILS NFR BLD AUTO: 65.9 % (ref 42.7–76)
NRBC BLD AUTO-RTO: 0 /100 WBC (ref 0–0.2)
PLATELET # BLD AUTO: 261 10*3/MM3 (ref 140–450)
PMV BLD AUTO: 9.3 FL (ref 6–12)
POTASSIUM SERPL-SCNC: 4.3 MMOL/L (ref 3.5–5.2)
PROT SERPL-MCNC: 7.5 G/DL (ref 6–8.5)
RBC # BLD AUTO: 5.07 10*6/MM3 (ref 4.14–5.8)
SODIUM SERPL-SCNC: 139 MMOL/L (ref 136–145)
TRIGL SERPL-MCNC: 145 MG/DL (ref 0–150)
VLDLC SERPL-MCNC: 25 MG/DL (ref 5–40)
WBC NRBC COR # BLD AUTO: 7.63 10*3/MM3 (ref 3.4–10.8)

## 2024-08-23 PROCEDURE — 36415 COLL VENOUS BLD VENIPUNCTURE: CPT

## 2024-08-23 PROCEDURE — 85025 COMPLETE CBC W/AUTO DIFF WBC: CPT

## 2024-08-23 PROCEDURE — 80053 COMPREHEN METABOLIC PANEL: CPT

## 2024-08-23 PROCEDURE — 80061 LIPID PANEL: CPT

## 2024-08-23 RX ORDER — ROSUVASTATIN CALCIUM 20 MG/1
20 TABLET, COATED ORAL NIGHTLY
Qty: 90 TABLET | Refills: 3 | Status: SHIPPED | OUTPATIENT
Start: 2024-08-23

## 2024-08-23 RX ORDER — LISINOPRIL 20 MG/1
20 TABLET ORAL DAILY
Qty: 90 TABLET | Refills: 3 | Status: SHIPPED | OUTPATIENT
Start: 2024-08-23

## 2024-08-23 RX ORDER — CARVEDILOL 12.5 MG/1
12.5 TABLET ORAL 2 TIMES DAILY
Qty: 180 TABLET | Refills: 3 | Status: SHIPPED | OUTPATIENT
Start: 2024-08-23

## 2024-08-23 NOTE — PROGRESS NOTES
Please call patient.  Cholesterol panel looks great.  HDL little low and that is a good cholesterol.  I would recommend increasing exercise.  CMP normal except for blood sugar elevated at 123.  Also his liver enzyme called ALT is always chronically elevated.  I would follow-up with PCP about both abnormalities.  CBC normal.  Continue current medications.  Thank you    KELTON Mcguire

## 2024-08-23 NOTE — PROGRESS NOTES
Anticoagulation Clinic Progress Note    Anticoagulation Summary  As of 8/23/2024      INR goal:  2.5-3.5   TTR:  78.9% (5.7 y)   INR used for dosing:  3.70 (8/23/2024)   Warfarin maintenance plan:  8 mg every day   Weekly warfarin total:  56 mg   Plan last modified:  Heather Kline RPH (12/27/2023)   Next INR check:  9/6/2024   Priority:  High   Target end date:  Indefinite    Indications    History of mechanical aortic valve replacement [Z95.2]  Long term (current) use of anticoagulants [Z79.01]                 Anticoagulation Episode Summary       INR check location:  Home Draw    Preferred lab:      Send INR reminders to:   JOSE [facility]  POOL    Comments:  **[facility]**          Anticoagulation Care Providers       Provider Role Specialty Phone number    Lorne Kam MD Referring Cardiology [phone]            Clinic Interview:  Patient Findings     Negatives:  Signs/symptoms of thrombosis, Signs/symptoms of bleeding,   Laboratory test error suspected, Change in health, Change in alcohol use,   Change in activity, Upcoming invasive procedure, Emergency department   visit, Upcoming dental procedure, Missed doses, Extra doses, Change in   medications, Change in diet/appetite, Hospital admission, Bruising, Other   complaints      Clinical Outcomes     Negatives:  Major bleeding event, Thromboembolic event,   Anticoagulation-related hospital admission, Anticoagulation-related ED   visit, Anticoagulation-related fatality        INR History:      7/12/2024     1:34 PM 7/26/2024    12:00 AM 7/26/2024     1:18 PM 8/9/2024    12:00 AM 8/9/2024     1:06 PM 8/23/2024    12:00 AM 8/23/2024     2:39 PM   Anticoagulation Monitoring   INR 3.10  2.80  3.40  3.70   INR Date 7/12/2024 7/26/2024 8/9/2024 8/23/2024   INR Goal 2.5-3.5  2.5-3.5  2.5-3.5  2.5-3.5   Trend Same  Same  Same  Same   Last Week Total 56 mg  56 mg  56 mg  56 mg   Next Week Total 56 mg  56 mg  56 mg  54 mg   Sun 8 mg  8 mg  8  mg  8 mg   Mon 8 mg  8 mg  8 mg  8 mg   Tue 8 mg  8 mg  8 mg  8 mg   Wed 8 mg  8 mg  8 mg  8 mg   Thu 8 mg  8 mg  8 mg  8 mg   Fri 8 mg  8 mg  8 mg  6 mg (8/23); Otherwise 8 mg   Sat 8 mg  8 mg  8 mg  8 mg   Historical INR  2.80      3.40      3.70            This result is from an external source.       Plan:  1. INR is Supratherapeutic today- see above in Anticoagulation Summary.   Will instruct Man Ramsey to Change their warfarin regimen- see above in Anticoagulation Summary.  partial to 6 mg then resume, rck 2 weeks   2. Follow up in 2 weeks  3. They have been instructed to call if any changes in medications, doses, concerns, etc. Patient expresses understanding and has no further questions at this time.    Heather Kline [facility]

## 2024-08-30 NOTE — PROGRESS NOTES
RM:________     PCP: Orlando Mcguire MD    : 1980  AGE: 44 y.o.  EST PATIENT     REASON FOR VISIT/  CC:        BP Readings from Last 3 Encounters:   23 145/86   23 130/90   23 140/76      Wt Readings from Last 3 Encounters:   23 125 kg (275 lb)   23 125 kg (275 lb 6.4 oz)   23 118 kg (261 lb)        WT: ____________ BP: __________L __________R HR______    CHEST PAIN: _____________    SOA: _____________PALPS: _______________     LIGHTHEADED: ___________FATIGUE: ________________ EDEMA __________    ALLERGIES:Patient has no known allergies. SMOKING HISTORY:  Social History     Tobacco Use    Smoking status: Never    Smokeless tobacco: Never    Tobacco comments:     caffeine use/ 2-3 CUPS of tea daily    Vaping Use    Vaping status: Never Used   Substance Use Topics    Alcohol use: Yes     Comment: 2-3 servings of alcohol daily    Drug use: No     CAFFEINE USE_________________  ALCOHOL ______________________

## 2024-09-04 ENCOUNTER — OFFICE VISIT (OUTPATIENT)
Dept: FAMILY MEDICINE CLINIC | Facility: CLINIC | Age: 44
End: 2024-09-04
Payer: COMMERCIAL

## 2024-09-04 VITALS
SYSTOLIC BLOOD PRESSURE: 140 MMHG | BODY MASS INDEX: 39.03 KG/M2 | WEIGHT: 278.8 LBS | HEIGHT: 71 IN | OXYGEN SATURATION: 99 % | RESPIRATION RATE: 18 BRPM | HEART RATE: 86 BPM | DIASTOLIC BLOOD PRESSURE: 78 MMHG

## 2024-09-04 DIAGNOSIS — F43.9 STRESS: ICD-10-CM

## 2024-09-04 DIAGNOSIS — R73.03 PREDIABETES: ICD-10-CM

## 2024-09-04 DIAGNOSIS — E78.2 MIXED HYPERLIPIDEMIA: ICD-10-CM

## 2024-09-04 DIAGNOSIS — R74.01 ELEVATED TRANSAMINASE LEVEL: Primary | ICD-10-CM

## 2024-09-04 DIAGNOSIS — I10 PRIMARY HYPERTENSION: ICD-10-CM

## 2024-09-04 LAB
ALBUMIN SERPL-MCNC: 4.7 G/DL (ref 3.5–5.2)
ALBUMIN/GLOB SERPL: 2 G/DL
ALP SERPL-CCNC: 90 U/L (ref 39–117)
ALT SERPL-CCNC: 80 U/L (ref 1–41)
AST SERPL-CCNC: 46 U/L (ref 1–40)
BILIRUB SERPL-MCNC: 0.6 MG/DL (ref 0–1.2)
BUN SERPL-MCNC: 13 MG/DL (ref 6–20)
BUN/CREAT SERPL: 13.3 (ref 7–25)
CALCIUM SERPL-MCNC: 9.8 MG/DL (ref 8.6–10.5)
CHLORIDE SERPL-SCNC: 100 MMOL/L (ref 98–107)
CO2 SERPL-SCNC: 26.3 MMOL/L (ref 22–29)
CREAT SERPL-MCNC: 0.98 MG/DL (ref 0.76–1.27)
EGFRCR SERPLBLD CKD-EPI 2021: 96.9 ML/MIN/1.73
GGT SERPL-CCNC: 108 U/L (ref 8–61)
GLOBULIN SER CALC-MCNC: 2.4 GM/DL
GLUCOSE SERPL-MCNC: 121 MG/DL (ref 65–99)
HBA1C MFR BLD: 6.8 % (ref 4.8–5.6)
POTASSIUM SERPL-SCNC: 4.1 MMOL/L (ref 3.5–5.2)
PROT SERPL-MCNC: 7.1 G/DL (ref 6–8.5)
SODIUM SERPL-SCNC: 136 MMOL/L (ref 136–145)

## 2024-09-04 PROCEDURE — 99214 OFFICE O/P EST MOD 30 MIN: CPT | Performed by: INTERNAL MEDICINE

## 2024-09-04 RX ORDER — AMOXICILLIN 500 MG/1
CAPSULE ORAL
COMMUNITY
Start: 2024-06-05

## 2024-09-04 NOTE — PROGRESS NOTES
Answers submitted by the patient for this visit:  Primary Reason for Visit (Submitted on 8/28/2024)  What is the primary reason for your visit?: Other  Other (Submitted on 8/28/2024)  Please describe your symptoms.: Following up on blood test results showing elevated blood sugar, liver enzymes, kidney numbers. Additional concerns re: anxiety.  Have you had these symptoms before?: No  How long have you been having these symptoms?: 1-4 days  Please describe any probable cause for these symptoms. : liver, kidney possibly related to Rosuvastatin. Blood sugar could be diabetes, or could be stress. Anxiety seems like a long-term concern.  Subjective chief complaint is follow-up on liver tests  Man Ramsey is a 44 y.o. male.     History of Present Illness Man is here today for follow-up on some liver test.  He had some lab work drawn at his cardiologist.  His cholesterol looked okay on the rosuvastatin.  His AST was 78.  His ALT was normal.  His creatinine was normal but had risen a little bit from previous.  In the past he has had some prediabetes with slight elevations in his A1c.  His blood sugar was also a little bit elevated.  He does report that he drinks the equivalent of approximately 8 drinks per week.  It was more than that for a little bit.  He was under some increased stress.  Some of this was due to work and some was due to a cat that was ill.  Some of that has gotten better.  He had a CT scan performed at urologist office about a year ago.  The liver was reportedly normal on that.  There were some indeterminant kidney lesions that were likely cysts.  The following portions of the patient's history were reviewed and updated as appropriate: allergies, current medications, and problem list.    Review of Systems   Gastrointestinal:  Negative for abdominal pain.       Objective   Physical Exam  Vitals and nursing note reviewed.   Constitutional:       Appearance: Normal appearance.   Neck:      Vascular: No  carotid bruit.   Cardiovascular:      Rate and Rhythm: Normal rate and regular rhythm.      Comments: There is a mechanical click  Pulmonary:      Effort: Pulmonary effort is normal.      Breath sounds: No wheezing, rhonchi or rales.   Abdominal:      General: Bowel sounds are normal.      Palpations: Abdomen is soft.      Tenderness: There is no abdominal tenderness. There is no guarding or rebound.   Musculoskeletal:      Right lower leg: No edema.      Left lower leg: No edema.   Neurological:      Mental Status: He is alert.     Assessment & Plan

## 2024-09-06 ENCOUNTER — TELEPHONE (OUTPATIENT)
Dept: FAMILY MEDICINE CLINIC | Facility: CLINIC | Age: 44
End: 2024-09-06

## 2024-09-06 ENCOUNTER — ANTICOAGULATION VISIT (OUTPATIENT)
Dept: PHARMACY | Facility: HOSPITAL | Age: 44
End: 2024-09-06
Payer: COMMERCIAL

## 2024-09-06 DIAGNOSIS — Z95.2 HISTORY OF MECHANICAL AORTIC VALVE REPLACEMENT: Primary | ICD-10-CM

## 2024-09-06 DIAGNOSIS — Z79.01 LONG TERM (CURRENT) USE OF ANTICOAGULANTS: ICD-10-CM

## 2024-09-06 LAB — INR PPP: 3.2

## 2024-09-06 NOTE — TELEPHONE ENCOUNTER
Caller: Man Ramsey    Relationship to patient: Self    Best call back number: [phone]    Patient is needing:   RETURNING MISSED CALL FROM DR. LAWSON REGARDING HIS LAB RESULTS.     Results were discussed with the patient.  I did advise of his diabetic state now.  I did advise that I usually do not treat until this gets above 7.  He is going to need to really watch his carbohydrate and sugar intake.  We will recheck that along with the liver test in 3 months.  I am going to get an ultrasound of his liver.  I did advise to eliminate alcohol.          
  Caller: Man Ramsey    Relationship: Self    Best call back number: [phone]     What was the call regarding:       TEST RESULTS---BLOOD WORK    RETURNING DR LAWSON CALL FROM 9/5/2024  
Attending Attestation (For Attendings USE Only)...

## 2024-09-06 NOTE — PROGRESS NOTES
Anticoagulation Clinic Progress Note    Anticoagulation Summary  As of 9/6/2024      INR goal:  2.5-3.5   TTR:  78.8% (5.7 y)   INR used for dosing:  3.20 (9/6/2024)   Warfarin maintenance plan:  8 mg every day   Weekly warfarin total:  56 mg   No change documented:  Mona Jones, PharmD   Plan last modified:  Heather Kline [facility] (12/27/2023)   Next INR check:  9/20/2024   Priority:  High   Target end date:  Indefinite    Indications    History of mechanical aortic valve replacement [Z95.2]  Long term (current) use of anticoagulants [Z79.01]                 Anticoagulation Episode Summary       INR check location:  Home Draw    Preferred lab:      Send INR reminders to:   JOSE LECHUGA  POOL    Comments:  **[facility]**          Anticoagulation Care Providers       Provider Role Specialty Phone number    Lorne Kam MD Referring Cardiology [phone]            Clinic Interview:  No pertinent clinical findings have been reported.    INR History:      7/26/2024     1:18 PM 8/9/2024    12:00 AM 8/9/2024     1:06 PM 8/23/2024    12:00 AM 8/23/2024     2:39 PM 9/6/2024    12:00 AM 9/6/2024     2:05 PM   Anticoagulation Monitoring   INR 2.80  3.40  3.70  3.20   INR Date 7/26/2024 8/9/2024 8/23/2024 9/6/2024   INR Goal 2.5-3.5  2.5-3.5  2.5-3.5  2.5-3.5   Trend Same  Same  Same  Same   Last Week Total 56 mg  56 mg  56 mg  56 mg   Next Week Total 56 mg  56 mg  54 mg  56 mg   Sun 8 mg  8 mg  8 mg  8 mg   Mon 8 mg  8 mg  8 mg  8 mg   Tue 8 mg  8 mg  8 mg  8 mg   Wed 8 mg  8 mg  8 mg  8 mg   Thu 8 mg  8 mg  8 mg  8 mg   Fri 8 mg  8 mg  6 mg (8/23); Otherwise 8 mg  8 mg   Sat 8 mg  8 mg  8 mg  8 mg   Historical INR  3.40      3.70      3.20            This result is from an external source.       Plan:  1. INR is therapeutic today- see above in Anticoagulation Summary.    Man Ramsey to continue their warfarin regimen- see above in Anticoagulation Summary.  2. Follow up in 2 weeks  3. Pt  has agreed to only be called if INR out of range. They have been instructed to call if any changes in medications, doses, concerns, etc. Patient expresses understanding and has no further questions at this time.    Mona Jones, PharmD

## 2024-09-12 ENCOUNTER — OFFICE VISIT (OUTPATIENT)
Dept: CARDIOLOGY | Facility: CLINIC | Age: 44
End: 2024-09-12
Payer: COMMERCIAL

## 2024-09-12 VITALS
HEART RATE: 65 BPM | WEIGHT: 270 LBS | HEIGHT: 71 IN | SYSTOLIC BLOOD PRESSURE: 126 MMHG | BODY MASS INDEX: 37.8 KG/M2 | DIASTOLIC BLOOD PRESSURE: 74 MMHG

## 2024-09-12 DIAGNOSIS — I10 PRIMARY HYPERTENSION: ICD-10-CM

## 2024-09-12 DIAGNOSIS — Z95.2 HISTORY OF MECHANICAL AORTIC VALVE REPLACEMENT: Primary | ICD-10-CM

## 2024-09-12 DIAGNOSIS — Z86.79 HISTORY OF CARDIOMYOPATHY: ICD-10-CM

## 2024-09-12 NOTE — PROGRESS NOTES
Date of Office Visit: 2024  Encounter Provider: Lorne Kam MD  Place of Service: [facility] CARDIOLOGY  Patient Name: Man Ramsey  :1980    Chief Complaint   Patient presents with    History of mechanical aortic valve replacement   :     HPI: Man Ramsey is a 44 y.o. male who presents today to follow-up. I have reviewed prior notes and there are no changes except for any new updates described below. I have also reviewed any information entered into the medical record by the patient or by ancillary staff.     He has a history of a bicuspid aortic valve with severe aortic insufficiency which caused nonischemic dilated cardiomyopathy. In , he underwent mechanical aortic valve replacement and had complete recovery of left ventricular systolic function. He has been maintained on warfarin and has had no cardiac issues. He denies dyspnea, chest pain, syncope, or edema.      He had an episode of rectal bleeding in ; a colonoscopy revealed hemorrhoids only.      An echo in  showed normal LV size/systolic function, and normal valvular function.    Past Medical History:   Diagnosis Date    Aortic insufficiency due to bicuspid aortic valve     with severe AI, s/p 29mm ATS mechanical AVR 2012.  Echo has shown normal function but mild AI.    BRBPR (bright red blood per rectum) 10/06/2021    SEEN AT [facility] ER    Clotting disorder 2019    current concern/complaint    Colon polyps     FOLLOWED BY DR. 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  He recovered with oral steroids.  GIANNA was negative for endocarditis.    First degree AV block 2021    Hematochezia 2019    SEEN AT [facility] ER    Hemorrhoids     History of blood transfusion     Hyperglycemia     Hypertension     Laceration of ankle 2010    RIGHT ANKLE,  SEEN AT [facility] ER    Long term (current) use of anticoagulants     Mixed hyperlipidemia     Nonischemic cardiomyopathy     due to AI, LVEF 40% with LVE 5/2012; improved to EF 57% in July 2012 with mild LV dilation.  Echo 3/2014 with normal LV size and systolic function    Obesity     Ocular migraine     Sleep apnea 07/2022       Past Surgical History:   Procedure Laterality Date    AORTIC VALVE REPAIR/REPLACEMENT N/A 05/30/2012    Mechanical, DR. INGA BOLES AT [facility]    CARDIAC CATHETERIZATION Bilateral 04/25/2012    MILD PULMONARY HYPERTENSION, ELEVATED LEFT VENTRICULAR END DIASTOLIC PRESSURE AS WELL AS PULMONARY CAPILLARY WEDGE PRESSURE CONSISTANT WITH CHF, DILATED LEFT VENTRICLE WITH LOW NORMAL LEFT VENTRICULAR SYSTOLIC FUNCTION, DR.REBECCA SESAY AT [facility]    CARDIAC VALVE REPLACEMENT  5/29/2012    Aortic valve, mechanical prosthesis    COLONOSCOPY N/A 12/17/2019    4 MM BENIGN POLYP IN SPLENIC FLEXURE, MODERATE HEMORRHOIDS, RESCOPE IN 5 YRS, DR. TORSTEN GORE AT [facility]    HEAD/NECK LACERATION REPAIR N/A 05/27/2009    BACK OF HEAD, D/T FALL, DONE AT [facility] ER    LEG LACERATION REPAIR Right 05/24/2010    RIGHT ANKLE, DONE AT [facility] ER    TRANSESOPHAGEAL ECHOCARDIOGRAM (GIANNA) N/A 05/30/2012    DR. INGA BOLES AT [facility]       Social History     Socioeconomic History    Marital status: Single   Tobacco Use    Smoking status: Never     Passive exposure: Never    Smokeless tobacco: Never    Tobacco comments:     caffeine use/ 2-3 CUPS of tea daily    Vaping Use    Vaping status: Never Used   Substance and Sexual Activity    Alcohol use: Yes     Alcohol/week: 8.0 - 10.0 standard drinks of alcohol     Types: 8 - 10 Drinks containing 0.5 oz of alcohol per week     Comment: Cocktails, usually 1, 3-4 nights a week.    Drug use: No    Sexual activity: Never       Family History   Problem Relation Age of Onset    Colon polyps Mother     Hypertension Mother     Arthritis Mother     Hypertension Father     Hyperlipidemia Sister      Hypertension Sister     Diabetes Sister     Hypertension Maternal Grandmother     Hypertension Paternal Grandmother        Review of Systems   Cardiovascular:  Negative for leg swelling.   Respiratory:  Negative for shortness of breath.    Neurological:  Positive for dizziness and headaches. Negative for light-headedness.   All other systems reviewed and are negative.      No Known Allergies      Current Outpatient Medications:     CALCIUM PO, Take  by mouth., Disp: , Rfl:     carvedilol (COREG) 12.5 MG tablet, Take 1 tablet by mouth 2 (Two) Times a Day., Disp: 180 tablet, Rfl: 3    Docusate Calcium (STOOL SOFTENER PO), Take  by mouth., Disp: , Rfl:     lisinopril (PRINIVIL,ZESTRIL) 20 MG tablet, Take 1 tablet by mouth Daily., Disp: 90 tablet, Rfl: 3    multivitamin with minerals tablet tablet, Take 1 tablet by mouth Daily., Disp: , Rfl:     Omega-3 Fatty Acids (FISH OIL) 1000 MG capsule capsule, Take  by mouth Daily With Breakfast., Disp: , Rfl:     Psyllium (METAMUCIL PO), Take  by mouth 3 (Three) Times a Day., Disp: , Rfl:     vitamin C (ASCORBIC ACID) 250 MG tablet, Take 2 tablets by mouth Daily., Disp: , Rfl:     warfarin (COUMADIN) 2 MG tablet, TAKE 4 TABLETS BY MOUTH ONCE DAILY AS DIRECTED, Disp: 360 tablet, Rfl: 0    amoxicillin (AMOXIL) 500 MG capsule, TAKE FOUR CAPSULES BY MOUTH ONE HOUR PRIOR TO DENTAL APPOINTMENT (Patient not taking: Reported on 9/12/2024), Disp: , Rfl:     fexofenadine (ALLEGRA) 180 MG tablet, Take 1 tablet by mouth Daily. (Patient not taking: Reported on 9/12/2024), Disp: , Rfl:     Hydrocortisone Ace-Pramoxine (Analpram-HC) 1-1 % rectal cream, Insert  into the rectum 2 (Two) Times a Day. (Patient not taking: Reported on 9/4/2024), Disp: 28.4 g, Rfl: 1    rosuvastatin (CRESTOR) 20 MG tablet, Take 1 tablet by mouth Every Night. (Patient not taking: Reported on 9/12/2024), Disp: 90 tablet, Rfl: 3     Objective:     Vitals:    09/12/24 1202   BP: 126/74   BP Location: Left arm   Pulse: 65  "  Weight: 122 kg (270 lb)   Height: 180.3 cm (70.98\")       Body mass index is 37.68 kg/m².    Physical Exam  Vitals reviewed.   Constitutional:       Appearance: He is well-developed.   HENT:      Head: Normocephalic.      Nose: Nose normal.   Eyes:      Conjunctiva/sclera: Conjunctivae normal.   Neck:      Vascular: No JVD.   Cardiovascular:      Rate and Rhythm: Normal rate and regular rhythm.      Pulses: Normal pulses and intact distal pulses.      Heart sounds: No murmur heard.     Comments: Mechanical S2  Pulmonary:      Effort: Pulmonary effort is normal.      Breath sounds: Normal breath sounds.   Abdominal:      Palpations: Abdomen is soft.      Tenderness: There is no abdominal tenderness.   Musculoskeletal:         General: No swelling. Normal range of motion.      Cervical back: Normal range of motion.   Lymphadenopathy:      Cervical: No cervical adenopathy.   Skin:     General: Skin is warm and dry.   Neurological:      General: No focal deficit present.      Mental Status: He is alert.   Psychiatric:         Mood and Affect: Mood normal.         ECG 12 Lead    Date/Time: 9/12/2024 12:18 PM  Performed by: Lorne Kam MD    Authorized by: Lorne Kam MD  Comparison: compared with previous ECG   Similar to previous ECG  Rhythm: sinus rhythm  Conduction: conduction normal  ST Segments: ST segments normal  T Waves: T waves normal  QRS axis: left  Other: no other findings  Comments: O/W NORMAL EKG          Assessment:       Diagnosis Plan   1. History of mechanical aortic valve replacement        2. History of cardiomyopathy        3. Primary hypertension           Plan:       He is status post mechanical aortic valve replacement for severe bicuspid aortic insufficiency.  He had nonischemic dilated cardiopathy which resolved.  An echo in 2022 showed normal LV size/systolic function and normal valvular function. I'll repeat this next year. His INR is well managed.  His BP is well controlled. "     Sincerely,       Lorne Kam MD

## 2024-09-16 RX ORDER — WARFARIN SODIUM 2 MG/1
TABLET ORAL
Qty: 360 TABLET | Refills: 0 | Status: SHIPPED | OUTPATIENT
Start: 2024-09-16

## 2024-09-17 ENCOUNTER — HOSPITAL ENCOUNTER (OUTPATIENT)
Dept: ULTRASOUND IMAGING | Facility: HOSPITAL | Age: 44
Discharge: HOME | End: 2024-09-17
Admitting: INTERNAL MEDICINE
Payer: COMMERCIAL

## 2024-09-17 DIAGNOSIS — R74.01 ELEVATED TRANSAMINASE LEVEL: ICD-10-CM

## 2024-09-17 PROCEDURE — 76705 ECHO EXAM OF ABDOMEN: CPT

## 2024-09-18 DIAGNOSIS — K76.9 LESION OF LIVER: Primary | ICD-10-CM

## 2024-09-20 ENCOUNTER — ANTICOAGULATION VISIT (OUTPATIENT)
Dept: PHARMACY | Facility: HOSPITAL | Age: 44
End: 2024-09-20
Payer: COMMERCIAL

## 2024-09-20 DIAGNOSIS — Z95.2 HISTORY OF MECHANICAL AORTIC VALVE REPLACEMENT: Primary | ICD-10-CM

## 2024-09-20 DIAGNOSIS — Z79.01 LONG TERM (CURRENT) USE OF ANTICOAGULANTS: ICD-10-CM

## 2024-09-20 LAB — INR PPP: 2.4

## 2024-10-04 LAB — INR PPP: 1.9

## 2024-10-07 ENCOUNTER — ANTICOAGULATION VISIT (OUTPATIENT)
Dept: PHARMACY | Facility: HOSPITAL | Age: 44
End: 2024-10-07
Payer: COMMERCIAL

## 2024-10-07 DIAGNOSIS — Z79.01 LONG TERM (CURRENT) USE OF ANTICOAGULANTS: ICD-10-CM

## 2024-10-07 DIAGNOSIS — Z95.2 HISTORY OF MECHANICAL AORTIC VALVE REPLACEMENT: Primary | ICD-10-CM

## 2024-10-07 NOTE — PROGRESS NOTES
Anticoagulation Clinic Progress Note    Anticoagulation Summary  As of 10/7/2024      INR goal:  2.5-3.5   TTR:  78.3% (5.8 y)   INR used for dosin.90 (10/4/2024)   Warfarin maintenance plan:  10 mg every Mon, Fri; 8 mg all other days   Weekly warfarin total:  60 mg   Plan last modified:  Mona Jones, PharmD (10/7/2024)   Next INR check:  10/21/2024   Priority:  High   Target end date:  Indefinite    Indications    History of mechanical aortic valve replacement [Z95.2]  Long term (current) use of anticoagulants [Z79.01]                 Anticoagulation Episode Summary       INR check location:  Home Draw    Preferred lab:      Send INR reminders to:  LISA LECHUGA  POOL    Comments:  **[facility]**          Anticoagulation Care Providers       Provider Role Specialty Phone number    Lorne Kam MD Referring Cardiology [phone]            Clinic Interview:  Patient Findings     Negatives:  Signs/symptoms of thrombosis, Signs/symptoms of bleeding,   Laboratory test error suspected, Change in health, Change in alcohol use,   Change in activity, Upcoming invasive procedure, Emergency department   visit, Upcoming dental procedure, Missed doses, Extra doses, Change in   medications, Change in diet/appetite, Hospital admission, Bruising, Other   complaints    Comments:  Eating more greens since diagnosed with diabetes.      Clinical Outcomes     Negatives:  Major bleeding event, Thromboembolic event,   Anticoagulation-related hospital admission, Anticoagulation-related ED   visit, Anticoagulation-related fatality    Comments:  Eating more greens since diagnosed with diabetes.        INR History:      2024     2:39 PM 2024    12:00 AM 2024     2:05 PM 2024    12:00 AM 2024     3:13 PM 10/4/2024    12:00 AM 10/7/2024     9:15 AM   Anticoagulation Monitoring   INR 3.70  3.20  2.40  1.90   INR Date 2024  2024  2024  10/4/2024   INR Goal 2.5-3.5  2.5-3.5   2.5-3.5  2.5-3.5   Trend Same  Same  Same  Up   Last Week Total 56 mg  56 mg  56 mg  56 mg   Next Week Total 54 mg  56 mg  58 mg  60 mg   Sun 8 mg  8 mg  8 mg  8 mg   Mon 8 mg  8 mg  10 mg (9/23); Otherwise 8 mg  10 mg   Tue 8 mg  8 mg  8 mg  8 mg   Wed 8 mg  8 mg  8 mg  8 mg   Thu 8 mg  8 mg  8 mg  8 mg   Fri 6 mg (8/23); Otherwise 8 mg  8 mg  8 mg  10 mg   Sat 8 mg  8 mg  8 mg  8 mg   Historical INR  3.20      2.40      1.90            This result is from an external source.       Plan:  1. INR is Subtherapeutic today- see above in Anticoagulation Summary.   Will instruct Man Ramsey to Change their warfarin regimen- see above in Anticoagulation Summary.  Increase to 10 mg Monday and Friday and 8 mg on all other days.  2. Follow up in 2 weeks  3. They have been instructed to call if any changes in medications, doses, concerns, etc. Patient expresses understanding and has no further questions at this time.    Mona Jones, PharmD

## 2024-10-13 ENCOUNTER — HOSPITAL ENCOUNTER (OUTPATIENT)
Facility: HOSPITAL | Age: 44
Discharge: HOME | End: 2024-10-13
Admitting: INTERNAL MEDICINE
Payer: COMMERCIAL

## 2024-10-13 DIAGNOSIS — K76.9 LESION OF LIVER: ICD-10-CM

## 2024-10-13 PROCEDURE — 74170 CT ABD WO CNTRST FLWD CNTRST: CPT

## 2024-10-13 PROCEDURE — 25510000001 IOPAMIDOL 61 % SOLUTION: Performed by: INTERNAL MEDICINE

## 2024-10-13 RX ORDER — IOPAMIDOL 612 MG/ML
100 INJECTION, SOLUTION INTRAVASCULAR
Status: COMPLETED | OUTPATIENT
Start: 2024-10-13 | End: 2024-10-13

## 2024-10-13 RX ADMIN — IOPAMIDOL 85 ML: 612 INJECTION, SOLUTION INTRAVENOUS at 15:45

## 2024-10-16 DIAGNOSIS — K76.9 LESION OF LIVER: Primary | ICD-10-CM

## 2024-10-17 ENCOUNTER — ANTICOAGULATION VISIT (OUTPATIENT)
Dept: PHARMACY | Facility: HOSPITAL | Age: 44
End: 2024-10-17
Payer: COMMERCIAL

## 2024-10-17 DIAGNOSIS — Z95.2 HISTORY OF MECHANICAL AORTIC VALVE REPLACEMENT: Primary | ICD-10-CM

## 2024-10-17 DIAGNOSIS — Z79.01 LONG TERM (CURRENT) USE OF ANTICOAGULANTS: ICD-10-CM

## 2024-10-17 LAB — INR PPP: 2.2

## 2024-10-17 NOTE — PROGRESS NOTES
Anticoagulation Clinic Progress Note    Anticoagulation Summary  As of 10/17/2024      INR goal:  2.5-3.5   TTR:  77.8% (5.8 y)   INR used for dosin.20 (10/17/2024)   Warfarin maintenance plan:  10 mg every Mon, Wed, Fri; 8 mg all other days   Weekly warfarin total:  62 mg   Plan last modified:  Heather Kline RPH (10/17/2024)   Next INR check:  10/24/2024   Priority:  High   Target end date:  Indefinite    Indications    History of mechanical aortic valve replacement [Z95.2]  Long term (current) use of anticoagulants [Z79.01]                 Anticoagulation Episode Summary       INR check location:  Home Draw    Preferred lab:  --    Send INR reminders to:  LISA LECHUGA  POOL    Comments:  **[facility]**          Anticoagulation Care Providers       Provider Role Specialty Phone number    Lorne Kam MD Referring Cardiology [phone]            Clinic Interview:  Patient Findings     Negatives:  Signs/symptoms of thrombosis, Signs/symptoms of bleeding,   Laboratory test error suspected, Change in health, Change in alcohol use,   Change in activity, Upcoming invasive procedure, Emergency department   visit, Upcoming dental procedure, Missed doses, Extra doses, Change in   medications, Change in diet/appetite, Hospital admission, Bruising, Other   complaints      Clinical Outcomes     Negatives:  Major bleeding event, Thromboembolic event,   Anticoagulation-related hospital admission, Anticoagulation-related ED   visit, Anticoagulation-related fatality        INR History:      2024     2:05 PM 2024    12:00 AM 2024     3:13 PM 10/4/2024    12:00 AM 10/7/2024     9:15 AM 10/17/2024    12:00 AM 10/17/2024    11:37 AM   Anticoagulation Monitoring   INR 3.20  2.40  1.90  2.20   INR Date 2024  2024  10/4/2024  10/17/2024   INR Goal 2.5-3.5  2.5-3.5  2.5-3.5  2.5-3.5   Trend Same  Same  Up  Up   Last Week Total 56 mg  56 mg  56 mg  60 mg   Next Week Total 56 mg   58 mg  60 mg  64 mg   Sun 8 mg  8 mg  8 mg  8 mg   Mon 8 mg  10 mg (9/23); Otherwise 8 mg  10 mg  10 mg   Tue 8 mg  8 mg  8 mg  8 mg   Wed 8 mg  8 mg  8 mg  10 mg   Thu 8 mg  8 mg  8 mg  10 mg (10/17)   Fri 8 mg  8 mg  10 mg  10 mg   Sat 8 mg  8 mg  8 mg  8 mg   Historical INR  2.40      1.90      2.20            This result is from an external source.       Plan:  1. INR is Subtherapeutic today- see above in Anticoagulation Summary.   Will instruct Man Ramsey to Increase their warfarin regimen- see above in Anticoagulation Summary.  10 mg today then trial on 10 mg MWF, 8 mg AOEV, rck 1 week as total weekly dose increase in only 3.3% and I think patient may need 10 mg four times a week.   2. Follow up in 1 weeks  3. They have been instructed to call if any changes in medications, doses, concerns, etc. Patient expresses understanding and has no further questions at this time.    Heather Kline [facility]

## 2024-10-21 ENCOUNTER — TELEPHONE (OUTPATIENT)
Dept: FAMILY MEDICINE CLINIC | Facility: CLINIC | Age: 44
End: 2024-10-21

## 2024-10-21 NOTE — TELEPHONE ENCOUNTER
Caller: Man Ramsey    Relationship to patient: Self    Best call back number: [phone]     Patient is needing: PATIENT HAD APPOINTMENT WITH PSYCHOLOGY ON 10/18//24

## 2024-10-24 ENCOUNTER — ANTICOAGULATION VISIT (OUTPATIENT)
Dept: PHARMACY | Facility: HOSPITAL | Age: 44
End: 2024-10-24
Payer: COMMERCIAL

## 2024-10-24 DIAGNOSIS — Z79.01 LONG TERM (CURRENT) USE OF ANTICOAGULANTS: ICD-10-CM

## 2024-10-24 DIAGNOSIS — Z95.2 HISTORY OF MECHANICAL AORTIC VALVE REPLACEMENT: Primary | ICD-10-CM

## 2024-10-24 LAB — INR PPP: 2.3

## 2024-10-24 NOTE — PROGRESS NOTES
Anticoagulation Clinic Progress Note    Anticoagulation Summary  As of 10/24/2024      INR goal:  2.5-3.5   TTR:  77.6% (5.9 y)   INR used for dosin.30 (10/24/2024)   Warfarin maintenance plan:  8 mg every Tue, Sat; 10 mg all other days   Weekly warfarin total:  66 mg   Plan last modified:  Neo Cornejo, PharmD (10/24/2024)   Next INR check:  10/31/2024   Priority:  High   Target end date:  Indefinite    Indications    History of mechanical aortic valve replacement [Z95.2]  Long term (current) use of anticoagulants [Z79.01]                 Anticoagulation Episode Summary       INR check location:  Home Draw    Preferred lab:  --    Send INR reminders to:  [facility]  [facility]    Comments:  **[facility]**          Anticoagulation Care Providers       Provider Role Specialty Phone number    Lorne Kam MD Referring Cardiology [phone]            Clinic Interview:  Patient Findings     Negatives:  Signs/symptoms of thrombosis, Signs/symptoms of bleeding,   Laboratory test error suspected, Change in health, Change in alcohol use,   Change in activity, Upcoming invasive procedure, Emergency department   visit, Upcoming dental procedure, Missed doses, Extra doses, Change in   medications, Change in diet/appetite, Hospital admission, Bruising, Other   complaints      Clinical Outcomes     Negatives:  Major bleeding event, Thromboembolic event,   Anticoagulation-related hospital admission, Anticoagulation-related ED   visit, Anticoagulation-related fatality        INR History:      2024     3:13 PM 10/4/2024    12:00 AM 10/7/2024     9:15 AM 10/17/2024    12:00 AM 10/17/2024    11:37 AM 10/24/2024    12:00 AM 10/24/2024     2:41 PM   Anticoagulation Monitoring   INR 2.40  1.90  2.20  2.30   INR Date 2024  10/4/2024  10/17/2024  10/24/2024   INR Goal 2.5-3.5  2.5-3.5  2.5-3.5  2.5-3.5   Trend Same  Up  Up  Up   Last Week Total 56 mg  56 mg  60 mg  64 mg   Next Week Total 58 mg  60  mg  64 mg  66 mg   Sun 8 mg  8 mg  8 mg  10 mg   Mon 10 mg (9/23); Otherwise 8 mg  10 mg  10 mg  10 mg   Tue 8 mg  8 mg  8 mg  8 mg   Wed 8 mg  8 mg  10 mg  10 mg   Thu 8 mg  8 mg  10 mg (10/17)  10 mg   Fri 8 mg  10 mg  10 mg  10 mg   Sat 8 mg  8 mg  8 mg  8 mg   Historical INR  1.90      2.20      2.30  C          C Corrected result    This result is from an external source.       Plan:  1. INR is Subtherapeutic today- see above in Anticoagulation Summary.   Will instruct Man Ramsey to Increase their warfarin regimen to 8 mg Tues/Sat, 10 mg all other days- see above in Anticoagulation Summary.  2. Follow up in 1 week.  3. They have been instructed to call if any changes in medications, doses, concerns, etc. Patient expresses understanding and has no further questions at this time.    Neo Cornejo, PharmD

## 2024-10-31 ENCOUNTER — ANTICOAGULATION VISIT (OUTPATIENT)
Dept: PHARMACY | Facility: HOSPITAL | Age: 44
End: 2024-10-31
Payer: COMMERCIAL

## 2024-10-31 DIAGNOSIS — Z79.01 LONG TERM (CURRENT) USE OF ANTICOAGULANTS: ICD-10-CM

## 2024-10-31 DIAGNOSIS — Z95.2 HISTORY OF MECHANICAL AORTIC VALVE REPLACEMENT: Primary | ICD-10-CM

## 2024-10-31 LAB — INR PPP: 2.4

## 2024-10-31 NOTE — PROGRESS NOTES
Anticoagulation Clinic Progress Note    Anticoagulation Summary  As of 10/31/2024      INR goal:  2.5-3.5   TTR:  77.3% (5.9 y)   INR used for dosin.40 (10/31/2024)   Warfarin maintenance plan:  10 mg every day   Weekly warfarin total:  70 mg   Plan last modified:  Neo Cornejo, PharmD (10/31/2024)   Next INR check:  2024   Priority:  High   Target end date:  Indefinite    Indications    History of mechanical aortic valve replacement [Z95.2]  Long term (current) use of anticoagulants [Z79.01]                 Anticoagulation Episode Summary       INR check location:  Home Draw    Preferred lab:  --    Send INR reminders to:   [facility]  [facility]    Comments:  **[facility]**          Anticoagulation Care Providers       Provider Role Specialty Phone number    Lorne Kam MD Referring Cardiology [phone]            Clinic Interview:  Patient Findings     Negatives:  Signs/symptoms of thrombosis, Signs/symptoms of bleeding,   Laboratory test error suspected, Change in health, Change in alcohol use,   Change in activity, Upcoming invasive procedure, Emergency department   visit, Upcoming dental procedure, Missed doses, Extra doses, Change in   medications, Change in diet/appetite, Hospital admission, Bruising, Other   complaints      Clinical Outcomes     Negatives:  Major bleeding event, Thromboembolic event,   Anticoagulation-related hospital admission, Anticoagulation-related ED   visit, Anticoagulation-related fatality        INR History:      10/7/2024     9:15 AM 10/17/2024    12:00 AM 10/17/2024    11:37 AM 10/24/2024    12:00 AM 10/24/2024     2:41 PM 10/31/2024    12:00 AM 10/31/2024     2:08 PM   Anticoagulation Monitoring   INR 1.90  2.20  2.30  2.40   INR Date 10/4/2024  10/17/2024  10/24/2024  10/31/2024   INR Goal 2.5-3.5  2.5-3.5  2.5-3.5  2.5-3.5   Trend Up  Up  Up  Up   Last Week Total 56 mg  60 mg  64 mg  66 mg   Next Week Total 60 mg  64 mg  66 mg  70 mg   Sun 8  mg  8 mg  10 mg  10 mg   Mon 10 mg  10 mg  10 mg  10 mg   Tue 8 mg  8 mg  8 mg  10 mg   Wed 8 mg  10 mg  10 mg  10 mg   Thu 8 mg  10 mg (10/17)  10 mg  10 mg   Fri 10 mg  10 mg  10 mg  10 mg   Sat 8 mg  8 mg  8 mg  10 mg   Historical INR  2.20      2.30  C     2.40           C Corrected result    This result is from an external source.       Plan:  1. INR is Subtherapeutic today- see above in Anticoagulation Summary.   Will instruct Man Ramsey to Increase their warfarin regimen to 10 mg daily - see above in Anticoagulation Summary.  2. Follow up in 1 week.  3. They have been instructed to call if any changes in medications, doses, concerns, etc. Patient expresses understanding and has no further questions at this time.    Neo Cornejo, PharmD

## 2024-11-07 LAB — INR PPP: 2.9

## 2024-11-08 ENCOUNTER — ANTICOAGULATION VISIT (OUTPATIENT)
Dept: PHARMACY | Facility: HOSPITAL | Age: 44
End: 2024-11-08
Payer: COMMERCIAL

## 2024-11-08 DIAGNOSIS — Z95.2 HISTORY OF MECHANICAL AORTIC VALVE REPLACEMENT: Primary | ICD-10-CM

## 2024-11-08 DIAGNOSIS — Z79.01 LONG TERM (CURRENT) USE OF ANTICOAGULANTS: ICD-10-CM

## 2024-11-08 NOTE — PROGRESS NOTES
Anticoagulation Clinic Progress Note    Anticoagulation Summary  As of 2024      INR goal:  2.5-3.5   TTR:  77.3% (5.9 y)   INR used for dosin.90 (2024)   Warfarin maintenance plan:  10 mg every day   Weekly warfarin total:  70 mg   No change documented:  Mona Jones, PharmD   Plan last modified:  Neo Cornejo PharmD (10/31/2024)   Next INR check:  2024   Priority:  High   Target end date:  Indefinite    Indications    History of mechanical aortic valve replacement [Z95.2]  Long term (current) use of anticoagulants [Z79.01]                 Anticoagulation Episode Summary       INR check location:  Home Draw    Preferred lab:  --    Send INR reminders to:   [facility]  [facility]    Comments:  [facility]**          Anticoagulation Care Providers       Provider Role Specialty Phone number    Lorne Kam MD Referring Cardiology [phone]            Clinic Interview:  No pertinent clinical findings have been reported.    INR History:      10/17/2024    11:37 AM 10/24/2024    12:00 AM 10/24/2024     2:41 PM 10/31/2024    12:00 AM 10/31/2024     2:08 PM 2024    12:00 AM 2024     8:50 AM   Anticoagulation Monitoring   INR 2.20  2.30  2.40  2.90   INR Date 10/17/2024  10/24/2024  10/31/2024  2024   INR Goal 2.5-3.5  2.5-3.5  2.5-3.5  2.5-3.5   Trend Up  Up  Up  Same   Last Week Total 60 mg  64 mg  66 mg  70 mg   Next Week Total 64 mg  66 mg  70 mg  70 mg   Sun 8 mg  10 mg  10 mg  10 mg   Mon 10 mg  10 mg  10 mg  10 mg   Tue 8 mg  8 mg  10 mg  10 mg   Wed 10 mg  10 mg  10 mg  10 mg   Thu 10 mg (10/17)  10 mg  10 mg  -   Fri 10 mg  10 mg  10 mg  10 mg   Sat 8 mg  8 mg  10 mg  10 mg   Historical INR  2.30  C     2.40      2.90           C Corrected result    This result is from an external source.       Plan:  1. INR is therapeutic today- see above in Anticoagulation Summary.    Man Ramsey to continue their warfarin regimen- see above in Anticoagulation  Summary.  2. Follow up in 1 week  3. Pt has agreed to only be called if INR out of range. They have been instructed to call if any changes in medications, doses, concerns, etc. Patient expresses understanding and has no further questions at this time.    Mona Jones, PharmD

## 2024-11-15 ENCOUNTER — ANTICOAGULATION VISIT (OUTPATIENT)
Dept: PHARMACY | Facility: HOSPITAL | Age: 44
End: 2024-11-15
Payer: COMMERCIAL

## 2024-11-15 DIAGNOSIS — Z79.01 LONG TERM (CURRENT) USE OF ANTICOAGULANTS: ICD-10-CM

## 2024-11-15 DIAGNOSIS — Z95.2 HISTORY OF MECHANICAL AORTIC VALVE REPLACEMENT: Primary | ICD-10-CM

## 2024-11-15 LAB — INR PPP: 3

## 2024-11-15 NOTE — PROGRESS NOTES
Anticoagulation Clinic Progress Note    Anticoagulation Summary  As of 11/15/2024      INR goal:  2.5-3.5   TTR:  77.4% (5.9 y)   INR used for dosing:  3.00 (11/15/2024)   Warfarin maintenance plan:  10 mg every day   Weekly warfarin total:  70 mg   No change documented:  Sun Curtis   Plan last modified:  Neo Cornejo, PharmD (10/31/2024)   Next INR check:  11/29/2024   Priority:  High   Target end date:  Indefinite    Indications    History of mechanical aortic valve replacement [Z95.2]  Long term (current) use of anticoagulants [Z79.01]                 Anticoagulation Episode Summary       INR check location:  Home Draw    Preferred lab:  --    Send INR reminders to:   [facility]  [facility]    Comments:  **[facility]**          Anticoagulation Care Providers       Provider Role Specialty Phone number    Lorne Kam MD Referring Cardiology [phone]            Clinic Interview:  No pertinent clinical findings have been reported.    INR History:      10/24/2024     2:41 PM 10/31/2024    12:00 AM 10/31/2024     2:08 PM 11/7/2024    12:00 AM 11/8/2024     8:50 AM 11/15/2024    12:00 AM 11/15/2024     1:31 PM   Anticoagulation Monitoring   INR 2.30  2.40  2.90  3.00   INR Date 10/24/2024  10/31/2024  11/7/2024  11/15/2024   INR Goal 2.5-3.5  2.5-3.5  2.5-3.5  2.5-3.5   Trend Up  Up  Same  Same   Last Week Total 64 mg  66 mg  70 mg  70 mg   Next Week Total 66 mg  70 mg  70 mg  70 mg   Sun 10 mg  10 mg  10 mg  10 mg   Mon 10 mg  10 mg  10 mg  10 mg   Tue 8 mg  10 mg  10 mg  10 mg   Wed 10 mg  10 mg  10 mg  10 mg   Thu 10 mg  10 mg  -  10 mg   Fri 10 mg  10 mg  10 mg  10 mg   Sat 8 mg  10 mg  10 mg  10 mg   Historical INR  2.40      2.90      3.00            This result is from an external source.       Plan:  1. INR is therapeutic today- see above in Anticoagulation Summary.    Man Ramsey to continue their warfarin regimen- see above in Anticoagulation Summary.  2. Follow up  in 2 weeks  3. Pt has agreed to only be called if INR out of range. They have been instructed to call if any changes in medications, doses, concerns, etc. Patient expresses understanding and has no further questions at this time.    Sun Curtis

## 2024-11-18 ENCOUNTER — OFFICE VISIT (OUTPATIENT)
Dept: SLEEP MEDICINE | Facility: HOSPITAL | Age: 44
End: 2024-11-18
Payer: COMMERCIAL

## 2024-11-18 ENCOUNTER — TELEPHONE (OUTPATIENT)
Dept: SLEEP MEDICINE | Facility: HOSPITAL | Age: 44
End: 2024-11-18
Payer: COMMERCIAL

## 2024-11-18 VITALS
HEIGHT: 71 IN | WEIGHT: 264 LBS | HEART RATE: 66 BPM | DIASTOLIC BLOOD PRESSURE: 86 MMHG | SYSTOLIC BLOOD PRESSURE: 135 MMHG | BODY MASS INDEX: 36.96 KG/M2 | OXYGEN SATURATION: 97 %

## 2024-11-18 DIAGNOSIS — G47.33 OBSTRUCTIVE SLEEP APNEA: Primary | ICD-10-CM

## 2024-11-18 DIAGNOSIS — E66.9 OBESITY (BMI 30-39.9): ICD-10-CM

## 2024-11-18 DIAGNOSIS — I42.9 CARDIOMYOPATHY, UNSPECIFIED TYPE: ICD-10-CM

## 2024-11-18 DIAGNOSIS — G47.34 NOCTURNAL HYPOXIA: ICD-10-CM

## 2024-11-18 PROCEDURE — G0463 HOSPITAL OUTPT CLINIC VISIT: HCPCS

## 2024-11-18 NOTE — PROGRESS NOTES
[facility]  Telephone: [phone] / Fax: [phone] [place]  Telephone: [phone] / Fax: [phone] Carmela Krause    PCP: Orlando Mcguire MD    Reason for visit: SENA f/u    Man Ramsey is a 44 y.o.male  was last seen at [facility] sleep lab in November 2023. His device pressure set at 8-20 cm H2O, he feels that pressures may need to go up some.His sleep schedule is 8pm-12am-5:45am-8am. ESS is 5. CPAP machine controls the snoring and apneas.    Interval history reviewed. He was diagnosed with DM2 recently.  He has been experiencing some liver issues and pending MRI. He stopped drinking completely.  He has been seeing a therapist for anxiety.    SH- no tobacco, drinks 8-10 alc per week, 4 caffeine per day.    ROS- +anxiety, +depression    DME [facility]    Current Medications:    Current Outpatient Medications:     amoxicillin (AMOXIL) 500 MG capsule, TAKE FOUR CAPSULES BY MOUTH ONE HOUR PRIOR TO DENTAL APPOINTMENT (Patient not taking: Reported on 9/12/2024), Disp: , Rfl:     CALCIUM PO, Take  by mouth., Disp: , Rfl:     carvedilol (COREG) 12.5 MG tablet, Take 1 tablet by mouth 2 (Two) Times a Day., Disp: 180 tablet, Rfl: 3    Docusate Calcium (STOOL SOFTENER PO), Take  by mouth., Disp: , Rfl:     fexofenadine (ALLEGRA) 180 MG tablet, Take 1 tablet by mouth Daily. (Patient not taking: Reported on 9/12/2024), Disp: , Rfl:     Hydrocortisone Ace-Pramoxine (Analpram-HC) 1-1 % rectal cream, Insert  into the rectum 2 (Two) Times a Day. (Patient not taking: Reported on 9/4/2024), Disp: 28.4 g, Rfl: 1    lisinopril (PRINIVIL,ZESTRIL) 20 MG tablet, Take 1 tablet by mouth Daily., Disp: 90 tablet, Rfl: 3    multivitamin with minerals tablet tablet, Take 1 tablet by mouth Daily., Disp: , Rfl:     Omega-3 Fatty Acids (FISH OIL) 1000 MG capsule capsule, Take  by mouth Daily With Breakfast., Disp: , Rfl:     Psyllium (METAMUCIL PO), Take  by mouth 3 (Three) Times a Day., Disp: , Rfl:      "rosuvastatin (CRESTOR) 20 MG tablet, Take 1 tablet by mouth Every Night. (Patient not taking: Reported on 9/12/2024), Disp: 90 tablet, Rfl: 3    vitamin C (ASCORBIC ACID) 250 MG tablet, Take 2 tablets by mouth Daily., Disp: , Rfl:     warfarin (COUMADIN) 2 MG tablet, TAKE 4 TABLETS BY MOUTH ONCE DAILY AS DIRECTED, Disp: 360 tablet, Rfl: 0   also entered in Sleep Questionnaire    Patient  has a past medical history of Aortic insufficiency due to bicuspid aortic valve, BRBPR (bright red blood per rectum) (10/06/2021), Clotting disorder (Nov 12 2019), Colon polyps, Coronary artery disease (Map 2012), Diabetes mellitus (9/2024), Fever of unknown origin (08/2012), First degree AV block (08/31/2021), Hematochezia (11/14/2019), Hemorrhoids, History of blood transfusion, Hyperglycemia, Hypertension, Laceration of ankle (05/24/2010), Long term (current) use of anticoagulants, Mixed hyperlipidemia, Nonischemic cardiomyopathy, Obesity, Ocular migraine, and Sleep apnea (07/2022).    I have reviewed the Past Medical History, Past Surgical History, Social History and Family History.    Vital Signs /86   Pulse 66   Ht 180.3 cm (70.98\")   Wt 120 kg (264 lb)   SpO2 97%   BMI 36.84 kg/m²  Body mass index is 36.84 kg/m².    General Alert and oriented. No acute distress noted   Pharynx/Throat Class II  Mallampati airway, large tongue, no evidence of redundant lateral pharyngeal tissue. No oral lesions. No thrush. Moist mucous membranes.   Head Normocephalic. Symmetrical. Atraumatic.    Nose No septal deviation. No drainage   Chest Wall Normal shape. Symmetric expansion with respiration. No tenderness.   Neck Trachea midline, no thyromegaly or adenopathy    Lungs Clear to auscultation bilaterally. No wheezes. No rhonchi. No rales. Respirations regular, even and unlabored.   Heart Regular rhythm and normal rate. Normal S1 and S2. No murmur   Abdomen Soft, non-tender and non-distended. Normal bowel sounds. No masses. "   Extremities Moves all extremities well. No edema   Psychiatric Normal mood and affect.     Testing:  Download last 90 day use average nightly use of 8 hours and 6 minutes on auto CPAP 8-20cm H2O, avg pressure is 14.3cm H2O, AHI is 0.7/hr.    Study-Diagnostic data available to date is as below and was reviewed on current visit:  Study-Respiratory events: July 2022    # of events Events/hr    Central apneas        0  0 /hr    Obstructive apneas        373  62.3 hr    Hypopneas        198  33.1 /hr   AHI, apnea hypopnea index  571      95.4 /hr      RDI (RDI= AHI+RERA's)     96.2 /hr       Impression:  1. Obstructive sleep apnea    2. Nocturnal hypoxia -corrected with CPAP   3. Cardiomyopathy, unspecified type    4. Obesity (BMI 30-39.9)          Plan:  Patient uses the CPAP device and benefits from its use in terms of reduction of hypersomnia and snoring.  I recommended raising the pressures to 10-20cm H2O. AHI appears to be within adequate range.  He will call us if pressures feel insufficient.    I reviewed download report and original sleep study report with the patient.  Average pressure is 14.3cm H2O. Pre teratment AHI was 95/hr and AHI on treatment is 0.7/hr. I advised pt to contact us if PAP pressures feel excessive or insufficient.    Weight loss will be strongly beneficial to reduce the severity of sleep-disordered breathing.      Follow up with Dr. Giles in one year    Thank you for allowing me to participate in your patient's care.      ZOË Moreno  [facility]  Phone: [phone]      Part of this note may be an electronic transcription/translation of spoken language to printed text using the Dragon Dictation System.

## 2024-11-22 ENCOUNTER — ANTICOAGULATION VISIT (OUTPATIENT)
Dept: PHARMACY | Facility: HOSPITAL | Age: 44
End: 2024-11-22
Payer: COMMERCIAL

## 2024-11-22 DIAGNOSIS — Z79.01 LONG TERM (CURRENT) USE OF ANTICOAGULANTS: ICD-10-CM

## 2024-11-22 DIAGNOSIS — Z95.2 HISTORY OF MECHANICAL AORTIC VALVE REPLACEMENT: Primary | ICD-10-CM

## 2024-11-22 LAB — INR PPP: 2.8

## 2024-11-22 NOTE — PROGRESS NOTES
Anticoagulation Clinic Progress Note    Anticoagulation Summary  As of 2024      INR goal:  2.5-3.5   TTR:  77.5% (5.9 y)   INR used for dosin.80 (2024)   Warfarin maintenance plan:  10 mg every day   Weekly warfarin total:  70 mg   No change documented:  Sun Curtis   Plan last modified:  Neo Cornejo, PharmD (10/31/2024)   Next INR check:  2024   Priority:  High   Target end date:  Indefinite    Indications    History of mechanical aortic valve replacement [Z95.2]  Long term (current) use of anticoagulants [Z79.01]                 Anticoagulation Episode Summary       INR check location:  Home Draw    Preferred lab:  --    Send INR reminders to:   [facility]SE [facility]CHU[facility]  POOL    Comments:  **[facility]**          Anticoagulation Care Providers       Provider Role Specialty Phone number    Lorne Kam MD Referring Cardiology [phone]            Clinic Interview:  No pertinent clinical findings have been reported.    INR History:      10/31/2024     2:08 PM 2024    12:00 AM 2024     8:50 AM 11/15/2024    12:00 AM 11/15/2024     1:31 PM 2024    12:00 AM 2024     1:11 PM   Anticoagulation Monitoring   INR 2.40  2.90  3.00  2.80   INR Date 10/31/2024  2024  11/15/2024  2024   INR Goal 2.5-3.5  2.5-3.5  2.5-3.5  2.5-3.5   Trend Up  Same  Same  Same   Last Week Total 66 mg  70 mg  70 mg  70 mg   Next Week Total 70 mg  70 mg  70 mg  70 mg   Sun 10 mg  10 mg  10 mg  10 mg   Mon 10 mg  10 mg  10 mg  10 mg   Tue 10 mg  10 mg  10 mg  10 mg   Wed 10 mg  10 mg  10 mg  10 mg   Thu 10 mg  -  10 mg  10 mg   Fri 10 mg  10 mg  10 mg  10 mg   Sat 10 mg  10 mg  10 mg  10 mg   Historical INR  2.90      3.00      2.80            This result is from an external source.       Plan:  1. INR is therapeutic today- see above in Anticoagulation Summary.    Man Ramsey to continue their warfarin regimen- see above in Anticoagulation Summary.  2. Follow  up in 2 weeks  3. Pt has agreed to only be called if INR out of range. They have been instructed to call if any changes in medications, doses, concerns, etc. Patient expresses understanding and has no further questions at this time.    Sun Curtis

## 2024-11-27 ENCOUNTER — HOSPITAL ENCOUNTER (OUTPATIENT)
Dept: MRI IMAGING | Facility: HOSPITAL | Age: 44
Discharge: HOME | End: 2024-11-27
Admitting: INTERNAL MEDICINE
Payer: COMMERCIAL

## 2024-11-27 DIAGNOSIS — K76.9 LESION OF LIVER: ICD-10-CM

## 2024-11-27 PROCEDURE — 25510000002 GADOBENATE DIMEGLUMINE 529 MG/ML SOLUTION: Performed by: INTERNAL MEDICINE

## 2024-11-27 PROCEDURE — 74183 MRI ABD W/O CNTR FLWD CNTR: CPT

## 2024-11-27 PROCEDURE — A9577 INJ MULTIHANCE: HCPCS | Performed by: INTERNAL MEDICINE

## 2024-11-27 RX ADMIN — GADOBENATE DIMEGLUMINE 20 ML: 529 INJECTION, SOLUTION INTRAVENOUS at 07:19

## 2024-11-29 LAB — INR PPP: 3

## 2024-12-02 ENCOUNTER — ANTICOAGULATION VISIT (OUTPATIENT)
Dept: PHARMACY | Facility: HOSPITAL | Age: 44
End: 2024-12-02
Payer: COMMERCIAL

## 2024-12-02 DIAGNOSIS — Z95.2 HISTORY OF MECHANICAL AORTIC VALVE REPLACEMENT: Primary | ICD-10-CM

## 2024-12-02 DIAGNOSIS — Z79.01 LONG TERM (CURRENT) USE OF ANTICOAGULANTS: ICD-10-CM

## 2024-12-02 NOTE — PROGRESS NOTES
Anticoagulation Clinic Progress Note    Anticoagulation Summary  As of 12/2/2024      INR goal:  2.5-3.5   TTR:  77.6% (6 y)   INR used for dosing:  3.00 (11/29/2024)   Warfarin maintenance plan:  10 mg every day   Weekly warfarin total:  70 mg   No change documented:  Sun Curtis   Plan last modified:  Neo Cornejo, PharmD (10/31/2024)   Next INR check:  12/13/2024   Priority:  High   Target end date:  Indefinite    Indications    History of mechanical aortic valve replacement [Z95.2]  Long term (current) use of anticoagulants [Z79.01]                 Anticoagulation Episode Summary       INR check location:  Home Draw    Preferred lab:  --    Send INR reminders to:   [facility]SE [facility]  POOL    Comments:  **[facility]**          Anticoagulation Care Providers       Provider Role Specialty Phone number    Lorne Kam MD Referring Cardiology [phone]            Clinic Interview:  No pertinent clinical findings have been reported.    INR History:      11/8/2024     8:50 AM 11/15/2024    12:00 AM 11/15/2024     1:31 PM 11/22/2024    12:00 AM 11/22/2024     1:11 PM 11/29/2024    12:00 AM 12/2/2024    11:45 AM   Anticoagulation Monitoring   INR 2.90  3.00  2.80  3.00   INR Date 11/7/2024  11/15/2024  11/22/2024  11/29/2024   INR Goal 2.5-3.5  2.5-3.5  2.5-3.5  2.5-3.5   Trend Same  Same  Same  Same   Last Week Total 70 mg  70 mg  70 mg  70 mg   Next Week Total 70 mg  70 mg  70 mg  70 mg   Sun 10 mg  10 mg  10 mg  10 mg   Mon 10 mg  10 mg  10 mg  10 mg   Tue 10 mg  10 mg  10 mg  10 mg   Wed 10 mg  10 mg  10 mg  10 mg   Thu -  10 mg  10 mg  10 mg   Fri 10 mg  10 mg  10 mg  10 mg   Sat 10 mg  10 mg  10 mg  10 mg   Historical INR  3.00      2.80      3.00            This result is from an external source.       Plan:  1. INR is therapeutic today- see above in Anticoagulation Summary.    Man Ramsey to continue their warfarin regimen- see above in Anticoagulation Summary.  2. Follow  up in 2 weeks  3. Pt has agreed to only be called if INR out of range. They have been instructed to call if any changes in medications, doses, concerns, etc. Patient expresses understanding and has no further questions at this time.    Sun Curtis

## 2024-12-06 ENCOUNTER — ANTICOAGULATION VISIT (OUTPATIENT)
Dept: PHARMACY | Facility: HOSPITAL | Age: 44
End: 2024-12-06
Payer: COMMERCIAL

## 2024-12-06 DIAGNOSIS — Z79.01 LONG TERM (CURRENT) USE OF ANTICOAGULANTS: ICD-10-CM

## 2024-12-06 DIAGNOSIS — Z95.2 HISTORY OF MECHANICAL AORTIC VALVE REPLACEMENT: Primary | ICD-10-CM

## 2024-12-06 LAB — INR PPP: 3

## 2024-12-06 NOTE — PROGRESS NOTES
Anticoagulation Clinic Progress Note    Anticoagulation Summary  As of 12/6/2024      INR goal:  2.5-3.5   TTR:  77.6% (6 y)   INR used for dosing:  3.00 (12/6/2024)   Warfarin maintenance plan:  10 mg every day   Weekly warfarin total:  70 mg   No change documented:  Sun Curtis   Plan last modified:  Neo Cornejo, PharmD (10/31/2024)   Next INR check:  12/20/2024   Priority:  High   Target end date:  Indefinite    Indications    History of mechanical aortic valve replacement [Z95.2]  Long term (current) use of anticoagulants [Z79.01]                 Anticoagulation Episode Summary       INR check location:  Home Draw    Preferred lab:  --    Send INR reminders to:   [facility]SE [facility]  POOL    Comments:  **[facility]**          Anticoagulation Care Providers       Provider Role Specialty Phone number    Lorne Kam MD Referring Cardiology [phone]            Clinic Interview:  No pertinent clinical findings have been reported.    INR History:      11/15/2024     1:31 PM 11/22/2024    12:00 AM 11/22/2024     1:11 PM 11/29/2024    12:00 AM 12/2/2024    11:45 AM 12/6/2024    12:00 AM 12/6/2024    11:23 AM   Anticoagulation Monitoring   INR 3.00  2.80  3.00  3.00   INR Date 11/15/2024  11/22/2024  11/29/2024  12/6/2024   INR Goal 2.5-3.5  2.5-3.5  2.5-3.5  2.5-3.5   Trend Same  Same  Same  Same   Last Week Total 70 mg  70 mg  70 mg  70 mg   Next Week Total 70 mg  70 mg  70 mg  70 mg   Sun 10 mg  10 mg  10 mg  10 mg   Mon 10 mg  10 mg  10 mg  10 mg   Tue 10 mg  10 mg  10 mg  10 mg   Wed 10 mg  10 mg  10 mg  10 mg   Thu 10 mg  10 mg  10 mg  10 mg   Fri 10 mg  10 mg  10 mg  10 mg   Sat 10 mg  10 mg  10 mg  10 mg   Historical INR  2.80      3.00      3.00            This result is from an external source.       Plan:  1. INR is therapeutic today- see above in Anticoagulation Summary.    Man Ramsey to continue their warfarin regimen- see above in Anticoagulation Summary.  2. Follow  up in 2 weeks  3. Pt has agreed to only be called if INR out of range. They have been instructed to call if any changes in medications, doses, concerns, etc. Patient expresses understanding and has no further questions at this time.    Sun Curtis

## 2024-12-09 RX ORDER — WARFARIN SODIUM 2 MG/1
TABLET ORAL
Qty: 360 TABLET | Refills: 0 | Status: SHIPPED | OUTPATIENT
Start: 2024-12-09

## 2024-12-12 ENCOUNTER — OFFICE VISIT (OUTPATIENT)
Dept: FAMILY MEDICINE CLINIC | Facility: CLINIC | Age: 44
End: 2024-12-12
Payer: COMMERCIAL

## 2024-12-12 VITALS
WEIGHT: 261.2 LBS | RESPIRATION RATE: 18 BRPM | OXYGEN SATURATION: 98 % | BODY MASS INDEX: 36.57 KG/M2 | HEIGHT: 71 IN | HEART RATE: 75 BPM | DIASTOLIC BLOOD PRESSURE: 82 MMHG | SYSTOLIC BLOOD PRESSURE: 144 MMHG

## 2024-12-12 DIAGNOSIS — R74.01 ELEVATED TRANSAMINASE LEVEL: ICD-10-CM

## 2024-12-12 DIAGNOSIS — R73.03 PREDIABETES: ICD-10-CM

## 2024-12-12 DIAGNOSIS — K76.9 LESION OF LIVER: Primary | ICD-10-CM

## 2024-12-12 LAB
ALBUMIN SERPL-MCNC: 4.8 G/DL (ref 3.5–5.2)
ALBUMIN/GLOB SERPL: 1.8 G/DL
ALP SERPL-CCNC: 91 U/L (ref 39–117)
ALT SERPL-CCNC: 53 U/L (ref 1–41)
AST SERPL-CCNC: 30 U/L (ref 1–40)
BILIRUB SERPL-MCNC: 0.4 MG/DL (ref 0–1.2)
BUN SERPL-MCNC: 17 MG/DL (ref 6–20)
BUN/CREAT SERPL: 16.2 (ref 7–25)
CALCIUM SERPL-MCNC: 9.7 MG/DL (ref 8.6–10.5)
CHLORIDE SERPL-SCNC: 102 MMOL/L (ref 98–107)
CO2 SERPL-SCNC: 26.9 MMOL/L (ref 22–29)
CREAT SERPL-MCNC: 1.05 MG/DL (ref 0.76–1.27)
EGFRCR SERPLBLD CKD-EPI 2021: 89.2 ML/MIN/1.73
GGT SERPL-CCNC: 72 U/L (ref 8–61)
GLOBULIN SER CALC-MCNC: 2.7 GM/DL
GLUCOSE SERPL-MCNC: 100 MG/DL (ref 65–99)
HBA1C MFR BLD: 5.5 % (ref 4.8–5.6)
POTASSIUM SERPL-SCNC: 4.4 MMOL/L (ref 3.5–5.2)
PROT SERPL-MCNC: 7.5 G/DL (ref 6–8.5)
SODIUM SERPL-SCNC: 138 MMOL/L (ref 136–145)

## 2024-12-12 RX ORDER — HYDROCORTISONE ACETATE PRAMOXINE HCL 1; 1 G/100G; G/100G
CREAM TOPICAL 2 TIMES DAILY
Qty: 28.4 G | Refills: 1 | Status: SHIPPED | OUTPATIENT
Start: 2024-12-12

## 2024-12-12 NOTE — PROGRESS NOTES
Answers submitted by the patient for this visit:  Primary Reason for Visit (Submitted on 12/11/2024)  What is the primary reason for your visit?: Diabetes  Subjective chief complaint is follow-up on labs but also with number of other issues  Man Ramsey is a 44 y.o. male.     History of Present Illness Man is here today for follow-up.  At his last visit his transaminases were higher than previous.  We did do an MRI scan of his abdomen and the really not clear pictures of the liver lesion that he had.  Some was due to motion artifact and some due to diaphragmatic artifact from heart motion.  Also since his last visit he did see the clinical psychologist.  He was diagnosed with major depression and anxiety disorder as well as autism spectrum disorder and attention deficit issues.  We did discuss treatment options.  He reports that the clinical psychologist recommended working on the depression and anxiety first and seeing if that would help some of the attention issues.  We did discuss that medicine such as the SSRIs with target both the depression and anxiety as opposed to using something such as Wellbutrin and then an anxiety medicine.  We did discuss however that if ADD medicines are required they do not interact well sometimes with the SSRI medications.  We did discuss sertraline and its side effects and gave him a handout regarding that.  I did advise him to start with a half of a tablet daily for the first 6 doses and then go to a whole tablet.  Will see him back in about a month and see how he is doing with that.  Also at his last visit his A1c did progress into the diabetic range.  The following portions of the patient's history were reviewed and updated as appropriate: allergies, current medications, and problem list.    Review of Systems   Endocrine: Positive for polydipsia and polyuria.   Neurological:  Positive for tremors and headaches. Negative for speech difficulty.   Psychiatric/Behavioral:   Negative for confusion.      Objective   Physical Exam  Vitals and nursing note reviewed.   Constitutional:       Appearance: Normal appearance.   Neck:      Vascular: No carotid bruit.   Cardiovascular:      Rate and Rhythm: Normal rate and regular rhythm.      Comments: There is a mechanical click  Pulmonary:      Effort: Pulmonary effort is normal.      Breath sounds: No wheezing, rhonchi or rales.   Neurological:      Mental Status: He is alert.   Psychiatric:         Attention and Perception: Attention normal.         Mood and Affect: Affect is blunt.         Speech: Speech normal.         Behavior: Behavior normal. Behavior is cooperative.     Assessment & Plan   Diagnoses and all orders for this visit:    1. Lesion of liver (Primary)  -     Comprehensive Metabolic Panel  -     Gamma GT    2. Elevated transaminase level  -     MRI abdomen w wo contrast; Future    3. Prediabetes  -     Comprehensive Metabolic Panel  -     Hemoglobin A1c    Other orders  -     sertraline (ZOLOFT) 50 MG tablet; Take 1 tablet by mouth Daily.  Dispense: 30 tablet; Refill: 5  -     Hydrocortisone Ace-Pramoxine (Analpram-HC) 1-1 % rectal cream; Insert  into the rectum 2 (Two) Times a Day.  Dispense: 28.4 g; Refill: 1    Man is here today for a number of issues.  We are going to set up a 6-month follow-up scan for his liver.  We did initiate some sertraline and he will try the half a tablet daily for the first 6 doses and then go to a whole tablet.  We advised him to call if he has any issues with the medicine.

## 2024-12-13 ENCOUNTER — ANTICOAGULATION VISIT (OUTPATIENT)
Dept: PHARMACY | Facility: HOSPITAL | Age: 44
End: 2024-12-13
Payer: COMMERCIAL

## 2024-12-13 DIAGNOSIS — Z95.2 HISTORY OF MECHANICAL AORTIC VALVE REPLACEMENT: Primary | ICD-10-CM

## 2024-12-13 DIAGNOSIS — Z79.01 LONG TERM (CURRENT) USE OF ANTICOAGULANTS: ICD-10-CM

## 2024-12-13 LAB — INR PPP: 2.8

## 2024-12-13 NOTE — PROGRESS NOTES
Anticoagulation Clinic Progress Note    Anticoagulation Summary  As of 2024      INR goal:  2.5-3.5   TTR:  77.7% (6 y)   INR used for dosin.80 (2024)   Warfarin maintenance plan:  10 mg every day   Weekly warfarin total:  70 mg   No change documented:  Sun Curtis   Plan last modified:  Neo Cornejo, PharmD (10/31/2024)   Next INR check:  2024   Priority:  High   Target end date:  Indefinite    Indications    History of mechanical aortic valve replacement [Z95.2]  Long term (current) use of anticoagulants [Z79.01]                 Anticoagulation Episode Summary       INR check location:  Home Draw    Preferred lab:  --    Send INR reminders to:   [facility]  [facility]    Comments:  **[facility]**          Anticoagulation Care Providers       Provider Role Specialty Phone number    Lorne Kam MD Referring Cardiology [phone]            Clinic Interview:  No pertinent clinical findings have been reported.    INR History:      2024     1:11 PM 2024    12:00 AM 2024    11:45 AM 2024    12:00 AM 2024    11:23 AM 2024    12:00 AM 2024     2:00 PM   Anticoagulation Monitoring   INR 2.80  3.00  3.00  2.80   INR Date 2024   INR Goal 2.5-3.5  2.5-3.5  2.5-3.5  2.5-3.5   Trend Same  Same  Same  Same   Last Week Total 70 mg  70 mg  70 mg  70 mg   Next Week Total 70 mg  70 mg  70 mg  70 mg   Sun 10 mg  10 mg  10 mg  10 mg   Mon 10 mg  10 mg  10 mg  10 mg   Tue 10 mg  10 mg  10 mg  10 mg   Wed 10 mg  10 mg  10 mg  10 mg   Thu 10 mg  10 mg  10 mg  10 mg   Fri 10 mg  10 mg  10 mg  10 mg   Sat 10 mg  10 mg  10 mg  10 mg   Historical INR  3.00      3.00      2.80            This result is from an external source.       Plan:  1. INR is therapeutic today- see above in Anticoagulation Summary.    Man Ramsey to continue their warfarin regimen- see above in Anticoagulation Summary.  2.  Follow up in 2 weeks  3. Pt has agreed to only be called if INR out of range. They have been instructed to call if any changes in medications, doses, concerns, etc. Patient expresses understanding and has no further questions at this time.    Sun Curtis

## 2024-12-20 ENCOUNTER — ANTICOAGULATION VISIT (OUTPATIENT)
Dept: PHARMACY | Facility: HOSPITAL | Age: 44
End: 2024-12-20
Payer: COMMERCIAL

## 2024-12-20 DIAGNOSIS — Z79.01 LONG TERM (CURRENT) USE OF ANTICOAGULANTS: ICD-10-CM

## 2024-12-20 DIAGNOSIS — Z95.2 HISTORY OF MECHANICAL AORTIC VALVE REPLACEMENT: Primary | ICD-10-CM

## 2024-12-20 LAB — INR PPP: 3.1

## 2024-12-20 NOTE — PROGRESS NOTES
Anticoagulation Clinic Progress Note    Anticoagulation Summary  As of 12/20/2024      INR goal:  2.5-3.5   TTR:  77.8% (6 y)   INR used for dosing:  3.10 (12/20/2024)   Warfarin maintenance plan:  10 mg every day   Weekly warfarin total:  70 mg   No change documented:  Sun Curtis   Plan last modified:  Neo Cornejo, PharmD (10/31/2024)   Next INR check:  1/3/2025   Priority:  High   Target end date:  Indefinite    Indications    History of mechanical aortic valve replacement [Z95.2]  Long term (current) use of anticoagulants [Z79.01]                 Anticoagulation Episode Summary       INR check location:  Home Draw    Preferred lab:  --    Send INR reminders to:   [facility]  [facility]    Comments:  **[facility]**          Anticoagulation Care Providers       Provider Role Specialty Phone number    Lorne Kam MD Referring Cardiology [phone]            Clinic Interview:  No pertinent clinical findings have been reported.    INR History:      12/2/2024    11:45 AM 12/6/2024    12:00 AM 12/6/2024    11:23 AM 12/13/2024    12:00 AM 12/13/2024     2:00 PM 12/20/2024    12:00 AM 12/20/2024     2:10 PM   Anticoagulation Monitoring   INR 3.00  3.00  2.80  3.10   INR Date 11/29/2024 12/6/2024 12/13/2024 12/20/2024   INR Goal 2.5-3.5  2.5-3.5  2.5-3.5  2.5-3.5   Trend Same  Same  Same  Same   Last Week Total 70 mg  70 mg  70 mg  70 mg   Next Week Total 70 mg  70 mg  70 mg  70 mg   Sun 10 mg  10 mg  10 mg  10 mg   Mon 10 mg  10 mg  10 mg  10 mg   Tue 10 mg  10 mg  10 mg  10 mg   Wed 10 mg  10 mg  10 mg  10 mg   Thu 10 mg  10 mg  10 mg  10 mg   Fri 10 mg  10 mg  10 mg  10 mg   Sat 10 mg  10 mg  10 mg  10 mg   Historical INR  3.00      2.80      3.10            This result is from an external source.       Plan:  1. INR is therapeutic today- see above in Anticoagulation Summary.    Man Ramsey to continue their warfarin regimen- see above in Anticoagulation Summary.  2. Follow  up in 2 weeks  3. Pt has agreed to only be called if INR out of range. They have been instructed to call if any changes in medications, doses, concerns, etc. Patient expresses understanding and has no further questions at this time.    Sun Curtis

## 2024-12-27 ENCOUNTER — ANTICOAGULATION VISIT (OUTPATIENT)
Dept: PHARMACY | Facility: HOSPITAL | Age: 44
End: 2024-12-27
Payer: COMMERCIAL

## 2024-12-27 DIAGNOSIS — Z95.2 HISTORY OF MECHANICAL AORTIC VALVE REPLACEMENT: Primary | ICD-10-CM

## 2024-12-27 DIAGNOSIS — Z79.01 LONG TERM (CURRENT) USE OF ANTICOAGULANTS: ICD-10-CM

## 2024-12-27 LAB — INR PPP: 3.9

## 2024-12-27 NOTE — PROGRESS NOTES
Anticoagulation Clinic Progress Note    Anticoagulation Summary  As of 12/27/2024      INR goal:  2.5-3.5   TTR:  77.7% (6 y)   INR used for dosing:  3.90 (12/27/2024)   Warfarin maintenance plan:  10 mg every day   Weekly warfarin total:  70 mg   Plan last modified:  Neo Cornejo, PharmD (10/31/2024)   Next INR check:  1/3/2025   Priority:  High   Target end date:  Indefinite    Indications    History of mechanical aortic valve replacement [Z95.2]  Long term (current) use of anticoagulants [Z79.01]                 Anticoagulation Episode Summary       INR check location:  Home Draw    Preferred lab:  --    Send INR reminders to:   [facility]  [facility]    Comments:  **[facility]**          Anticoagulation Care Providers       Provider Role Specialty Phone number    Lorne Kam MD Referring Cardiology [phone]            Clinic Interview:  Patient Findings     Positives:  Change in diet/appetite    Negatives:  Signs/symptoms of thrombosis, Signs/symptoms of bleeding,   Laboratory test error suspected, Change in health, Change in alcohol use,   Change in activity, Upcoming invasive procedure, Emergency department   visit, Upcoming dental procedure, Missed doses, Extra doses, Change in   medications, Hospital admission, Bruising, Other complaints    Comments:  Reports fewer salads over holiday season.      Clinical Outcomes     Negatives:  Major bleeding event, Thromboembolic event,   Anticoagulation-related hospital admission, Anticoagulation-related ED   visit, Anticoagulation-related fatality    Comments:  Reports fewer salads over holiday season.        INR History:      12/6/2024    11:23 AM 12/13/2024    12:00 AM 12/13/2024     2:00 PM 12/20/2024    12:00 AM 12/20/2024     2:10 PM 12/27/2024    12:00 AM 12/27/2024     3:04 PM   Anticoagulation Monitoring   INR 3.00  2.80  3.10  3.90   INR Date 12/6/2024 12/13/2024 12/20/2024 12/27/2024   INR Goal 2.5-3.5  2.5-3.5  2.5-3.5  2.5-3.5    Trend Same  Same  Same  Same   Last Week Total 70 mg  70 mg  70 mg  70 mg   Next Week Total 70 mg  70 mg  70 mg  64 mg   Sun 10 mg  10 mg  10 mg  10 mg   Mon 10 mg  10 mg  10 mg  10 mg   Tue 10 mg  10 mg  10 mg  10 mg   Wed 10 mg  10 mg  10 mg  10 mg   Thu 10 mg  10 mg  10 mg  10 mg   Fri 10 mg  10 mg  10 mg  4 mg (12/27)   Sat 10 mg  10 mg  10 mg  10 mg   Historical INR  2.80      3.10      3.90            This result is from an external source.       Plan:  1. INR is Supratherapeutic today- see above in Anticoagulation Summary.   Will instruct Man Ramsey to Change their warfarin regimen (4 mg today, then resume 10 mg daily) - see above in Anticoagulation Summary.  2. Follow up in 1 week.  3. They have been instructed to call if any changes in medications, doses, concerns, etc. Patient expresses understanding and has no further questions at this time.    Neo Cornejo, PharmD

## 2024-12-30 ENCOUNTER — CLINICAL SUPPORT (OUTPATIENT)
Dept: FAMILY MEDICINE CLINIC | Facility: CLINIC | Age: 44
End: 2024-12-30
Payer: COMMERCIAL

## 2024-12-30 DIAGNOSIS — Z23 IMMUNIZATION DUE: Primary | ICD-10-CM

## 2024-12-30 PROCEDURE — 91320 SARSCV2 VAC 30MCG TRS-SUC IM: CPT | Performed by: INTERNAL MEDICINE

## 2024-12-30 PROCEDURE — 90480 ADMN SARSCOV2 VAC 1/ONLY CMP: CPT | Performed by: INTERNAL MEDICINE

## 2025-01-03 ENCOUNTER — ANTICOAGULATION VISIT (OUTPATIENT)
Dept: PHARMACY | Facility: HOSPITAL | Age: 45
End: 2025-01-03
Payer: COMMERCIAL

## 2025-01-03 DIAGNOSIS — Z79.01 LONG TERM (CURRENT) USE OF ANTICOAGULANTS: ICD-10-CM

## 2025-01-03 DIAGNOSIS — Z95.2 HISTORY OF MECHANICAL AORTIC VALVE REPLACEMENT: Primary | ICD-10-CM

## 2025-01-03 LAB — INR PPP: 4.6

## 2025-01-03 NOTE — PROGRESS NOTES
Anticoagulation Clinic Progress Note    Anticoagulation Summary  As of 1/3/2025      INR goal:  2.5-3.5   TTR:  77.4% (6.1 y)   INR used for dosin.60 (1/3/2025)   Warfarin maintenance plan:  10 mg every day   Weekly warfarin total:  70 mg   Plan last modified:  Neo Cornejo, PharmD (10/31/2024)   Next INR check:  1/10/2025   Priority:  High   Target end date:  Indefinite    Indications    History of mechanical aortic valve replacement [Z95.2]  Long term (current) use of anticoagulants [Z79.01]                 Anticoagulation Episode Summary       INR check location:  Home Draw    Preferred lab:  --    Send INR reminders to:   [facility]  [facility]    Comments:  [facility]**          Anticoagulation Care Providers       Provider Role Specialty Phone number    Lorne Kam MD Referring Cardiology [phone]            Clinic Interview:  Patient Findings     Positives:  Change in diet/appetite    Negatives:  Signs/symptoms of thrombosis, Signs/symptoms of bleeding,   Laboratory test error suspected, Change in health, Change in alcohol use,   Change in activity, Upcoming invasive procedure, Emergency department   visit, Upcoming dental procedure, Missed doses, Extra doses, Change in   medications, Hospital admission, Bruising, Other complaints      Clinical Outcomes     Negatives:  Major bleeding event, Thromboembolic event,   Anticoagulation-related hospital admission, Anticoagulation-related ED   visit, Anticoagulation-related fatality        INR History:      2024     2:00 PM 2024    12:00 AM 2024     2:10 PM 2024    12:00 AM 2024     3:04 PM 1/3/2025    12:00 AM 1/3/2025     1:11 PM   Anticoagulation Monitoring   INR 2.80  3.10  3.90  4.60   INR Date 2024  2024  2024  1/3/2025   INR Goal 2.5-3.5  2.5-3.5  2.5-3.5  2.5-3.5   Trend Same  Same  Same  Same   Last Week Total 70 mg  70 mg  70 mg  64 mg   Next Week Total 70 mg  70 mg  64 mg  60  mg   Sun 10 mg  10 mg  10 mg  10 mg   Mon 10 mg  10 mg  10 mg  10 mg   Tue 10 mg  10 mg  10 mg  10 mg   Wed 10 mg  10 mg  10 mg  10 mg   Thu 10 mg  10 mg  10 mg  10 mg   Fri 10 mg  10 mg  4 mg (12/27)  Hold (1/3)   Sat 10 mg  10 mg  10 mg  10 mg   Historical INR  3.10      3.90      4.60            This result is from an external source.       Plan:  1. INR is Supratherapeutic today- see above in Anticoagulation Summary.   Will instruct Man Ramsey to Change their warfarin regimen- see above in Anticoagulation Summary.  Still eating less greens, hold and recheck in 1 week   2. Follow up in 1 weeks  3. They have been instructed to call if any changes in medications, doses, concerns, etc. Patient expresses understanding and has no further questions at this time.    Heather Kline [facility]

## 2025-01-08 LAB — INR PPP: 3.8

## 2025-01-09 ENCOUNTER — ANTICOAGULATION VISIT (OUTPATIENT)
Dept: PHARMACY | Facility: HOSPITAL | Age: 45
End: 2025-01-09
Payer: COMMERCIAL

## 2025-01-09 DIAGNOSIS — Z95.2 HISTORY OF MECHANICAL AORTIC VALVE REPLACEMENT: Primary | ICD-10-CM

## 2025-01-09 DIAGNOSIS — Z79.01 LONG TERM (CURRENT) USE OF ANTICOAGULANTS: ICD-10-CM

## 2025-01-09 NOTE — PROGRESS NOTES
Anticoagulation Clinic Progress Note    Anticoagulation Summary  As of 1/9/2025      INR goal:  2.5-3.5   TTR:  77.3% (6.1 y)   INR used for dosing:  3.80 (1/8/2025)   Warfarin maintenance plan:  8 mg every Sun, Thu; 10 mg all other days   Weekly warfarin total:  66 mg   Plan last modified:  Neo Cornejo, PharmD (1/9/2025)   Next INR check:  1/15/2025   Priority:  High   Target end date:  Indefinite    Indications    History of mechanical aortic valve replacement [Z95.2]  Long term (current) use of anticoagulants [Z79.01]                 Anticoagulation Episode Summary       INR check location:  Home Draw    Preferred lab:  --    Send INR reminders to:  [facility]  [facility]    Comments:  **[facility]**          Anticoagulation Care Providers       Provider Role Specialty Phone number    Lorne Kam MD Referring Cardiology [phone]            Clinic Interview:  Patient Findings     Positives:  Change in diet/appetite    Negatives:  Signs/symptoms of thrombosis, Signs/symptoms of bleeding,   Laboratory test error suspected, Change in health, Change in alcohol use,   Change in activity, Upcoming invasive procedure, Emergency department   visit, Upcoming dental procedure, Missed doses, Extra doses, Change in   medications, Hospital admission, Bruising, Other complaints    Comments:  Reports he has begun reincorporating more vegetables into his   diet, and he plans to continue.      Clinical Outcomes     Negatives:  Major bleeding event, Thromboembolic event,   Anticoagulation-related hospital admission, Anticoagulation-related ED   visit, Anticoagulation-related fatality    Comments:  Reports he has begun reincorporating more vegetables into his   diet, and he plans to continue.        INR History:      12/20/2024     2:10 PM 12/27/2024    12:00 AM 12/27/2024     3:04 PM 1/3/2025    12:00 AM 1/3/2025     1:11 PM 1/8/2025    12:00 AM 1/9/2025     9:54 AM   Anticoagulation Monitoring   INR  3.10  3.90  4.60  3.80   INR Date 12/20/2024  12/27/2024  1/3/2025  1/8/2025   INR Goal 2.5-3.5  2.5-3.5  2.5-3.5  2.5-3.5   Trend Same  Same  Same  Down   Last Week Total 70 mg  70 mg  64 mg  60 mg   Next Week Total 70 mg  64 mg  60 mg  66 mg   Sun 10 mg  10 mg  10 mg  8 mg   Mon 10 mg  10 mg  10 mg  10 mg   Tue 10 mg  10 mg  10 mg  10 mg   Wed 10 mg  10 mg  10 mg  -   Thu 10 mg  10 mg  10 mg  8 mg   Fri 10 mg  4 mg (12/27)  Hold (1/3)  10 mg   Sat 10 mg  10 mg  10 mg  10 mg   Historical INR  3.90      4.60      3.80            This result is from an external source.       Plan:  1. INR was Supratherapeutic yesterday- see above in Anticoagulation Summary.   Will instruct Man STEWART Roxy to Change their warfarin regimen to 8 mg Sun/Thurs, 10 mg all other days - see above in Anticoagulation Summary.  2. Follow up in 1 week.  3. They have been instructed to call if any changes in medications, doses, concerns, etc. Patient expresses understanding and has no further questions at this time.    Neo Cornejo, PharmD

## 2025-01-16 ENCOUNTER — TELEPHONE (OUTPATIENT)
Dept: FAMILY MEDICINE CLINIC | Facility: CLINIC | Age: 45
End: 2025-01-16
Payer: COMMERCIAL

## 2025-01-16 NOTE — TELEPHONE ENCOUNTER
Hub to relay    Callng pt to see if they would still like the referral order to get an mri placed by his pcp?

## 2025-01-16 NOTE — TELEPHONE ENCOUNTER
Name: Man Ramsey      Relationship: Self      Best Callback Number: [phone]       HUB PROVIDED THE RELAY MESSAGE FROM THE OFFICE      PATIENT: HAS FURTHER QUESTIONS AND WOULD LIKE A CALL BACK AT THE FOLLOWING PHONE NUMBER     ADDITIONAL INFORMATION:    PLEASE RETURN PATIENT'S CALL.

## 2025-01-17 ENCOUNTER — OFFICE VISIT (OUTPATIENT)
Dept: FAMILY MEDICINE CLINIC | Facility: CLINIC | Age: 45
End: 2025-01-17
Payer: COMMERCIAL

## 2025-01-17 ENCOUNTER — ANTICOAGULATION VISIT (OUTPATIENT)
Dept: PHARMACY | Facility: HOSPITAL | Age: 45
End: 2025-01-17
Payer: COMMERCIAL

## 2025-01-17 ENCOUNTER — TELEPHONE (OUTPATIENT)
Dept: FAMILY MEDICINE CLINIC | Facility: CLINIC | Age: 45
End: 2025-01-17

## 2025-01-17 VITALS
SYSTOLIC BLOOD PRESSURE: 144 MMHG | WEIGHT: 253 LBS | HEART RATE: 63 BPM | OXYGEN SATURATION: 96 % | DIASTOLIC BLOOD PRESSURE: 78 MMHG | BODY MASS INDEX: 35.42 KG/M2 | HEIGHT: 71 IN | RESPIRATION RATE: 16 BRPM

## 2025-01-17 DIAGNOSIS — Z79.01 LONG TERM (CURRENT) USE OF ANTICOAGULANTS: ICD-10-CM

## 2025-01-17 DIAGNOSIS — F41.8 DEPRESSION WITH ANXIETY: Primary | ICD-10-CM

## 2025-01-17 DIAGNOSIS — Z95.2 HISTORY OF MECHANICAL AORTIC VALVE REPLACEMENT: Primary | ICD-10-CM

## 2025-01-17 PROBLEM — F84.0 AUTISM SPECTRUM: Status: ACTIVE | Noted: 2025-01-17

## 2025-01-17 PROBLEM — R41.840 ATTENTION DEFICIT: Status: ACTIVE | Noted: 2025-01-17

## 2025-01-17 LAB — INR PPP: 3.4

## 2025-01-17 PROCEDURE — 99213 OFFICE O/P EST LOW 20 MIN: CPT | Performed by: INTERNAL MEDICINE

## 2025-01-17 NOTE — PROGRESS NOTES
Anticoagulation Clinic Progress Note    Anticoagulation Summary  As of 1/17/2025      INR goal:  2.5-3.5   TTR:  77.1% (6.1 y)   INR used for dosing:  3.40 (1/17/2025)   Warfarin maintenance plan:  8 mg every Sun, Thu; 10 mg all other days   Weekly warfarin total:  66 mg   No change documented:  Heather Kline, AIDA   Plan last modified:  Neo Cornejo, PharmD (1/9/2025)   Next INR check:  1/24/2025   Priority:  High   Target end date:  Indefinite    Indications    History of mechanical aortic valve replacement [Z95.2]  Long term (current) use of anticoagulants [Z79.01]                 Anticoagulation Episode Summary       INR check location:  Home Draw    Preferred lab:  --    Send INR reminders to:   [facility]  POOL    Comments:  **[facility]**          Anticoagulation Care Providers       Provider Role Specialty Phone number    Lorne Kam MD Referring Cardiology [phone]            Clinic Interview:  No pertinent clinical findings have been reported.    INR History:      12/27/2024     3:04 PM 1/3/2025    12:00 AM 1/3/2025     1:11 PM 1/8/2025    12:00 AM 1/9/2025     9:54 AM 1/17/2025    12:00 AM 1/17/2025    10:52 AM   Anticoagulation Monitoring   INR 3.90  4.60  3.80  3.40   INR Date 12/27/2024  1/3/2025  1/8/2025  1/17/2025   INR Goal 2.5-3.5  2.5-3.5  2.5-3.5  2.5-3.5   Trend Same  Same  Down  Same   Last Week Total 70 mg  64 mg  60 mg  66 mg   Next Week Total 64 mg  60 mg  66 mg  66 mg   Sun 10 mg  10 mg  8 mg  8 mg   Mon 10 mg  10 mg  10 mg  10 mg   Tue 10 mg  10 mg  10 mg  10 mg   Wed 10 mg  10 mg  -  10 mg   Thu 10 mg  10 mg  8 mg  8 mg   Fri 4 mg (12/27)  Hold (1/3)  10 mg  10 mg   Sat 10 mg  10 mg  10 mg  10 mg   Historical INR  4.60      3.80      3.40        Visit Report      Report Report       This result is from an external source.       Plan:  1. INR is therapeutic today- see above in Anticoagulation Summary.    Man Ramsey to continue their warfarin regimen-  see above in Anticoagulation Summary.  2. Follow up in 1 week  3. Pt has agreed to only be called if INR out of range. They have been instructed to call if any changes in medications, doses, concerns, etc. Patient expresses understanding and has no further questions at this time.    Heather Kline [facility]

## 2025-01-17 NOTE — PROGRESS NOTES
Subjective if complaint is follow-up on depression  Man Ramsey is a 44 y.o. male.     Anxiety   Patient reports no chest pain or nausea.  Man is here today for follow-up on depression.  He was diagnosed with major depression and anxiety disorder.  He is also felt that he was on the autism spectrum and had attention deficit issues.  He does see a clinical psychologist.  At his last visit we did initiate some sertraline.  He is now on the 50 mg dose.  He seems to think it is doing well.  He himself does not seem to see it but in the last week his mother his boss and his psychologist Gisele said they think he is doing better.  We did discuss whether he feels like he needs a increase in dose but he is wanting to watch and see on this dose for a little bit longer.    The following portions of the patient's history were reviewed and updated as appropriate: allergies, current medications, and problem list.    Review of Systems   Constitutional:  Negative for chills, diaphoresis, fatigue and fever.   HENT:  Negative for congestion and sore throat.    Respiratory:  Negative for cough.    Cardiovascular:  Negative for chest pain.   Gastrointestinal:  Negative for abdominal pain, nausea and vomiting.   Genitourinary:  Negative for dysuria.   Musculoskeletal:  Negative for myalgias and neck pain.   Skin:  Negative for rash.   Neurological:  Negative for weakness, numbness and headaches.     Objective   Physical Exam  Vitals and nursing note reviewed.   Neck:      Vascular: No carotid bruit.   Cardiovascular:      Rate and Rhythm: Normal rate and regular rhythm.      Comments: Mechanical click is unchanged  Pulmonary:      Effort: Pulmonary effort is normal.      Breath sounds: No wheezing, rhonchi or rales.     Assessment & Plan   Diagnoses and all orders for this visit:    1. Depression with anxiety (Primary)    Man is here today for follow-up.  We are going to leave him on the 50 mg dose.  He is going to contact me in  a few months and let me know how he is doing we may consider increasing the dose at that time

## 2025-01-24 ENCOUNTER — ANTICOAGULATION VISIT (OUTPATIENT)
Dept: PHARMACY | Facility: HOSPITAL | Age: 45
End: 2025-01-24
Payer: COMMERCIAL

## 2025-01-24 DIAGNOSIS — Z79.01 LONG TERM (CURRENT) USE OF ANTICOAGULANTS: ICD-10-CM

## 2025-01-24 DIAGNOSIS — Z95.2 HISTORY OF MECHANICAL AORTIC VALVE REPLACEMENT: Primary | ICD-10-CM

## 2025-01-24 LAB
INR PPP: 4.5
INR PPP: 4.8

## 2025-01-24 NOTE — PROGRESS NOTES
Anticoagulation Clinic Progress Note    Anticoagulation Summary  As of 2025      INR goal:  2.5-3.5   TTR:  76.8% (6.1 y)   INR used for dosin.80 (2025)   Warfarin maintenance plan:  8 mg every Sun, Thu; 10 mg all other days   Weekly warfarin total:  66 mg   Plan last modified:  Neo Cornejo, PharmD (2025)   Next INR check:  2025   Priority:  High   Target end date:  Indefinite    Indications    History of mechanical aortic valve replacement [Z95.2]  Long term (current) use of anticoagulants [Z79.01]                 Anticoagulation Episode Summary       INR check location:  Home Draw    Preferred lab:  --    Send INR reminders to:  LISA [facility]CHUGA  POOL    Comments:  **[facility]**          Anticoagulation Care Providers       Provider Role Specialty Phone number    Lorne Kma MD Referring Cardiology [phone]            Clinic Interview:  Patient Findings     Positives:  Change in diet/appetite    Negatives:  Signs/symptoms of thrombosis, Signs/symptoms of bleeding,   Laboratory test error suspected, Change in health, Change in alcohol use,   Change in activity, Upcoming invasive procedure, Emergency department   visit, Upcoming dental procedure, Missed doses, Extra doses, Change in   medications, Hospital admission, Bruising, Other complaints      Clinical Outcomes     Negatives:  Major bleeding event, Thromboembolic event,   Anticoagulation-related hospital admission, Anticoagulation-related ED   visit, Anticoagulation-related fatality        INR History:      1/3/2025     1:11 PM 2025    12:00 AM 2025     9:54 AM 2025    12:00 AM 2025    10:52 AM 2025    12:00 AM 2025     2:37 PM   Anticoagulation Monitoring   INR 4.60  3.80  3.40  4.80   INR Date 1/3/2025  2025  2025  2025   INR Goal 2.5-3.5  2.5-3.5  2.5-3.5  2.5-3.5   Trend Same  Down  Same  Same   Last Week Total 64 mg  60 mg  66 mg  66 mg   Next Week Total 60 mg   66 mg  66 mg  56 mg   Sun 10 mg  8 mg  8 mg  8 mg   Mon 10 mg  10 mg  10 mg  10 mg   Tue 10 mg  10 mg  10 mg  10 mg   Wed 10 mg  -  10 mg  -   Thu 10 mg  8 mg  8 mg  -   Fri Hold (1/3)  10 mg  10 mg  Hold (1/24)   Sat 10 mg  10 mg  10 mg  10 mg   Historical INR  3.80      3.40      4.50        4.80        Visit Report    Report Report         This result is from an external source.    Multiple values from one day are sorted in reverse-chronological order       Plan:  1. INR is Supratherapeutic today- see above in Anticoagulation Summary.   Will instruct Man Ramsey to Change their warfarin regimen- see above in Anticoagulation Summary.      Changed his lettuce to prewashed and is eating slightly less. Confirmed dose. Hold and rck on Wednesday   2. Follow up on Wednesday   3. They have been instructed to call if any changes in medications, doses, concerns, etc. Patient expresses understanding and has no further questions at this time.    Heather Kline [facility]

## 2025-01-29 ENCOUNTER — ANTICOAGULATION VISIT (OUTPATIENT)
Dept: PHARMACY | Facility: HOSPITAL | Age: 45
End: 2025-01-29
Payer: COMMERCIAL

## 2025-01-29 DIAGNOSIS — Z95.2 HISTORY OF MECHANICAL AORTIC VALVE REPLACEMENT: Primary | ICD-10-CM

## 2025-01-29 DIAGNOSIS — Z79.01 LONG TERM (CURRENT) USE OF ANTICOAGULANTS: ICD-10-CM

## 2025-01-29 LAB — INR PPP: 3.4

## 2025-01-29 NOTE — PROGRESS NOTES
Anticoagulation Clinic Progress Note    Anticoagulation Summary  As of 1/29/2025      INR goal:  2.5-3.5   TTR:  76.7% (6.1 y)   INR used for dosing:  3.40 (1/29/2025)   Warfarin maintenance plan:  10 mg every Mon, Wed, Fri; 8 mg all other days   Weekly warfarin total:  62 mg   Plan last modified:  Heather Kline RPH (1/29/2025)   Next INR check:  2/5/2025   Priority:  High   Target end date:  Indefinite    Indications    History of mechanical aortic valve replacement [Z95.2]  Long term (current) use of anticoagulants [Z79.01]                 Anticoagulation Episode Summary       INR check location:  Home Draw    Preferred lab:  --    Send INR reminders to:  LISA [facility]CH[facility]  POOL    Comments:  **[facility]**          Anticoagulation Care Providers       Provider Role Specialty Phone number    Lorne Kam MD Referring Cardiology [phone]            Clinic Interview:  Patient Findings     Negatives:  Signs/symptoms of thrombosis, Signs/symptoms of bleeding,   Laboratory test error suspected, Change in health, Change in alcohol use,   Change in activity, Upcoming invasive procedure, Emergency department   visit, Upcoming dental procedure, Missed doses, Extra doses, Change in   medications, Change in diet/appetite, Hospital admission, Bruising, Other   complaints      Clinical Outcomes     Negatives:  Major bleeding event, Thromboembolic event,   Anticoagulation-related hospital admission, Anticoagulation-related ED   visit, Anticoagulation-related fatality        INR History:      1/9/2025     9:54 AM 1/17/2025    12:00 AM 1/17/2025    10:52 AM 1/24/2025    12:00 AM 1/24/2025     2:37 PM 1/29/2025    12:00 AM 1/29/2025     3:58 PM   Anticoagulation Monitoring   INR 3.80  3.40  4.80  3.40   INR Date 1/8/2025 1/17/2025 1/24/2025 1/29/2025   INR Goal 2.5-3.5  2.5-3.5  2.5-3.5  2.5-3.5   Trend Down  Same  Same  Down   Last Week Total 60 mg  66 mg  66 mg  56 mg   Next Week Total 66 mg  66 mg   56 mg  62 mg   Sun 8 mg  8 mg  8 mg  8 mg   Mon 10 mg  10 mg  10 mg  10 mg   Tue 10 mg  10 mg  10 mg  8 mg   Wed -  10 mg  -  10 mg   Thu 8 mg  8 mg  -  8 mg   Fri 10 mg  10 mg  Hold (1/24)  10 mg   Sat 10 mg  10 mg  10 mg  8 mg   Historical INR  3.40      4.50        4.80      3.40        Visit Report  Report Report           This result is from an external source.    Multiple values from one day are sorted in reverse-chronological order       Plan:  1. INR is Therapeutic today- see above in Anticoagulation Summary.   Will instruct Man STEWART Roxy to Change their warfarin regimen- see above in Anticoagulation Summary.  Decrease to 8 mg on tues, thurs, sat and sun, and 10 mg AOD, rck 1 week   2. Follow up in 1 weeks  3. They have been instructed to call if any changes in medications, doses, concerns, etc. Patient expresses understanding and has no further questions at this time.    Heather Kline [facility]

## 2025-02-05 LAB — INR PPP: 3.5

## 2025-02-06 ENCOUNTER — ANTICOAGULATION VISIT (OUTPATIENT)
Dept: PHARMACY | Facility: HOSPITAL | Age: 45
End: 2025-02-06
Payer: COMMERCIAL

## 2025-02-06 DIAGNOSIS — Z95.2 HISTORY OF MECHANICAL AORTIC VALVE REPLACEMENT: Primary | ICD-10-CM

## 2025-02-06 DIAGNOSIS — Z79.01 LONG TERM (CURRENT) USE OF ANTICOAGULANTS: ICD-10-CM

## 2025-02-06 NOTE — PROGRESS NOTES
Anticoagulation Clinic Progress Note    Anticoagulation Summary  As of 2/6/2025      INR goal:  2.5-3.5   TTR:  76.8% (6.1 y)   INR used for dosing:  3.50 (2/5/2025)   Warfarin maintenance plan:  10 mg every Mon, Wed, Fri; 8 mg all other days   Weekly warfarin total:  62 mg   No change documented:  Heather Kline RPH   Plan last modified:  Heather Kline RPH (1/29/2025)   Next INR check:  2/12/2025   Priority:  High   Target end date:  Indefinite    Indications    History of mechanical aortic valve replacement [Z95.2]  Long term (current) use of anticoagulants [Z79.01]                 Anticoagulation Episode Summary       INR check location:  Home Draw    Preferred lab:  --    Send INR reminders to:   JOSE [facility]CHUGA  POOL    Comments:  **[facility]**          Anticoagulation Care Providers       Provider Role Specialty Phone number    Lorne Kam MD Referring Cardiology [phone]            Clinic Interview:  No pertinent clinical findings have been reported.    INR History:      1/17/2025    10:52 AM 1/24/2025    12:00 AM 1/24/2025     2:37 PM 1/29/2025    12:00 AM 1/29/2025     3:58 PM 2/5/2025    12:00 AM 2/6/2025     1:35 PM   Anticoagulation Monitoring   INR 3.40  4.80  3.40  3.50   INR Date 1/17/2025 1/24/2025 1/29/2025 2/5/2025   INR Goal 2.5-3.5  2.5-3.5  2.5-3.5  2.5-3.5   Trend Same  Same  Down  Same   Last Week Total 66 mg  66 mg  56 mg  62 mg   Next Week Total 66 mg  56 mg  62 mg  62 mg   Sun 8 mg  8 mg  8 mg  8 mg   Mon 10 mg  10 mg  10 mg  10 mg   Tue 10 mg  10 mg  8 mg  8 mg   Wed 10 mg  -  10 mg  -   Thu 8 mg  -  8 mg  8 mg   Fri 10 mg  Hold (1/24)  10 mg  10 mg   Sat 10 mg  10 mg  8 mg  8 mg   Historical INR  4.50        4.80      3.40      3.50        Visit Report Report             This result is from an external source.    Multiple values from one day are sorted in reverse-chronological order       Plan:  1. INR is therapeutic today- see above in Anticoagulation  Summary.    Man Ramsey to continue their warfarin regimen- see above in Anticoagulation Summary.  2. Follow up in 1 week  3. Pt has agreed to only be called if INR out of range. They have been instructed to call if any changes in medications, doses, concerns, etc. Patient expresses understanding and has no further questions at this time.    Heather Kline, [facility]

## 2025-02-12 LAB — INR PPP: 3.6

## 2025-02-13 ENCOUNTER — ANTICOAGULATION VISIT (OUTPATIENT)
Dept: PHARMACY | Facility: HOSPITAL | Age: 45
End: 2025-02-13
Payer: COMMERCIAL

## 2025-02-13 DIAGNOSIS — Z79.01 LONG TERM (CURRENT) USE OF ANTICOAGULANTS: ICD-10-CM

## 2025-02-13 DIAGNOSIS — Z95.2 HISTORY OF MECHANICAL AORTIC VALVE REPLACEMENT: Primary | ICD-10-CM

## 2025-02-13 NOTE — PROGRESS NOTES
Anticoagulation Clinic Progress Note    Anticoagulation Summary  As of 2/13/2025      INR goal:  2.5-3.5   TTR:  76.5% (6.2 y)   INR used for dosing:  3.60 (2/12/2025)   Warfarin maintenance plan:  10 mg every Mon, Fri; 8 mg all other days   Weekly warfarin total:  60 mg   Plan last modified:  Mckenzie Grayson, [facility] (2/13/2025)   Next INR check:  2/20/2025   Priority:  High   Target end date:  Indefinite    Indications    History of mechanical aortic valve replacement [Z95.2]  Long term (current) use of anticoagulants [Z79.01]                 Anticoagulation Episode Summary       INR check location:  Home Draw    Preferred lab:  --    Send INR reminders to:   JOSE LECHUGA  POOL    Comments:  **[facility]**          Anticoagulation Care Providers       Provider Role Specialty Phone number    Lorne Kam MD Referring Cardiology [phone]            Drug interactions: has remained unchanged.  Diet: has remained unchanged.    Clinic Interview:  No pertinent clinical findings have been reported.    INR History:      1/24/2025     2:37 PM 1/29/2025    12:00 AM 1/29/2025     3:58 PM 2/5/2025    12:00 AM 2/6/2025     1:35 PM 2/12/2025    12:00 AM 2/13/2025     9:09 AM   Anticoagulation Monitoring   INR 4.80  3.40  3.50  3.60   INR Date 1/24/2025 1/29/2025 2/5/2025 2/12/2025   INR Goal 2.5-3.5  2.5-3.5  2.5-3.5  2.5-3.5   Trend Same  Down  Same  Down   Last Week Total 66 mg  56 mg  62 mg  62 mg   Next Week Total 56 mg  62 mg  62 mg  60 mg   Sun 8 mg  8 mg  8 mg  8 mg   Mon 10 mg  10 mg  10 mg  10 mg   Tue 10 mg  8 mg  8 mg  8 mg   Wed -  10 mg  -  8 mg   Thu -  8 mg  8 mg  8 mg   Fri Hold (1/24)  10 mg  10 mg  10 mg   Sat 10 mg  8 mg  8 mg  8 mg   Historical INR  3.40      3.50      3.60            This result is from an external source.       Plan:  1. INR is Supratherapeutic today- see above in Anticoagulation Summary.   Pt continues reincorporating more vegetables in diet. Instructed pt to  decr regiment to 10mg MF, 8 mg AOD, tigre in 1 wk- see above in Anticoagulation Summary.  2. Follow up in 1 weeks  3. Pt has agreed to only be called if INR out of range. They have been instructed to call if any changes in medications, doses, concerns, etc. Patient expresses understanding and has no further questions at this time.    Mckenzie Grayson, formerly [facility]

## 2025-02-19 ENCOUNTER — ANTICOAGULATION VISIT (OUTPATIENT)
Dept: PHARMACY | Facility: HOSPITAL | Age: 45
End: 2025-02-19
Payer: COMMERCIAL

## 2025-02-19 DIAGNOSIS — Z79.01 LONG TERM (CURRENT) USE OF ANTICOAGULANTS: ICD-10-CM

## 2025-02-19 DIAGNOSIS — Z95.2 HISTORY OF MECHANICAL AORTIC VALVE REPLACEMENT: Primary | ICD-10-CM

## 2025-02-19 LAB — INR PPP: 3.7

## 2025-02-19 NOTE — PROGRESS NOTES
Anticoagulation Clinic Progress Note    Anticoagulation Summary  As of 2/19/2025      INR goal:  2.5-3.5   TTR:  76.3% (6.2 y)   INR used for dosing:  3.70 (2/19/2025)   Warfarin maintenance plan:  8 mg every day   Weekly warfarin total:  56 mg   Plan last modified:  Neo Cornejo, PharmD (2/19/2025)   Next INR check:  2/26/2025   Priority:  High   Target end date:  Indefinite    Indications    History of mechanical aortic valve replacement [Z95.2]  Long term (current) use of anticoagulants [Z79.01]                 Anticoagulation Episode Summary       INR check location:  Home Draw    Preferred lab:  --    Send INR reminders to:   JOSE LECHUGA  POOL    Comments:  **[facility]**          Anticoagulation Care Providers       Provider Role Specialty Phone number    Lorne Kam MD Referring Cardiology [phone]            Clinic Interview:  Patient Findings     Negatives:  Signs/symptoms of thrombosis, Signs/symptoms of bleeding,   Laboratory test error suspected, Change in health, Change in alcohol use,   Change in activity, Upcoming invasive procedure, Emergency department   visit, Upcoming dental procedure, Missed doses, Extra doses, Change in   medications, Change in diet/appetite, Hospital admission, Bruising, Other   complaints      Clinical Outcomes     Negatives:  Major bleeding event, Thromboembolic event,   Anticoagulation-related hospital admission, Anticoagulation-related ED   visit, Anticoagulation-related fatality        INR History:      1/29/2025     3:58 PM 2/5/2025    12:00 AM 2/6/2025     1:35 PM 2/12/2025    12:00 AM 2/13/2025     9:09 AM 2/19/2025    12:00 AM 2/19/2025     2:31 PM   Anticoagulation Monitoring   INR 3.40  3.50  3.60  3.70   INR Date 1/29/2025 2/5/2025 2/12/2025 2/19/2025   INR Goal 2.5-3.5  2.5-3.5  2.5-3.5  2.5-3.5   Trend Down  Same  Down  Down   Last Week Total 56 mg  62 mg  62 mg  62 mg   Next Week Total 62 mg  62 mg  60 mg  56 mg   Sun 8 mg  8 mg   8 mg  8 mg   Mon 10 mg  10 mg  10 mg  8 mg   Tue 8 mg  8 mg  8 mg  8 mg   Wed 10 mg  -  8 mg  8 mg   Thu 8 mg  8 mg  8 mg  8 mg   Fri 10 mg  10 mg  10 mg  8 mg   Sat 8 mg  8 mg  8 mg  8 mg   Historical INR  3.50      3.60      3.70            This result is from an external source.       Plan:  1. INR is Supratherapeutic today- see above in Anticoagulation Summary.   Will instruct Man Ramsey to Change their warfarin regimen to 8 mg daily - see above in Anticoagulation Summary.  2. Follow up in 1 week.  3. They have been instructed to call if any changes in medications, doses, concerns, etc. Patient expresses understanding and has no further questions at this time.    Neo Cornejo, PharmD

## 2025-02-21 RX ORDER — SERTRALINE HYDROCHLORIDE 100 MG/1
100 TABLET, FILM COATED ORAL DAILY
Qty: 30 TABLET | Refills: 5 | Status: SHIPPED | OUTPATIENT
Start: 2025-02-21

## 2025-02-26 ENCOUNTER — ANTICOAGULATION VISIT (OUTPATIENT)
Dept: PHARMACY | Facility: HOSPITAL | Age: 45
End: 2025-02-26
Payer: COMMERCIAL

## 2025-02-26 DIAGNOSIS — Z79.01 LONG TERM (CURRENT) USE OF ANTICOAGULANTS: ICD-10-CM

## 2025-02-26 DIAGNOSIS — Z95.2 HISTORY OF MECHANICAL AORTIC VALVE REPLACEMENT: Primary | ICD-10-CM

## 2025-02-26 LAB — INR PPP: 2.9

## 2025-02-26 NOTE — PROGRESS NOTES
Anticoagulation Clinic Progress Note    Anticoagulation Summary  As of 2025      INR goal:  2.5-3.5   TTR:  76.3% (6.2 y)   INR used for dosin.90 (2025)   Warfarin maintenance plan:  8 mg every day   Weekly warfarin total:  56 mg   No change documented:  Neo Cornejo, PharmD   Plan last modified:  Neo Cornejo PharmD (2025)   Next INR check:  3/5/2025   Priority:  High   Target end date:  Indefinite    Indications    History of mechanical aortic valve replacement [Z95.2]  Long term (current) use of anticoagulants [Z79.01]                 Anticoagulation Episode Summary       INR check location:  Home Draw    Preferred lab:  --    Send INR reminders to:   JOSE LECHUGA  POOL    Comments:  **[facility]**          Anticoagulation Care Providers       Provider Role Specialty Phone number    Lorne Kam MD Referring Cardiology [phone]            Clinic Interview:  No pertinent clinical findings have been reported.    INR History:      2025     1:35 PM 2025    12:00 AM 2025     9:09 AM 2025    12:00 AM 2025     2:31 PM 2025    12:00 AM 2025     3:30 PM   Anticoagulation Monitoring   INR 3.50  3.60  3.70  2.90   INR Date 2025   INR Goal 2.5-3.5  2.5-3.5  2.5-3.5  2.5-3.5   Trend Same  Down  Down  Same   Last Week Total 62 mg  62 mg  62 mg  56 mg   Next Week Total 62 mg  60 mg  56 mg  56 mg   Sun 8 mg  8 mg  8 mg  8 mg   Mon 10 mg  10 mg  8 mg  8 mg   Tue 8 mg  8 mg  8 mg  8 mg   Wed -  8 mg  8 mg  8 mg   Thu 8 mg  8 mg  8 mg  8 mg   Fri 10 mg  10 mg  8 mg  8 mg   Sat 8 mg  8 mg  8 mg  8 mg   Historical INR  3.60      3.70      2.90            This result is from an external source.       Plan:  1. INR is therapeutic today- see above in Anticoagulation Summary.    Man Ramsey to continue their warfarin regimen at dose of 8 mg daily - see above in Anticoagulation Summary.  2. Follow up in 1 week to  ensure stable.  3. Pt has agreed to only be called if INR out of range. Left HIPAA-compliant voicemail with instructions. They have been instructed to call if any changes in medications, doses, concerns, etc.     Conner TuckerD

## 2025-03-04 RX ORDER — WARFARIN SODIUM 2 MG/1
TABLET ORAL
Qty: 360 TABLET | Refills: 1 | Status: SHIPPED | OUTPATIENT
Start: 2025-03-04

## 2025-03-05 LAB — INR PPP: 2.7

## 2025-03-06 ENCOUNTER — ANTICOAGULATION VISIT (OUTPATIENT)
Dept: PHARMACY | Facility: HOSPITAL | Age: 45
End: 2025-03-06
Payer: COMMERCIAL

## 2025-03-06 DIAGNOSIS — Z95.2 HISTORY OF MECHANICAL AORTIC VALVE REPLACEMENT: Primary | ICD-10-CM

## 2025-03-06 DIAGNOSIS — Z79.01 LONG TERM (CURRENT) USE OF ANTICOAGULANTS: ICD-10-CM

## 2025-03-06 NOTE — PROGRESS NOTES
Anticoagulation Clinic Progress Note    Anticoagulation Summary  As of 3/6/2025      INR goal:  2.5-3.5   TTR:  76.4% (6.2 y)   INR used for dosin.70 (3/5/2025)   Warfarin maintenance plan:  8 mg every day   Weekly warfarin total:  56 mg   No change documented:  Jeevan Davies, Pharmacy Technician   Plan last modified:  Neo Cornejo, PharmD (2025)   Next INR check:  3/13/2025   Priority:  High   Target end date:  Indefinite    Indications    History of mechanical aortic valve replacement [Z95.2]  Long term (current) use of anticoagulants [Z79.01]                 Anticoagulation Episode Summary       INR check location:  Home Draw    Preferred lab:  --    Send INR reminders to:   JOSE LECHUGA  POOL    Comments:  **[facility]**          Anticoagulation Care Providers       Provider Role Specialty Phone number    Lorne Kam MD Referring Cardiology [phone]            Clinic Interview:  No pertinent clinical findings have been reported.    INR History:      2025     9:09 AM 2025    12:00 AM 2025     2:31 PM 2025    12:00 AM 2025     3:30 PM 3/5/2025    12:00 AM 3/6/2025     9:36 AM   Anticoagulation Monitoring   INR 3.60  3.70  2.90  2.70   INR Date 2025  2025  2025  3/5/2025   INR Goal 2.5-3.5  2.5-3.5  2.5-3.5  2.5-3.5   Trend Down  Down  Same  Same   Last Week Total 62 mg  62 mg  56 mg  56 mg   Next Week Total 60 mg  56 mg  56 mg  56 mg   Sun 8 mg  8 mg  8 mg  8 mg   Mon 10 mg  8 mg  8 mg  8 mg   Tue 8 mg  8 mg  8 mg  8 mg   Wed 8 mg  8 mg  8 mg  8 mg   Thu 8 mg  8 mg  8 mg  8 mg   Fri 10 mg  8 mg  8 mg  8 mg   Sat 8 mg  8 mg  8 mg  8 mg   Historical INR  3.70      2.90      2.70            This result is from an external source.       Plan:  1. INR is therapeutic today- see above in Anticoagulation Summary.    Man Ramsey to continue their warfarin regimen- see above in Anticoagulation Summary.  2. Follow up in 1 week  3. Pt has  agreed to only be called if INR out of range. They have been instructed to call if any changes in medications, doses, concerns, etc. Patient expresses understanding and has no further questions at this time.    Jeevan Davies, Pharmacy Technician

## 2025-03-12 LAB — INR PPP: 2.6

## 2025-03-13 ENCOUNTER — ANTICOAGULATION VISIT (OUTPATIENT)
Dept: PHARMACY | Facility: HOSPITAL | Age: 45
End: 2025-03-13
Payer: COMMERCIAL

## 2025-03-13 DIAGNOSIS — Z95.2 HISTORY OF MECHANICAL AORTIC VALVE REPLACEMENT: Primary | ICD-10-CM

## 2025-03-13 DIAGNOSIS — Z79.01 LONG TERM (CURRENT) USE OF ANTICOAGULANTS: ICD-10-CM

## 2025-03-13 NOTE — PROGRESS NOTES
Anticoagulation Clinic Progress Note    Anticoagulation Summary  As of 3/13/2025      INR goal:  2.5-3.5   TTR:  76.4% (6.2 y)   INR used for dosin.60 (3/12/2025)   Warfarin maintenance plan:  8 mg every day   Weekly warfarin total:  56 mg   No change documented:  Jeevan Davies, Pharmacy Technician   Plan last modified:  Neo Cornejo, PharmD (2025)   Next INR check:  3/19/2025   Priority:  High   Target end date:  Indefinite    Indications    History of mechanical aortic valve replacement [Z95.2]  Long term (current) use of anticoagulants [Z79.01]                 Anticoagulation Episode Summary       INR check location:  Home Draw    Preferred lab:  --    Send INR reminders to:   JOSE LECHUGA  POOL    Comments:  **[facility]**          Anticoagulation Care Providers       Provider Role Specialty Phone number    Lorne Kam MD Referring Cardiology [phone]            Clinic Interview:  No pertinent clinical findings have been reported.    INR History:      2025     2:31 PM 2025    12:00 AM 2025     3:30 PM 3/5/2025    12:00 AM 3/6/2025     9:36 AM 3/12/2025    12:00 AM 3/13/2025     8:17 AM   Anticoagulation Monitoring   INR 3.70  2.90  2.70  2.60   INR Date 2025  2025  3/5/2025  3/12/2025   INR Goal 2.5-3.5  2.5-3.5  2.5-3.5  2.5-3.5   Trend Down  Same  Same  Same   Last Week Total 62 mg  56 mg  56 mg  56 mg   Next Week Total 56 mg  56 mg  56 mg  56 mg   Sun 8 mg  8 mg  8 mg  8 mg   Mon 8 mg  8 mg  8 mg  8 mg   Tue 8 mg  8 mg  8 mg  8 mg   Wed 8 mg  8 mg  8 mg  -   Thu 8 mg  8 mg  8 mg  8 mg   Fri 8 mg  8 mg  8 mg  8 mg   Sat 8 mg  8 mg  8 mg  8 mg   Historical INR  2.90      2.70      2.60            This result is from an external source.       Plan:  1. INR is therapeutic today- see above in Anticoagulation Summary.    Man Ramsey to continue their warfarin regimen- see above in Anticoagulation Summary.  2. Follow up in 1 week  3. Pt has  agreed to only be called if INR out of range. They have been instructed to call if any changes in medications, doses, concerns, etc. Patient expresses understanding and has no further questions at this time.    Jeevan Davies, Pharmacy Technician

## 2025-03-19 LAB — INR PPP: 2.6

## 2025-03-20 ENCOUNTER — ANTICOAGULATION VISIT (OUTPATIENT)
Dept: PHARMACY | Facility: HOSPITAL | Age: 45
End: 2025-03-20
Payer: COMMERCIAL

## 2025-03-20 DIAGNOSIS — Z95.2 HISTORY OF MECHANICAL AORTIC VALVE REPLACEMENT: Primary | ICD-10-CM

## 2025-03-20 DIAGNOSIS — Z79.01 LONG TERM (CURRENT) USE OF ANTICOAGULANTS: ICD-10-CM

## 2025-03-20 NOTE — PROGRESS NOTES
Anticoagulation Clinic Progress Note    Anticoagulation Summary  As of 3/20/2025      INR goal:  2.5-3.5   TTR:  76.5% (6.3 y)   INR used for dosin.60 (3/19/2025)   Warfarin maintenance plan:  8 mg every day   Weekly warfarin total:  56 mg   No change documented:  Sun Curtis   Plan last modified:  Neo Cornejo, PharmD (2025)   Next INR check:  2025   Priority:  High   Target end date:  Indefinite    Indications    History of mechanical aortic valve replacement [Z95.2]  Long term (current) use of anticoagulants [Z79.01]                 Anticoagulation Episode Summary       INR check location:  Home Draw    Preferred lab:  --    Send INR reminders to:   JOSE LECHUGA  POOL    Comments:  **[facility]**          Anticoagulation Care Providers       Provider Role Specialty Phone number    Lorne Kam MD Referring Cardiology [phone]            Clinic Interview:  No pertinent clinical findings have been reported.    INR History:      2025     3:30 PM 3/5/2025    12:00 AM 3/6/2025     9:36 AM 3/12/2025    12:00 AM 3/13/2025     8:17 AM 3/19/2025    12:00 AM 3/20/2025     8:21 AM   Anticoagulation Monitoring   INR 2.90  2.70  2.60  2.60   INR Date 2025  3/5/2025  3/12/2025  3/19/2025   INR Goal 2.5-3.5  2.5-3.5  2.5-3.5  2.5-3.5   Trend Same  Same  Same  Same   Last Week Total 56 mg  56 mg  56 mg  56 mg   Next Week Total 56 mg  56 mg  56 mg  56 mg   Sun 8 mg  8 mg  8 mg  8 mg   Mon 8 mg  8 mg  8 mg  8 mg   Tue 8 mg  8 mg  8 mg  8 mg   Wed 8 mg  8 mg  -  8 mg   Thu 8 mg  8 mg  8 mg  8 mg   Fri 8 mg  8 mg  8 mg  8 mg   Sat 8 mg  8 mg  8 mg  8 mg   Historical INR  2.70      2.60      2.60            This result is from an external source.       Plan:  1. INR is therapeutic today- see above in Anticoagulation Summary.    Man Ramsey to continue their warfarin regimen- see above in Anticoagulation Summary.  2. Follow up in 2 weeks  3. Pt has agreed to only be  called if INR out of range. They have been instructed to call if any changes in medications, doses, concerns, etc. Patient expresses understanding and has no further questions at this time.    Sun Curtis

## 2025-03-27 LAB — INR PPP: 2.7

## 2025-03-28 ENCOUNTER — ANTICOAGULATION VISIT (OUTPATIENT)
Dept: PHARMACY | Facility: HOSPITAL | Age: 45
End: 2025-03-28
Payer: COMMERCIAL

## 2025-03-28 DIAGNOSIS — Z95.2 HISTORY OF MECHANICAL AORTIC VALVE REPLACEMENT: Primary | ICD-10-CM

## 2025-03-28 DIAGNOSIS — Z79.01 LONG TERM (CURRENT) USE OF ANTICOAGULANTS: ICD-10-CM

## 2025-03-28 NOTE — PROGRESS NOTES
Anticoagulation Clinic Progress Note    Anticoagulation Summary  As of 3/28/2025      INR goal:  2.5-3.5   TTR:  76.6% (6.3 y)   INR used for dosin.70 (3/27/2025)   Warfarin maintenance plan:  8 mg every day   Weekly warfarin total:  56 mg   No change documented:  Sun Curtis   Plan last modified:  Neo Cornjeo, PharmD (2025)   Next INR check:  4/3/2025   Priority:  High   Target end date:  Indefinite    Indications    History of mechanical aortic valve replacement [Z95.2]  Long term (current) use of anticoagulants [Z79.01]                 Anticoagulation Episode Summary       INR check location:  Home Draw    Preferred lab:  --    Send INR reminders to:   JOSE LECHUGA  POOL    Comments:  **[facility]**          Anticoagulation Care Providers       Provider Role Specialty Phone number    Lorne Kam MD Referring Cardiology [phone]            Clinic Interview:  No pertinent clinical findings have been reported.    INR History:      3/6/2025     9:36 AM 3/12/2025    12:00 AM 3/13/2025     8:17 AM 3/19/2025    12:00 AM 3/20/2025     8:21 AM 3/27/2025    12:00 AM 3/28/2025     8:26 AM   Anticoagulation Monitoring   INR 2.70  2.60  2.60  2.70   INR Date 3/5/2025  3/12/2025  3/19/2025  3/27/2025   INR Goal 2.5-3.5  2.5-3.5  2.5-3.5  2.5-3.5   Trend Same  Same  Same  Same   Last Week Total 56 mg  56 mg  56 mg  56 mg   Next Week Total 56 mg  56 mg  56 mg  56 mg   Sun 8 mg  8 mg  8 mg  8 mg   Mon 8 mg  8 mg  8 mg  8 mg   Tue 8 mg  8 mg  8 mg  8 mg   Wed 8 mg  -  8 mg  8 mg   Thu 8 mg  8 mg  8 mg  -   Fri 8 mg  8 mg  8 mg  8 mg   Sat 8 mg  8 mg  8 mg  8 mg   Historical INR  2.60      2.60      2.70            This result is from an external source.       Plan:  1. INR is therapeutic today- see above in Anticoagulation Summary.    Man Ramsey to continue their warfarin regimen- see above in Anticoagulation Summary.  2. Follow up in 1 week  3. Pt has agreed to only be  called if INR out of range. They have been instructed to call if any changes in medications, doses, concerns, etc. Patient expresses understanding and has no further questions at this time.    Sun Curtis

## 2025-04-03 ENCOUNTER — ANTICOAGULATION VISIT (OUTPATIENT)
Dept: PHARMACY | Facility: HOSPITAL | Age: 45
End: 2025-04-03
Payer: COMMERCIAL

## 2025-04-03 DIAGNOSIS — Z95.2 HISTORY OF MECHANICAL AORTIC VALVE REPLACEMENT: Primary | ICD-10-CM

## 2025-04-03 DIAGNOSIS — Z79.01 LONG TERM (CURRENT) USE OF ANTICOAGULANTS: ICD-10-CM

## 2025-04-03 LAB — INR PPP: 2.6

## 2025-04-03 NOTE — PROGRESS NOTES
Anticoagulation Clinic Progress Note    Anticoagulation Summary  As of 4/3/2025      INR goal:  2.5-3.5   TTR:  76.6% (6.3 y)   INR used for dosin.60 (4/3/2025)   Warfarin maintenance plan:  8 mg every day   Weekly warfarin total:  56 mg   No change documented:  Sun Curtis   Plan last modified:  Neo Cornejo, PharmD (2025)   Next INR check:  2025   Priority:  High   Target end date:  Indefinite    Indications    History of mechanical aortic valve replacement [Z95.2]  Long term (current) use of anticoagulants [Z79.01]                 Anticoagulation Episode Summary       INR check location:  Home Draw    Preferred lab:  --    Send INR reminders to:   [facility]    Comments:  **[facility]**          Anticoagulation Care Providers       Provider Role Specialty Phone number    Lorne Kam MD Referring Cardiology [phone]            Clinic Interview:  No pertinent clinical findings have been reported.    INR History:      3/13/2025     8:17 AM 3/19/2025    12:00 AM 3/20/2025     8:21 AM 3/27/2025    12:00 AM 3/28/2025     8:26 AM 4/3/2025    12:00 AM 4/3/2025    10:27 AM   Anticoagulation Monitoring   INR 2.60  2.60  2.70  2.60   INR Date 3/12/2025  3/19/2025  3/27/2025  4/3/2025   INR Goal 2.5-3.5  2.5-3.5  2.5-3.5  2.5-3.5   Trend Same  Same  Same  Same   Last Week Total 56 mg  56 mg  56 mg  56 mg   Next Week Total 56 mg  56 mg  56 mg  56 mg   Sun 8 mg  8 mg  8 mg  8 mg   Mon 8 mg  8 mg  8 mg  8 mg   Tue 8 mg  8 mg  8 mg  8 mg   Wed -  8 mg  8 mg  8 mg   Thu 8 mg  8 mg  -  8 mg   Fri 8 mg  8 mg  8 mg  8 mg   Sat 8 mg  8 mg  8 mg  8 mg   Historical INR  2.60      2.70      2.60            This result is from an external source.       Plan:  1. INR is therapeutic today- see above in Anticoagulation Summary.    Man Ramsey to continue their warfarin regimen- see above in Anticoagulation Summary.  2. Follow up in 2 weeks  3. Pt has agreed to only be  called if INR out of range. They have been instructed to call if any changes in medications, doses, concerns, etc. Patient expresses understanding and has no further questions at this time.    Sun Curtis

## 2025-04-09 ENCOUNTER — ANTICOAGULATION VISIT (OUTPATIENT)
Dept: PHARMACY | Facility: HOSPITAL | Age: 45
End: 2025-04-09
Payer: COMMERCIAL

## 2025-04-09 DIAGNOSIS — Z95.2 HISTORY OF MECHANICAL AORTIC VALVE REPLACEMENT: Primary | ICD-10-CM

## 2025-04-09 DIAGNOSIS — Z79.01 LONG TERM (CURRENT) USE OF ANTICOAGULANTS: ICD-10-CM

## 2025-04-09 LAB — INR PPP: 2.9

## 2025-04-09 NOTE — PROGRESS NOTES
Anticoagulation Clinic Progress Note    Anticoagulation Summary  As of 2025      INR goal:  2.5-3.5   TTR:  76.7% (6.3 y)   INR used for dosin.90 (2025)   Warfarin maintenance plan:  8 mg every day   Weekly warfarin total:  56 mg   No change documented:  Jeevan Davies, Pharmacy Technician   Plan last modified:  Neo Cornejo, PharmD (2025)   Next INR check:  2025   Priority:  High   Target end date:  Indefinite    Indications    History of mechanical aortic valve replacement [Z95.2]  Long term (current) use of anticoagulants [Z79.01]                 Anticoagulation Episode Summary       INR check location:  Home Draw    Preferred lab:  --    Send INR reminders to:   [facility]    Comments:  [facility]          Anticoagulation Care Providers       Provider Role Specialty Phone number    Lorne Kam MD Referring Cardiology [phone]            Clinic Interview:  No pertinent clinical findings have been reported.    INR History:      3/20/2025     8:21 AM 3/27/2025    12:00 AM 3/28/2025     8:26 AM 4/3/2025    12:00 AM 4/3/2025    10:27 AM 2025    12:00 AM 2025     1:08 PM   Anticoagulation Monitoring   INR 2.60  2.70  2.60  2.90   INR Date 3/19/2025  3/27/2025  4/3/2025  2025   INR Goal 2.5-3.5  2.5-3.5  2.5-3.5  2.5-3.5   Trend Same  Same  Same  Same   Last Week Total 56 mg  56 mg  56 mg  56 mg   Next Week Total 56 mg  56 mg  56 mg  56 mg   Sun 8 mg  8 mg  8 mg  8 mg   Mon 8 mg  8 mg  8 mg  8 mg   Tue 8 mg  8 mg  8 mg  8 mg   Wed 8 mg  8 mg  8 mg  8 mg   Thu 8 mg  -  8 mg  8 mg   Fri 8 mg  8 mg  8 mg  8 mg   Sat 8 mg  8 mg  8 mg  8 mg   Historical INR  2.70      2.60      2.90            This result is from an external source.       Plan:  1. INR is therapeutic today- see above in Anticoagulation Summary.    Man Ramsey to continue their warfarin regimen- see above in Anticoagulation Summary.  2. Follow up in 1 week  3. Pt has agreed to  only be called if INR out of range. They have been instructed to call if any changes in medications, doses, concerns, etc. Patient expresses understanding and has no further questions at this time.    Jeevan Davies, Pharmacy Technician

## 2025-04-17 ENCOUNTER — ANTICOAGULATION VISIT (OUTPATIENT)
Dept: PHARMACY | Facility: HOSPITAL | Age: 45
End: 2025-04-17
Payer: COMMERCIAL

## 2025-04-17 DIAGNOSIS — Z79.01 LONG TERM (CURRENT) USE OF ANTICOAGULANTS: ICD-10-CM

## 2025-04-17 DIAGNOSIS — Z95.2 HISTORY OF MECHANICAL AORTIC VALVE REPLACEMENT: Primary | ICD-10-CM

## 2025-04-17 LAB — INR PPP: 2.7

## 2025-04-17 NOTE — PROGRESS NOTES
Anticoagulation Clinic Progress Note    Anticoagulation Summary  As of 2025      INR goal:  2.5-3.5   TTR:  76.8% (6.3 y)   INR used for dosin.70 (2025)   Warfarin maintenance plan:  8 mg every day   Weekly warfarin total:  56 mg   No change documented:  Jeevan Davies, Pharmacy Technician   Plan last modified:  Neo Cornejo, PharmD (2025)   Next INR check:  2025   Priority:  High   Target end date:  Indefinite    Indications    History of mechanical aortic valve replacement [Z95.2]  Long term (current) use of anticoagulants [Z79.01]                 Anticoagulation Episode Summary       INR check location:  Home Draw    Preferred lab:  --    Send INR reminders to:   [facility]    Comments:  [facility]          Anticoagulation Care Providers       Provider Role Specialty Phone number    Lorne Kam MD Referring Cardiology [phone]            Clinic Interview:  No pertinent clinical findings have been reported.    INR History:      3/28/2025     8:26 AM 4/3/2025    12:00 AM 4/3/2025    10:27 AM 2025    12:00 AM 2025     1:08 PM 2025    12:00 AM 2025     3:38 PM   Anticoagulation Monitoring   INR 2.70  2.60  2.90  2.70   INR Date 3/27/2025  4/3/2025  2025  2025   INR Goal 2.5-3.5  2.5-3.5  2.5-3.5  2.5-3.5   Trend Same  Same  Same  Same   Last Week Total 56 mg  56 mg  56 mg  56 mg   Next Week Total 56 mg  56 mg  56 mg  56 mg   Sun 8 mg  8 mg  8 mg  8 mg   Mon 8 mg  8 mg  8 mg  8 mg   Tue 8 mg  8 mg  8 mg  8 mg   Wed 8 mg  8 mg  8 mg  8 mg   Thu -  8 mg  8 mg  8 mg   Fri 8 mg  8 mg  8 mg  8 mg   Sat 8 mg  8 mg  8 mg  8 mg   Historical INR  2.60      2.90      2.70            This result is from an external source.       Plan:  1. INR is therapeutic today- see above in Anticoagulation Summary.    Man Ramsey to continue their warfarin regimen- see above in Anticoagulation Summary.  2. Follow up in 1 week  3. Pt has agreed  to only be called if INR out of range. They have been instructed to call if any changes in medications, doses, concerns, etc. Patient expresses understanding and has no further questions at this time.    Jeevan Davies, Pharmacy Technician

## 2025-04-24 ENCOUNTER — ANTICOAGULATION VISIT (OUTPATIENT)
Dept: PHARMACY | Facility: HOSPITAL | Age: 45
End: 2025-04-24
Payer: COMMERCIAL

## 2025-04-24 DIAGNOSIS — Z79.01 LONG TERM (CURRENT) USE OF ANTICOAGULANTS: ICD-10-CM

## 2025-04-24 DIAGNOSIS — Z95.2 HISTORY OF MECHANICAL AORTIC VALVE REPLACEMENT: Primary | ICD-10-CM

## 2025-04-24 LAB — INR PPP: 2.9

## 2025-05-01 ENCOUNTER — ANTICOAGULATION VISIT (OUTPATIENT)
Dept: PHARMACY | Facility: HOSPITAL | Age: 45
End: 2025-05-01
Payer: COMMERCIAL

## 2025-05-01 DIAGNOSIS — Z95.2 HISTORY OF MECHANICAL AORTIC VALVE REPLACEMENT: Primary | ICD-10-CM

## 2025-05-01 DIAGNOSIS — Z79.01 LONG TERM (CURRENT) USE OF ANTICOAGULANTS: ICD-10-CM

## 2025-05-01 LAB — INR PPP: 2.5

## 2025-05-01 NOTE — PROGRESS NOTES
Anticoagulation Clinic Progress Note    Anticoagulation Summary  As of 2025      INR goal:  2.5-3.5   TTR:  76.9% (6.4 y)   INR used for dosin.50 (2025)   Warfarin maintenance plan:  8 mg every day   Weekly warfarin total:  56 mg   No change documented:  Jeevan Davies, Pharmacy Technician   Plan last modified:  Neo Cornejo, PharmD (2025)   Next INR check:  2025   Priority:  High   Target end date:  Indefinite    Indications    History of mechanical aortic valve replacement [Z95.2]  Long term (current) use of anticoagulants [Z79.01]                 Anticoagulation Episode Summary       INR check location:  Home Draw    Preferred lab:  --    Send INR reminders to:   [facility]    Comments:  [facility]          Anticoagulation Care Providers       Provider Role Specialty Phone number    Lorne Kam MD Referring Cardiology [phone]            Clinic Interview:  No pertinent clinical findings have been reported.    INR History:      2025     1:08 PM 2025    12:00 AM 2025     3:38 PM 2025    12:00 AM 2025     3:29 PM 2025    12:00 AM 2025     3:51 PM   Anticoagulation Monitoring   INR 2.90  2.70  2.90  2.50   INR Date 2025   INR Goal 2.5-3.5  2.5-3.5  2.5-3.5  2.5-3.5   Trend Same  Same  Same  Same   Last Week Total 56 mg  56 mg  56 mg  56 mg   Next Week Total 56 mg  56 mg  56 mg  56 mg   Sun 8 mg  8 mg  8 mg  8 mg   Mon 8 mg  8 mg  8 mg  8 mg   Tue 8 mg  8 mg  8 mg  8 mg   Wed 8 mg  8 mg  8 mg  8 mg   Thu 8 mg  8 mg  8 mg  8 mg   Fri 8 mg  8 mg  8 mg  8 mg   Sat 8 mg  8 mg  8 mg  8 mg   Historical INR  2.70      2.90      2.50            This result is from an external source.       Plan:  1. INR is therapeutic today- see above in Anticoagulation Summary.    Man Ramsey to continue their warfarin regimen- see above in Anticoagulation Summary.  2. Follow up in 1 week  3. Pt has agreed  to only be called if INR out of range. They have been instructed to call if any changes in medications, doses, concerns, etc. Patient expresses understanding and has no further questions at this time.    Jeevan Davies, Pharmacy Technician

## 2025-05-14 LAB — INR PPP: 1.9

## 2025-05-15 ENCOUNTER — ANTICOAGULATION VISIT (OUTPATIENT)
Dept: PHARMACY | Facility: HOSPITAL | Age: 45
End: 2025-05-15
Payer: COMMERCIAL

## 2025-05-15 DIAGNOSIS — Z79.01 LONG TERM (CURRENT) USE OF ANTICOAGULANTS: ICD-10-CM

## 2025-05-15 DIAGNOSIS — Z95.2 HISTORY OF MECHANICAL AORTIC VALVE REPLACEMENT: Primary | ICD-10-CM

## 2025-05-15 NOTE — PROGRESS NOTES
Anticoagulation Clinic Progress Note    Anticoagulation Summary  As of 5/15/2025      INR goal:  2.5-3.5   TTR:  76.5% (6.4 y)   INR used for dosin.90 (2025)   Warfarin maintenance plan:  8 mg every day   Weekly warfarin total:  56 mg   Plan last modified:  Neo Cornejo, PharmD (2025)   Next INR check:  2025   Priority:  High   Target end date:  Indefinite    Indications    History of mechanical aortic valve replacement [Z95.2]  Long term (current) use of anticoagulants [Z79.01]                 Anticoagulation Episode Summary       INR check location:  Home Draw    Preferred lab:  --    Send INR reminders to:   [facility]    Comments:  [facility]          Anticoagulation Care Providers       Provider Role Specialty Phone number    Lorne Kam MD Referring Cardiology [phone]            Clinic Interview:  Patient Findings     Positives:  Change in diet/appetite    Negatives:  Signs/symptoms of thrombosis, Signs/symptoms of bleeding,   Laboratory test error suspected, Change in health, Change in alcohol use,   Change in activity, Upcoming invasive procedure, Emergency department   visit, Upcoming dental procedure, Missed doses, Extra doses, Change in   medications, Hospital admission, Bruising, Other complaints    Comments:  Reports he has been adding dried brussels sprouts to salads.       Clinical Outcomes     Negatives:  Major bleeding event, Thromboembolic event,   Anticoagulation-related hospital admission, Anticoagulation-related ED   visit, Anticoagulation-related fatality    Comments:  Reports he has been adding dried brussels sprouts to salads.         INR History:      2025     3:38 PM 2025    12:00 AM 2025     3:29 PM 2025    12:00 AM 2025     3:51 PM 2025    12:00 AM 5/15/2025     9:35 AM   Anticoagulation Monitoring   INR 2.70  2.90  2.50  1.90   INR Date 2025   INR Goal 2.5-3.5   2.5-3.5  2.5-3.5  2.5-3.5   Trend Same  Same  Same  Same   Last Week Total 56 mg  56 mg  56 mg  56 mg   Next Week Total 56 mg  56 mg  56 mg  60 mg   Sun 8 mg  8 mg  8 mg  8 mg   Mon 8 mg  8 mg  8 mg  8 mg   Tue 8 mg  8 mg  8 mg  8 mg   Wed 8 mg  8 mg  8 mg  -   Thu 8 mg  8 mg  8 mg  12 mg (5/15)   Fri 8 mg  8 mg  8 mg  8 mg   Sat 8 mg  8 mg  8 mg  8 mg   Historical INR  2.90      2.50      1.90            This result is from an external source.       Plan:  1. INR is Subtherapeutic today- see above in Anticoagulation Summary.   Will instruct Man Ramsey to Change their warfarin regimen (12 mg today, then resume 8 mg daily) - see above in Anticoagulation Summary.  2. Follow up in 1 week.  3. They have been instructed to call if any changes in medications, doses, concerns, etc. Patient expresses understanding and has no further questions at this time.    Neo Cornejo, PharmD

## 2025-05-22 ENCOUNTER — ANTICOAGULATION VISIT (OUTPATIENT)
Dept: PHARMACY | Facility: HOSPITAL | Age: 45
End: 2025-05-22
Payer: COMMERCIAL

## 2025-05-22 DIAGNOSIS — Z79.01 LONG TERM (CURRENT) USE OF ANTICOAGULANTS: ICD-10-CM

## 2025-05-22 DIAGNOSIS — Z95.2 HISTORY OF MECHANICAL AORTIC VALVE REPLACEMENT: Primary | ICD-10-CM

## 2025-05-22 LAB — INR PPP: 2.2

## 2025-05-22 NOTE — PROGRESS NOTES
Anticoagulation Clinic Progress Note    Anticoagulation Summary  As of 2025      INR goal:  2.5-3.5   TTR:  76.2% (6.4 y)   INR used for dosin.20 (2025)   Warfarin maintenance plan:  10 mg every Mon; 8 mg all other days   Weekly warfarin total:  58 mg   Plan last modified:  Neo Cornejo, PharmD (2025)   Next INR check:  2025   Priority:  High   Target end date:  Indefinite    Indications    History of mechanical aortic valve replacement [Z95.2]  Long term (current) use of anticoagulants [Z79.01]                 Anticoagulation Episode Summary       INR check location:  Home Draw    Preferred lab:  --    Send INR reminders to:  [facility]    Comments:  [facility]**          Anticoagulation Care Providers       Provider Role Specialty Phone number    Lorne Kam MD Referring Cardiology [phone]            Clinic Interview:  Patient Findings     Negatives:  Signs/symptoms of thrombosis, Signs/symptoms of bleeding,   Laboratory test error suspected, Change in health, Change in alcohol use,   Change in activity, Upcoming invasive procedure, Emergency department   visit, Upcoming dental procedure, Missed doses, Extra doses, Change in   medications, Change in diet/appetite, Hospital admission, Bruising, Other   complaints      Clinical Outcomes     Negatives:  Major bleeding event, Thromboembolic event,   Anticoagulation-related hospital admission, Anticoagulation-related ED   visit, Anticoagulation-related fatality        INR History:      2025     3:29 PM 2025    12:00 AM 2025     3:51 PM 2025    12:00 AM 5/15/2025     9:35 AM 2025    12:00 AM 2025     9:57 AM   Anticoagulation Monitoring   INR 2.90  2.50  1.90  2.20   INR Date 2025   INR Goal 2.5-3.5  2.5-3.5  2.5-3.5  2.5-3.5   Trend Same  Same  Same  Up   Last Week Total 56 mg  56 mg  56 mg  60 mg   Next Week Total 56 mg  56 mg  60 mg  60  mg   Sun 8 mg  8 mg  8 mg  8 mg   Mon 8 mg  8 mg  8 mg  10 mg   Tue 8 mg  8 mg  8 mg  8 mg   Wed 8 mg  8 mg  -  8 mg   Thu 8 mg  8 mg  12 mg (5/15)  10 mg (5/22)   Fri 8 mg  8 mg  8 mg  8 mg   Sat 8 mg  8 mg  8 mg  8 mg   Historical INR  2.50      1.90      2.20            This result is from an external source.       Plan:  1. INR is Subtherapeutic today- see above in Anticoagulation Summary.   Will instruct Man STEWART Roxy to Increase their warfarin regimen (10 mg today, then increase to 10 mg Mon, 8 mg all other days) - see above in Anticoagulation Summary.  2. Follow up in 1 week. Pending INR, will consider increasing total weekly dose further.   3. They have been instructed to call if any changes in medications, doses, concerns, etc. Patient expresses understanding and has no further questions at this time.    Neo Cornejo, PharmD

## 2025-05-28 ENCOUNTER — ANTICOAGULATION VISIT (OUTPATIENT)
Dept: PHARMACY | Facility: HOSPITAL | Age: 45
End: 2025-05-28
Payer: COMMERCIAL

## 2025-05-28 DIAGNOSIS — Z79.01 LONG TERM (CURRENT) USE OF ANTICOAGULANTS: ICD-10-CM

## 2025-05-28 DIAGNOSIS — Z95.2 HISTORY OF MECHANICAL AORTIC VALVE REPLACEMENT: Primary | ICD-10-CM

## 2025-05-28 LAB — INR PPP: 2.2

## 2025-05-28 NOTE — PROGRESS NOTES
Anticoagulation Clinic Progress Note    Anticoagulation Summary  As of 2025      INR goal:  2.5-3.5   TTR:  76.0% (6.5 y)   INR used for dosin.20 (2025)   Warfarin maintenance plan:  10 mg every Mon, Wed, Fri; 8 mg all other days   Weekly warfarin total:  62 mg   Plan last modified:  Heather Kline RPH (2025)   Next INR check:  2025   Priority:  High   Target end date:  Indefinite    Indications    History of mechanical aortic valve replacement [Z95.2]  Long term (current) use of anticoagulants [Z79.01]                 Anticoagulation Episode Summary       INR check location:  Home Draw    Preferred lab:  --    Send INR reminders to:  [facility]    Comments:  [facility]          Anticoagulation Care Providers       Provider Role Specialty Phone number    Lorne Kam MD Referring Cardiology [phone]            Clinic Interview:  Patient Findings     Positives:  Bruising    Negatives:  Signs/symptoms of thrombosis, Signs/symptoms of bleeding,   Laboratory test error suspected, Change in health, Change in alcohol use,   Change in activity, Upcoming invasive procedure, Emergency department   visit, Upcoming dental procedure, Missed doses, Extra doses, Change in   medications, Change in diet/appetite, Hospital admission, Other complaints      Comments:  Reports no changes. Did mention two bruises, but reports they   are improving as expected      Clinical Outcomes     Negatives:  Major bleeding event, Thromboembolic event,   Anticoagulation-related hospital admission, Anticoagulation-related ED   visit, Anticoagulation-related fatality    Comments:  Reports no changes. Did mention two bruises, but reports they   are improving as expected        INR History:      2025     3:51 PM 2025    12:00 AM 5/15/2025     9:35 AM 2025    12:00 AM 2025     9:57 AM 2025    12:00 AM 2025     2:21 PM   Anticoagulation Monitoring   INR 2.50   1.90  2.20  2.20   INR Date 5/1/2025 5/14/2025 5/22/2025 5/28/2025   INR Goal 2.5-3.5  2.5-3.5  2.5-3.5  2.5-3.5   Trend Same  Same  Up  Up   Last Week Total 56 mg  56 mg  60 mg  60 mg   Next Week Total 56 mg  60 mg  60 mg  62 mg   Sun 8 mg  8 mg  8 mg  8 mg   Mon 8 mg  8 mg  10 mg  10 mg   Tue 8 mg  8 mg  8 mg  8 mg   Wed 8 mg  -  8 mg  10 mg   Thu 8 mg  12 mg (5/15)  10 mg (5/22)  8 mg   Fri 8 mg  8 mg  8 mg  10 mg   Sat 8 mg  8 mg  8 mg  8 mg   Historical INR  1.90      2.20      2.20            This result is from an external source.       Plan:  1. INR is Subtherapeutic today- see above in Anticoagulation Summary.   Will instruct Man Ramsey to increase Wednesday's and Friday's dose to 10 mg (now 10 mg MWF, 8 AODs)- see above in Anticoagulation Summary.  2. Follow up in 1 week  3. They have been instructed to call if any changes in medications, doses, concerns, etc. Patient expresses understanding and has no further questions at this time.    Srinivasa Nixon [facility]

## 2025-06-06 ENCOUNTER — ANTICOAGULATION VISIT (OUTPATIENT)
Dept: PHARMACY | Facility: HOSPITAL | Age: 45
End: 2025-06-06
Payer: COMMERCIAL

## 2025-06-06 DIAGNOSIS — Z95.2 HISTORY OF MECHANICAL AORTIC VALVE REPLACEMENT: Primary | ICD-10-CM

## 2025-06-06 DIAGNOSIS — Z79.01 LONG TERM (CURRENT) USE OF ANTICOAGULANTS: ICD-10-CM

## 2025-06-06 LAB — INR PPP: 2.9

## 2025-06-06 NOTE — PROGRESS NOTES
Anticoagulation Clinic Progress Note    Anticoagulation Summary  As of 2025      INR goal:  2.5-3.5   TTR:  76.0% (6.5 y)   INR used for dosin.90 (2025)   Warfarin maintenance plan:  10 mg every Mon, Wed, Fri; 8 mg all other days   Weekly warfarin total:  62 mg   No change documented:  Heather Kline RPH   Plan last modified:  Heather Kline RPH (2025)   Next INR check:  2025   Priority:  High   Target end date:  Indefinite    Indications    History of mechanical aortic valve replacement [Z95.2]  Long term (current) use of anticoagulants [Z79.01]                 Anticoagulation Episode Summary       INR check location:  Home Draw    Preferred lab:  --    Send INR reminders to:   [facility]    Comments:  [facility]**          Anticoagulation Care Providers       Provider Role Specialty Phone number    Lorne Kam MD Referring Cardiology [phone]            Clinic Interview:  No pertinent clinical findings have been reported.    INR History:      5/15/2025     9:35 AM 2025    12:00 AM 2025     9:57 AM 2025    12:00 AM 2025     2:21 PM 2025    12:00 AM 2025     9:26 AM   Anticoagulation Monitoring   INR 1.90  2.20  2.20  2.90   INR Date 2025   INR Goal 2.5-3.5  2.5-3.5  2.5-3.5  2.5-3.5   Trend Same  Up  Up  Same   Last Week Total 56 mg  60 mg  60 mg  62 mg   Next Week Total 60 mg  60 mg  62 mg  62 mg   Sun 8 mg  8 mg  8 mg  8 mg   Mon 8 mg  10 mg  10 mg  10 mg   Tue 8 mg  8 mg  8 mg  8 mg   Wed -  8 mg  10 mg  10 mg   Thu 12 mg (5/15)  10 mg ()  8 mg  8 mg   Fri 8 mg  8 mg  10 mg  10 mg   Sat 8 mg  8 mg  8 mg  8 mg   Historical INR  2.20      2.20      2.90  C          C Corrected result    This result is from an external source.       Plan:  1. INR is therapeutic today- see above in Anticoagulation Summary.    Man Ramsey to continue their warfarin regimen- see above in  Anticoagulation Summary.  2. Follow up in 1 week  3. Pt has agreed to only be called if INR out of range. They have been instructed to call if any changes in medications, doses, concerns, etc. Patient expresses understanding and has no further questions at this time.    Heather Kline, [facility]

## 2025-06-11 ENCOUNTER — ANTICOAGULATION VISIT (OUTPATIENT)
Dept: PHARMACY | Facility: HOSPITAL | Age: 45
End: 2025-06-11
Payer: COMMERCIAL

## 2025-06-11 DIAGNOSIS — Z79.01 LONG TERM (CURRENT) USE OF ANTICOAGULANTS: ICD-10-CM

## 2025-06-11 DIAGNOSIS — Z95.2 HISTORY OF MECHANICAL AORTIC VALVE REPLACEMENT: Primary | ICD-10-CM

## 2025-06-11 LAB — INR PPP: 2.6

## 2025-06-18 ENCOUNTER — HOSPITAL ENCOUNTER (OUTPATIENT)
Dept: MRI IMAGING | Facility: HOSPITAL | Age: 45
Discharge: HOME | End: 2025-06-18
Admitting: INTERNAL MEDICINE
Payer: COMMERCIAL

## 2025-06-18 DIAGNOSIS — R74.01 ELEVATED TRANSAMINASE LEVEL: ICD-10-CM

## 2025-06-18 PROCEDURE — 25510000002 GADOBENATE DIMEGLUMINE 529 MG/ML SOLUTION: Performed by: INTERNAL MEDICINE

## 2025-06-18 PROCEDURE — 74183 MRI ABD W/O CNTR FLWD CNTR: CPT

## 2025-06-18 PROCEDURE — 76014 MR SFTY IMPLT&/FB ASMT STF 1: CPT

## 2025-06-18 PROCEDURE — A9577 INJ MULTIHANCE: HCPCS | Performed by: INTERNAL MEDICINE

## 2025-06-18 RX ADMIN — GADOBENATE DIMEGLUMINE 20 ML: 529 INJECTION, SOLUTION INTRAVENOUS at 07:23

## 2025-06-20 ENCOUNTER — ANTICOAGULATION VISIT (OUTPATIENT)
Dept: PHARMACY | Facility: HOSPITAL | Age: 45
End: 2025-06-20
Payer: COMMERCIAL

## 2025-06-20 DIAGNOSIS — Z79.01 LONG TERM (CURRENT) USE OF ANTICOAGULANTS: ICD-10-CM

## 2025-06-20 DIAGNOSIS — Z95.2 HISTORY OF MECHANICAL AORTIC VALVE REPLACEMENT: Primary | ICD-10-CM

## 2025-06-20 LAB — INR PPP: 2.8

## 2025-06-20 NOTE — PROGRESS NOTES
Anticoagulation Clinic Progress Note    Anticoagulation Summary  As of 2025      INR goal:  2.5-3.5   TTR:  76.1% (6.5 y)   INR used for dosin.80 (2025)   Warfarin maintenance plan:  10 mg every Mon, Wed, Fri; 8 mg all other days   Weekly warfarin total:  62 mg   No change documented:  Neo Cornejo, PharmD   Plan last modified:  Heather Kline RPH (2025)   Next INR check:  2025   Priority:  High   Target end date:  Indefinite    Indications    History of mechanical aortic valve replacement [Z95.2]  Long term (current) use of anticoagulants [Z79.01]                 Anticoagulation Episode Summary       INR check location:  Home Draw    Preferred lab:  --    Send INR reminders to:   [facility]    Comments:  **[facility]**          Anticoagulation Care Providers       Provider Role Specialty Phone number    Lorne Kam MD Referring Cardiology [phone]            Clinic Interview:  No pertinent clinical findings have been reported.    INR History:      2025     2:21 PM 2025    12:00 AM 2025     9:26 AM 2025    12:00 AM 2025     3:33 PM 2025    12:00 AM 2025     1:58 PM   Anticoagulation Monitoring   INR 2.20  2.90  2.60  2.80   INR Date 2025   INR Goal 2.5-3.5  2.5-3.5  2.5-3.5  2.5-3.5   Trend Up  Same  Same  Same   Last Week Total 60 mg  62 mg  62 mg  62 mg   Next Week Total 62 mg  62 mg  62 mg  62 mg   Sun 8 mg  8 mg  8 mg  8 mg   Mon 10 mg  10 mg  10 mg  10 mg   Tue 8 mg  8 mg  8 mg  8 mg   Wed 10 mg  10 mg  10 mg  10 mg   Thu 8 mg  8 mg  8 mg  8 mg   Fri 10 mg  10 mg  10 mg  10 mg   Sat 8 mg  8 mg  8 mg  8 mg   Historical INR  2.90  C     2.60      2.80           C Corrected result    This result is from an external source.       Plan:  1. INR is therapeutic today- see above in Anticoagulation Summary.    Man Ramsey to continue their warfarin regimen- see above in  Anticoagulation Summary.  2. Follow up in 1 week  3. Pt has agreed to only be called if INR out of range. They have been instructed to call if any changes in medications, doses, concerns, etc. Patient expresses understanding and has no further questions at this time.    Neo Cornejo, PharmD

## 2025-06-25 ENCOUNTER — ANTICOAGULATION VISIT (OUTPATIENT)
Dept: PHARMACY | Facility: HOSPITAL | Age: 45
End: 2025-06-25
Payer: COMMERCIAL

## 2025-06-25 DIAGNOSIS — Z79.01 LONG TERM (CURRENT) USE OF ANTICOAGULANTS: ICD-10-CM

## 2025-06-25 DIAGNOSIS — Z95.2 HISTORY OF MECHANICAL AORTIC VALVE REPLACEMENT: Primary | ICD-10-CM

## 2025-06-25 LAB — INR PPP: 2.6

## 2025-06-25 NOTE — PROGRESS NOTES
Anticoagulation Clinic Progress Note    Anticoagulation Summary  As of 2025      INR goal:  2.5-3.5   TTR:  76.2% (6.5 y)   INR used for dosin.60 (2025)   Warfarin maintenance plan:  10 mg every Mon, Wed, Fri; 8 mg all other days   Weekly warfarin total:  62 mg   No change documented:  Jeevan Davies, Pharmacy Technician   Plan last modified:  Heather Kline RPH (2025)   Next INR check:  2025   Priority:  High   Target end date:  Indefinite    Indications    History of mechanical aortic valve replacement [Z95.2]  Long term (current) use of anticoagulants [Z79.01]                 Anticoagulation Episode Summary       INR check location:  Home Draw    Preferred lab:  --    Send INR reminders to:   [facility]    Comments:  **[facility]**          Anticoagulation Care Providers       Provider Role Specialty Phone number    Lorne Kam MD Referring Cardiology [phone]            Clinic Interview:  No pertinent clinical findings have been reported.    INR History:      2025     9:26 AM 2025    12:00 AM 2025     3:33 PM 2025    12:00 AM 2025     1:58 PM 2025    12:00 AM 2025     3:21 PM   Anticoagulation Monitoring   INR 2.90  2.60  2.80  2.60   INR Date 2025   INR Goal 2.5-3.5  2.5-3.5  2.5-3.5  2.5-3.5   Trend Same  Same  Same  Same   Last Week Total 62 mg  62 mg  62 mg  62 mg   Next Week Total 62 mg  62 mg  62 mg  62 mg   Sun 8 mg  8 mg  8 mg  8 mg   Mon 10 mg  10 mg  10 mg  10 mg   Tue 8 mg  8 mg  8 mg  8 mg   Wed 10 mg  10 mg  10 mg  10 mg   Thu 8 mg  8 mg  8 mg  8 mg   Fri 10 mg  10 mg  10 mg  10 mg   Sat 8 mg  8 mg  8 mg  8 mg   Historical INR  2.60      2.80      2.60            This result is from an external source.       Plan:  1. INR is therapeutic today- see above in Anticoagulation Summary.    Man Ramsey to continue their warfarin regimen- see above in Anticoagulation  Summary.  2. Follow up in 1 week  3. Pt has agreed to only be called if INR out of range. They have been instructed to call if any changes in medications, doses, concerns, etc. Patient expresses understanding and has no further questions at this time.    Jeevan Davies, Pharmacy Technician

## 2025-07-02 LAB — INR PPP: 2.6

## 2025-07-03 ENCOUNTER — ANTICOAGULATION VISIT (OUTPATIENT)
Dept: PHARMACY | Facility: HOSPITAL | Age: 45
End: 2025-07-03
Payer: COMMERCIAL

## 2025-07-03 DIAGNOSIS — Z79.01 LONG TERM (CURRENT) USE OF ANTICOAGULANTS: ICD-10-CM

## 2025-07-03 DIAGNOSIS — Z95.2 HISTORY OF MECHANICAL AORTIC VALVE REPLACEMENT: Primary | ICD-10-CM

## 2025-07-03 NOTE — PROGRESS NOTES
Biopsies benign  Colonoscopy recall 10 years Patient's IV removed with catheter intact. AVS reviewed with patient including medications, s/s to monitor for, and follow up appointments. Patient verbalized understanding.

## 2025-07-03 NOTE — PROGRESS NOTES
Anticoagulation Clinic Progress Note    Anticoagulation Summary  As of 7/3/2025      INR goal:  2.5-3.5   TTR:  76.2% (6.5 y)   INR used for dosin.60 (2025)   Warfarin maintenance plan:  10 mg every Mon, Wed, Fri; 8 mg all other days   Weekly warfarin total:  62 mg   No change documented:  Jeevan Davies, Pharmacy Technician   Plan last modified:  Heather Kline RPH (2025)   Next INR check:  2025   Priority:  High   Target end date:  Indefinite    Indications    History of mechanical aortic valve replacement [Z95.2]  Long term (current) use of anticoagulants [Z79.01]                 Anticoagulation Episode Summary       INR check location:  Home Draw    Preferred lab:  --    Send INR reminders to:   [facility]  [facility]    Comments:  **[facility]**          Anticoagulation Care Providers       Provider Role Specialty Phone number    Lorne Kam MD Referring Cardiology [phone]            Clinic Interview:  No pertinent clinical findings have been reported.    INR History:      2025     3:33 PM 2025    12:00 AM 2025     1:58 PM 2025    12:00 AM 2025     3:21 PM 2025    12:00 AM 7/3/2025     7:56 AM   Anticoagulation Monitoring   INR 2.60  2.80  2.60  2.60   INR Date 2025   INR Goal 2.5-3.5  2.5-3.5  2.5-3.5  2.5-3.5   Trend Same  Same  Same  Same   Last Week Total 62 mg  62 mg  62 mg  62 mg   Next Week Total 62 mg  62 mg  62 mg  62 mg   Sun 8 mg  8 mg  8 mg  8 mg   Mon 10 mg  10 mg  10 mg  10 mg   Tue 8 mg  8 mg  8 mg  8 mg   Wed 10 mg  10 mg  10 mg  -   Thu 8 mg  8 mg  8 mg  8 mg   Fri 10 mg  10 mg  10 mg  10 mg   Sat 8 mg  8 mg  8 mg  8 mg   Historical INR  2.80      2.60      2.60            This result is from an external source.       Plan:  1. INR is therapeutic today- see above in Anticoagulation Summary.    Man Ramsey to continue their warfarin regimen- see above in Anticoagulation  Summary.  2. Follow up in 1 week  3. Pt has agreed to only be called if INR out of range. They have been instructed to call if any changes in medications, doses, concerns, etc. Patient expresses understanding and has no further questions at this time.    Jeevan Davies, Pharmacy Technician

## 2025-07-11 ENCOUNTER — ANTICOAGULATION VISIT (OUTPATIENT)
Dept: PHARMACY | Facility: HOSPITAL | Age: 45
End: 2025-07-11
Payer: COMMERCIAL

## 2025-07-11 DIAGNOSIS — Z95.2 HISTORY OF MECHANICAL AORTIC VALVE REPLACEMENT: Primary | ICD-10-CM

## 2025-07-11 DIAGNOSIS — Z79.01 LONG TERM (CURRENT) USE OF ANTICOAGULANTS: ICD-10-CM

## 2025-07-11 LAB — INR PPP: 2.5

## 2025-07-11 NOTE — PROGRESS NOTES
Anticoagulation Clinic Progress Note    Anticoagulation Summary  As of 2025      INR goal:  2.5-3.5   TTR:  76.3% (6.6 y)   INR used for dosin.50 (2025)   Warfarin maintenance plan:  10 mg every Mon, Wed, Fri; 8 mg all other days   Weekly warfarin total:  62 mg   No change documented:  Sun Curtis   Plan last modified:  Heather Kline RPH (2025)   Next INR check:  2025   Priority:  High   Target end date:  Indefinite    Indications    History of mechanical aortic valve replacement [Z95.2]  Long term (current) use of anticoagulants [Z79.01]                 Anticoagulation Episode Summary       INR check location:  Home Draw    Preferred lab:  --    Send INR reminders to:   [facility]    Comments:  **[facility]**          Anticoagulation Care Providers       Provider Role Specialty Phone number    Lorne Kam MD Referring Cardiology [phone]            Clinic Interview:  No pertinent clinical findings have been reported.    INR History:      2025     1:58 PM 2025    12:00 AM 2025     3:21 PM 2025    12:00 AM 7/3/2025     7:56 AM 2025    12:00 AM 2025    11:26 AM   Anticoagulation Monitoring   INR 2.80  2.60  2.60  2.50   INR Date 2025   INR Goal 2.5-3.5  2.5-3.5  2.5-3.5  2.5-3.5   Trend Same  Same  Same  Same   Last Week Total 62 mg  62 mg  62 mg  62 mg   Next Week Total 62 mg  62 mg  62 mg  62 mg   Sun 8 mg  8 mg  8 mg  8 mg   Mon 10 mg  10 mg  10 mg  10 mg   Tue 8 mg  8 mg  8 mg  8 mg   Wed 10 mg  10 mg  -  10 mg   Thu 8 mg  8 mg  8 mg  8 mg   Fri 10 mg  10 mg  10 mg  10 mg   Sat 8 mg  8 mg  8 mg  8 mg   Historical INR  2.60      2.60      2.50            This result is from an external source.       Plan:  1. INR is therapeutic today- see above in Anticoagulation Summary.    Man Ramsey to continue their warfarin regimen- see above in Anticoagulation Summary.  2.  Follow up in 2 weeks  3. Pt has agreed to only be called if INR out of range. They have been instructed to call if any changes in medications, doses, concerns, etc. Patient expresses understanding and has no further questions at this time.    Sun Curtis

## 2025-07-16 LAB — INR PPP: 2.7

## 2025-07-17 ENCOUNTER — ANTICOAGULATION VISIT (OUTPATIENT)
Dept: PHARMACY | Facility: HOSPITAL | Age: 45
End: 2025-07-17
Payer: COMMERCIAL

## 2025-07-17 DIAGNOSIS — Z95.2 HISTORY OF MECHANICAL AORTIC VALVE REPLACEMENT: Primary | ICD-10-CM

## 2025-07-17 DIAGNOSIS — Z79.01 LONG TERM (CURRENT) USE OF ANTICOAGULANTS: ICD-10-CM

## 2025-07-17 NOTE — PROGRESS NOTES
Anticoagulation Clinic Progress Note    Anticoagulation Summary  As of 2025      INR goal:  2.5-3.5   TTR:  76.4% (6.6 y)   INR used for dosin.70 (2025)   Warfarin maintenance plan:  10 mg every Mon, Wed, Fri; 8 mg all other days   Weekly warfarin total:  62 mg   Plan last modified:  Heather Kline RPH (2025)   Next INR check:  --   Priority:  High   Target end date:  Indefinite    Indications    History of mechanical aortic valve replacement [Z95.2]  Long term (current) use of anticoagulants [Z79.01]                 Anticoagulation Episode Summary       INR check location:  Home Draw    Preferred lab:  --    Send INR reminders to:   [facility]  [facility]    Comments:  [facility]**          Anticoagulation Care Providers       Provider Role Specialty Phone number    Lorne Kam MD Referring Cardiology [phone]            Clinic Interview:  No pertinent clinical findings have been reported.    INR History:      2025     3:21 PM 2025    12:00 AM 7/3/2025     7:56 AM 2025    12:00 AM 2025    11:26 AM 2025    12:00 AM 2025     8:43 AM   Anticoagulation Monitoring   INR 2.60  2.60  2.50  2.70   INR Date 2025   INR Goal 2.5-3.5  2.5-3.5  2.5-3.5  2.5-3.5   Trend Same  Same  Same  Same   Last Week Total 62 mg  62 mg  62 mg  62 mg   Next Week Total 62 mg  62 mg  62 mg  62 mg   Sun 8 mg  8 mg  8 mg  8 mg   Mon 10 mg  10 mg  10 mg  10 mg   Tue 8 mg  8 mg  8 mg  8 mg   Wed 10 mg  -  10 mg  10 mg   Thu 8 mg  8 mg  8 mg  8 mg   Fri 10 mg  10 mg  10 mg  10 mg   Sat 8 mg  8 mg  8 mg  8 mg   Historical INR  2.60      2.50      2.70            This result is from an external source.       Plan:  1. INR is therapeutic today- see above in Anticoagulation Summary.    Man Ramsey to continue their warfarin regimen- see above in Anticoagulation Summary.  2. Follow up in 1 week  3. Pt has agreed to only be called if INR  out of range. They have been instructed to call if any changes in medications, doses, concerns, etc. Patient expresses understanding and has no further questions at this time.    Sun Curtis

## 2025-07-24 ENCOUNTER — ANTICOAGULATION VISIT (OUTPATIENT)
Dept: PHARMACY | Facility: HOSPITAL | Age: 45
End: 2025-07-24
Payer: COMMERCIAL

## 2025-07-24 DIAGNOSIS — Z79.01 LONG TERM (CURRENT) USE OF ANTICOAGULANTS: ICD-10-CM

## 2025-07-24 DIAGNOSIS — Z95.2 HISTORY OF MECHANICAL AORTIC VALVE REPLACEMENT: Primary | ICD-10-CM

## 2025-07-24 LAB — INR PPP: 3

## 2025-07-24 NOTE — PROGRESS NOTES
Anticoagulation Clinic Progress Note    Anticoagulation Summary  As of 7/24/2025      INR goal:  2.5-3.5   TTR:  76.5% (6.6 y)   INR used for dosing:  3.00 (7/24/2025)   Warfarin maintenance plan:  10 mg every Mon, Wed, Fri; 8 mg all other days   Weekly warfarin total:  62 mg   No change documented:  Sun Curtis   Plan last modified:  Heather Kline RPH (5/28/2025)   Next INR check:  8/7/2025   Priority:  High   Target end date:  Indefinite    Indications    History of mechanical aortic valve replacement [Z95.2]  Long term (current) use of anticoagulants [Z79.01]                 Anticoagulation Episode Summary       INR check location:  Home Draw    Preferred lab:  --    Send INR reminders to:   [facility]  [facility]    Comments:  [facility]**          Anticoagulation Care Providers       Provider Role Specialty Phone number    Lorne Kam MD Referring Cardiology [phone]            Clinic Interview:  No pertinent clinical findings have been reported.    INR History:      7/3/2025     7:56 AM 7/11/2025    12:00 AM 7/11/2025    11:26 AM 7/16/2025    12:00 AM 7/17/2025     8:43 AM 7/24/2025    12:00 AM 7/24/2025     3:47 PM   Anticoagulation Monitoring   INR 2.60  2.50  2.70  3.00   INR Date 7/2/2025 7/11/2025 7/16/2025 7/24/2025   INR Goal 2.5-3.5  2.5-3.5  2.5-3.5  2.5-3.5   Trend Same  Same  Same  Same   Last Week Total 62 mg  62 mg  62 mg  62 mg   Next Week Total 62 mg  62 mg  62 mg  62 mg   Sun 8 mg  8 mg  8 mg  8 mg   Mon 10 mg  10 mg  10 mg  10 mg   Tue 8 mg  8 mg  8 mg  8 mg   Wed -  10 mg  -  10 mg   Thu 8 mg  8 mg  8 mg  8 mg   Fri 10 mg  10 mg  10 mg  10 mg   Sat 8 mg  8 mg  8 mg  8 mg   Historical INR  2.50      2.70      3.00            This result is from an external source.       Plan:  1. INR is therapeutic today- see above in Anticoagulation Summary.    Man Ramsey to continue their warfarin regimen- see above in Anticoagulation Summary.  2. Follow up  in 2 weeks  3. Pt has agreed to only be called if INR out of range. They have been instructed to call if any changes in medications, doses, concerns, etc. Patient expresses understanding and has no further questions at this time.    Sun Curtis

## 2025-08-03 LAB — INR PPP: 3.2

## 2025-08-04 ENCOUNTER — ANTICOAGULATION VISIT (OUTPATIENT)
Dept: PHARMACY | Facility: HOSPITAL | Age: 45
End: 2025-08-04
Payer: COMMERCIAL

## 2025-08-04 DIAGNOSIS — Z79.01 LONG TERM (CURRENT) USE OF ANTICOAGULANTS: ICD-10-CM

## 2025-08-04 DIAGNOSIS — Z95.2 HISTORY OF MECHANICAL AORTIC VALVE REPLACEMENT: Primary | ICD-10-CM

## 2025-08-05 RX ORDER — SERTRALINE HYDROCHLORIDE 100 MG/1
100 TABLET, FILM COATED ORAL DAILY
Qty: 30 TABLET | Refills: 0 | Status: SHIPPED | OUTPATIENT
Start: 2025-08-05

## 2025-08-15 ENCOUNTER — ANTICOAGULATION VISIT (OUTPATIENT)
Dept: PHARMACY | Facility: HOSPITAL | Age: 45
End: 2025-08-15
Payer: COMMERCIAL

## 2025-08-15 DIAGNOSIS — Z95.2 HISTORY OF MECHANICAL AORTIC VALVE REPLACEMENT: Primary | ICD-10-CM

## 2025-08-15 DIAGNOSIS — Z79.01 LONG TERM (CURRENT) USE OF ANTICOAGULANTS: ICD-10-CM

## 2025-08-15 LAB — INR PPP: 2.7

## 2025-08-21 RX ORDER — WARFARIN SODIUM 2 MG/1
TABLET ORAL
Qty: 400 TABLET | Refills: 0 | Status: SHIPPED | OUTPATIENT
Start: 2025-08-21

## 2025-08-22 ENCOUNTER — ANTICOAGULATION VISIT (OUTPATIENT)
Dept: PHARMACY | Facility: HOSPITAL | Age: 45
End: 2025-08-22
Payer: COMMERCIAL

## 2025-08-22 DIAGNOSIS — Z95.2 HISTORY OF MECHANICAL AORTIC VALVE REPLACEMENT: Primary | ICD-10-CM

## 2025-08-22 DIAGNOSIS — Z79.01 LONG TERM (CURRENT) USE OF ANTICOAGULANTS: ICD-10-CM

## 2025-08-22 LAB — INR PPP: 2.8

## 2025-08-27 ENCOUNTER — ANTICOAGULATION VISIT (OUTPATIENT)
Dept: PHARMACY | Facility: HOSPITAL | Age: 45
End: 2025-08-27
Payer: COMMERCIAL

## 2025-08-27 DIAGNOSIS — Z95.2 HISTORY OF MECHANICAL AORTIC VALVE REPLACEMENT: Primary | ICD-10-CM

## 2025-08-27 DIAGNOSIS — Z79.01 LONG TERM (CURRENT) USE OF ANTICOAGULANTS: ICD-10-CM

## 2025-08-27 LAB — INR PPP: 2.7

## (undated) DEVICE — THE TORRENT IRRIGATION SCOPE CONNECTOR IS USED WITH THE TORRENT IRRIGATION TUBING TO PROVIDE IRRIGATION FLUIDS SUCH AS STERILE WATER DURING GASTROINTESTINAL ENDOSCOPIC PROCEDURES WHEN USED IN CONJUNCTION WITH AN IRRIGATION PUMP (OR ELECTROSURGICAL UNIT).: Brand: TORRENT

## (undated) DEVICE — CANN NASL CO2 TRULINK W/O2 A/

## (undated) DEVICE — TUBING, SUCTION, 1/4" X 10', STRAIGHT: Brand: MEDLINE

## (undated) DEVICE — KT VLV BIOGUARD SXN BIOP AIR/H20 CONN 4PC DISP

## (undated) DEVICE — SENSR O2 OXIMAX FNGR A/ 18IN NONSTR

## (undated) DEVICE — SINGLE-USE BIOPSY FORCEPS: Brand: RADIAL JAW 4